# Patient Record
Sex: FEMALE | Race: BLACK OR AFRICAN AMERICAN | NOT HISPANIC OR LATINO | Employment: OTHER | URBAN - METROPOLITAN AREA
[De-identification: names, ages, dates, MRNs, and addresses within clinical notes are randomized per-mention and may not be internally consistent; named-entity substitution may affect disease eponyms.]

---

## 2017-01-03 ENCOUNTER — OFFICE VISIT (OUTPATIENT)
Dept: FAMILY MEDICINE | Facility: CLINIC | Age: 67
End: 2017-01-03
Payer: COMMERCIAL

## 2017-01-03 VITALS
BODY MASS INDEX: 46.1 KG/M2 | SYSTOLIC BLOOD PRESSURE: 108 MMHG | WEIGHT: 276.69 LBS | DIASTOLIC BLOOD PRESSURE: 79 MMHG | TEMPERATURE: 99 F | HEART RATE: 88 BPM | HEIGHT: 65 IN

## 2017-01-03 DIAGNOSIS — Z00.00 ANNUAL PHYSICAL EXAM: Primary | ICD-10-CM

## 2017-01-03 DIAGNOSIS — I10 ESSENTIAL HYPERTENSION: ICD-10-CM

## 2017-01-03 DIAGNOSIS — D32.0 MENINGIOMA, RECURRENT OF BRAIN: ICD-10-CM

## 2017-01-03 LAB
BILIRUB UR QL STRIP: NEGATIVE
CLARITY UR REFRACT.AUTO: CLEAR
COLOR UR AUTO: YELLOW
GLUCOSE UR QL STRIP: NEGATIVE
HGB UR QL STRIP: ABNORMAL
KETONES UR QL STRIP: NEGATIVE
LEUKOCYTE ESTERASE UR QL STRIP: NEGATIVE
MICROSCOPIC COMMENT: NORMAL
NITRITE UR QL STRIP: NEGATIVE
PH UR STRIP: 6 [PH] (ref 5–8)
PROT UR QL STRIP: NEGATIVE
RBC #/AREA URNS AUTO: 1 /HPF (ref 0–4)
SP GR UR STRIP: 1.01 (ref 1–1.03)
SQUAMOUS #/AREA URNS AUTO: 2 /HPF
URN SPEC COLLECT METH UR: ABNORMAL
UROBILINOGEN UR STRIP-ACNC: NEGATIVE EU/DL
WBC #/AREA URNS AUTO: 1 /HPF (ref 0–5)

## 2017-01-03 PROCEDURE — 99999 PR PBB SHADOW E&M-EST. PATIENT-LVL III: CPT | Mod: PBBFAC,,, | Performed by: FAMILY MEDICINE

## 2017-01-03 PROCEDURE — 99396 PREV VISIT EST AGE 40-64: CPT | Mod: S$GLB,,, | Performed by: FAMILY MEDICINE

## 2017-01-03 PROCEDURE — 81001 URINALYSIS AUTO W/SCOPE: CPT

## 2017-01-03 PROCEDURE — 3074F SYST BP LT 130 MM HG: CPT | Mod: S$GLB,,, | Performed by: FAMILY MEDICINE

## 2017-01-03 PROCEDURE — 3078F DIAST BP <80 MM HG: CPT | Mod: S$GLB,,, | Performed by: FAMILY MEDICINE

## 2017-01-03 RX ORDER — HYDROCHLOROTHIAZIDE 25 MG/1
25 TABLET ORAL DAILY
Qty: 90 TABLET | Refills: 3 | Status: SHIPPED | OUTPATIENT
Start: 2017-01-03 | End: 2017-03-28 | Stop reason: SDUPTHER

## 2017-01-03 RX ORDER — VALSARTAN 80 MG/1
80 TABLET ORAL DAILY
Qty: 90 TABLET | Refills: 3 | Status: SHIPPED | OUTPATIENT
Start: 2017-01-03 | End: 2018-01-14 | Stop reason: SDUPTHER

## 2017-01-03 NOTE — PROGRESS NOTES
Subjective:       Patient ID: Katelyn Huynh is a 66 y.o. female.    Chief Complaint: Annual Exam    HPI Comments: Disclaimer: This note has been generated using voice-recognition software. There may be typographical errors that have been missed during proof-reading    Here for routine exam, last exam one year ago  Last colonoscopy 2011, normal  Immunizations:  Will need Pneumovax at age 67  Prior history of anosmia secondary to meningioma.  Has f/u appt with MRI in July of this year  Normal recent mammogram  Recurrent pain, left knee, considering left knee replacement in the next 2 months    Review of Systems   Constitutional: Negative.  Negative for activity change, appetite change, chills, diaphoresis, fatigue, fever and unexpected weight change.   HENT: Negative.  Negative for congestion, ear pain, hearing loss, rhinorrhea, sinus pressure, sore throat, tinnitus, trouble swallowing and voice change.         History of anosmia     Eyes: Negative.  Negative for photophobia, pain and visual disturbance.   Respiratory: Negative.  Negative for cough, chest tightness and shortness of breath.    Cardiovascular: Negative.  Negative for chest pain, palpitations and leg swelling.   Gastrointestinal: Negative.  Negative for abdominal distention, abdominal pain, blood in stool, constipation, diarrhea, nausea and vomiting.   Genitourinary: Negative.  Negative for difficulty urinating, dysuria, frequency and hematuria.   Musculoskeletal: Positive for arthralgias. Negative for back pain, joint swelling, neck pain and neck stiffness.   Skin: Negative for color change, pallor and rash.   Neurological: Negative.  Negative for dizziness, tremors, speech difficulty, weakness, light-headedness, numbness and headaches.   Hematological: Negative for adenopathy. Does not bruise/bleed easily.   Psychiatric/Behavioral: Negative for agitation, confusion, dysphoric mood, hallucinations and sleep disturbance. The patient is not  nervous/anxious.        Objective:      Physical Exam   Constitutional: She is oriented to person, place, and time. She appears well-developed and well-nourished.   Obese, NAD   HENT:   Right Ear: Tympanic membrane and external ear normal.   Left Ear: Tympanic membrane and external ear normal.   Nose: Nose normal.   Mouth/Throat: Oropharynx is clear and moist.   Eyes: Conjunctivae and EOM are normal. Pupils are equal, round, and reactive to light.   Neck: Normal range of motion. Neck supple. No tracheal deviation present. No thyromegaly present.   Cardiovascular: Normal rate, regular rhythm, normal heart sounds and intact distal pulses.    Pulmonary/Chest: Effort normal. She has no wheezes. She has no rales. She exhibits no tenderness.   Abdominal: Soft. Bowel sounds are normal. She exhibits no distension and no mass. There is no rebound.   Musculoskeletal: Normal range of motion. She exhibits tenderness. She exhibits no edema.   Tender along medial, lateral joint lines left knee   Lymphadenopathy:     She has no cervical adenopathy.   Neurological: She is alert and oriented to person, place, and time. She has normal reflexes. No cranial nerve deficit. Coordination normal.   Skin: Skin is warm and dry. No rash noted. No erythema.   Psychiatric: She has a normal mood and affect. Her behavior is normal.       Assessment:       1.  Physical exam  2.  Essential hypertension  3.  DJD, left knee  4.  Meningioma  5.  obesity  Plan:       1.  Labs reviewed with pt  2.  Refill medications, UA, EKG  3.  Return in 2-3 months for Pneumovax

## 2017-01-03 NOTE — MR AVS SNAPSHOT
[facility]  [address] [facility], [address]  [facility] [address]  Phone: [phone]  Fax: [phone]                  Katelyn Huynh   1/3/2017 2:00 PM   Office Visit    Description:  Female : 1950   Provider:  Daniel Garcia MD   Department:  [facility]           Reason for Visit     Annual Exam           Diagnoses this Visit        Comments    Annual physical exam    -  Primary     Essential hypertension         Meningioma, recurrent of brain                To Do List           Goals (5 Years of Data)     None       These Medications        Disp Refills Start End    valsartan (DIOVAN) 80 MG tablet 90 tablet 3 1/3/2017     Take 1 tablet (80 mg total) by mouth once daily. - Oral    Pharmacy: [facility] [address] [place] [address] AT [facility] Ph #: [phone]       hydrochlorothiazide (HYDRODIURIL) 25 MG tablet 90 tablet 3 1/3/2017     Take 1 tablet (25 mg total) by mouth once daily. - Oral    Pharmacy: [facility] [address] [place] [address] AT [facility] Ph #: [phone]         [facility]     [facility] -  Assistance  Registered nurses in the [facility] provide clinical advisement, health education, appointment booking, and other advisory services.  Call for this free service at [phone].             Medications           Message regarding Medications     Verify the changes and/or additions to your medication regime listed below are the same as discussed with your clinician today.  If any of these changes or additions are incorrect, please notify your healthcare provider.        CHANGE how you are taking these medications     Start Taking Instead of    valsartan (DIOVAN) 80 MG tablet valsartan (DIOVAN) 80 MG tablet    Dosage:  Take 1 tablet (80 mg total) by mouth once daily. Dosage:  TAKE 1 TABLET BY  "MOUTH ONCE DAILY    Reason for Change:  Reorder     hydrochlorothiazide (HYDRODIURIL) 25 MG tablet hydrochlorothiazide (HYDRODIURIL) 25 MG tablet    Dosage:  Take 1 tablet (25 mg total) by mouth once daily. Dosage:  TAKE 1 TABLET BY MOUTH ONCE DAILY    Reason for Change:  Reorder            Verify that the below list of medications is an accurate representation of the medications you are currently taking.  If none reported, the list may be blank. If incorrect, please contact your healthcare provider. Carry this list with you in case of emergency.           Current Medications     ASCORBATE CALCIUM (VITAMIN C ORAL) Take by mouth.    hydrochlorothiazide (HYDRODIURIL) 25 MG tablet Take 1 tablet (25 mg total) by mouth once daily.    meloxicam (MOBIC) 15 MG tablet Take 1 tablet (15 mg total) by mouth once daily.    pimecrolimus (ELIDEL) 1 % cream Apply topically 2 (two) times daily.    tramadol (ULTRAM) 50 mg tablet TAKE 1 TABLET BY MOUTH EVERY 4 TO 6 HOURS AS NEEDED FOR PAIN    valsartan (DIOVAN) 80 MG tablet Take 1 tablet (80 mg total) by mouth once daily.           Clinical Reference Information           Vital Signs - Last Recorded  Most recent update: 1/3/2017  2:02 PM by Winifred Beckford MA    BP Pulse Temp Ht Wt BMI    108/79 (BP Location: Left arm, Patient Position: Sitting, BP Method: Automatic) 88 98.5 °F (36.9 °C) (Oral) 5' 5" (1.651 m) 125.5 kg (276 lb 10.8 oz) 46.04 kg/m2      Blood Pressure          Most Recent Value    BP  108/79      Allergies as of 1/3/2017     Cortisone      Immunizations Administered on Date of Encounter - 1/3/2017     None      Orders Placed During Today's Visit      Normal Orders This Visit    URINALYSIS     Future Labs/Procedures Expected by Expires    EKG 12-lead  As directed 1/3/2018      Instructions    Call after your next birthday for Pneumonia vaccine       "

## 2017-03-21 ENCOUNTER — OFFICE VISIT (OUTPATIENT)
Dept: FAMILY MEDICINE | Facility: CLINIC | Age: 67
End: 2017-03-21
Payer: COMMERCIAL

## 2017-03-21 VITALS
HEIGHT: 64 IN | WEIGHT: 272.94 LBS | TEMPERATURE: 98 F | HEART RATE: 68 BPM | SYSTOLIC BLOOD PRESSURE: 136 MMHG | DIASTOLIC BLOOD PRESSURE: 84 MMHG | BODY MASS INDEX: 46.6 KG/M2

## 2017-03-21 DIAGNOSIS — I87.2 VENOUS STASIS DERMATITIS OF BOTH LOWER EXTREMITIES: Primary | ICD-10-CM

## 2017-03-21 PROCEDURE — 3079F DIAST BP 80-89 MM HG: CPT | Mod: S$GLB,,, | Performed by: FAMILY MEDICINE

## 2017-03-21 PROCEDURE — 3075F SYST BP GE 130 - 139MM HG: CPT | Mod: S$GLB,,, | Performed by: FAMILY MEDICINE

## 2017-03-21 PROCEDURE — 99214 OFFICE O/P EST MOD 30 MIN: CPT | Mod: S$GLB,,, | Performed by: FAMILY MEDICINE

## 2017-03-21 PROCEDURE — 99999 PR PBB SHADOW E&M-EST. PATIENT-LVL III: CPT | Mod: PBBFAC,,, | Performed by: FAMILY MEDICINE

## 2017-03-21 PROCEDURE — 1160F RVW MEDS BY RX/DR IN RCRD: CPT | Mod: S$GLB,,, | Performed by: FAMILY MEDICINE

## 2017-03-21 RX ORDER — TRIAMCINOLONE ACETONIDE 1 MG/G
CREAM TOPICAL 3 TIMES DAILY
Qty: 80 G | Refills: 3 | Status: SHIPPED | OUTPATIENT
Start: 2017-03-21 | End: 2017-03-28 | Stop reason: SDUPTHER

## 2017-03-21 RX ORDER — CEPHALEXIN 500 MG/1
500 CAPSULE ORAL EVERY 8 HOURS
Qty: 30 CAPSULE | Refills: 0 | Status: SHIPPED | OUTPATIENT
Start: 2017-03-21 | End: 2017-03-31

## 2017-03-21 NOTE — PROGRESS NOTES
Disclaimer: This note has been generated using voice-recognition software. There may be typographical errors that have been missed during proof-reading      Patient is 66-year-old presents today for evaluation of a recurrent pruritic rash, involving the lower extremities, present for over 2 weeks.  She denies any changes in soaps, detergents or lotions.

## 2017-03-21 NOTE — PROGRESS NOTES
Subjective:       Patient ID: Katelyn Huynh is a 66 y.o. female.    Chief Complaint: Rash (Both legs)    HPI Comments: Disclaimer: This note has been generated using voice-recognition software. There may be typographical errors that have been missed during proof-reading      Patient is 66-year-old presents today for evaluation of a recurrent pruritic rash, involving the lower extremities, present for over 2 weeks.  She denies any changes in soaps, detergents or lotions.      Rash       Review of Systems   Skin: Positive for color change and rash. Negative for wound.       Objective:      Physical Exam   Musculoskeletal: She exhibits edema.   1+ edema bilaterally   Skin:   There is bilateral erythema and some induration involving the lower extremities, overlying areas of varicosities.  No ulcerations noted.       Assessment:       1. Venous stasis dermatitis of both lower extremities        Plan:       1.  TAC 0.1% topically; she will call if any allergic symptoms occur  2.  Keflex tid  3.  Increase HCTZ to 50mg daily  4.  F/u one week

## 2017-03-28 ENCOUNTER — OFFICE VISIT (OUTPATIENT)
Dept: FAMILY MEDICINE | Facility: CLINIC | Age: 67
End: 2017-03-28
Payer: COMMERCIAL

## 2017-03-28 VITALS
TEMPERATURE: 98 F | WEIGHT: 276.25 LBS | DIASTOLIC BLOOD PRESSURE: 70 MMHG | SYSTOLIC BLOOD PRESSURE: 128 MMHG | BODY MASS INDEX: 46.02 KG/M2 | HEART RATE: 100 BPM | HEIGHT: 65 IN

## 2017-03-28 DIAGNOSIS — I87.2 VENOUS STASIS DERMATITIS OF BOTH LOWER EXTREMITIES: ICD-10-CM

## 2017-03-28 PROCEDURE — 1160F RVW MEDS BY RX/DR IN RCRD: CPT | Mod: S$GLB,,, | Performed by: FAMILY MEDICINE

## 2017-03-28 PROCEDURE — 99999 PR PBB SHADOW E&M-EST. PATIENT-LVL III: CPT | Mod: PBBFAC,,, | Performed by: FAMILY MEDICINE

## 2017-03-28 PROCEDURE — 3078F DIAST BP <80 MM HG: CPT | Mod: S$GLB,,, | Performed by: FAMILY MEDICINE

## 2017-03-28 PROCEDURE — 3074F SYST BP LT 130 MM HG: CPT | Mod: S$GLB,,, | Performed by: FAMILY MEDICINE

## 2017-03-28 PROCEDURE — 99213 OFFICE O/P EST LOW 20 MIN: CPT | Mod: S$GLB,,, | Performed by: FAMILY MEDICINE

## 2017-03-28 RX ORDER — TRIAMCINOLONE ACETONIDE 1 MG/G
CREAM TOPICAL 3 TIMES DAILY
Qty: 80 G | Refills: 3 | Status: SHIPPED | OUTPATIENT
Start: 2017-03-28 | End: 2017-04-07

## 2017-03-28 RX ORDER — HYDROCHLOROTHIAZIDE 25 MG/1
50 TABLET ORAL DAILY
Qty: 180 TABLET | Refills: 3 | Status: ON HOLD | OUTPATIENT
Start: 2017-03-28 | End: 2018-03-13

## 2017-03-28 NOTE — PROGRESS NOTES
Subjective:       Patient ID: Katelyn Huynh is a 66 y.o. female.    Chief Complaint: Hypertension (follow up )    HPI Comments: Disclaimer: This note has been generated using voice-recognition software. There may be typographical errors that have been missed during proof-reading    Patient is 66-year-old presents today for follow-up of stasis dermatitis with superimposed infection.  Since last seen, she is tolerating her medications with no specific side effects, and has noted marked improvement of symptoms.  She denies any further swelling, pruritus, or discomfort.    Hypertension       Review of Systems   Cardiovascular: Negative for leg swelling.   Skin: Negative for color change, rash and wound.       Objective:      Physical Exam   Musculoskeletal: She exhibits no edema.   Skin:   Bilateral superficial varicosities over both lower extremities, mostly involving the medial aspect of the legs.  There is no erythema, or induration noted today.  No open sores or lesions.       Assessment:       1. Venous stasis dermatitis of both lower extremities        Plan:     1.  Continue and finish present medications 2.  F/u 2-3 months

## 2017-06-07 ENCOUNTER — OFFICE VISIT (OUTPATIENT)
Dept: OPHTHALMOLOGY | Facility: CLINIC | Age: 67
End: 2017-06-07
Payer: COMMERCIAL

## 2017-06-07 DIAGNOSIS — H52.7 REFRACTIVE ERROR: ICD-10-CM

## 2017-06-07 DIAGNOSIS — H26.492 PCO (POSTERIOR CAPSULAR OPACIFICATION), LEFT: Primary | ICD-10-CM

## 2017-06-07 DIAGNOSIS — I10 ESSENTIAL HYPERTENSION: ICD-10-CM

## 2017-06-07 DIAGNOSIS — H04.123 DRY EYE SYNDROME, BILATERAL: ICD-10-CM

## 2017-06-07 DIAGNOSIS — Z96.1 PSEUDOPHAKIA: ICD-10-CM

## 2017-06-07 PROCEDURE — 99999 PR PBB SHADOW E&M-EST. PATIENT-LVL II: CPT | Mod: PBBFAC,,, | Performed by: OPHTHALMOLOGY

## 2017-06-07 PROCEDURE — 92014 COMPRE OPH EXAM EST PT 1/>: CPT | Mod: S$GLB,,, | Performed by: OPHTHALMOLOGY

## 2017-06-07 NOTE — PROGRESS NOTES
Subjective:       Patient ID: Katelyn Huynh is a 66 y.o. female.    Chief Complaint: Dry Eye (KIRILL, bilateral)    HPI  Review of Systems    Objective:      Physical Exam    Assessment:       1. PCO (posterior capsular opacification), left    2. Dry eye syndrome, bilateral    3. Essential hypertension    4. Refractive error - Both Eyes    5. Pseudophakia - Both Eyes        Plan:       Early PCO OS- Not Visually Significant.     KIRILL-Needs more AT's.  HTN-No retinopathy OU.  RE-Pt wants MRx.        AT's.  Control HTN.  Give MRx.  RTC 1 yr.

## 2017-06-28 ENCOUNTER — OFFICE VISIT (OUTPATIENT)
Dept: RADIATION ONCOLOGY | Facility: CLINIC | Age: 67
End: 2017-06-28
Payer: COMMERCIAL

## 2017-06-28 ENCOUNTER — HOSPITAL ENCOUNTER (OUTPATIENT)
Dept: RADIOLOGY | Facility: HOSPITAL | Age: 67
Discharge: HOME | End: 2017-06-28
Attending: RADIOLOGY
Payer: COMMERCIAL

## 2017-06-28 VITALS
RESPIRATION RATE: 20 BRPM | TEMPERATURE: 98 F | BODY MASS INDEX: 47.86 KG/M2 | HEART RATE: 97 BPM | WEIGHT: 287.63 LBS | SYSTOLIC BLOOD PRESSURE: 135 MMHG | DIASTOLIC BLOOD PRESSURE: 80 MMHG

## 2017-06-28 DIAGNOSIS — Z86.018 HISTORY OF MENINGIOMA: Primary | Chronic | ICD-10-CM

## 2017-06-28 DIAGNOSIS — Z86.018 HISTORY OF MENINGIOMA: Chronic | ICD-10-CM

## 2017-06-28 PROCEDURE — A9585 GADOBUTROL INJECTION: HCPCS | Performed by: RADIOLOGY

## 2017-06-28 PROCEDURE — 99999 PR PBB SHADOW E&M-EST. PATIENT-LVL III: CPT | Mod: PBBFAC,,, | Performed by: RADIOLOGY

## 2017-06-28 PROCEDURE — 70553 MRI BRAIN STEM W/O & W/DYE: CPT | Mod: 26,,, | Performed by: RADIOLOGY

## 2017-06-28 PROCEDURE — 99212 OFFICE O/P EST SF 10 MIN: CPT | Mod: S$GLB,,, | Performed by: RADIOLOGY

## 2017-06-28 PROCEDURE — 70553 MRI BRAIN STEM W/O & W/DYE: CPT | Mod: TC

## 2017-06-28 PROCEDURE — 25500020 PHARM REV CODE 255: Performed by: RADIOLOGY

## 2017-06-28 RX ORDER — GADOBUTROL 604.72 MG/ML
10 INJECTION INTRAVENOUS
Status: COMPLETED | OUTPATIENT
Start: 2017-06-28 | End: 2017-06-28

## 2017-06-28 RX ADMIN — GADOBUTROL 10 ML: 604.72 INJECTION INTRAVENOUS at 08:06

## 2017-06-28 NOTE — PROGRESS NOTES
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     She comes today after MRI scan. This shows persistent meningioma on the floor the anterior cranial fossa  There is no significant change since prior MRI of 1/13/16.     Patient relates that she continues to have no sense of smell. She has no significant headaches and generally feels well.     She recently saw Dr Kaiser in opthalmology clinic for dry eye syndrome. She has no double vision. She notes occasional floaters and has had a change in the refraction of her spectacles.      PHYSICAL EXAMINATION: The EOM are full.  There are no evident neurologic deficits.     IMPRESSION: Stable residual meningioma of the planum sphenoidale after resection and radiation treatment.     PLAN: Patient to return in 18 months with MRI of the brain. She is followed at yearly intervals in opthalmology clinic.

## 2017-08-07 ENCOUNTER — TELEPHONE (OUTPATIENT)
Dept: DERMATOLOGY | Facility: CLINIC | Age: 67
End: 2017-08-07

## 2017-08-07 NOTE — TELEPHONE ENCOUNTER
----- Message from Raquel Hines sent at 8/7/2017  9:00 AM CDT -----  Contact: patient  Please call above patient said she has a rash on back now spreading to her face need to gat in ASAP waiting on a call from the nurse thanks

## 2017-08-09 ENCOUNTER — OFFICE VISIT (OUTPATIENT)
Dept: FAMILY MEDICINE | Facility: CLINIC | Age: 67
End: 2017-08-09
Payer: COMMERCIAL

## 2017-08-09 VITALS
SYSTOLIC BLOOD PRESSURE: 116 MMHG | HEART RATE: 98 BPM | DIASTOLIC BLOOD PRESSURE: 93 MMHG | WEIGHT: 287.94 LBS | TEMPERATURE: 99 F | HEIGHT: 64 IN | BODY MASS INDEX: 49.16 KG/M2

## 2017-08-09 DIAGNOSIS — Z29.9 PREVENTIVE MEASURE: ICD-10-CM

## 2017-08-09 DIAGNOSIS — L25.9 CONTACT DERMATITIS, UNSPECIFIED CONTACT DERMATITIS TYPE, UNSPECIFIED TRIGGER: Primary | ICD-10-CM

## 2017-08-09 PROCEDURE — 90471 IMMUNIZATION ADMIN: CPT | Mod: S$GLB,,, | Performed by: FAMILY MEDICINE

## 2017-08-09 PROCEDURE — 3008F BODY MASS INDEX DOCD: CPT | Mod: S$GLB,,, | Performed by: FAMILY MEDICINE

## 2017-08-09 PROCEDURE — 99213 OFFICE O/P EST LOW 20 MIN: CPT | Mod: 25,S$GLB,, | Performed by: FAMILY MEDICINE

## 2017-08-09 PROCEDURE — 99999 PR PBB SHADOW E&M-EST. PATIENT-LVL III: CPT | Mod: PBBFAC,,, | Performed by: FAMILY MEDICINE

## 2017-08-09 PROCEDURE — 90732 PPSV23 VACC 2 YRS+ SUBQ/IM: CPT | Mod: S$GLB,,, | Performed by: FAMILY MEDICINE

## 2017-08-09 PROCEDURE — 3074F SYST BP LT 130 MM HG: CPT | Mod: S$GLB,,, | Performed by: FAMILY MEDICINE

## 2017-08-09 PROCEDURE — 3080F DIAST BP >= 90 MM HG: CPT | Mod: S$GLB,,, | Performed by: FAMILY MEDICINE

## 2017-08-09 RX ORDER — BETAMETHASONE DIPROPIONATE 0.5 MG/G
CREAM TOPICAL 2 TIMES DAILY
Qty: 45 G | Refills: 1 | Status: ON HOLD | OUTPATIENT
Start: 2017-08-09 | End: 2018-02-28

## 2017-08-09 RX ORDER — HYDROXYZINE HYDROCHLORIDE 25 MG/1
25 TABLET, FILM COATED ORAL 3 TIMES DAILY PRN
Qty: 30 TABLET | Refills: 0 | Status: ON HOLD | OUTPATIENT
Start: 2017-08-09 | End: 2018-02-09 | Stop reason: HOSPADM

## 2017-08-09 NOTE — PROGRESS NOTES
Pneumovax administered to Left deltoid as per MD orders, patient tolerated well advise patient to wait 15min after shot is given for any adverse reaction.

## 2017-08-09 NOTE — PROGRESS NOTES
Subjective:       Patient ID: Katelyn Huynh is a 67 y.o. female.    Chief Complaint: Rash (mid-back and face)    Disclaimer: This note has been generated using voice-recognition software. There may be typographical errors that have been missed during proof-reading    68 yo presents for evaluation of pruritic rash, involving the mid upper back.  Rash started about 5 days ago.  Denies any changes in shampoo, soaps detergents or lotions      Review of Systems   Skin: Positive for color change and rash.       Objective:      Physical Exam   Skin: Rash noted.        Confluent erythematous rash, mary shaped, starting at nape of neck and involving mid upper back       Assessment:       1. Contact dermatitis, unspecified contact dermatitis type, unspecified trigger    2. Preventive measure        Plan:       1.  Diprolene cream topically, Atarax  2.  Stop all hair preparations for now

## 2017-12-11 ENCOUNTER — TELEPHONE (OUTPATIENT)
Dept: FAMILY MEDICINE | Facility: CLINIC | Age: 67
End: 2017-12-11

## 2017-12-11 RX ORDER — BENZONATATE 200 MG/1
200 CAPSULE ORAL 3 TIMES DAILY PRN
Qty: 30 CAPSULE | Refills: 0 | Status: SHIPPED | OUTPATIENT
Start: 2017-12-11 | End: 2017-12-18

## 2017-12-11 NOTE — TELEPHONE ENCOUNTER
----- Message from Tasia Liriano sent at 12/11/2017 11:01 AM CST -----  Contact: self   Patient would like to get medical advice.  Symptoms (please be specific):   A lot of Coughing, stuffy  How long has patient had these symptoms:  2 days   Pharmacy name and phone #:  [facility] [id number] [phone] (Phone)  [phone] (Fax)  Any drug allergies:  None   Comments:

## 2017-12-11 NOTE — TELEPHONE ENCOUNTER
I sent in a prescription for her cought.  She is allergic to steroids, so cannot send anything for the nasal congestion

## 2017-12-11 NOTE — TELEPHONE ENCOUNTER
Patient reports non-productive cough and stuffy nose x 2 days. Verbalizes that she is unable to clear passages when blowing nose. Patient is taking Robitussin DM. Patient requesting advise.

## 2018-01-08 ENCOUNTER — HOSPITAL ENCOUNTER (OUTPATIENT)
Dept: RADIOLOGY | Facility: HOSPITAL | Age: 68
Discharge: HOME | End: 2018-01-08
Attending: ORTHOPAEDIC SURGERY
Payer: COMMERCIAL

## 2018-01-08 ENCOUNTER — OFFICE VISIT (OUTPATIENT)
Dept: ORTHOPEDICS | Facility: CLINIC | Age: 68
End: 2018-01-08
Payer: COMMERCIAL

## 2018-01-08 VITALS — BODY MASS INDEX: 49.26 KG/M2 | WEIGHT: 288.56 LBS | HEIGHT: 64 IN

## 2018-01-08 DIAGNOSIS — M17.12 PRIMARY OSTEOARTHRITIS OF LEFT KNEE: ICD-10-CM

## 2018-01-08 DIAGNOSIS — M17.12 PRIMARY OSTEOARTHRITIS OF LEFT KNEE: Primary | ICD-10-CM

## 2018-01-08 PROCEDURE — 99999 PR PBB SHADOW E&M-EST. PATIENT-LVL III: CPT | Mod: PBBFAC,,, | Performed by: ORTHOPAEDIC SURGERY

## 2018-01-08 PROCEDURE — 73565 X-RAY EXAM OF KNEES: CPT | Mod: 26,,, | Performed by: RADIOLOGY

## 2018-01-08 PROCEDURE — 73565 X-RAY EXAM OF KNEES: CPT | Mod: TC

## 2018-01-08 PROCEDURE — 99214 OFFICE O/P EST MOD 30 MIN: CPT | Mod: S$GLB,,, | Performed by: ORTHOPAEDIC SURGERY

## 2018-01-08 RX ORDER — ACETAMINOPHEN 500 MG
1000 TABLET ORAL EVERY 6 HOURS
Status: CANCELLED | OUTPATIENT
Start: 2018-01-08 | End: 2018-01-10

## 2018-01-08 RX ORDER — MORPHINE SULFATE 10 MG/ML
2 INJECTION, SOLUTION INTRAMUSCULAR; INTRAVENOUS
Status: CANCELLED | OUTPATIENT
Start: 2018-01-08

## 2018-01-08 RX ORDER — SODIUM CHLORIDE 9 MG/ML
INJECTION, SOLUTION INTRAVENOUS CONTINUOUS
Status: CANCELLED | OUTPATIENT
Start: 2018-01-08 | End: 2018-01-09

## 2018-01-08 RX ORDER — BISACODYL 10 MG
10 SUPPOSITORY, RECTAL RECTAL EVERY 12 HOURS PRN
Status: CANCELLED | OUTPATIENT
Start: 2018-01-08

## 2018-01-08 RX ORDER — SODIUM CHLORIDE 9 MG/ML
INJECTION, SOLUTION INTRAVENOUS
Status: CANCELLED | OUTPATIENT
Start: 2018-01-08

## 2018-01-08 RX ORDER — CELECOXIB 100 MG/1
200 CAPSULE ORAL DAILY
Status: CANCELLED | OUTPATIENT
Start: 2018-01-08

## 2018-01-08 RX ORDER — LIDOCAINE HYDROCHLORIDE 10 MG/ML
1 INJECTION, SOLUTION EPIDURAL; INFILTRATION; INTRACAUDAL; PERINEURAL
Status: CANCELLED | OUTPATIENT
Start: 2018-01-08

## 2018-01-08 RX ORDER — OXYCODONE HYDROCHLORIDE 5 MG/1
15 TABLET ORAL
Status: CANCELLED | OUTPATIENT
Start: 2018-01-08

## 2018-01-08 RX ORDER — ACETAMINOPHEN 10 MG/ML
1000 INJECTION, SOLUTION INTRAVENOUS ONCE
Status: CANCELLED | OUTPATIENT
Start: 2018-01-08 | End: 2018-01-08

## 2018-01-08 RX ORDER — OXYCODONE HYDROCHLORIDE 5 MG/1
5 TABLET ORAL
Status: CANCELLED | OUTPATIENT
Start: 2018-01-08

## 2018-01-08 RX ORDER — MIDAZOLAM HYDROCHLORIDE 5 MG/ML
1 INJECTION INTRAMUSCULAR; INTRAVENOUS EVERY 5 MIN PRN
Status: CANCELLED | OUTPATIENT
Start: 2018-01-08

## 2018-01-08 RX ORDER — PREGABALIN 25 MG/1
150 CAPSULE ORAL NIGHTLY
Status: CANCELLED | OUTPATIENT
Start: 2018-01-08

## 2018-01-08 RX ORDER — MUPIROCIN 20 MG/G
1 OINTMENT TOPICAL
Status: CANCELLED | OUTPATIENT
Start: 2018-01-08

## 2018-01-08 RX ORDER — CELECOXIB 100 MG/1
400 CAPSULE ORAL
Status: CANCELLED | OUTPATIENT
Start: 2018-01-08

## 2018-01-08 RX ORDER — POLYETHYLENE GLYCOL 3350 17 G/17G
17 POWDER, FOR SOLUTION ORAL DAILY
Status: CANCELLED | OUTPATIENT
Start: 2018-01-08

## 2018-01-08 RX ORDER — AMOXICILLIN 250 MG
1 CAPSULE ORAL 2 TIMES DAILY
Status: CANCELLED | OUTPATIENT
Start: 2018-01-08

## 2018-01-08 RX ORDER — RAMELTEON 8 MG/1
8 TABLET ORAL NIGHTLY PRN
Status: CANCELLED | OUTPATIENT
Start: 2018-01-08

## 2018-01-08 RX ORDER — FENTANYL CITRATE 50 UG/ML
25 INJECTION, SOLUTION INTRAMUSCULAR; INTRAVENOUS EVERY 5 MIN PRN
Status: CANCELLED | OUTPATIENT
Start: 2018-01-08

## 2018-01-08 RX ORDER — ROPIVACAINE HYDROCHLORIDE 2 MG/ML
8 INJECTION, SOLUTION EPIDURAL; INFILTRATION; PERINEURAL CONTINUOUS
Status: CANCELLED | OUTPATIENT
Start: 2018-01-08

## 2018-01-08 RX ORDER — PREGABALIN 25 MG/1
150 CAPSULE ORAL
Status: CANCELLED | OUTPATIENT
Start: 2018-01-08

## 2018-01-08 RX ORDER — NALOXONE HCL 0.4 MG/ML
0.02 VIAL (ML) INJECTION
Status: CANCELLED | OUTPATIENT
Start: 2018-01-08

## 2018-01-08 RX ORDER — TRIAMCINOLONE ACETONIDE 1 MG/G
CREAM TOPICAL
Refills: 2 | Status: ON HOLD | COMMUNITY
Start: 2017-11-02 | End: 2018-03-13 | Stop reason: HOSPADM

## 2018-01-08 RX ORDER — SODIUM CHLORIDE 0.9 % (FLUSH) 0.9 %
3 SYRINGE (ML) INJECTION EVERY 8 HOURS PRN
Status: CANCELLED | OUTPATIENT
Start: 2018-01-08

## 2018-01-08 RX ORDER — FAMOTIDINE 20 MG/1
20 TABLET, FILM COATED ORAL 2 TIMES DAILY
Status: CANCELLED | OUTPATIENT
Start: 2018-01-08

## 2018-01-08 RX ORDER — OXYCODONE HYDROCHLORIDE 5 MG/1
10 TABLET ORAL
Status: CANCELLED | OUTPATIENT
Start: 2018-01-08

## 2018-01-08 RX ORDER — ASPIRIN 325 MG
325 TABLET, DELAYED RELEASE (ENTERIC COATED) ORAL 2 TIMES DAILY
Status: CANCELLED | OUTPATIENT
Start: 2018-01-08

## 2018-01-08 RX ORDER — ONDANSETRON 2 MG/ML
4 INJECTION INTRAMUSCULAR; INTRAVENOUS EVERY 12 HOURS PRN
Status: CANCELLED | OUTPATIENT
Start: 2018-01-08

## 2018-01-08 NOTE — PROGRESS NOTES
CHIEF COMPLAINT: Left knee pain.                                                          HISTORY OF PRESENT ILLNESS:  The patient is a 67 y.o. female known to this clinic who presents for evaluation of her left knee pain. She had a right TKA on 2/24/2015 which has done well. She is here today to discuss TKA for her left knee. She has had cortisone injections which were not helpful. The last one was about a year ago.     Pain Duration: 2 years  Pain Quality: achy  Pain Context:worsening  Pain Timing: constant  Pain Location:medial  Pain Severity: severe  Modifying Factors: improves with rest, ice, and elevation and ibuprofen, although she has stopped taking this due to stomach pain; the pain is worse with walking longer distances  Associated Signs and Symptoms: none    She  presents for further treatment recommendations.    She denies radicular pain or low back pain.  She denies distal paresthesias, lower extremity edema, or calf pain concerning for vascular claudication.  She has no history of discreet prior trauma.                                                                                                                       PAST MEDICAL HISTORY:    Past Medical History:   Diagnosis Date    Arthritis     Cataract     Glaucoma     History of iritis     OD    Hypertension     Meningioma     Uveitis                                                                                                             PAST SURGICAL HISTORY:    Past Surgical History:   Procedure Laterality Date    Brain tumor surgery      had a brain tumor removed    CATARACT EXTRACTION      OU    CATARACT EXTRACTION BILATERAL W/ ANTERIOR VITRECTOMY      CRANIOTOMY      KNEE SURGERY  2-24-15    right TKR    YAG Right 1/20/2016    Dr. Kaiser                                                                                                               SOCIAL HISTORY:  Reviewed per EPIC history for tobacco or alcohol use and  she   is an active  67 y.o.  female                                                                             FAMILY MEDICAL HISTORY:  family history includes Cataracts in her maternal aunt, mother, and sister; Diabetes in her father, maternal aunt, mother, and sister; Glaucoma in her brother, maternal aunt, maternal uncle, and mother; Hypertension in her father and mother; Stroke in her sister.                                                                                                                                                        PHYSICAL EXAMINATION:                                                        GENERAL:  A well-developed, well-nourished 67 y.o. female who is alert and oriented in no acute distress.      Gait: She  walks with a normal gait.                   EXTREMITIES:  Examination of lower extremities reveals there is no visible mass or deformity.    Left knee:  ROM 0-115    Ligamentously stable to varus/valgus stress.  Lateral crepitation    Anterior and posterior drawers negative.    No pain over pes bursa.    No warmth    No erythema    Effusion No    Positive medial and lateral joint line tenderness    Right knee:  ROM 0-120    Ligamentously stable to varus/valgus stress.    Anterior and posterior drawers negative.    No pain over pes bursa.    No warmth    No erythema    Effusion No    Well-healed surgical scar    The skin over both lower extremities is normal and unremarkable.  She has a  painless range of motion of the hips and ankles bilaterally.   Sensation is intact in both lower extremities.    There are no motor deficits in the lower extremities bilaterally.   Pedal pulses are palpable distally bilaterally.    She has no calf tenderness to palpation nor edema.    HEENT: normocephalic and atraumatic, EOMI, sclera white.  Oropharanx unremarkable  Heart: RRR without appreciable mumrur  Lungs: CTA  Abdomen: soft, non-tender                                    She has imaging which  was reviewed with the patient and shows severe osteoarthritis.  Radiographs show advanced DJD with joint space narrowing, sclerosis, and osteophytes.                                                                              IMPRESSION: Osteoarthritis left knee.                             The conservative options including NSAIDs, activity modification, physical therapy, corticosteroid injection, and viscosupplimentation were discussed.    The surgical process of knee replacement was discussed in detail with the patient including a detailed discussion of the procedure itself (including visual model, x-ray review, and literature review). The typical perioperative and post-operative course was discussed and perioperative risks were discussed to the patient's satisfaction.  Risks and complications discussed included but were not limited to the risks of anesthetic complications, infection, bleeding, wound healing complications, aseptic loosening, instability, limb length inequality, neurologic dysfunction including numbness,  DVT, pulmonary embolism, perioperative medical risks (cardiac, pulmonary, renal, neurologic), and death and the patient elects to proceed.   I have discussed anticoagulation with aspirin and coumadin and in low risk patients I have recommended aspirin twice a day. We will initiate pre-operative medical evaluation and clearance and set a provisional date for surgical intervention according to the patient's schedule on 2/9/18.     I have personally interviewed and evaluated the patient.  I have reviewed the resident physician's note and I concur with the findings.

## 2018-01-08 NOTE — H&P
CHIEF COMPLAINT: Left knee pain.                                                          HISTORY OF PRESENT ILLNESS:  The patient is a 67 y.o. female known to this clinic who presents for evaluation of her left knee pain. She had a right TKA on 2/24/2015 which has done well. She is here today to discuss TKA for her left knee. She has had cortisone injections which were not helpful. The last one was about a year ago.     Pain Duration: 2 years  Pain Quality: achy  Pain Context:worsening  Pain Timing: constant  Pain Location:medial  Pain Severity: severe  Modifying Factors: improves with rest, ice, and elevation and ibuprofen, although she has stopped taking this due to stomach pain; the pain is worse with walking longer distances  Associated Signs and Symptoms: none    She  presents for further treatment recommendations.    She denies radicular pain or low back pain.  She denies distal paresthesias, lower extremity edema, or calf pain concerning for vascular claudication.  She has no history of discreet prior trauma.                                                                                                                       PAST MEDICAL HISTORY:    Past Medical History:   Diagnosis Date    Arthritis     Cataract     Glaucoma     History of iritis     OD    Hypertension     Meningioma     Uveitis                                                                                                             PAST SURGICAL HISTORY:    Past Surgical History:   Procedure Laterality Date    Brain tumor surgery      had a brain tumor removed    CATARACT EXTRACTION      OU    CATARACT EXTRACTION BILATERAL W/ ANTERIOR VITRECTOMY      CRANIOTOMY      KNEE SURGERY  2-24-15    right TKR    YAG Right 1/20/2016    Dr. Kaiser                                                                                                               SOCIAL HISTORY:  Reviewed per EPIC history for tobacco or alcohol use and  she   is an active  67 y.o.  female                                                                             FAMILY MEDICAL HISTORY:  family history includes Cataracts in her maternal aunt, mother, and sister; Diabetes in her father, maternal aunt, mother, and sister; Glaucoma in her brother, maternal aunt, maternal uncle, and mother; Hypertension in her father and mother; Stroke in her sister.                                                                                                                                                        PHYSICAL EXAMINATION:                                                        GENERAL:  A well-developed, well-nourished 67 y.o. female who is alert and oriented in no acute distress.      Gait: She  walks with a normal gait.                   EXTREMITIES:  Examination of lower extremities reveals there is no visible mass or deformity.    Left knee:  ROM 0-115    Ligamentously stable to varus/valgus stress.  Lateral crepitation    Anterior and posterior drawers negative.    No pain over pes bursa.    No warmth    No erythema    Effusion No    Positive medial and lateral joint line tenderness    Right knee:  ROM 0-120    Ligamentously stable to varus/valgus stress.    Anterior and posterior drawers negative.    No pain over pes bursa.    No warmth    No erythema    Effusion No    Well-healed surgical scar    The skin over both lower extremities is normal and unremarkable.  She has a  painless range of motion of the hips and ankles bilaterally.   Sensation is intact in both lower extremities.    There are no motor deficits in the lower extremities bilaterally.   Pedal pulses are palpable distally bilaterally.    She has no calf tenderness to palpation nor edema.    HEENT: normocephalic and atraumatic, EOMI, sclera white.  Oropharanx unremarkable  Heart: RRR without appreciable mumrur  Lungs: CTA  Abdomen: soft, non-tender                                    She has imaging which  was reviewed with the patient and shows severe osteoarthritis.  Radiographs show advanced DJD with joint space narrowing, sclerosis, and osteophytes.                                                                              IMPRESSION: Osteoarthritis left knee.                             The conservative options including NSAIDs, activity modification, physical therapy, corticosteroid injection, and viscosupplimentation were discussed.    The surgical process of knee replacement was discussed in detail with the patient including a detailed discussion of the procedure itself (including visual model, x-ray review, and literature review). The typical perioperative and post-operative course was discussed and perioperative risks were discussed to the patient's satisfaction.  Risks and complications discussed included but were not limited to the risks of anesthetic complications, infection, bleeding, wound healing complications, aseptic loosening, instability, limb length inequality, neurologic dysfunction including numbness,  DVT, pulmonary embolism, perioperative medical risks (cardiac, pulmonary, renal, neurologic), and death and the patient elects to proceed.   I have discussed anticoagulation with aspirin and coumadin and in low risk patients I have recommended aspirin twice a day. We will initiate pre-operative medical evaluation and clearance and set a provisional date for surgical intervention according to the patient's schedule on 2/9/18.

## 2018-01-14 RX ORDER — VALSARTAN 80 MG/1
TABLET ORAL
Qty: 90 TABLET | Refills: 0 | Status: SHIPPED | OUTPATIENT
Start: 2018-01-14 | End: 2018-06-11 | Stop reason: SDUPTHER

## 2018-01-18 ENCOUNTER — ANESTHESIA EVENT (OUTPATIENT)
Dept: SURGERY | Facility: HOSPITAL | Age: 68
DRG: 470 | End: 2018-01-18
Payer: COMMERCIAL

## 2018-01-18 DIAGNOSIS — M79.606 PAIN OF LOWER EXTREMITY, UNSPECIFIED LATERALITY: Primary | ICD-10-CM

## 2018-01-18 NOTE — ANESTHESIA PREPROCEDURE EVALUATION
Anesthesia Assessment: Preoperative EQUATION    Planned Procedure: Procedure(s) (LRB):  REPLACEMENT-KNEE-TOTAL (Left)  Requested Anesthesia Type:Regional  Surgeon: Jasen Salinas MD  Service: Orthopedics  Known or anticipated Date of Surgery:2/9/2018    Plan:    Testing:  Hematology Profile, BMP and PT/INR   Pre-anesthesia  visit       Visit focus: possible regional anesthesia and/or nerve block      Consultation:IM Perioperative Hospitalist     Fara Hammonds RN 01/18/2018 01/18/2018  Katelyn Huynh is a 67 y.o., female.    Anesthesia Evaluation    I have reviewed the Patient Summary Reports.    I have reviewed the Nursing Notes.   I have reviewed the Medications.     Review of Systems  Anesthesia Hx:  Hx of Anesthetic complications   S/p R-TKR  2/2015 d/c pod #2    Additional Notes:  Difficult secondary to morbid obesity and poor landmarks.  Midline approach unsuccessfully attempted initially. R-paramedian successful History of prior surgery of interest to airway management or planning: Denies Family Hx of Anesthesia complications.   Denies Personal Hx of Anesthesia complications.   Social:  Former Smoker, Alcohol Use 1-2 times a month   Hematology/Oncology:  Hematology Normal   Oncology Normal     EENT/Dental:  EENT/Dental Normal Eyes: Eye Disease: Inflammatory Eye Disease: Iritis  resolved   Cardiovascular:   Hypertension ECG has been reviewed. Housework, cook, grocery shopping. Can climb a flight of stairs. Denies chest pain or sob   Pulmonary:  Possible Obstructive Sleep Apnea , (STOP/BANG) Symptoms B - BMI > 35, A - Age > 50, N - Neck circumference > 40 cms, P - Pressure being treated for high BP, S - Snoring (loud) and O - Observed interrupted breathing during sleep   HIGH RISK FOR EMY   Renal/:  Renal/ Normal     Hepatic/GI:   GERD, well controlled Denies Liver Disease.     Musculoskeletal:   Arthritis   Musculoskeletal General/Symptoms: Functional capacity is ambulatory with cane.  Joint Disease:  Arthritis, Osteoarthritis    Neurological:   Denies CVA. Denies Seizures.  Pain , onset is chronic , location of knee , quality of aching/dull, throbbing , severity is a 8 , precipitating factors are sitting too long or walks too long , alleviating factors are recline with legs elevated; heat;ibuprofen. Osteoarthritis  Brain Tumor, Meningioma (s/p craniotomy) Patient reports loss sense of smell and taste since brain surgery   Endocrine:  Endocrine Normal    Dermatological:  Skin Normal    Psych:  Psychiatric Normal           Physical Exam  General:  Morbid Obesity    Airway/Jaw/Neck:  Airway Findings: Mouth Opening: Normal Tongue: Normal  General Airway Assessment: Adult  Mallampati: III  Improves to II with phonation.  TM Distance: Normal, at least 6 cm  Jaw/Neck Findings:  Neck ROM: Normal ROM      Dental:  Dental Findings: (several missing teeth upper and lower. 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     Fara Hammonds, RN 1/25/18          Anesthesia Plan  Type of Anesthesia, risks & benefits discussed:  Anesthesia Type:  spinal, CSE  Patient's Preference:   Intra-op Monitoring Plan: standard ASA monitors  Intra-op Monitoring Plan Comments:   Post Op Pain Control Plan: multimodal analgesia, peripheral nerve block and IV/PO Opioids PRN  Post Op Pain Control Plan Comments:   Induction:   IV  Beta Blocker:  Patient is not currently on a Beta-Blocker (No further documentation required).       Informed Consent: Patient  understands risks and agrees with Anesthesia plan.  Questions answered. Anesthesia consent signed with patient.  ASA Score: 2     Day of Surgery Review of History & Physical:    H&P update referred to the surgeon.         Ready For Surgery From Anesthesia Perspective.

## 2018-01-18 NOTE — PRE ADMISSION SCREENING
Anesthesia Assessment: Preoperative EQUATION    Planned Procedure: Procedure(s) (LRB):  REPLACEMENT-KNEE-TOTAL (Left)  Requested Anesthesia Type:Regional  Surgeon: Jasen Salinas MD  Service: Orthopedics  Known or anticipated Date of Surgery:2/9/2018    Plan:    Testing:  Hematology Profile, BMP and PT/INR   Pre-anesthesia  visit       Visit focus: possible regional anesthesia and/or nerve block      Consultation: Perioperative Hospitalist     Fara Hammonds RN 01/18/2018

## 2018-01-24 ENCOUNTER — TELEPHONE (OUTPATIENT)
Dept: FAMILY MEDICINE | Facility: CLINIC | Age: 68
End: 2018-01-24

## 2018-01-24 DIAGNOSIS — Z12.31 ENCOUNTER FOR SCREENING MAMMOGRAM FOR MALIGNANT NEOPLASM OF BREAST: Primary | ICD-10-CM

## 2018-01-24 NOTE — TELEPHONE ENCOUNTER
Patient requesting a mammogram order to have done at [facility]. Mammogram ordered.

## 2018-01-24 NOTE — TELEPHONE ENCOUNTER
----- Message from Yohan Martin sent at 1/24/2018  9:17 AM CST -----  Contact: self/[phone]  Pt is calling to get a referral from the doctor to have a mammogram done [facility] . Please call and advise.    Thank You

## 2018-01-25 ENCOUNTER — INITIAL CONSULT (OUTPATIENT)
Dept: INTERNAL MEDICINE | Facility: CLINIC | Age: 68
End: 2018-01-25
Payer: COMMERCIAL

## 2018-01-25 ENCOUNTER — OFFICE VISIT (OUTPATIENT)
Dept: ORTHOPEDICS | Facility: CLINIC | Age: 68
End: 2018-01-25
Payer: MEDICARE

## 2018-01-25 ENCOUNTER — HOSPITAL ENCOUNTER (OUTPATIENT)
Dept: PREADMISSION TESTING | Facility: HOSPITAL | Age: 68
Discharge: HOME | End: 2018-01-25
Attending: ANESTHESIOLOGY
Payer: MEDICARE

## 2018-01-25 VITALS
DIASTOLIC BLOOD PRESSURE: 70 MMHG | HEIGHT: 65 IN | TEMPERATURE: 98 F | SYSTOLIC BLOOD PRESSURE: 156 MMHG | HEART RATE: 80 BPM | OXYGEN SATURATION: 97 % | WEIGHT: 293 LBS | BODY MASS INDEX: 48.82 KG/M2

## 2018-01-25 VITALS
BODY MASS INDEX: 50.02 KG/M2 | HEART RATE: 97 BPM | SYSTOLIC BLOOD PRESSURE: 156 MMHG | WEIGHT: 293 LBS | HEIGHT: 64 IN | DIASTOLIC BLOOD PRESSURE: 70 MMHG

## 2018-01-25 DIAGNOSIS — Z86.018 HISTORY OF MENINGIOMA: Chronic | ICD-10-CM

## 2018-01-25 DIAGNOSIS — Z96.659 STATUS POST KNEE REPLACEMENT, UNSPECIFIED LATERALITY: Primary | ICD-10-CM

## 2018-01-25 DIAGNOSIS — R60.9 EDEMA, UNSPECIFIED TYPE: ICD-10-CM

## 2018-01-25 DIAGNOSIS — I10 ESSENTIAL HYPERTENSION: ICD-10-CM

## 2018-01-25 DIAGNOSIS — Z87.898 HISTORY OF PREDIABETES: ICD-10-CM

## 2018-01-25 DIAGNOSIS — I83.93 VARICOSE VEINS OF BOTH LOWER EXTREMITIES: ICD-10-CM

## 2018-01-25 DIAGNOSIS — R73.03 PREDIABETES: ICD-10-CM

## 2018-01-25 DIAGNOSIS — E66.01 MORBID OBESITY: ICD-10-CM

## 2018-01-25 DIAGNOSIS — Z01.818 PREOP EXAMINATION: Primary | ICD-10-CM

## 2018-01-25 PROCEDURE — 99999 PR PBB SHADOW E&M-EST. PATIENT-LVL III: CPT | Mod: PBBFAC,,, | Performed by: HOSPITALIST

## 2018-01-25 PROCEDURE — 99244 OFF/OP CNSLTJ NEW/EST MOD 40: CPT | Mod: S$GLB,,, | Performed by: HOSPITALIST

## 2018-01-25 PROCEDURE — 99999 PR PBB SHADOW E&M-EST. PATIENT-LVL III: CPT | Mod: PBBFAC,,, | Performed by: NURSE PRACTITIONER

## 2018-01-25 PROCEDURE — 99499 UNLISTED E&M SERVICE: CPT | Mod: S$GLB,,, | Performed by: NURSE PRACTITIONER

## 2018-01-25 RX ORDER — IBUPROFEN 200 MG
200 TABLET ORAL
Status: ON HOLD | COMMUNITY
End: 2018-02-09 | Stop reason: HOSPADM

## 2018-01-25 NOTE — HPI
History of present illness- I had the pleasure of meeting this pleasant 67 y.o. lady in the pre op clinic prior to her elective Orthopedic surgery. The patient is new to me .     I have obtained the history by speaking to the patient and by reviewing the electronic health records.    Events leading up to surgery / History of presenting illness -    She has been troubled with severe  Left knee pain for 1 year . Pain increases with activity and decreases with Ibuprofen , elevating the leg on recliner helps and resting.    Relevant health conditions of significance for the perioperative period/ History of presenting illness -    Morbid obesity   Contributors- Soft drinks, Cookies, cakes, Off work for 1 year   Suggested weight loss    Hypertension ,On medication  Home  machine readings -  120-130/90- 80    Stable residual meningioma of the planum sphenoidale after resection and radiation treatment.     Under yearly MRI monitoring     History of prediabetes   Family history of Diabetes ( mother, father, sister)     Not known to have heart disease , Diabetes Mellitus, Lung disease , DVT,PE, sleep apnea, Fatty liver

## 2018-01-25 NOTE — PROGRESS NOTES
Katelyn Huynh is a 67 y.o. year old here today for a pre-operative visit in preparation for a Left total knee arthroplasty to be performed by  Dr. Salinas on 2/9/18.  she was last seen and treated in the clinic on 1/8/2018. she will be medically optimized by the pre op center. There has been no significant change in medical status since last visit. No fever, chills, malaise, or unexplained weight change.      Allergies, Medications, past medical and surgical history reviewed.    Focused examination performed.    Patient declined to see Dr. Salinas today in clinic. All questions answered. Patient encouraged to call with questions. Contact information given.     Pre, pj, and post operative procedures and expectations discussed. Questions were answered. Katelyn Huynh has been educated and is ready to proceed with surgery. Approximately 30 minutes was spent discussing surgical outcomes, plans, procedures pre, pj, and post operative expections and care.  Surgical consent signed.    Katelynrosalba Martines Lino will contact us if there are any questions, concerns, or changes in medical status prior to surgery.

## 2018-01-25 NOTE — PROGRESS NOTES
[facility] - Pre Op Consult  Progress Note    Patient Name: Katelyn Huynh  MRN: [id number]  Date of Evaluation- 01/25/2018  PCP- Daniel Garcia MD    Future cases for Katelyn Huynh [[id number]]     Case ID Status Date Time Madi Procedure Provider Location    [id number] [facility] 2/9/2018  8:50  REPLACEMENT-KNEE-TOTAL Jasen Salinas MD [[id number]] [facility] OR 2ND FLR      Left    HPI:  History of present illness- I had the pleasure of meeting this pleasant 67 y.o. lady in the pre op clinic prior to her elective Orthopedic surgery. The patient is new to me .     I have obtained the history by speaking to the patient and by reviewing the electronic health records.    Events leading up to surgery / History of presenting illness -    She has been troubled with severe  Left knee pain for 1 year . Pain increases with activity and decreases with Ibuprofen , elevating the leg on recliner helps and resting.    Relevant health conditions of significance for the perioperative period/ History of presenting illness -    Morbid obesity   Contributors- Soft drinks, Cookies, cakes, Off work for 1 year   Suggested weight loss    Hypertension ,On medication  Home  machine readings -  120-130/90- 80    Stable residual meningioma of the planum sphenoidale after resection and radiation treatment.     Under yearly MRI monitoring     History of prediabetes   Family history of Diabetes ( mother, father, sister)     Not known to have heart disease , Diabetes Mellitus, Lung disease , DVT,PE, sleep apnea, Fatty liver      Subjective/ Objective:          Chief complaint-Preoperative evaluation, Perioperative Medical management, complication reduction plan     Active cardiac conditions- none    Revised cardiac risk index predictors- none    Functional capacity -Examples of physical activity , takes care of her sister who has health problems , takes her to doctors, does grocery, can take a flight of stairs holding on to the railing-----  She can undertake all the above activities without  chest pain,chest tightness, Shortness of breath ,dizziness,lightheadedness making her exercise tolerance more  than 4 Mets.       Review of Systems   Constitutional: Negative for chills and fever.        Gained weight    HENT:        STOPBANG score 6/8  \Follow up suggested    Snoring - when very tired - 1-2 times a month   Witnessed stopping of breathing   Elevated BP  BMI> 35   Age over 50   Neck size over 40 CM     Eyes:        No new visual changes   Respiratory:        No cough , phlegm     Cardiovascular:        As noted   Gastrointestinal:        No overt GI/ blood losses  Bowel movements- Regular    Endocrine:        Prednisone use > 20 mg daily for 3 weeks- None   Genitourinary: Negative for dysuria.        No urinary hesitancy   Musculoskeletal:        As above      Neurological: Negative for syncope.        No unilateral weakness   Hematological:        Current use of Anticoagulants  Current use of Antiplatelet agents    None   Psychiatric/Behavioral:        Depression  Anxiety     None       No vascular stenting     Past Surgical History:   Procedure Laterality Date    Brain tumor surgery      had a brain tumor removed    CATARACT EXTRACTION      OU    CATARACT EXTRACTION BILATERAL W/ ANTERIOR VITRECTOMY      CRANIOTOMY      KNEE SURGERY  2-24-15    right TKR    YAG Right 1/20/2016    Dr. Kaiser       No anesthesia, bleeding, cardiac problems, PONV with previous surgeries/procedures.  FH- No anesthesia, thrombosis/ bleeding  , early onset heart disease in family   Medications and Allergies reviewed in epic.   Lives with  ,sister  Help available post op    Physical Exam  Physical Exam  Constitutional- Vitals - Body mass index is 49.58 kg/m²., There were no vitals filed for this visit.  General appearance-Conscious,Coherent  Eyes- No conjunctival icterus,pupils  round  and reactive to light   ENT-Oral cavity- moist  , Hearing grossly  "normal   Neck- No thyromegaly ,Trachea -central, No jugular venous distension,   No Carotid Bruit   Cardiovascular -Heart Sounds- Normal  and  no murmur   , No gallop rhythm   Respiratory - Normal Respiratory Effort, Normal breath sounds and  no wheeze    Peripheral pitting pedal edema-- mild,  varicose veins right lower extremity  and  varicose veins left lower extremity, no calf pain   Gastrointestinal -Soft abdomen, No palpable masses, Non Tender,Liver,Spleen not palpable. No-- free fluid and shifting dullness  Musculoskeletal- No finger Clubbing. Strength grossly normal   Lymphatic-No Palpable cervical, axillary,Inguinal lymphadenopathy   Psychiatric - normal effect,Orientation  Rt Dorsalis pedis pulses-palpable    Lt Dorsalis pedis pulses- palpable   Rt Posterior tibial pulses -palpable   Left posterior tibial pulses -palpable   Miscellaneous -  Surgical scarRt knee  and  no renal bruit  Height 5' 4.5" (1.638 m), weight 133.1 kg (293 lb 6.4 oz).      Investigations  Lab and Imaging have been reviewed in [facility].    Review of Medicine tests    EKG- I had independently reviewed the EKG from--1/26/2015   It was reported to be showing     Normal sinus rhythm  Normal ECG  When compared with ECG of 26-AUG-2014 08:50,  No significant change was found      Review of old records- Was done and information gathered regards to events leading to surgery and health conditions of significance in the perioperative period.        Preoperative cardiac risk assessment-  The patient does not have any active cardiac conditions . Revised cardiac risk index predictors-0 ---.Functional capacity is more than 4 Mets. She will be undergoing a Orthopedic procedure that carries a intermediate risk     The estimated risk of the rate of adverse cardiac outcomes  0.4%    No further cardiac work up is indicated prior to proceeding with the surgery     Orders Placed This Encounter    Hemoglobin A1c       American Society of Anesthesiologists " Physical status classification ( ASA ) class: 3     Postoperative pulmonary complication risk assessment:      ARISCAT ( [place]et) risk index- risk class -  Low, if duration of surgery is under 3 hours, intermediate, if duration of surgery is over 3 hours      American college of Surgeons, NSQUIP risk calculation     Risk of serous complication --5.3 % ( Average risk - 3.7 %), Risk of any Complication-6.1  % ( Average risk-- 4.3 %)    Assessment/Plan:     Essential hypertension  Hypertension-  Blood pressure is acceptable .I suggest holding HCTZ,Vlasartan on the morning of the surgery and can continue that  post operatively under blood pressure, electrolyte and renal function monitoring as long as they are acceptable.I suggest addressing pain control as uncontrolled pain can increased blood pressure     History of meningioma      Stable residual meningioma of the planum sphenoidale after resection and radiation treatment.     Under yearly MRI monitoring         Morbid obesity  Suggested weight loss  At this time , prefers loosing weight naturally versus bariatric surgery  Wants to join gym after recovering from knee replacement and plan on buying a treadmill    History of prediabetes  History of prediabetes   Family history of Diabetes ( mother, father, sister)  Will check A1c    Varicose veins of both lower extremities  Increased risk of thrombosis , compression stocking use discussed   Itching in the area from varicose veins    Edema  Edema- I suggested avoidance of added salt,avoidance of NSAID's, unless prescribed and suggested Limb elevation and lauryn hose use        Preventive perioperative care    Thromboembolic prophylaxis:  Her risk factors for thrombosis include morbid obesity, surgical procedure and age.I suggest  thromboembolic prophylaxis ( mechanical/pharmacological, weighing the risk benefits of pharmacological agent use considering pj procedural bleeding )  during the perioperative period.I suggested  "being active in the post operative period. The patient is a candidate for extended DVT prophylaxis     Postoperative pulmonary complication prophylaxis-Risk factors for post operative pulmonary complications include morbid obesity  age over 65 years and ASA class >2- I suggest incentive spirometry use, early ambulation and end tidal carbon dioxide monitoring  , oral care , head end of bed elevation     Renal complication prophylaxis-Risk factors for renal complications include hypertension . I suggest keeping her well hydrated .I suggested drinking 2 litre's of water a day      Surgical site Infection Prophylaxis-I  suggest appropriate antibiotic for Prophylaxis against Surgical site infections      In view of regional anesthesia, joint replacement  the patient  is at risk of postoperative urinary retention.  I suggest avoidance / minimizing the of  Benzodiazepines,Anticholinergic medication,antihistamines ( Benadryl) , if possible in the perioperative period. I suggest using the minimum possible use of opioids for the minimum period of time in the perioperative period. Benadryl avoidance suggested      This visit was focused on Preoperative evaluation, Perioperative Medical management, complication reduction plans. I suggest that the patient follows up with primary care or relevant sub specialists for ongoing health care.    I appreciate the opportunity to be involved in this patients care. Please feel free to contact me if there were any questions about this consultation.    Patient is optimized     Patient was instructed to call and update me about any changes to health, changes to medication, office visits , hospitalizations between now and surgery     Sheryl Albright MD  Perioperative Medicine  [facility]   Pager [phone]  ------    1/26-- 8 25     BP (!) 156/70 Comment: lt  Pulse 80   Temp 97.9 °F (36.6 °C) (Oral)   Ht 5' 4.5" (1.638 m)   Wt 133.1 kg (293 lb 6.4 oz)   SpO2 97%   " BMI 49.58 kg/m²   A1c - 5.7   Hb,HCT,PLT-,INR-N  BMP -BUN,Creat -N  Called to discuss labs-Improved a1c discussed   Call, if has any changes   -------    2/8- 8 28     Called to follow up   Unable to speak   Left a message to call, if needed

## 2018-01-25 NOTE — OUTPATIENT SUBJECTIVE & OBJECTIVE
Outpatient Subjective & Objective     Chief complaint-Preoperative evaluation, Perioperative Medical management, complication reduction plan     Active cardiac conditions- none    Revised cardiac risk index predictors- none    Functional capacity -Examples of physical activity , takes care of her sister who has health problems , takes her to doctors, does grocery, can take a flight of stairs holding on to the railing----- She can undertake all the above activities without  chest pain,chest tightness, Shortness of breath ,dizziness,lightheadedness making her exercise tolerance more  than 4 Mets.       Review of Systems   Constitutional: Negative for chills and fever.        Gained weight    HENT:        STOPBANG score 6/8  \Follow up suggested    Snoring - when very tired - 1-2 times a month   Witnessed stopping of breathing   Elevated BP  BMI> 35   Age over 50   Neck size over 40 CM     Eyes:        No new visual changes   Respiratory:        No cough , phlegm     Cardiovascular:        As noted   Gastrointestinal:        No overt GI/ blood losses  Bowel movements- Regular    Endocrine:        Prednisone use > 20 mg daily for 3 weeks- None   Genitourinary: Negative for dysuria.        No urinary hesitancy   Musculoskeletal:        As above      Neurological: Negative for syncope.        No unilateral weakness   Hematological:        Current use of Anticoagulants  Current use of Antiplatelet agents    None   Psychiatric/Behavioral:        Depression  Anxiety     None       No vascular stenting     Past Surgical History:   Procedure Laterality Date    Brain tumor surgery      had a brain tumor removed    CATARACT EXTRACTION      OU    CATARACT EXTRACTION BILATERAL W/ ANTERIOR VITRECTOMY      CRANIOTOMY      KNEE SURGERY  2-24-15    right TKR    YAG Right 1/20/2016    Dr. Kaiser       No anesthesia, bleeding, cardiac problems, PONV with previous surgeries/procedures.  FH- No anesthesia, thrombosis/ bleeding  " , early onset heart disease in family   Medications and Allergies reviewed in epic.   Lives with  ,sister  Help available post op    Physical Exam  Physical Exam  Constitutional- Vitals - Body mass index is 49.58 kg/m²., There were no vitals filed for this visit.  General appearance-Conscious,Coherent  Eyes- No conjunctival icterus,pupils  round  and reactive to light   ENT-Oral cavity- moist  , Hearing grossly normal   Neck- No thyromegaly ,Trachea -central, No jugular venous distension,   No Carotid Bruit   Cardiovascular -Heart Sounds- Normal  and  no murmur   , No gallop rhythm   Respiratory - Normal Respiratory Effort, Normal breath sounds and  no wheeze    Peripheral pitting pedal edema-- mild,  varicose veins right lower extremity  and  varicose veins left lower extremity, no calf pain   Gastrointestinal -Soft abdomen, No palpable masses, Non Tender,Liver,Spleen not palpable. No-- free fluid and shifting dullness  Musculoskeletal- No finger Clubbing. Strength grossly normal   Lymphatic-No Palpable cervical, axillary,Inguinal lymphadenopathy   Psychiatric - normal effect,Orientation  Rt Dorsalis pedis pulses-palpable    Lt Dorsalis pedis pulses- palpable   Rt Posterior tibial pulses -palpable   Left posterior tibial pulses -palpable   Miscellaneous -  Surgical scarRt knee  and  no renal bruit  Height 5' 4.5" (1.638 m), weight 133.1 kg (293 lb 6.4 oz).      Investigations  Lab and Imaging have been reviewed in [facility].    Review of Medicine tests    EKG- I had independently reviewed the EKG from--1/26/2015   It was reported to be showing     Normal sinus rhythm  Normal ECG  When compared with ECG of 26-AUG-2014 08:50,  No significant change was found      Review of old records- Was done and information gathered regards to events leading to surgery and health conditions of significance in the perioperative period.    Outpatient Subjective & Objective   "

## 2018-01-25 NOTE — H&P
CC: Left knee pain    Katelyn Huynh is a 67 y.o. female with a several history of Left knee pain. Pain is worse with activity and weight bearing.  Patient has experienced interference of activities of daily living due to decreased range of motion and an increase in joint pain and swelling.  Patient has failed non-operative treatment including NSAIDs, corticosteroid injections, viscosupplement injections, and activity modification.  Katelyn Huynh currently ambulates with a cane.     Past Medical History:   Diagnosis Date    Arthritis     Cataract     Glaucoma     History of iritis     OD    Hypertension     Meningioma     Uveitis        Past Surgical History:   Procedure Laterality Date    Brain tumor surgery      had a brain tumor removed    CATARACT EXTRACTION      OU    CATARACT EXTRACTION BILATERAL W/ ANTERIOR VITRECTOMY      CRANIOTOMY      KNEE SURGERY  2-24-15    right TKR    YAG Right 1/20/2016    Dr. Kaiser       Family History   Problem Relation Age of Onset    Glaucoma Mother     Hypertension Mother     Diabetes Mother     Cataracts Mother     Hypertension Father     Diabetes Father     Diabetes Sister     Cataracts Sister     Stroke Sister      minor stroke    Glaucoma Brother     Glaucoma Maternal Aunt     Diabetes Maternal Aunt     Cataracts Maternal Aunt     Glaucoma Maternal Uncle     Amblyopia Neg Hx     Blindness Neg Hx     Macular degeneration Neg Hx     Retinal detachment Neg Hx     Strabismus Neg Hx        Review of patient's allergies indicates:   Allergen Reactions    Cortisone Hives and Swelling     Swelling of the tongue         Current Outpatient Prescriptions:     ASCORBATE CALCIUM (VITAMIN C ORAL), Take by mouth once daily. , Disp: , Rfl:     hydrochlorothiazide (HYDRODIURIL) 25 MG tablet, Take 2 tablets (50 mg total) by mouth once daily. (Patient taking differently: Take 50 mg by mouth every morning. ), Disp: 180 tablet,  "Rfl: 3    hydrOXYzine HCl (ATARAX) 25 MG tablet, Take 1 tablet (25 mg total) by mouth 3 (three) times daily as needed for Itching., Disp: 30 tablet, Rfl: 0    ibuprofen (ADVIL,MOTRIN) 200 MG tablet, Take 200 mg by mouth as needed for Pain., Disp: , Rfl:     tramadol (ULTRAM) 50 mg tablet, TAKE 1 TABLET BY MOUTH EVERY 4 TO 6 HOURS AS NEEDED FOR PAIN, Disp: 60 tablet, Rfl: 0    triamcinolone acetonide 0.1% (KENALOG) 0.1 % cream, JOSE ELIAS EXT AA TID, Disp: , Rfl: 2    valsartan (DIOVAN) 80 MG tablet, TAKE 1 TABLET(80 MG) BY MOUTH EVERY DAY (Patient taking differently: TAKE 1 TABLET(80 MG) BY MOUTH EVERY DAY  am), Disp: 90 tablet, Rfl: 0    betamethasone dipropionate (DIPROLENE) 0.05 % cream, Apply topically 2 (two) times daily., Disp: 45 g, Rfl: 1    Review of Systems:  Constitutional: no fever or chills  Eyes: no visual changes  ENT: no nasal congestion or sore throat  Respiratory: no cough or shortness of breath  Cardiovascular: no chest pain or palpitations  Gastrointestinal: no nausea or vomiting, tolerating diet  Genitourinary: no hematuria or dysuria  Integument/Breast: no rash or pruritis  Hematologic/Lymphatic: no easy bruising or lymphadenopathy  Musculoskeletal: positive for c/o left knee pain worse with activity  Neurological: no seizures or tremors  Behavioral/Psych: no auditory or visual hallucinations  Endocrine: no heat or cold intolerance    PE:  BP (!) 156/70 (BP Location: Left arm, Patient Position: Sitting, BP Method: Large (Automatic))   Pulse 97   Ht 5' 4" (1.626 m)   Wt 133.1 kg (293 lb 6.2 oz)   BMI 50.36 kg/m²   General: Pleasant, cooperative, NAD   Gait: antalgic  HEENT: NCAT, sclera nonicteric   Lungs: Respirations clear bilaterally; equal and unlabored.   CV: S1S2; 2+ bilateral upper and lower extremity pulses.   Skin: Intact throughout with no rashes, erythema, or lesions  Extremities: No LE edema,  no erythema or warmth of the skin in either lower extremity.    Left knee exam:  Knee " Range of Motion:limited by pain, pain with passive range of motion  Effusion:none  Condition of skin:intact  Location of tenderness:Medial joint line, Lateral joint line and Patella   Strength:limited by pain  Stability: stable to testing    Hip Examination:normal    Radiographs: Radiographs reveal Severe degenerative change of the lateral compartment of the left knee     Knee Alignment: normal    Diagnosis: osteoarthritis Left knee    Plan: Left total knee arthroplasty    Due to the serious nature of total joint infection and the high prevalence of community acquired MRSA, vancomycin will be used perioperatively.

## 2018-01-25 NOTE — ASSESSMENT & PLAN NOTE
Suggested weight loss  At this time , prefers loosing weight naturally versus bariatric surgery  Wants to join gym after recovering from knee replacement and plan on buying a treadmill

## 2018-01-25 NOTE — DISCHARGE INSTRUCTIONS
Your surgery has been scheduled for:__________________________________________    You should report to:  ____[facility], located on the [place] side of the first floor of the           [facility] ([phone])  ____[facility], located on the [facility] side of the            Second floor of the [facility] ([phone])  ____3rd Floor [facility] located on the [facility] side of the [facility] [phone]  Please Note   - Tell your doctor if you take Aspirin, products containing Aspirin, herbal medications  or blood thinners, such as Coumadin, Ticlid, or Plavix.  (Consult your provider regarding holding or stopping before surgery).  - Arrange for someone to drive you home following surgery.  You will not be allowed to leave the surgical facility alone or drive yourself home following sedation and anesthesia.  Before Surgery  - Stop taking all herbal medications 14days prior to surgery  - No Motrin/Advil (Ibuprofen) 7 days before surgery  - No Aleve (Naproxen) 7 days before surgery  - Stop Taking Asprin, products containing Asprin _____days before surgery  - Stop taking blood thinners_______days before surgery  - Refrain from drinking alcoholic beverages for 24hours before and after surgery  - Stop or limit smoking _________days before surgery  Night before Surgery  - DO NOT EAT OR DRINK ANYTHING AFTER MIDNIGHT, INCLUDING GUM, HARD CANDY, MINTS, OR CHEWING TOBACCO.  - Take a shower or bath (shower is recommended).  Bathe with Hibiclens soap or an antibacterial soap from the neck down.  If not supplied by your surgeon, hibiclens soap will need to be purchased over the counter in pharmacy.  Rinse soap off thoroughly.  - Shampoo your hair with your regular shampoo  The Day of Surgery  - Take another bath or shower with hibiclens or any antibacterial soap, to reduce the chance of infection.  - Take heart and blood  pressure medications with a small sip of water, as advised by the perioperative team.  - Do not take fluid pills  - You may brush your teeth and rinse your mouth, but do not swall any additional water.   - Do not apply perfumes, powder, body lotions or deodorant on the day of surgery.  - Nail polish should be removed.  - Do not wear makeup or moisturizer  - Wear comfortable clothes, such as a button front shirt and loose fitting pants.  - Leave all jewelry, including body piercings, and valuables at home.    - Bring any devices you will neeed after surgery such as crutches or canes.  - If you have sleep apnea, please bring your CPAP machine  In the event that your physical condition changes including the onset of a cold or respiratory illness, or if you have to delay or cancel your surgery, please notify your surgeon.Anesthesia: Regional Anesthesia  Youre scheduled for surgery. During surgery, youll receive medication called anesthesia to keep you comfortable and pain-free. Your surgeon has decided that youll receive regional anesthesia. This sheet tells you what to expect with this type of anesthesia.  What Is Regional Anesthesia?  Regional anesthesia numbs one region of your body. The anesthesia may be given around nerves or into veins in your arms, neck, or legs (nerve block or Cate block). Or it may be sent into the spinal fluid (spinal anesthesia) or into the space just outside the spinal fluid (epidural anesthesia). Sedatives may also be given to relax you.  Nerve Block or Cate Block  A small area of the body, such as an arm or leg, can be numbed using a nerve block or [place] block.  · Nerve block: During a nerve block, your skin is numbed. A needle is then inserted near nerves that serve the area to be numbed. Anesthetic is sent through the needle.  · IV regional or Cate block: For this type of block, an IV line is put into a vein. The blood flow to the area to be numbed is blocked for a short time.  Anesthetic is sent through the IV.  Spinal Anesthesia  Spinal anesthesia numbs your body from about the waist down.  · Anesthetic is injected into the spinal fluid. This is a substance that surrounds the spinal cord in your spinal column. The anesthetic blocks pain traveling from the body to the brain.  · To receive the anesthetic, your skin is numbed at the injection site.  · A needle is then inserted into the spinal fluid. Anesthetic is sent through the needle.  Anesthesia Tools and Medications  · Local anesthetic given through a needle numbs one region of your body.  · Electrocardiography leads (electrodes) record your heart rate and rhythm.  · A blood pressure cuff monitors your blood pressure.  · A pulse oximeter on the end of the finger measures your blood oxygen level.  · Sedatives may be given through an IV to relax you and keep you comfortable. You may stay awake or sleep slightly.  · Oxygen may be given to you through a facemask.  Risks and Possible Complications  Regional anesthesia carries some risks. These include:  · Nausea and vomiting  · Headache  · Backache  · Decreased blood pressure  · Allergic reaction to the anesthetic  · Ongoing numbness (rare)  · Irregular heartbeat (rare)  · Cardiac arrest (rare)   © 3937-4960 [facility], [address] [place] [address], [place] [address]. All rights reserved. This information is not intended as a substitute for professional medical care. Always follow your healthcare professional's instructions.

## 2018-01-25 NOTE — LETTER
January 25, 2018      Odessa York MD  [address] [facility] [address]           [facility] - Pre Op Consult  [address] [facility] [address]  Phone: [phone]          Patient: Katelyn Huynh   MR Number: [id number]   YOB: 1950   Date of Visit: 1/25/2018       Dear Dr. Odessa York:    Thank you for referring Katelyn Huynh to me for evaluation. Attached you will find relevant portions of my assessment and plan of care.    If you have questions, please do not hesitate to call me. I look forward to following Katelyn Huynh along with you.    Sincerely,    Sheryl Albright MD    Enclosure  CC:  MD Jasen Nolasco MD    If you would like to receive this communication electronically, please contact [email or web address] or [phone] to request more information on [facility] Link access.    For providers and/or their staff who would like to refer a patient to [facility], please contact us through our one-stop-shop provider referral line, [facility], at [phone].    If you feel you have received this communication in error or would no longer like to receive these types of communications, please e-mail [email or web address]

## 2018-01-25 NOTE — ASSESSMENT & PLAN NOTE
Edema- I suggested avoidance of added salt,avoidance of NSAID's, unless prescribed and suggested Limb elevation and lauryn hose use

## 2018-01-25 NOTE — ASSESSMENT & PLAN NOTE
Stable residual meningioma of the planum sphenoidale after resection and radiation treatment.     Under yearly MRI monitoring

## 2018-01-25 NOTE — ASSESSMENT & PLAN NOTE
Hypertension-  Blood pressure is acceptable .I suggest holding HCTZ,Vlasartan on the morning of the surgery and can continue that  post operatively under blood pressure, electrolyte and renal function monitoring as long as they are acceptable.I suggest addressing pain control as uncontrolled pain can increased blood pressure

## 2018-01-25 NOTE — ASSESSMENT & PLAN NOTE
Increased risk of thrombosis , compression stocking use discussed   Itching in the area from varicose veins

## 2018-01-26 PROBLEM — R73.03 PREDIABETES: Status: ACTIVE | Noted: 2018-01-25

## 2018-02-08 ENCOUNTER — HOSPITAL ENCOUNTER (OUTPATIENT)
Dept: RADIOLOGY | Facility: HOSPITAL | Age: 68
Discharge: HOME | DRG: 470 | End: 2018-02-08
Attending: FAMILY MEDICINE
Payer: COMMERCIAL

## 2018-02-08 DIAGNOSIS — Z12.31 ENCOUNTER FOR SCREENING MAMMOGRAM FOR MALIGNANT NEOPLASM OF BREAST: ICD-10-CM

## 2018-02-08 PROCEDURE — 77063 BREAST TOMOSYNTHESIS BI: CPT | Mod: TC

## 2018-02-08 PROCEDURE — 77067 SCR MAMMO BI INCL CAD: CPT | Mod: 26,,, | Performed by: RADIOLOGY

## 2018-02-08 PROCEDURE — 77063 BREAST TOMOSYNTHESIS BI: CPT | Mod: 26,,, | Performed by: RADIOLOGY

## 2018-02-08 PROCEDURE — 77067 SCR MAMMO BI INCL CAD: CPT | Mod: TC

## 2018-02-09 ENCOUNTER — ANESTHESIA (OUTPATIENT)
Dept: SURGERY | Facility: HOSPITAL | Age: 68
DRG: 470 | End: 2018-02-09
Payer: COMMERCIAL

## 2018-02-09 ENCOUNTER — HOSPITAL ENCOUNTER (INPATIENT)
Facility: HOSPITAL | Age: 68
LOS: 1 days | Discharge: HOME | DRG: 470 | End: 2018-02-10
Attending: ORTHOPAEDIC SURGERY | Admitting: ORTHOPAEDIC SURGERY
Payer: COMMERCIAL

## 2018-02-09 DIAGNOSIS — M17.12 PRIMARY OSTEOARTHRITIS OF LEFT KNEE: ICD-10-CM

## 2018-02-09 DIAGNOSIS — H26.493 BILATERAL POSTERIOR CAPSULAR OPACIFICATION: ICD-10-CM

## 2018-02-09 DIAGNOSIS — I10 ESSENTIAL HYPERTENSION: Primary | ICD-10-CM

## 2018-02-09 LAB
ANION GAP SERPL CALC-SCNC: 7 MMOL/L
BUN SERPL-MCNC: 10 MG/DL
CALCIUM SERPL-MCNC: 8.2 MG/DL
CHLORIDE SERPL-SCNC: 106 MMOL/L
CO2 SERPL-SCNC: 24 MMOL/L
CREAT SERPL-MCNC: 0.8 MG/DL
EST. GFR  (AFRICAN AMERICAN): >60 ML/MIN/1.73 M^2
EST. GFR  (NON AFRICAN AMERICAN): >60 ML/MIN/1.73 M^2
GLUCOSE SERPL-MCNC: 114 MG/DL
POTASSIUM SERPL-SCNC: 4.3 MMOL/L
SODIUM SERPL-SCNC: 137 MMOL/L

## 2018-02-09 PROCEDURE — 37000009 HC ANESTHESIA EA ADD 15 MINS: Performed by: ORTHOPAEDIC SURGERY

## 2018-02-09 PROCEDURE — 97530 THERAPEUTIC ACTIVITIES: CPT

## 2018-02-09 PROCEDURE — 63600175 PHARM REV CODE 636 W HCPCS: Performed by: ORTHOPAEDIC SURGERY

## 2018-02-09 PROCEDURE — 25000003 PHARM REV CODE 250: Performed by: ANESTHESIOLOGY

## 2018-02-09 PROCEDURE — 97535 SELF CARE MNGMENT TRAINING: CPT

## 2018-02-09 PROCEDURE — 0SRD0J9 REPLACEMENT OF LEFT KNEE JOINT WITH SYNTHETIC SUBSTITUTE, CEMENTED, OPEN APPROACH: ICD-10-PCS | Performed by: ORTHOPAEDIC SURGERY

## 2018-02-09 PROCEDURE — 36000711: Performed by: ORTHOPAEDIC SURGERY

## 2018-02-09 PROCEDURE — 97161 PT EVAL LOW COMPLEX 20 MIN: CPT

## 2018-02-09 PROCEDURE — 94799 UNLISTED PULMONARY SVC/PX: CPT

## 2018-02-09 PROCEDURE — 88311 DECALCIFY TISSUE: CPT | Mod: 26,,, | Performed by: PATHOLOGY

## 2018-02-09 PROCEDURE — 63600175 PHARM REV CODE 636 W HCPCS: Performed by: ANESTHESIOLOGY

## 2018-02-09 PROCEDURE — 64450 NJX AA&/STRD OTHER PN/BRANCH: CPT | Mod: 59,LT,, | Performed by: ANESTHESIOLOGY

## 2018-02-09 PROCEDURE — 37000008 HC ANESTHESIA 1ST 15 MINUTES: Performed by: ORTHOPAEDIC SURGERY

## 2018-02-09 PROCEDURE — C1776 JOINT DEVICE (IMPLANTABLE): HCPCS | Performed by: ORTHOPAEDIC SURGERY

## 2018-02-09 PROCEDURE — 80048 BASIC METABOLIC PNL TOTAL CA: CPT

## 2018-02-09 PROCEDURE — 97165 OT EVAL LOW COMPLEX 30 MIN: CPT

## 2018-02-09 PROCEDURE — 76942 ECHO GUIDE FOR BIOPSY: CPT | Performed by: ANESTHESIOLOGY

## 2018-02-09 PROCEDURE — 27447 TOTAL KNEE ARTHROPLASTY: CPT | Mod: LT,,, | Performed by: ORTHOPAEDIC SURGERY

## 2018-02-09 PROCEDURE — 94761 N-INVAS EAR/PLS OXIMETRY MLT: CPT

## 2018-02-09 PROCEDURE — 36000710: Performed by: ORTHOPAEDIC SURGERY

## 2018-02-09 PROCEDURE — 36415 COLL VENOUS BLD VENIPUNCTURE: CPT

## 2018-02-09 PROCEDURE — 88305 TISSUE EXAM BY PATHOLOGIST: CPT | Mod: 26,,, | Performed by: PATHOLOGY

## 2018-02-09 PROCEDURE — 25000003 PHARM REV CODE 250: Performed by: ORTHOPAEDIC SURGERY

## 2018-02-09 PROCEDURE — 27800517 HC TRAY,EPIDURAL-CONTINUOUS: Performed by: ANESTHESIOLOGY

## 2018-02-09 PROCEDURE — D9220A PRA ANESTHESIA: Mod: ,,, | Performed by: ANESTHESIOLOGY

## 2018-02-09 PROCEDURE — 71000039 HC RECOVERY, EACH ADD'L HOUR: Performed by: ORTHOPAEDIC SURGERY

## 2018-02-09 PROCEDURE — 76942 ECHO GUIDE FOR BIOPSY: CPT | Mod: 26,,, | Performed by: ANESTHESIOLOGY

## 2018-02-09 PROCEDURE — 11000001 HC ACUTE MED/SURG PRIVATE ROOM

## 2018-02-09 PROCEDURE — 71000033 HC RECOVERY, INTIAL HOUR: Performed by: ORTHOPAEDIC SURGERY

## 2018-02-09 PROCEDURE — 63600175 PHARM REV CODE 636 W HCPCS

## 2018-02-09 PROCEDURE — 88305 TISSUE EXAM BY PATHOLOGIST: CPT | Performed by: PATHOLOGY

## 2018-02-09 PROCEDURE — 64448 NJX AA&/STRD FEM NRV NFS IMG: CPT | Mod: 59,LT,, | Performed by: ANESTHESIOLOGY

## 2018-02-09 PROCEDURE — 27201423 OPTIME MED/SURG SUP & DEVICES STERILE SUPPLY: Performed by: ORTHOPAEDIC SURGERY

## 2018-02-09 PROCEDURE — C1713 ANCHOR/SCREW BN/BN,TIS/BN: HCPCS | Performed by: ORTHOPAEDIC SURGERY

## 2018-02-09 DEVICE — BASEPLATE TIB CEM PRIM SZ 4: Type: IMPLANTABLE DEVICE | Site: TIBIA | Status: FUNCTIONAL

## 2018-02-09 DEVICE — CEMENT BONE W/GENT SIMPLEX HV: Type: IMPLANTABLE DEVICE | Site: KNEE | Status: FUNCTIONAL

## 2018-02-09 DEVICE — COMP FEM POST STAB CEM SZ 4 LT: Type: IMPLANTABLE DEVICE | Site: FEMUR | Status: FUNCTIONAL

## 2018-02-09 DEVICE — PATELLA TRIATHLON 33X9 SYMTRC: Type: IMPLANTABLE DEVICE | Site: PATELLA | Status: FUNCTIONAL

## 2018-02-09 RX ORDER — DOCUSATE SODIUM 100 MG/1
100 CAPSULE, LIQUID FILLED ORAL 2 TIMES DAILY
Qty: 60 CAPSULE | Refills: 0 | Status: ON HOLD | OUTPATIENT
Start: 2018-02-09 | End: 2018-02-24 | Stop reason: HOSPADM

## 2018-02-09 RX ORDER — AMOXICILLIN 250 MG
1 CAPSULE ORAL 2 TIMES DAILY
Status: DISCONTINUED | OUTPATIENT
Start: 2018-02-09 | End: 2018-02-10 | Stop reason: HOSPADM

## 2018-02-09 RX ORDER — PREGABALIN 50 MG/1
150 CAPSULE ORAL NIGHTLY
Status: DISCONTINUED | OUTPATIENT
Start: 2018-02-09 | End: 2018-02-10 | Stop reason: HOSPADM

## 2018-02-09 RX ORDER — RAMELTEON 8 MG/1
8 TABLET ORAL NIGHTLY PRN
Status: DISCONTINUED | OUTPATIENT
Start: 2018-02-09 | End: 2018-02-10 | Stop reason: HOSPADM

## 2018-02-09 RX ORDER — PREGABALIN 150 MG/1
150 CAPSULE ORAL
Status: COMPLETED | OUTPATIENT
Start: 2018-02-09 | End: 2018-02-09

## 2018-02-09 RX ORDER — ROPIVACAINE HYDROCHLORIDE 2 MG/ML
8 INJECTION, SOLUTION EPIDURAL; INFILTRATION; PERINEURAL CONTINUOUS
Status: DISCONTINUED | OUTPATIENT
Start: 2018-02-09 | End: 2018-02-10 | Stop reason: HOSPADM

## 2018-02-09 RX ORDER — ONDANSETRON 4 MG/1
4 TABLET, ORALLY DISINTEGRATING ORAL EVERY 6 HOURS PRN
Qty: 30 TABLET | Refills: 0 | Status: ON HOLD | OUTPATIENT
Start: 2018-02-09 | End: 2018-02-24 | Stop reason: HOSPADM

## 2018-02-09 RX ORDER — OXYCODONE AND ACETAMINOPHEN 10; 325 MG/1; MG/1
1 TABLET ORAL EVERY 4 HOURS PRN
Qty: 90 TABLET | Refills: 0 | Status: ON HOLD | OUTPATIENT
Start: 2018-02-09 | End: 2018-02-24 | Stop reason: HOSPADM

## 2018-02-09 RX ORDER — FENTANYL CITRATE 50 UG/ML
25 INJECTION, SOLUTION INTRAMUSCULAR; INTRAVENOUS EVERY 5 MIN PRN
Status: DISCONTINUED | OUTPATIENT
Start: 2018-02-09 | End: 2018-02-09 | Stop reason: HOSPADM

## 2018-02-09 RX ORDER — CELECOXIB 200 MG/1
400 CAPSULE ORAL
Status: COMPLETED | OUTPATIENT
Start: 2018-02-09 | End: 2018-02-09

## 2018-02-09 RX ORDER — SODIUM CHLORIDE 0.9 % (FLUSH) 0.9 %
3 SYRINGE (ML) INJECTION EVERY 8 HOURS PRN
Status: DISCONTINUED | OUTPATIENT
Start: 2018-02-09 | End: 2018-02-10 | Stop reason: HOSPADM

## 2018-02-09 RX ORDER — ACETAMINOPHEN 500 MG
1000 TABLET ORAL EVERY 6 HOURS
Status: DISCONTINUED | OUTPATIENT
Start: 2018-02-09 | End: 2018-02-10 | Stop reason: HOSPADM

## 2018-02-09 RX ORDER — DEXAMETHASONE SODIUM PHOSPHATE 4 MG/ML
INJECTION, SOLUTION INTRA-ARTICULAR; INTRALESIONAL; INTRAMUSCULAR; INTRAVENOUS; SOFT TISSUE
Status: DISCONTINUED | OUTPATIENT
Start: 2018-02-09 | End: 2018-02-09

## 2018-02-09 RX ORDER — OXYCODONE HYDROCHLORIDE 5 MG/1
5 TABLET ORAL
Status: DISCONTINUED | OUTPATIENT
Start: 2018-02-09 | End: 2018-02-10 | Stop reason: HOSPADM

## 2018-02-09 RX ORDER — SODIUM CHLORIDE 9 MG/ML
INJECTION, SOLUTION INTRAVENOUS CONTINUOUS PRN
Status: DISCONTINUED | OUTPATIENT
Start: 2018-02-09 | End: 2018-02-09

## 2018-02-09 RX ORDER — ONDANSETRON 2 MG/ML
4 INJECTION INTRAMUSCULAR; INTRAVENOUS EVERY 12 HOURS PRN
Status: DISCONTINUED | OUTPATIENT
Start: 2018-02-09 | End: 2018-02-09

## 2018-02-09 RX ORDER — SODIUM CHLORIDE 9 MG/ML
INJECTION, SOLUTION INTRAVENOUS CONTINUOUS
Status: ACTIVE | OUTPATIENT
Start: 2018-02-09 | End: 2018-02-10

## 2018-02-09 RX ORDER — CEFAZOLIN SODIUM 1 G/3ML
2 INJECTION, POWDER, FOR SOLUTION INTRAMUSCULAR; INTRAVENOUS
Status: COMPLETED | OUTPATIENT
Start: 2018-02-09 | End: 2018-02-10

## 2018-02-09 RX ORDER — ACETAMINOPHEN 10 MG/ML
1000 INJECTION, SOLUTION INTRAVENOUS ONCE
Status: COMPLETED | OUTPATIENT
Start: 2018-02-09 | End: 2018-02-09

## 2018-02-09 RX ORDER — NALOXONE HCL 0.4 MG/ML
0.02 VIAL (ML) INJECTION
Status: DISCONTINUED | OUTPATIENT
Start: 2018-02-09 | End: 2018-02-10 | Stop reason: HOSPADM

## 2018-02-09 RX ORDER — ASPIRIN 325 MG
325 TABLET, DELAYED RELEASE (ENTERIC COATED) ORAL 2 TIMES DAILY
Status: DISCONTINUED | OUTPATIENT
Start: 2018-02-09 | End: 2018-02-10 | Stop reason: HOSPADM

## 2018-02-09 RX ORDER — ASPIRIN 325 MG
325 TABLET ORAL 2 TIMES DAILY
Qty: 60 TABLET | Refills: 0 | Status: ON HOLD | OUTPATIENT
Start: 2018-02-09 | End: 2018-02-24 | Stop reason: HOSPADM

## 2018-02-09 RX ORDER — MUPIROCIN 20 MG/G
1 OINTMENT TOPICAL
Status: COMPLETED | OUTPATIENT
Start: 2018-02-09 | End: 2018-02-09

## 2018-02-09 RX ORDER — PROPOFOL 10 MG/ML
VIAL (ML) INTRAVENOUS CONTINUOUS PRN
Status: DISCONTINUED | OUTPATIENT
Start: 2018-02-09 | End: 2018-02-09

## 2018-02-09 RX ORDER — LIDOCAINE HYDROCHLORIDE 10 MG/ML
1 INJECTION, SOLUTION EPIDURAL; INFILTRATION; INTRACAUDAL; PERINEURAL
Status: COMPLETED | OUTPATIENT
Start: 2018-02-09 | End: 2018-02-09

## 2018-02-09 RX ORDER — HYDROCHLOROTHIAZIDE 25 MG/1
50 TABLET ORAL EVERY MORNING
Status: DISCONTINUED | OUTPATIENT
Start: 2018-02-09 | End: 2018-02-10 | Stop reason: HOSPADM

## 2018-02-09 RX ORDER — BISACODYL 10 MG
10 SUPPOSITORY, RECTAL RECTAL EVERY 12 HOURS PRN
Status: DISCONTINUED | OUTPATIENT
Start: 2018-02-09 | End: 2018-02-10 | Stop reason: HOSPADM

## 2018-02-09 RX ORDER — ACETAMINOPHEN 10 MG/ML
INJECTION, SOLUTION INTRAVENOUS
Status: COMPLETED
Start: 2018-02-09 | End: 2018-02-09

## 2018-02-09 RX ORDER — MORPHINE SULFATE 2 MG/ML
2 INJECTION, SOLUTION INTRAMUSCULAR; INTRAVENOUS
Status: DISCONTINUED | OUTPATIENT
Start: 2018-02-09 | End: 2018-02-10 | Stop reason: HOSPADM

## 2018-02-09 RX ORDER — FAMOTIDINE 20 MG/1
20 TABLET, FILM COATED ORAL 2 TIMES DAILY
Status: DISCONTINUED | OUTPATIENT
Start: 2018-02-09 | End: 2018-02-10 | Stop reason: HOSPADM

## 2018-02-09 RX ORDER — ROPIVACAINE HYDROCHLORIDE 2 MG/ML
INJECTION, SOLUTION EPIDURAL; INFILTRATION; PERINEURAL
Status: COMPLETED
Start: 2018-02-09 | End: 2018-02-09

## 2018-02-09 RX ORDER — POLYETHYLENE GLYCOL 3350 17 G/17G
17 POWDER, FOR SOLUTION ORAL DAILY
Status: DISCONTINUED | OUTPATIENT
Start: 2018-02-09 | End: 2018-02-10 | Stop reason: HOSPADM

## 2018-02-09 RX ORDER — OXYCODONE HYDROCHLORIDE 5 MG/1
15 TABLET ORAL
Status: DISCONTINUED | OUTPATIENT
Start: 2018-02-09 | End: 2018-02-10 | Stop reason: HOSPADM

## 2018-02-09 RX ORDER — SODIUM CHLORIDE 0.9 % (FLUSH) 0.9 %
3 SYRINGE (ML) INJECTION
Status: DISCONTINUED | OUTPATIENT
Start: 2018-02-09 | End: 2018-02-10 | Stop reason: HOSPADM

## 2018-02-09 RX ORDER — CEFAZOLIN SODIUM 1 G/3ML
INJECTION, POWDER, FOR SOLUTION INTRAMUSCULAR; INTRAVENOUS
Status: DISCONTINUED | OUTPATIENT
Start: 2018-02-09 | End: 2018-02-09

## 2018-02-09 RX ORDER — OXYCODONE HYDROCHLORIDE 5 MG/1
10 TABLET ORAL
Status: DISCONTINUED | OUTPATIENT
Start: 2018-02-09 | End: 2018-02-10 | Stop reason: HOSPADM

## 2018-02-09 RX ORDER — KETAMINE HCL IN 0.9 % NACL 50 MG/5 ML
SYRINGE (ML) INTRAVENOUS
Status: DISCONTINUED | OUTPATIENT
Start: 2018-02-09 | End: 2018-02-09

## 2018-02-09 RX ORDER — VALSARTAN 80 MG/1
80 TABLET ORAL DAILY
Status: DISCONTINUED | OUTPATIENT
Start: 2018-02-09 | End: 2018-02-10 | Stop reason: HOSPADM

## 2018-02-09 RX ORDER — CEFAZOLIN SODIUM 1 G/3ML
2 INJECTION, POWDER, FOR SOLUTION INTRAMUSCULAR; INTRAVENOUS
Status: DISCONTINUED | OUTPATIENT
Start: 2018-02-09 | End: 2018-02-09

## 2018-02-09 RX ORDER — SODIUM CHLORIDE 9 MG/ML
INJECTION, SOLUTION INTRAVENOUS
Status: COMPLETED | OUTPATIENT
Start: 2018-02-09 | End: 2018-02-09

## 2018-02-09 RX ORDER — MIDAZOLAM HYDROCHLORIDE 1 MG/ML
1 INJECTION INTRAMUSCULAR; INTRAVENOUS EVERY 5 MIN PRN
Status: DISCONTINUED | OUTPATIENT
Start: 2018-02-09 | End: 2018-02-09 | Stop reason: HOSPADM

## 2018-02-09 RX ORDER — FENTANYL CITRATE 50 UG/ML
25 INJECTION, SOLUTION INTRAMUSCULAR; INTRAVENOUS EVERY 5 MIN PRN
Status: COMPLETED | OUTPATIENT
Start: 2018-02-09 | End: 2018-02-09

## 2018-02-09 RX ORDER — ONDANSETRON 2 MG/ML
4 INJECTION INTRAMUSCULAR; INTRAVENOUS EVERY 8 HOURS PRN
Status: DISCONTINUED | OUTPATIENT
Start: 2018-02-09 | End: 2018-02-10 | Stop reason: HOSPADM

## 2018-02-09 RX ADMIN — CEFAZOLIN 2 G: 1 INJECTION, POWDER, FOR SOLUTION INTRAMUSCULAR; INTRAVENOUS at 03:02

## 2018-02-09 RX ADMIN — VANCOMYCIN 1000 MG: 1 INJECTION, SOLUTION INTRAVENOUS at 06:02

## 2018-02-09 RX ADMIN — SODIUM CHLORIDE: 0.9 INJECTION, SOLUTION INTRAVENOUS at 05:02

## 2018-02-09 RX ADMIN — FAMOTIDINE 20 MG: 20 TABLET, FILM COATED ORAL at 09:02

## 2018-02-09 RX ADMIN — OXYCODONE HYDROCHLORIDE 10 MG: 5 TABLET ORAL at 09:02

## 2018-02-09 RX ADMIN — MIDAZOLAM HYDROCHLORIDE 2 MG: 1 INJECTION, SOLUTION INTRAMUSCULAR; INTRAVENOUS at 06:02

## 2018-02-09 RX ADMIN — FENTANYL CITRATE 25 MCG: 50 INJECTION, SOLUTION INTRAMUSCULAR; INTRAVENOUS at 10:02

## 2018-02-09 RX ADMIN — HYDROCHLOROTHIAZIDE 50 MG: 25 TABLET ORAL at 03:02

## 2018-02-09 RX ADMIN — ACETAMINOPHEN 1000 MG: 500 TABLET ORAL at 11:02

## 2018-02-09 RX ADMIN — ACETAMINOPHEN 1000 MG: 500 TABLET ORAL at 03:02

## 2018-02-09 RX ADMIN — SODIUM CHLORIDE, SODIUM GLUCONATE, SODIUM ACETATE, POTASSIUM CHLORIDE, MAGNESIUM CHLORIDE, SODIUM PHOSPHATE, DIBASIC, AND POTASSIUM PHOSPHATE: .53; .5; .37; .037; .03; .012; .00082 INJECTION, SOLUTION INTRAVENOUS at 08:02

## 2018-02-09 RX ADMIN — MUPIROCIN 1 G: 20 OINTMENT TOPICAL at 06:02

## 2018-02-09 RX ADMIN — SODIUM CHLORIDE: 0.9 INJECTION, SOLUTION INTRAVENOUS at 04:02

## 2018-02-09 RX ADMIN — CELECOXIB 400 MG: 200 CAPSULE ORAL at 06:02

## 2018-02-09 RX ADMIN — ASPIRIN 325 MG: 325 TABLET, DELAYED RELEASE ORAL at 09:02

## 2018-02-09 RX ADMIN — Medication 20 MG: at 07:02

## 2018-02-09 RX ADMIN — OXYCODONE HYDROCHLORIDE 15 MG: 5 TABLET ORAL at 01:02

## 2018-02-09 RX ADMIN — ACETAMINOPHEN 1000 MG: 10 INJECTION, SOLUTION INTRAVENOUS at 09:02

## 2018-02-09 RX ADMIN — SODIUM CHLORIDE, SODIUM GLUCONATE, SODIUM ACETATE, POTASSIUM CHLORIDE, MAGNESIUM CHLORIDE, SODIUM PHOSPHATE, DIBASIC, AND POTASSIUM PHOSPHATE: .53; .5; .37; .037; .03; .012; .00082 INJECTION, SOLUTION INTRAVENOUS at 07:02

## 2018-02-09 RX ADMIN — Medication 10 MG: at 07:02

## 2018-02-09 RX ADMIN — STANDARDIZED SENNA CONCENTRATE AND DOCUSATE SODIUM 1 TABLET: 8.6; 5 TABLET, FILM COATED ORAL at 11:02

## 2018-02-09 RX ADMIN — ROPIVACAINE HYDROCHLORIDE 8 ML/HR: 2 INJECTION, SOLUTION EPIDURAL; INFILTRATION at 09:02

## 2018-02-09 RX ADMIN — VALSARTAN 80 MG: 80 TABLET ORAL at 02:02

## 2018-02-09 RX ADMIN — FAMOTIDINE 20 MG: 20 TABLET, FILM COATED ORAL at 11:02

## 2018-02-09 RX ADMIN — SODIUM CHLORIDE: 0.9 INJECTION, SOLUTION INTRAVENOUS at 06:02

## 2018-02-09 RX ADMIN — CEFAZOLIN 3 G: 330 INJECTION, POWDER, FOR SOLUTION INTRAMUSCULAR; INTRAVENOUS at 07:02

## 2018-02-09 RX ADMIN — FENTANYL CITRATE 25 MCG: 50 INJECTION, SOLUTION INTRAMUSCULAR; INTRAVENOUS at 06:02

## 2018-02-09 RX ADMIN — SODIUM CHLORIDE: 0.9 INJECTION, SOLUTION INTRAVENOUS at 09:02

## 2018-02-09 RX ADMIN — LIDOCAINE HYDROCHLORIDE: 10 INJECTION, SOLUTION EPIDURAL; INFILTRATION; INTRACAUDAL; PERINEURAL at 05:02

## 2018-02-09 RX ADMIN — STANDARDIZED SENNA CONCENTRATE AND DOCUSATE SODIUM 1 TABLET: 8.6; 5 TABLET, FILM COATED ORAL at 09:02

## 2018-02-09 RX ADMIN — PROPOFOL 75 MCG/KG/MIN: 10 INJECTION, EMULSION INTRAVENOUS at 07:02

## 2018-02-09 RX ADMIN — ASPIRIN 325 MG: 325 TABLET, DELAYED RELEASE ORAL at 11:02

## 2018-02-09 RX ADMIN — FENTANYL CITRATE 25 MCG: 50 INJECTION, SOLUTION INTRAMUSCULAR; INTRAVENOUS at 11:02

## 2018-02-09 RX ADMIN — PREGABALIN 150 MG: 50 CAPSULE ORAL at 11:02

## 2018-02-09 RX ADMIN — PREGABALIN 150 MG: 150 CAPSULE ORAL at 06:02

## 2018-02-09 RX ADMIN — POLYETHYLENE GLYCOL 3350 17 G: 17 POWDER, FOR SOLUTION ORAL at 09:02

## 2018-02-09 RX ADMIN — Medication 1000 MG: at 11:02

## 2018-02-09 RX ADMIN — DEXAMETHASONE SODIUM PHOSPHATE 8 MG: 4 INJECTION, SOLUTION INTRAMUSCULAR; INTRAVENOUS at 07:02

## 2018-02-09 RX ADMIN — OXYCODONE HYDROCHLORIDE 5 MG: 5 TABLET ORAL at 07:02

## 2018-02-09 RX ADMIN — CEFAZOLIN 2 G: 1 INJECTION, POWDER, FOR SOLUTION INTRAMUSCULAR; INTRAVENOUS at 11:02

## 2018-02-09 NOTE — PT/OT/SLP EVAL
Physical Therapy Evaluation    Patient Name:  Katelyn Huynh   MRN:  [id number]    Recommendations:     Discharge recommendations: Home Health PT  Barriers to discharge: None  Equipment recommendations: none    Assessment:     Katelyn Huynh is a 67 y.o. female admitted with a medical diagnosis of L TKA.  She presents with the below impairments/functional limitations.  Pt tolerated evaluation well today and is motivated to do well with recent joint replacement.  Pt is a good candidate for skilled PT services to address the below deficits and to increase functional independence.      Rehab Prognosis: good; patient would benefit from acute skilled PT services to address these deficits and reach maximum level of function.      Rehab Identified impairments/functional limitations:   weakness, impaired endurance, impaired sensation, impaired self care skills, impaired functional mobilty, gait instability, impaired balance, decreased coordination, decreased lower extremity function, decreased safety awareness, pain, decreased ROM, impaired skin, edema, orthopedic precautions    Recent Surgery: Procedure(s) (LRB):  REPLACEMENT-KNEE-TOTAL (Left) Day of Surgery    Plan:     During this hospitalization, patient to be seen BID to address the above listed problems via gait training, therapeutic activity, therapeutic exercise, and neuromuscular re-education   Plan of Care Reviewed with: patient    Subjective     Communicated with RN prior to session.  Patient found supine upon PT entry, agreeable to evaluation.      Chief Complaint: none  Patient comments/goals: to return home safely    Pain/Comfort:  Pain level prior: 4/10 in L knee  Pain level post: 4/10 in L knee    Patients cultural, spiritual, Mosque conflicts given the current situation: none stated    Living Environment:  Pt lives with her  and sister in [facility] with no NOÉ and tub/shower combo.   Previous level of function: PTA, pt reports  being independent for all adls and mod I for functional mobility   Equipment Owned: rolling walker, bedside commode, transfer tub bench and straight cane   Assistance Upon Discharge: Pt reports she will have assistance from both her  and sister upon d/c from hospital.   Objective:     Patient found supine in bed with blood pressure cuff, telemetry, perineural catheter, polar ice, peripheral IV, pulse ox, and FCD.     General Precautions: Standard, fall  Orthopedic Precautions:  LLE weight bearing as tolerated  Braces: none     Physical Exam:  Postural examination/scapula alignment: WFL    Skin integrity: Visible skin intact  Edema: Mild LLE    Sensation:   Intact    Lower Extremity Range of Motion:  Right Lower Extremity: WFL  Left Lower Extremity: WFL except knee    Lower Extremity Strength:  Right Lower Extremity: WFL  Left Lower Extremity: WFL except knee    Functional Mobility:    Bed Mobility:    Supine to sit: Min A  Sit to supine: Min A    Transfers:    Sit<>Stand: Min A with SW    Ambulation:    Pt ambulated 3 steps forward and backward and 1-2 sidesteps with SW and CGA.  Pt educated on 3 point gait pattern.  Pt able to verbalize and demonstrate understanding.       AM-PAC 6 CLICK MOBILITY  Total Score: 18     Therapeutic Activities and Exercises:  PT evaluation performed.  Pt educated on role of PT, safety with functional mobility.     Patient left supine in bed with all lines intact and RN notified.    GOALS:    Physical Therapy Goals        Problem: Physical Therapy Goal    Goal Priority Disciplines Outcome Goal Variances Interventions   Physical Therapy Goal     PT/OT, PT Ongoing (interventions implemented as appropriate)     Description:  Goals to be met by: 18     Patient will increase functional independence with mobility by performin. Supine to sit with Supervision  2. Sit to supine with Supervision  3. Sit to stand transfer with Stand-by Assistance  4. Gait  x 150 feet with  Stand-by Assistance using Rolling Walker.   5. Lower extremity exercise program x 30 reps per handout, with independence                      History:     Past Medical History:   Diagnosis Date    Arthritis     Cataract     Glaucoma     History of iritis     OD    Hypertension     Meningioma     Uveitis        Past Surgical History:   Procedure Laterality Date    Brain tumor surgery      had a brain tumor removed    CATARACT EXTRACTION      OU    CATARACT EXTRACTION BILATERAL W/ ANTERIOR VITRECTOMY      CHOLECYSTECTOMY      CRANIOTOMY      KNEE SURGERY  2-24-15    right TKR    YAG Right 1/20/2016    Dr. Kaiser       Time Tracking:     PT Received On: 02/09/18  PT Start Time: 1305     PT Stop Time: 1325  PT Total Time (min): 20 min     Billable Minutes: Evaluation 12; Therapeutic Activity 8      Prashant Stinson, PT, DPT  2/9/2018   [phone]

## 2018-02-09 NOTE — ANESTHESIA PROCEDURE NOTES
IPACK Single Injection Block    Patient location during procedure: pre-op   Block not for primary anesthetic.  Reason for block: at surgeon's request and post-op pain management   Post-op Pain Location: L knee pain  Start time: 2/9/2018 6:35 AM  Timeout: 2/9/2018 6:31 AM   End time: 2/9/2018 6:51 AM  Staffing  Anesthesiologist: NIKIA BUTT  Resident/CRNA: TRAN SWIFT  Performed: resident/CRNA   Preanesthetic Checklist  Completed: patient identified, site marked, surgical consent, pre-op evaluation, timeout performed, IV checked, risks and benefits discussed and monitors and equipment checked  Peripheral Block  Patient position: supine  Prep: ChloraPrep  Patient monitoring: heart rate, cardiac monitor, continuous pulse ox, continuous capnometry and frequent blood pressure checks  Block type: I PACK  Laterality: left  Injection technique: single shot  Needle  Needle type: Stimuplex   Needle gauge: 21 G  Needle length: 4 in  Needle localization: anatomical landmarks and ultrasound guidance   -ultrasound image captured on disc.  Assessment  Injection assessment: negative aspiration, negative parasthesia and local visualized surrounding nerve  Paresthesia pain: none  Heart rate change: no  Slow fractionated injection: yes  Medications:  Bolus administered: 20 mL of 0.25 ropivacaine  Epinephrine added: 3.75 mcg/mL (1/300,000)  Additional Notes  VSS.  [facility] RN monitoring vitals throughout procedure.  Patient tolerated procedure well.

## 2018-02-09 NOTE — PLAN OF CARE
[facility]    HOME HEALTH ORDERS  FACE TO FACE ENCOUNTER    Patient Name: Katelyn Huynh  YOB: 1950    PCP: Daniel Garcia MD   PCP Address: [address] [facility] [address] / [facility] [address]  PCP Phone Number: [phone]  PCP Fax: [phone]    Encounter Date: 02/09/2018    Admit to Home Health    Diagnoses:  Active Hospital Problems    Diagnosis  POA    *Primary osteoarthritis of left knee [M17.12]  Yes      Resolved Hospital Problems    Diagnosis Date Resolved POA   No resolved problems to display.       Future Appointments  Date Time Provider Department Center   2/23/2018 10:15 AM Shelley Up NP [facility]hp[facility]           I have seen and examined this patient face to face today. My clinical findings that support the need for the home health skilled services and home bound status are the following:  Weakness/numbness causing balance and gait disturbance due to Joint Replacement making it taxing to leave home.    Allergies:  Review of patient's allergies indicates:   Allergen Reactions    Cortisone Hives and Swelling     Swelling of the tongue       Diet: regular diet    Activities: activity as tolerated    Nursing:   SN to complete comprehensive assessment including routine vital signs. Instruct on disease process and s/s of complications to report to MD. Follow specific home health arthoplasty protocol. Review/verify medication list sent home with the patient at time of discharge  and instruct patient/caregiver as needed. If coumadin ordered, coumadin clinic to manage INR with INR draws 2x per week with a goal to maintain INR between 1.8 and 2.2. Frequency may be adjusted depending on start of care date.    Notify MD if SBP > 160 or < 90; DBP > 90 or < 50; HR > 120 or < 50; Temp > 101    Home Medical Equipment:  Walker, 3-1 bedside commode, transfer tub bench    CONSULTS:    Physical Therapy to evaluate and treat. Evaluate for home safety  and equipment needs; Establish/upgrade home exercise program. Perform / instruct on therapeutic exercises, gait training, transfer training, and Range of Motion.    OTHER:  Occupational Therapy to evaluate and treat. Evaluate home environment for safety and equipment needs. Perform/Instruct on transfers, ADL training, ROM, and therapeutic exercises.    MISCELLANEOUS CARE:  Routine Skin for Bedridden Patients: Instruct patient/caregiver to apply moisture barrier cream to all skin folds and wet areas in perineal area daily and after baths and all bowel movements.    WOUND CARE ORDERS  Follow Home Health specific protocol.      Medications: Review discharge medications with patient and family and provide education.      Current Discharge Medication List      CONTINUE these medications which have NOT CHANGED    Details   ASCORBATE CALCIUM (VITAMIN C ORAL) Take by mouth once daily.       hydrochlorothiazide (HYDRODIURIL) 25 MG tablet Take 2 tablets (50 mg total) by mouth once daily.  Qty: 180 tablet, Refills: 3      ibuprofen (ADVIL,MOTRIN) 200 MG tablet Take 200 mg by mouth as needed for Pain.      valsartan (DIOVAN) 80 MG tablet TAKE 1 TABLET(80 MG) BY MOUTH EVERY DAY  Qty: 90 tablet, Refills: 0      betamethasone dipropionate (DIPROLENE) 0.05 % cream Apply topically 2 (two) times daily.  Qty: 45 g, Refills: 1    Associated Diagnoses: Contact dermatitis, unspecified contact dermatitis type, unspecified trigger      hydrOXYzine HCl (ATARAX) 25 MG tablet Take 1 tablet (25 mg total) by mouth 3 (three) times daily as needed for Itching.  Qty: 30 tablet, Refills: 0    Associated Diagnoses: Contact dermatitis, unspecified contact dermatitis type, unspecified trigger      tramadol (ULTRAM) 50 mg tablet TAKE 1 TABLET BY MOUTH EVERY 4 TO 6 HOURS AS NEEDED FOR PAIN  Qty: 60 tablet, Refills: 0    Associated Diagnoses: Primary osteoarthritis of one knee      triamcinolone acetonide 0.1% (KENALOG) 0.1 % cream JOSE ELIAS EXT AA  TID  Refills: 2             I certify that this patient is confined to her home and needs intermittent skilled nursing care, physical therapy and occupational therapy.

## 2018-02-09 NOTE — OP NOTE
02/09/2018    PREOPERATIVE DIAGNOSIS:  Osteoarthritis left knee.    POSTOPERATIVE DIAGNOSIS:  Osteoarthritis left knee.    PROCEDURE:  Left total knee replacement. (CPT# 07329)    SURGEON:  Jasen Salinas M.D.    ASSISTANT:   Emigdio.    ANESTHESIA:  spinal.    ESTIMATED BLOOD LOSS:  200 mL    Complications: None    Specimen: Bone    INDICATIONS:  Katelyn Huynh is a 67 y.o. year-old with a longstanding history of left knee pain.  She has failed extensive conservative care to this point.  Preoperative imaging revealed degenerative joint disease and treatment options were discussed.  She elected to proceed with knee replacement.    Risks and complications were discussed including, but not limited to risks of anesthetic complications, infections, wound healing complications, aseptic loosening, instability, DVT, pulmonary embolism and death among others and she elected to proceed.    COMPONENTS USED:  The Chronogolf triathlon system with size femur 4, tibia 4, 11  mm polyethylene, 33 mm patella.    DESCRIPTION OF PROCEDURE:  The patient was taken to the Operating Room where anesthesia was administered by the Anesthesia Department. She was then placed in the supine position and all superficial neurovascular structures were well padded.  The left lower extremity was then sterilely prepped and draped in the normal fashion.    Under tourniquet control, a 20 cm longitudinal incision was made over the anterior aspect of the knee.  Subcutaneous tissue was sharply dissected down to the deep fascia, which was incised along the line of the incision.  A standard medial parapatellar arthrotomy was made.  The proximal medial tibial plateau was then subperiosteally exposed protecting the medial collateral ligament.  A portion of the infrapatellar fat pad was excised.  The patella was everted and the knee was flexed to 90 degrees.    The extramedullary tibial alignment guide was then placed and the proximal tibial osteotomy  was made approximately 2 mm distal to the most shallow surface of the tibial plateau.  This was done perpendicular to the axis of the tibia with approximately 3 degrees posterior slope.    A drill was then used to gain access into the intramedullary canal of the distal femur. The distal femoral cutting guide was placed and the 10 mm distal femoral cut was made in 5 degrees of valgus.  Femur was sized to a size 4.  The size 4 cutting guide was applied and the posterior femoral condyle cuts were made.  At this time, the cutting guide was removed and the cruciate ligaments, osteophytes, menisci and any debris was removed from the joint space.  Flexion and extension gaps were checked and found to be equal and stable at 11 mm. The femoral cutting guide was reapplied and the posterior chamfer, anterior, and anterior chamfer cuts were made. The notch cutting guide for the posterior stabilize housing was placed and the notch cuts were made.    We then turned our attention back towards the proximal tibia.  This was sized to a size 4, placed in neutral rotation, and the keel was punched in the standard fashion.  Trial sizes of the 4 femur, 4 tibia, and 11 mm polyethylene liner were then placed.    The patellar cutting guide was then used to remove the dorsal 8 mm of the patellar surface to a final thickness of 12 mm.  This was sized to a size 33. Drill holes were placed and patellar trial was placed.    At this time, the knee was placed through range of motion.  Excellent patellofemoral tracking and stability were noted throughout.  Full extension was easily obtained and intraoperative range of motion was from approximately 0 to 120 degrees.    Trial components were removed and the bony surfaces were thoroughly irrigated in a pulse lavage fashion and dried.  Two batches of cement were mixed and the tibial, femoral and patellar components were cemented into position, impacted and held in place as the cement polymerized.  Any  excess cement was removed using Doucette elevators.  The 11 mm polyethylene was then locked into position.    The wound was once again thoroughly irrigated.  The tourniquet was deflated and any significant bleeding was stopped using electrocautery.  Tranexamic acid was placed in the wound as well to aid in hemostasis.    The quadriceps tendon and parapatellar retinaculum were then closed with interrupted figure-of-eight sutures of #1 Vicryl.  Subcutaneous tissue was closed with interrupted inverted stitches #3-0 Vicryl.  Skin was approximated using staples.  Sterile dressing was applied and she was returned to the Postanesthesia Care Unit in stable condition.    As the attending surgeon I was physically present for the key/critical portions of the procedure.

## 2018-02-09 NOTE — NURSING TRANSFER
Nursing Transfer Note      2/9/2018     Transfer To: 510    Transfer via stretcher    Transfer with two personal belongings bags, purse, perineural catheter w/ infusion    Transported by MAGDA Gonzalez    Medicines sent: NS infusing; Ropivacaine infusing    Chart send with patient: Yes    Notified: spouse, Mr. Riddle; Pts nurse Carlene    Patient reassessed at: 1700 2/9/18

## 2018-02-09 NOTE — TRANSFER OF CARE
"Anesthesia Transfer of Care Note    Patient: Katelyn Huynh    Procedure(s) Performed: Procedure(s) (LRB):  REPLACEMENT-KNEE-TOTAL (Left)    Patient location: PACU    Anesthesia Type: spinal and MAC    Transport from OR: Transported from OR on 100% O2 by closed face mask with adequate spontaneous ventilation    Post pain: adequate analgesia    Post assessment: no apparent anesthetic complications    Post vital signs: stable    Level of consciousness: awake and alert    Nausea/Vomiting: no nausea/vomiting    Complications: none    Transfer of care protocol was followed      Last vitals:   Visit Vitals  BP (!) 147/84 (BP Location: Left arm, Patient Position: Lying)   Pulse 81   Temp 36.8 °C (98.3 °F) (Oral)   Resp 12   Ht 5' 4.5" (1.638 m)   Wt 128.4 kg (283 lb)   SpO2 100%   Breastfeeding? No   BMI 47.83 kg/m²     "

## 2018-02-09 NOTE — PLAN OF CARE
VSS. Patient states pain is tolerable. No N&V. Bilateral foot pumps  in place throughout duration in PACU. PT/OT worked with pt. Pt offered bedpan/purewick multiple times, pt refused stating nu would wait until she was in her room. Pt has perineaural catheter w/ infusion. NS infusing. Pts dressing clean/dry/intact w/ polar ice in place. Polar ice refilled with ice before transfer. Pt transferred to room 510. Pts  notified.Transferred to room, Carlene SELF received report.

## 2018-02-09 NOTE — ANESTHESIA PROCEDURE NOTES
Spinal    Diagnosis: arthritis  Patient location during procedure: OR  Start time: 2/9/2018 7:10 AM  Timeout: 2/9/2018 7:08 AM  End time: 2/9/2018 7:21 AM  Staffing  Anesthesiologist: NIKIA BUTT  Resident/CRNA: TRAN RODRIGUEZ JR.  Performed: resident/CRNA   Preanesthetic Checklist  Completed: patient identified, site marked, surgical consent, pre-op evaluation, timeout performed, IV checked, risks and benefits discussed and monitors and equipment checked  Spinal Block  Patient position: sitting  Prep: ChloraPrep  Patient monitoring: heart rate, cardiac monitor and continuous pulse ox  Approach: midline  Location: L3-4  Injection technique: single shot  CSF Fluid: clear free-flowing CSF  Needle  Needle type: pencil-tip   Needle gauge: 25 G  Needle length: 5 in  Needle localization: anatomical landmarks  Assessment  Ease of block: difficult  Patient's tolerance of the procedure: comfortable throughout block and no complaints  Medications:  Bolus administered: 2.4 mL of 2.0 mepivacaine  Additional Notes  Tuohy used as introducer to facilitate spinal placement.  Attempt initially at L4-5 unsuccessful

## 2018-02-09 NOTE — ANESTHESIA PROCEDURE NOTES
Adductor Canal Catheter    Patient location during procedure: pre-op   Block not for primary anesthetic.  Reason for block: at surgeon's request and post-op pain management   Post-op Pain Location: L knee pain  Start time: 2/9/2018 6:35 AM  Timeout: 2/9/2018 6:31 AM   End time: 2/9/2018 6:51 AM  Staffing  Anesthesiologist: NIKIA BUTT  Resident/CRNA: TRAN SWIFT  Performed: resident/CRNA   Preanesthetic Checklist  Completed: patient identified, site marked, surgical consent, pre-op evaluation, timeout performed, IV checked, risks and benefits discussed and monitors and equipment checked  Peripheral Block  Patient position: supine  Prep: ChloraPrep and site prepped and draped  Patient monitoring: heart rate, cardiac monitor, continuous pulse ox, continuous capnometry and frequent blood pressure checks  Block type: adductor canal  Laterality: left  Injection technique: continuous  Needle  Needle type: Tuohy   Needle gauge: 17 G  Needle length: 3.5 in  Needle localization: anatomical landmarks and ultrasound guidance  Catheter type: spring wound  Catheter size: 19 G  Test dose: lidocaine 1.5% with Epi 1-to-200,000 and negative   -ultrasound image captured on disc.  Assessment  Injection assessment: negative aspiration, negative parasthesia and local visualized surrounding nerve  Paresthesia pain: none  Heart rate change: no  Slow fractionated injection: yes  Medications:  Bolus administered: 20 mL of 0.25 ropivacaine  Epinephrine added: 3.75 mcg/mL (1/300,000)  Additional Notes  VSS.  [facility] RN monitoring vitals throughout procedure.  Patient tolerated procedure well.

## 2018-02-09 NOTE — PT/OT/SLP EVAL
Occupational Therapy   Evaluation    Name: Katelyn Huynh  MRN: [id number]  Admitting Diagnosis:  Primary osteoarthritis of left knee Day of Surgery    Recommendations:     Discharge recommendations: home with home health     Barriers to discharge: None    Equipment recommendations: none - pt has all necessary equipment     History:     Occupational Profile:  Living Environment: Pt lives with her  and sister in [facility] with no NOÉ and tub/shower combo.   Previous level of function: PTA, pt reports being independent for all adls and mod I for functional mobility   Equipment Owned: rolling walker, bedside commode, transfer tub bench and straight cane   Assistance Upon Discharge: Pt reports she will have assistance from both her  and sister upon d/c from hospital.     Past Medical History:   Diagnosis Date    Arthritis     Cataract     Glaucoma     History of iritis     OD    Hypertension     Meningioma     Uveitis        Past Surgical History:   Procedure Laterality Date    Brain tumor surgery      had a brain tumor removed    CATARACT EXTRACTION      OU    CATARACT EXTRACTION BILATERAL W/ ANTERIOR VITRECTOMY      CHOLECYSTECTOMY      CRANIOTOMY      KNEE SURGERY  2-24-15    right TKR    YAG Right 1/20/2016    Dr. Kaiser       Subjective     Communicated with: RN prior to session.    Pt agreeable to Evaluation.    Pain/Comfort:  Pain level: 3/10 in L knee    Objective:     Pt found supine in bed with blood pressure cuff, quick catheter, telemetry, PNC, PCEA, pulse ox, Peripheral IV and FCD.    Orthopedic Precautions: LLE weight bearing as tolerated    Occupational Performance:    Bed Mobility:    Supine to sit: CGA  Sit to supine: Min A    Transfers:    Sit<>Stand: Min A, SW    Ambulation:    Pt ambulated 3 steps forward, backward, and side stepped to HOB with CGA and SW - no signs of LOB or SOB noted     Activities of Daily Living:  UE dressing: Maximum Assistance to meño gown  around back  LE dressing: Total Assistance to meño socks  Pt educated on LE dressing techniques and need for AD with LE dressing       Patient left supine in bed with all lines intact and RN notified.    [facility] 6 Click: Self-care  [facility] Total Score: 16    Education:    Assessment:     Katelyn Huynh is a 67 y.o. female with a medical diagnosis of Primary osteoarthritis of left knee.  She presents with decreased function of L LE impeding her ability to perform ADLs and functional mobility and would benefit from OT services to maximize functional (I) and safety.    Performance deficits affecting function are weakness, impaired endurance, impaired self care skills, impaired functional mobilty, gait instability, impaired balance, decreased lower extremity function, decreased safety awareness, pain, impaired skin, decreased ROM, edema, orthopedic precautions.    Rehab Prognosis:  Good    Plan:     Patient to be seen QD to address the above listed problems via self-care/home management, therapeutic activities, therapeutic exercises    Plan of Care Reviewed with: patient    This Plan of care has been discussed with the patient who was involved in its development and understands and is in agreement with the identified goals and treatment plan    GOALS:    Occupational Therapy Goals        Problem: Occupational Therapy Goal    Goal Priority Disciplines Outcome Interventions   Occupational Therapy Goal     OT, PT/OT Ongoing (interventions implemented as appropriate)    Description:  Goals to be met by: 7 days    Patient will increase functional independence with ADLs by performing:    UE Dressing with Supervision.  LE Dressing with Supervision with AD as needed.  Grooming while standing with Supervision.  Toileting from bedside commode with Supervision for hygiene and clothing management.   Stand pivot transfers with Supervision.  Toilet transfer to bedside commode with Supervision.                         Time  Tracking:     OT Received On: 2/9/2018  OT Start Time: 1305  OT Stop Time: 1325   OT Total Time: 20 minutes     Billable Minutes:  Evaluation 12 minutes  Self Care/Home Management 8 minutes    Hailey Stephen OT  2/9/2018

## 2018-02-10 VITALS
TEMPERATURE: 98 F | OXYGEN SATURATION: 96 % | BODY MASS INDEX: 47.8 KG/M2 | HEART RATE: 85 BPM | DIASTOLIC BLOOD PRESSURE: 60 MMHG | RESPIRATION RATE: 16 BRPM | WEIGHT: 280 LBS | HEIGHT: 64 IN | SYSTOLIC BLOOD PRESSURE: 138 MMHG

## 2018-02-10 PROCEDURE — 97530 THERAPEUTIC ACTIVITIES: CPT

## 2018-02-10 PROCEDURE — 97116 GAIT TRAINING THERAPY: CPT

## 2018-02-10 PROCEDURE — 63600175 PHARM REV CODE 636 W HCPCS: Performed by: ORTHOPAEDIC SURGERY

## 2018-02-10 PROCEDURE — 25000003 PHARM REV CODE 250: Performed by: ORTHOPAEDIC SURGERY

## 2018-02-10 PROCEDURE — 97110 THERAPEUTIC EXERCISES: CPT

## 2018-02-10 PROCEDURE — 97535 SELF CARE MNGMENT TRAINING: CPT

## 2018-02-10 PROCEDURE — 25000003 PHARM REV CODE 250: Performed by: STUDENT IN AN ORGANIZED HEALTH CARE EDUCATION/TRAINING PROGRAM

## 2018-02-10 RX ORDER — CALCIUM CARBONATE 500(1250)
1000 TABLET ORAL
Status: DISCONTINUED | OUTPATIENT
Start: 2018-02-10 | End: 2018-02-10 | Stop reason: HOSPADM

## 2018-02-10 RX ORDER — CELECOXIB 200 MG/1
200 CAPSULE ORAL DAILY
Status: DISCONTINUED | OUTPATIENT
Start: 2018-02-10 | End: 2018-02-10 | Stop reason: HOSPADM

## 2018-02-10 RX ADMIN — STANDARDIZED SENNA CONCENTRATE AND DOCUSATE SODIUM 1 TABLET: 8.6; 5 TABLET, FILM COATED ORAL at 08:02

## 2018-02-10 RX ADMIN — FAMOTIDINE 20 MG: 20 TABLET, FILM COATED ORAL at 08:02

## 2018-02-10 RX ADMIN — CELECOXIB 200 MG: 200 CAPSULE ORAL at 09:02

## 2018-02-10 RX ADMIN — ASPIRIN 325 MG: 325 TABLET, DELAYED RELEASE ORAL at 08:02

## 2018-02-10 RX ADMIN — ROPIVACAINE HYDROCHLORIDE 8 ML/HR: 2 INJECTION, SOLUTION EPIDURAL; INFILTRATION at 07:02

## 2018-02-10 RX ADMIN — HYDROCHLOROTHIAZIDE 50 MG: 25 TABLET ORAL at 06:02

## 2018-02-10 RX ADMIN — ACETAMINOPHEN 1000 MG: 500 TABLET ORAL at 12:02

## 2018-02-10 RX ADMIN — OXYCODONE HYDROCHLORIDE 15 MG: 5 TABLET ORAL at 12:02

## 2018-02-10 RX ADMIN — CEFAZOLIN 2 G: 1 INJECTION, POWDER, FOR SOLUTION INTRAMUSCULAR; INTRAVENOUS at 07:02

## 2018-02-10 RX ADMIN — POLYETHYLENE GLYCOL 3350 17 G: 17 POWDER, FOR SOLUTION ORAL at 08:02

## 2018-02-10 RX ADMIN — CALCIUM 1000 MG: 500 TABLET ORAL at 12:02

## 2018-02-10 RX ADMIN — OXYCODONE HYDROCHLORIDE 10 MG: 5 TABLET ORAL at 06:02

## 2018-02-10 RX ADMIN — VALSARTAN 80 MG: 80 TABLET ORAL at 08:02

## 2018-02-10 RX ADMIN — CALCIUM 1000 MG: 500 TABLET ORAL at 08:02

## 2018-02-10 RX ADMIN — ACETAMINOPHEN 1000 MG: 500 TABLET ORAL at 06:02

## 2018-02-10 NOTE — PT/OT/SLP PROGRESS
Occupational Therapy   Treatment    Name: Katelyn Huynh  MRN: [id number]  Admitting Diagnosis:  Primary osteoarthritis of left knee  1 Day Post-Op    Recommendations:     Discharge Recommendations: home with home health  Discharge Equipment Recommendations:  walker, rolling, bedside commode  Barriers to discharge:  None    Subjective     Communicated with: RN prior to session.  Pain/Comfort:  · Pain Rating 1: 3/10  · Location - Side 1: Left  · Location 1:  (knee)  · Pain Addressed 1: Pre-medicate for activity, Cessation of Activity  · Pain Rating Post-Intervention 1: 3/10    Patients cultural, spiritual, Temple conflicts given the current situation: none stated    Objective:     Patient found with:  (polar ice; epidural; FCD)    General Precautions: Standard, fall   Orthopedic Precautions:LLE weight bearing as tolerated   Braces: N/A     Occupational Performance:    Bed Mobility:    · NT, found and left UIC    Functional Mobility/Transfers:  · Patient completed Sit <> Stand Transfer with stand by assistance  with  standard walker   · Functional Mobility: SBA with SW, 3 steps forward, 3 backward    Activities of Daily Living:  · LB Dressing: able to doff/don L sock with no AE; able to don pants with good technique and good safety awareness; able to stand and pull over hips with SBA and SW    Patient left up in chair with all lines intact and call button in reach    [facility] 6 Click:  [facility] Total Score: 20    Treatment & Education:  Pt ed re OT role and POC. Pt performed LBD with SBA (see above). Pt performed multiple sit to stand t/f's with SBA and SW, and was able to ambulate 3 feet forward and backward with SBA and SW.  Education:    Assessment:     Katelyn Huynh is a 67 y.o. female with a medical diagnosis of Primary osteoarthritis of left knee.  She presents with the following performance deficits affecting function are weakness, impaired endurance, gait instability, decreased lower  extremity function, decreased ROM, pain, impaired self care skills.  Pt participates well and is motivated to regain functional independence. Pt progressing well with therapy and is able to perform LBD with SBA and no AE. Pt would benefit from HH therapy to continue progression toward improved independence in mobility and self care.    Rehab Prognosis:  Good; patient would benefit from acute skilled OT services to address these deficits and reach maximum level of function.       Plan:     Patient to be seen   to address the above listed problems via self-care/home management, therapeutic activities, therapeutic exercises  · Plan of Care Expires:    · Plan of Care Reviewed with: patient    This Plan of care has been discussed with the patient who was involved in its development and understands and is in agreement with the identified goals and treatment plan    GOALS:    Occupational Therapy Goals        Problem: Occupational Therapy Goal    Goal Priority Disciplines Outcome Interventions   Occupational Therapy Goal     OT, PT/OT Ongoing (interventions implemented as appropriate)    Description:  Goals to be met by: 7 days    Patient will increase functional independence with ADLs by performing:    UE Dressing with Supervision.  LE Dressing with Supervision with AD as needed.  Grooming while standing with Supervision.  Toileting from bedside commode with Supervision for hygiene and clothing management.   Stand pivot transfers with Supervision.  Toilet transfer to bedside commode with Supervision.                         Time Tracking:     OT Date of Treatment: 02/10/18  OT Start Time: 0956  OT Stop Time: 1019  OT Total Time (min): 23 min    Billable Minutes:Self Care/Home Management 23 minutes    KAROLINE Miller  2/10/2018  Pager: [phone]

## 2018-02-10 NOTE — SUBJECTIVE & OBJECTIVE
"Principal Problem:Primary osteoarthritis of left knee    Principal Orthopedic Problem: same    Interval History: Patient seen and examined at bedside.  No acute events overnight.  Pain controlled.  Walked with PT yesterday.      Review of patient's allergies indicates:   Allergen Reactions    Cortisone Hives and Swelling     Swelling of the tongue       Current Facility-Administered Medications   Medication    0.9%  NaCl infusion    acetaminophen tablet 1,000 mg    aspirin EC tablet 325 mg    bisacodyl suppository 10 mg    ceFAZolin injection 2 g    famotidine tablet 20 mg    hydroCHLOROthiazide tablet 50 mg    morphine injection 2 mg    morphine injection 2 mg    morphine injection 2 mg    naloxone 0.4 mg/mL injection 0.02 mg    ondansetron injection 4 mg    oxyCODONE immediate release tablet 10 mg    oxyCODONE immediate release tablet 15 mg    oxyCODONE immediate release tablet 5 mg    polyethylene glycol packet 17 g    pregabalin capsule 150 mg    ramelteon tablet 8 mg    ropivacaine (PF) 2 mg/ml (0.2%) infusion    senna-docusate 8.6-50 mg per tablet 1 tablet    sodium chloride 0.9% flush 3 mL    sodium chloride 0.9% flush 3 mL    valsartan tablet 80 mg     Objective:     Vital Signs (Most Recent):  Temp: 97.9 °F (36.6 °C) (02/10/18 0600)  Pulse: 85 (02/10/18 0600)  Resp: 18 (02/10/18 0600)  BP: 134/65 (02/10/18 0600)  SpO2: 95 % (02/10/18 0600) Vital Signs (24h Range):  Temp:  [94.1 °F (34.5 °C)-98.8 °F (37.1 °C)] 97.9 °F (36.6 °C)  Pulse:  [64-98] 85  Resp:  [12-20] 18  SpO2:  [91 %-100 %] 95 %  BP: (118-164)/(60-86) 134/65     Weight: 128.4 kg (283 lb)  Height: 5' 4.5" (163.8 cm)  Body mass index is 47.83 kg/m².      Intake/Output Summary (Last 24 hours) at 02/10/18 0654  Last data filed at 02/09/18 1500   Gross per 24 hour   Intake           3689.3 ml   Output                0 ml   Net           3689.3 ml       Ortho/SPM Exam  AAOx4  NAD  RRR  No increased WOB  Aquacel with small " amount of blood  Polar ice in place  SILT and motor intact T/SP/DP  WWP extremities  FCDs in place and functioning    Significant Labs: All pertinent labs within the past 24 hours have been reviewed.    Significant Imaging: I have reviewed all pertinent imaging results/findings.

## 2018-02-10 NOTE — PROGRESS NOTES
Pt d/c home with walker per MD order. Pt verbalized understanding d/c instructions.Written prescriptions given to pt .IV removed and catheter tip intact.VSS.Pt left via wheelchair escorted by staff and family.

## 2018-02-10 NOTE — PROGRESS NOTES
[facility]  Orthopedics  Progress Note    Patient Name: Katelyn Huynh  MRN: [id number]  Admission Date: 2/9/2018  Hospital Length of Stay: 1 days  Attending Provider: Jasen Salinas MD  Primary Care Provider: Daniel Garcia MD  Follow-up For: Procedure(s) (LRB):  REPLACEMENT-KNEE-TOTAL (Left)    Post-Operative Day: 1 Day Post-Op  Subjective:     Principal Problem:Primary osteoarthritis of left knee    Principal Orthopedic Problem: same    Interval History: Patient seen and examined at bedside.  No acute events overnight.  Pain controlled.  Walked with PT yesterday.      Review of patient's allergies indicates:   Allergen Reactions    Cortisone Hives and Swelling     Swelling of the tongue       Current Facility-Administered Medications   Medication    0.9%  NaCl infusion    acetaminophen tablet 1,000 mg    aspirin EC tablet 325 mg    bisacodyl suppository 10 mg    ceFAZolin injection 2 g    famotidine tablet 20 mg    hydroCHLOROthiazide tablet 50 mg    morphine injection 2 mg    morphine injection 2 mg    morphine injection 2 mg    naloxone 0.4 mg/mL injection 0.02 mg    ondansetron injection 4 mg    oxyCODONE immediate release tablet 10 mg    oxyCODONE immediate release tablet 15 mg    oxyCODONE immediate release tablet 5 mg    polyethylene glycol packet 17 g    pregabalin capsule 150 mg    ramelteon tablet 8 mg    ropivacaine (PF) 2 mg/ml (0.2%) infusion    senna-docusate 8.6-50 mg per tablet 1 tablet    sodium chloride 0.9% flush 3 mL    sodium chloride 0.9% flush 3 mL    valsartan tablet 80 mg     Objective:     Vital Signs (Most Recent):  Temp: 97.9 °F (36.6 °C) (02/10/18 0600)  Pulse: 85 (02/10/18 0600)  Resp: 18 (02/10/18 0600)  BP: 134/65 (02/10/18 0600)  SpO2: 95 % (02/10/18 0600) Vital Signs (24h Range):  Temp:  [94.1 °F (34.5 °C)-98.8 °F (37.1 °C)] 97.9 °F (36.6 °C)  Pulse:  [64-98] 85  Resp:  [12-20] 18  SpO2:  [91 %-100 %] 95 %  BP:  "(118-164)/(60-86) 134/65     Weight: 128.4 kg (283 lb)  Height: 5' 4.5" (163.8 cm)  Body mass index is 47.83 kg/m².      Intake/Output Summary (Last 24 hours) at 02/10/18 0654  Last data filed at 02/09/18 1500   Gross per 24 hour   Intake           3689.3 ml   Output                0 ml   Net           3689.3 ml       Ortho/SPM Exam  AAOx4  NAD  RRR  No increased WOB  Aquacel with small amount of blood  Polar ice in place  SILT and motor intact T/SP/DP  WWP extremities  FCDs in place and functioning    Significant Labs: All pertinent labs within the past 24 hours have been reviewed.    Significant Imaging: I have reviewed all pertinent imaging results/findings.    Assessment/Plan:     * Primary osteoarthritis of left knee    67 y.o. female POD1 s/p L TKA    Pain control  PT/OT: WBAT LLE  DVT PPx:  BID, FCDs at all times when not ambulating  Abx: postop Ancef  Labs: reviewed  Drain: none  Lozano: none    Dispo: f/u PT recs        Idiopathic hypertension    Home meds              Isidro Harper MD  Orthopedics  [facility]  "

## 2018-02-10 NOTE — PT/OT/SLP PROGRESS
Physical Therapy Treatment    Patient Name:  Katelyn Huynh   MRN:  [id number]    Recommendations:     Discharge Recommendations:  home with home health   Discharge Equipment Recommendations: walker, rolling, bedside commode   Barriers to discharge: None    Assessment:     Katelyn Huynh is a 67 y.o. female admitted with a medical diagnosis of Primary osteoarthritis of left knee.  She presents with the following impairments/functional limitations:  weakness, impaired endurance, impaired self care skills, impaired balance, gait instability, decreased lower extremity function, decreased ROM, edema, orthopedic precautions, pain, impaired joint extensibility.    Rehab Prognosis:  Good; patient would benefit from acute skilled PT services to address these deficits and reach maximum level of function.      -Pt tolerated PM session well with improved functional mobility. Pt with no LOB during ambulation and t/f's using RW. Pt with good tolerance to supine exercises and demonstrated good knee flexion ROM. Pt safe to d/c home with HHPT at this time.    Recent Surgery: Procedure(s) (LRB):  REPLACEMENT-KNEE-TOTAL (Left) 1 Day Post-Op    Plan:     During this hospitalization, patient to be seen BID to address the above listed problems via gait training, therapeutic activities, therapeutic exercises, neuromuscular re-education  · Plan of Care Expires:  03/10/18   Plan of Care Reviewed with: patient    Subjective     Communicated with nsg prior to session.  Patient found sitting upon PT entry to room, agreeable to treatment.      Chief Complaint: impaired mobility  Patient comments/goals: return home to [facility]  Pain/Comfort:  · Pain Rating 1: 6/10  · Location - Side 1: Left  · Location - Orientation 1: anterior  · Location 1: knee  · Pain Addressed 1: Pre-medicate for activity, Cessation of Activity, Reposition  · Pain Rating Post-Intervention 1: 4/10    Patients cultural, spiritual, Christianity conflicts  given the current situation: none stated    Objective:     Patient found with: FCD, perineural catheter, cryotherapy     General Precautions: Standard, fall   Orthopedic Precautions:LLE weight bearing as tolerated   Braces: N/A     Functional Mobility:  · Bed Mobility:     · Scooting: stand by assistance  · Supine to Sit: stand by assistance  · Sit to Supine: stand by assistance  · Transfers:     · Sit to Stand:  contact guard assistance with rolling walker  · Gait: 75' x2 trials using RW; cueing for upright posture.  · Balance: Pt with good sitting and fair standing balance.      AM-PAC 6 CLICK MOBILITY  Turning over in bed (including adjusting bedclothes, sheets and blankets)?: 4  Sitting down on and standing up from a chair with arms (e.g., wheelchair, bedside commode, etc.): 3  Moving from lying on back to sitting on the side of the bed?: 4  Moving to and from a bed to a chair (including a wheelchair)?: 4  Need to walk in hospital room?: 4  Climbing 3-5 steps with a railing?: 4  Total Score: 23       Therapeutic Activities and Exercises:   -Pt performed TKR protocol exercises x15 reps of:  A. AP  B. GS  C. QS  D. SAQ-AAROM  E. Heel slides-AAROM  F. Hip abd/add-AAROM  G. SLR-AAROM  H. LAQ-AAROM    -Pt educated on:  A. PT POC and role of PT  B. Importance of OOB activity to improve functional outcomes after surgery  C. S/s associated with TKR procedure  D. Importance of re-gaining ROM early in acute phase  E. DME mgmt and gait/transfer sequencing  F. Performing HEP to reduce risk of developing blood clots  G. Car t/f    -Pt's knee ROM measured at 0 deg ext to 68 deg flexion      Patient left up in chair with all lines intact, call button in reach and nsg notified..    GOALS:    Physical Therapy Goals     Not on file          Multidisciplinary Problems (Resolved)        Problem: Physical Therapy Goal    Goal Priority Disciplines Outcome Goal Variances Interventions   Physical Therapy Goal   (Resolved)     PT/OT, PT  Outcome(s) achieved     Description:  Goals to be met by: 18     Patient will increase functional independence with mobility by performin. Supine to sit with Supervision  2. Sit to supine with Supervision  3. Sit to stand transfer with Stand-by Assistance  4. Gait  x 150 feet with Stand-by Assistance using Rolling Walker.   5. Lower extremity exercise program x 30 reps per handout, with independence                      Time Tracking:     PT Received On: 02/10/18  PT Start Time: 1306     PT Stop Time: 1351  PT Total Time (min): 45 min     Billable Minutes: Gait Training 15, Therapeutic Activity 15 and Therapeutic Exercise 15    Treatment Type: Treatment  PT/PTA: PT           Ian Rodriguez, PT  02/10/2018

## 2018-02-10 NOTE — PLAN OF CARE
Problem: Occupational Therapy Goal  Goal: Occupational Therapy Goal  Goals to be met by: 7 days    Patient will increase functional independence with ADLs by performing:    UE Dressing with Supervision.  LE Dressing with Supervision with AD as needed.  Grooming while standing with Supervision.  Toileting from bedside commode with Supervision for hygiene and clothing management.   Stand pivot transfers with Supervision.  Toilet transfer to bedside commode with Supervision.        Outcome: Ongoing (interventions implemented as appropriate)  Goals remain appropriate. Cont POC.    KAROLINE Miller  2/10/2018  Pager: [phone]

## 2018-02-10 NOTE — PROGRESS NOTES
Acute Pain Service and Perioperative Surgical Home Progress Note    HPI  Katelyn Huynh is a 67 y.o., female,     Interval history    Surgery:  Procedure(s) (LRB):  REPLACEMENT-KNEE-TOTAL (Left)    Post Op Day #: 1    Catheter type: Perineural Adductor Canal    Infusion type: Ropivacaine 0.2%  8 mL/hr basal    Problem List:    Active Hospital Problems    Diagnosis  POA    *Primary osteoarthritis of left knee [M17.12]  Yes    Idiopathic hypertension [I10]  Yes      Resolved Hospital Problems    Diagnosis Date Resolved POA   No resolved problems to display.       Subjective:     General appearance of alert, oriented, no complaints   Pain with rest: 3    Numbers   Pain with movement: 5    Numbers   Side Effects    1. Pruritis No    2. Nausea No    3. Motor Blockade No, 0=Ability to raise lower extremities off bed    4. Sedation No, 1=awake and alert    Schedule Medications:    acetaminophen  1,000 mg Oral Q6H    aspirin  325 mg Oral BID    calcium carbonate  1,000 mg Oral TID WM    famotidine  20 mg Oral BID    hydroCHLOROthiazide  50 mg Oral QAM    polyethylene glycol  17 g Oral Daily    pregabalin  150 mg Oral QHS    senna-docusate 8.6-50 mg  1 tablet Oral BID    valsartan  80 mg Oral Daily        Continuous Infusions:   sodium chloride 0.9% 150 mL/hr at 02/09/18 1652    ropivacaine (PF) 2 mg/ml (0.2%) 8 mL/hr (02/10/18 0711)        PRN Medications:  bisacodyl, morphine, morphine, morphine, naloxone, ondansetron, oxyCODONE, oxyCODONE, oxyCODONE, ramelteon, sodium chloride 0.9%, sodium chloride 0.9%       Antibiotics:  Antibiotics     None           Objective:     Catheter site clean, dry, intact    Vital Signs (Most Recent):  Temp: 36.6 °C (97.9 °F) (02/10/18 0732)  Pulse: 80 (02/10/18 0732)  Resp: 18 (02/10/18 0732)  BP: 122/60 (02/10/18 0732)  SpO2: 96 % (02/10/18 0732) Vital Signs Range (Last 24H):  Temp:  [34.5 °C (94.1 °F)-37.1 °C (98.8 °F)]   Pulse:  [64-98]   Resp:  [12-20]   BP:  (118-164)/(60-86)   SpO2:  [91 %-100 %]      I & O (Last 24H):  Intake/Output Summary (Last 24 hours) at 02/10/18 0813  Last data filed at 02/10/18 0500   Gross per 24 hour   Intake           3485.3 ml   Output                4 ml   Net           3481.3 ml       Physical Exam:    GA: Alert, comfortable, no acute distress.   Pulmonary: Clear to auscultation A/P/L. No wheezing, crackles, or rhonchi.  Cardiac: RRR S1 & S2 w/o rubs/murmurs/gallops.   Abdominal:Bowel sounds present. No tenderness to palpation or distension. No appreciable hepatosplenomegaly.   Skin: No jaundice, rashes, or visible lesions.    Laboratory:  CBC: No results for input(s): WBC, RBC, HGB, HCT, PLT, MCV, MCH, MCHC in the last 72 hours.    BMP: Recent Labs      02/09/18   0938   NA  137   K  4.3   CO2  24   CL  106   BUN  10   CREATININE  0.8   GLU  114*   CALCIUM  8.2*       No results for input(s): PT, INR, PROTIME, APTT in the last 72 hours.    Assessment:    Pain control adequate    Plan:    1. Pain:  Adductor canal perineural catheter in place and infusing. Good level.  Multimodal pain regimen with intraoperative ketamine and dexamethasone, postoperative acetaminophen, celecoxib, pregabalin, and PRN oxycodone as indicated unless contraindicated.  Will continue to monitor. Plan to continue perineural catheter and discharge home with On-Q today if working well with PT.  2. HTN: BPs in good range.  Continue home HCTZ, valsartan.  3. FEN/GI:  Tolerating liquids, advance diet as tolerated.  4. DVT prophylaxis: SCD,  BID  5. Dispo:  Pt working well with PT/OT. Case management and SW for placement. Plan for discharge tomorrow or possibly today if patient works well with PT and meets goals.    Ozzie Chavira MD PGY2/CA1  Anesthesiology  [facility]  Pager: [phone]

## 2018-02-10 NOTE — PROGRESS NOTES
Pt and family present, consent to converting adductor PNC to On Q.  Site CDI.  Educated regarding continued pain management, fall risk, signs of complications, continued monitoring, as well as discontinuing catheter on 2/12/2018.  Two numbers obtained for APS to follow up.  Understanding verbalized.

## 2018-02-10 NOTE — ASSESSMENT & PLAN NOTE
67 y.o. female POD1 s/p L TKA    Pain control  PT/OT: WBAT LLE  DVT PPx:  BID, FCDs at all times when not ambulating  Abx: postop Ancef  Labs: reviewed  Drain: none  Lozano: none    Dispo: f/u PT recs

## 2018-02-10 NOTE — PLAN OF CARE
Problem: Physical Therapy Goal  Goal: Physical Therapy Goal  Goals to be met by: 18     Patient will increase functional independence with mobility by performin. Supine to sit with Supervision  2. Sit to supine with Supervision  3. Sit to stand transfer with Stand-by Assistance  4. Gait  x 150 feet with Stand-by Assistance using Rolling Walker.   5. Lower extremity exercise program x 30 reps per handout, with independence     Outcome: Outcome(s) achieved Date Met: 02/10/18  Pt tolerated PM session well with improved functional mobility. Pt with no LOB during ambulation and t/f's using RW. Pt with good tolerance to supine exercises and demonstrated good knee flexion ROM. Pt safe to d/c home with HHPT at this time.

## 2018-02-10 NOTE — PLAN OF CARE
Pt accepted to [facility] and will be able to admit tomorrow.    Nunu Tamayo LMSW  On call

## 2018-02-10 NOTE — PROGRESS NOTES
Pt arrive to room via stretcher escorted by staff. Pt alert/oriented /awake. Ace wrap to LLE clean dry intact. VSS. No cardiac monitoring. Call light within reach. Fall precautions maintained.

## 2018-02-10 NOTE — DISCHARGE SUMMARY
"[facility]  Orthopedics  Discharge Summary      Patient Name: Katelyn Huynh  MRN: [id number]  Admission Date: 2/9/2018  Hospital Length of Stay: 1 days  Discharge Date and Time: 2/10/2018  4:09 PM  Attending Physician: Jasen Salinas MD  Discharging Provider: Isidro Harper MD  Primary Care Provider: Daniel Garcia MD    HPI: Katelyn Huynh is a 67 y.o. female with a several history of Left knee pain. Pain is worse with activity and weight bearing.  Patient has experienced interference of activities of daily living due to decreased range of motion and an increase in joint pain and swelling.  Patient has failed non-operative treatment including NSAIDs, corticosteroid injections, viscosupplement injections, and activity modification.  Katelyn Huynh currently ambulates with a cane.     Procedure(s) (LRB):  REPLACEMENT-KNEE-TOTAL (Left)      Hospital Course: Patient presented for above procedure.  Tolerated it well and was discharged home POD1 after voiding, tolerating diet, ambulating, pain controlled.  Discharge instructions, follow-up appointment, and med rec are below.            Pending Diagnostic Studies:     None        Final Active Diagnoses:    Diagnosis Date Noted POA    PRINCIPAL PROBLEM:  Primary osteoarthritis of left knee [M17.12] 02/09/2018 Yes    Idiopathic hypertension [I10] 05/20/2013 Yes      Problems Resolved During this Admission:    Diagnosis Date Noted Date Resolved POA      Discharged Condition: stable    Disposition: Home or Self Care    Follow Up:  Follow-up Information     Shelley Up NP On 2/23/2018.    Specialty:  Orthopedic Surgery  Why:  For wound re-check, staple removal  Contact information:  [address] [place] [id number]  [phone]                 Patient Instructions:     COMMODE FOR HOME USE   Order Specific Question Answer Comments   Type: Standard    Height: 5' 4.5" (1.638 m)    Weight: 128.4 kg (283 " "lb)    Does patient have medical equipment at home? none    Length of need (1-99 months): 1      BATH/SHOWER CHAIR FOR HOME USE   Order Specific Question Answer Comments   Height: 5' 4.5" (1.638 m)    Weight: 128.4 kg (283 lb)    Does patient have medical equipment at home? none    Length of need (1-99 months): 99    Type: Without back      TRANSFER TUB BENCH FOR HOME USE   Order Specific Question Answer Comments   Type of Transfer Tub Bench: Unpadded    Height: 5' 4.5" (1.638 m)    Weight: 128.4 kg (283 lb)    Does patient have medical equipment at home? none    Length of need (1-99 months): 1      WALKER FOR HOME USE   Order Specific Question Answer Comments   Type of Walker: Adult (5'4"-6'6")    With wheels? Yes    Height: 5' 4.5" (1.638 m)    Weight: 128.4 kg (283 lb)    Length of need (1-99 months): 1    Does patient have medical equipment at home? none    Please check all that apply: Walker will be used for gait training.      Call MD for:  temperature >100.4     Call MD for:  persistent nausea and vomiting or diarrhea     Call MD for:  severe uncontrolled pain     Call MD for:  redness, tenderness, or signs of infection (pain, swelling, redness, odor or green/yellow discharge around incision site)     Call MD for:  difficulty breathing or increased cough     Call MD for:  severe persistent headache     Call MD for:  worsening rash     Call MD for:  persistent dizziness, light-headedness, or visual disturbances     Call MD for:  increased confusion or weakness       Medications:  Reconciled Home Medications:   Discharge Medication List as of 2/10/2018  8:41 AM      START taking these medications    Details   aspirin 325 MG tablet Take 1 tablet (325 mg total) by mouth 2 (two) times daily., Starting Fri 2/9/2018, Until Sat 2/9/2019, Print      docusate sodium (COLACE) 100 MG capsule Take 1 capsule (100 mg total) by mouth 2 (two) times daily., Starting Fri 2/9/2018, Print      ondansetron (ZOFRAN-ODT) 4 MG TbDL " Take 1 tablet (4 mg total) by mouth every 6 (six) hours as needed., Starting Fri 2/9/2018, Print      oxyCODONE-acetaminophen (PERCOCET)  mg per tablet Take 1 tablet by mouth every 4 (four) hours as needed for Pain., Starting Fri 2/9/2018, Print         CONTINUE these medications which have NOT CHANGED    Details   ASCORBATE CALCIUM (VITAMIN C ORAL) Take by mouth once daily. , Historical Med      betamethasone dipropionate (DIPROLENE) 0.05 % cream Apply topically 2 (two) times daily., Starting Wed 8/9/2017, Until Wed 1/24/2018, Normal      hydrochlorothiazide (HYDRODIURIL) 25 MG tablet Take 2 tablets (50 mg total) by mouth once daily., Starting Tue 3/28/2017, Normal      triamcinolone acetonide 0.1% (KENALOG) 0.1 % cream JOSE ELIAS EXT AA TID, Historical Med      valsartan (DIOVAN) 80 MG tablet TAKE 1 TABLET(80 MG) BY MOUTH EVERY DAY, Normal         STOP taking these medications       hydrOXYzine HCl (ATARAX) 25 MG tablet Comments:   Reason for Stopping:         ibuprofen (ADVIL,MOTRIN) 200 MG tablet Comments:   Reason for Stopping:         tramadol (ULTRAM) 50 mg tablet Comments:   Reason for Stopping:               Isidro Harper MD  Orthopedics  [facility]

## 2018-02-11 NOTE — ANESTHESIA POST-OP PAIN MANAGEMENT
Called to discuss ON-Q catheter with patient, pain adequately controlled, denied signs/symptoms of allergic reaction.    Will call again tomorrow to discuss removal.    Wilfred Armenta, CA-2

## 2018-02-12 NOTE — ANESTHESIA POSTPROCEDURE EVALUATION
"Anesthesia Post Evaluation    Patient: Katelyn Huynh    Procedure(s) Performed: Procedure(s) (LRB):  REPLACEMENT-KNEE-TOTAL (Left)    Final Anesthesia Type: general  Patient location during evaluation: PACU  Patient participation: Yes- Able to Participate  Level of consciousness: awake and alert and oriented  Post-procedure vital signs: reviewed and stable  Pain management: adequate  Airway patency: patent  PONV status at discharge: No PONV  Anesthetic complications: no      Cardiovascular status: blood pressure returned to baseline  Respiratory status: unassisted, spontaneous ventilation and room air  Hydration status: euvolemic  Follow-up not needed.        Visit Vitals  /60 (Patient Position: Sitting)   Pulse 85   Temp 36.6 °C (97.9 °F) (Oral)   Resp 16   Ht 5' 4" (1.626 m)   Wt 127 kg (280 lb)   SpO2 96%   Breastfeeding? No   BMI 48.06 kg/m²       Pain/Cheli Score: No Data Recorded      "

## 2018-02-12 NOTE — ADDENDUM NOTE
Addendum  created 02/12/18 1457 by Sage Altamirano MD    Anesthesia Event edited, Sign clinical note

## 2018-02-12 NOTE — ANESTHESIA POST-OP PAIN MANAGEMENT
On-Q catheter removed, tip intact. No issues.  All questions answered.    Enrike Altamirano, CA-1

## 2018-02-12 NOTE — ANESTHESIA POST-OP PAIN MANAGEMENT
Called patient twice to discuss On-Q catheter, pain adequately controlled, denied signs/symptoms of allergic reaction.. Patient without any issues. She is waiting for her son to arrive prior to pulling the On-Q catheter. Patient to call Anesthesia team once catheter is pulled.    Enrike Altamirano, CA-1

## 2018-02-13 ENCOUNTER — NURSE TRIAGE (OUTPATIENT)
Dept: ADMINISTRATIVE | Facility: CLINIC | Age: 68
End: 2018-02-13

## 2018-02-13 NOTE — TELEPHONE ENCOUNTER
Pt had a knee replacement of Friday. Had a fall and has been taken to hospital per ambulance to [facility]. Would like to speak to surgeon. Advised to call back when clinic opens. Caller concerned about pt having a wrist injury and not being able to do anything.    Reason for Disposition   [1] Caller requesting NON-URGENT health information AND [2] PCP's office is the best resource    Protocols used: ST INFORMATION ONLY CALL-A-AH

## 2018-02-14 ENCOUNTER — TELEPHONE (OUTPATIENT)
Dept: ORTHOPEDICS | Facility: CLINIC | Age: 68
End: 2018-02-14

## 2018-02-14 NOTE — TELEPHONE ENCOUNTER
Ortho Telephone Triage Call  [phone]  Caller: Nadja Coats,PT reporting that pt fell yesterday fracturing one of her wrists and is, presently, in Recovery at  [facility] s/p wrist surgery. Nadja states that pt did not injure L TKA during fall. She requests that pt be considered for SNF care, as pt will not be able to use upper extremity to balance when ambulating.   HX: L TKA 2/9/18  Triage Advice: Advised that Dr. Salinas is out this week and that MICHAEL Up NP will be notified.   Resolution: States understanding.

## 2018-02-16 ENCOUNTER — HOSPITAL ENCOUNTER (INPATIENT)
Facility: HOSPITAL | Age: 68
LOS: 7 days | Discharge: ANOTHER HOSPITAL | DRG: 560 | End: 2018-02-23
Attending: INTERNAL MEDICINE | Admitting: INTERNAL MEDICINE
Payer: COMMERCIAL

## 2018-02-16 DIAGNOSIS — E66.01 MORBID OBESITY WITH BMI OF 50.0-59.9, ADULT: ICD-10-CM

## 2018-02-16 DIAGNOSIS — Z96.652 S/P TKR (TOTAL KNEE REPLACEMENT), LEFT: ICD-10-CM

## 2018-02-16 DIAGNOSIS — S62.102A LEFT WRIST FRACTURE: ICD-10-CM

## 2018-02-16 DIAGNOSIS — M17.12 PRIMARY OSTEOARTHRITIS OF LEFT KNEE: ICD-10-CM

## 2018-02-16 DIAGNOSIS — R00.0 TACHYCARDIA: ICD-10-CM

## 2018-02-16 PROCEDURE — 25000003 PHARM REV CODE 250: Performed by: INTERNAL MEDICINE

## 2018-02-16 PROCEDURE — 12000000 HC SNF SEMI-PRIVATE ROOM

## 2018-02-16 RX ORDER — ACETAMINOPHEN 325 MG/1
650 TABLET ORAL EVERY 6 HOURS PRN
Status: DISCONTINUED | OUTPATIENT
Start: 2018-02-16 | End: 2018-02-24 | Stop reason: HOSPADM

## 2018-02-16 RX ORDER — PREGABALIN 75 MG/1
75 CAPSULE ORAL NIGHTLY
Status: DISCONTINUED | OUTPATIENT
Start: 2018-02-16 | End: 2018-02-24 | Stop reason: HOSPADM

## 2018-02-16 RX ORDER — ONDANSETRON 4 MG/1
4 TABLET, ORALLY DISINTEGRATING ORAL EVERY 6 HOURS PRN
Status: DISCONTINUED | OUTPATIENT
Start: 2018-02-16 | End: 2018-02-24 | Stop reason: HOSPADM

## 2018-02-16 RX ORDER — CALCIUM CARBONATE 200(500)MG
500 TABLET,CHEWABLE ORAL 2 TIMES DAILY PRN
Status: DISCONTINUED | OUTPATIENT
Start: 2018-02-16 | End: 2018-02-24 | Stop reason: HOSPADM

## 2018-02-16 RX ORDER — HYDROCHLOROTHIAZIDE 25 MG/1
50 TABLET ORAL EVERY MORNING
Status: DISCONTINUED | OUTPATIENT
Start: 2018-02-17 | End: 2018-02-24 | Stop reason: HOSPADM

## 2018-02-16 RX ORDER — OXYCODONE HYDROCHLORIDE 5 MG/1
5 TABLET ORAL
Status: DISCONTINUED | OUTPATIENT
Start: 2018-02-16 | End: 2018-02-24 | Stop reason: HOSPADM

## 2018-02-16 RX ORDER — RAMELTEON 8 MG/1
8 TABLET ORAL NIGHTLY PRN
Status: DISCONTINUED | OUTPATIENT
Start: 2018-02-16 | End: 2018-02-24 | Stop reason: HOSPADM

## 2018-02-16 RX ORDER — AMOXICILLIN 250 MG
1 CAPSULE ORAL 2 TIMES DAILY
Status: DISCONTINUED | OUTPATIENT
Start: 2018-02-16 | End: 2018-02-24 | Stop reason: HOSPADM

## 2018-02-16 RX ORDER — POLYETHYLENE GLYCOL 3350 17 G/17G
17 POWDER, FOR SOLUTION ORAL DAILY
Status: DISCONTINUED | OUTPATIENT
Start: 2018-02-17 | End: 2018-02-24 | Stop reason: HOSPADM

## 2018-02-16 RX ORDER — OXYCODONE HYDROCHLORIDE 5 MG/1
10 TABLET ORAL
Status: DISCONTINUED | OUTPATIENT
Start: 2018-02-16 | End: 2018-02-24 | Stop reason: HOSPADM

## 2018-02-16 RX ORDER — FAMOTIDINE 20 MG/1
20 TABLET, FILM COATED ORAL 2 TIMES DAILY
Status: DISCONTINUED | OUTPATIENT
Start: 2018-02-16 | End: 2018-02-24 | Stop reason: HOSPADM

## 2018-02-16 RX ORDER — ASPIRIN 325 MG
325 TABLET, DELAYED RELEASE (ENTERIC COATED) ORAL 2 TIMES DAILY
Status: DISCONTINUED | OUTPATIENT
Start: 2018-02-16 | End: 2018-02-24 | Stop reason: HOSPADM

## 2018-02-16 RX ORDER — VALSARTAN 80 MG/1
80 TABLET ORAL DAILY
Status: DISCONTINUED | OUTPATIENT
Start: 2018-02-17 | End: 2018-02-24 | Stop reason: HOSPADM

## 2018-02-16 RX ADMIN — FAMOTIDINE 20 MG: 20 TABLET, FILM COATED ORAL at 09:02

## 2018-02-16 RX ADMIN — PREGABALIN 75 MG: 75 CAPSULE ORAL at 09:02

## 2018-02-16 RX ADMIN — OXYCODONE HYDROCHLORIDE 10 MG: 5 TABLET ORAL at 09:02

## 2018-02-16 RX ADMIN — ASPIRIN 325 MG: 325 TABLET, DELAYED RELEASE ORAL at 09:02

## 2018-02-16 RX ADMIN — STANDARDIZED SENNA CONCENTRATE AND DOCUSATE SODIUM 1 TABLET: 8.6; 5 TABLET, FILM COATED ORAL at 09:02

## 2018-02-17 PROBLEM — Z96.652 S/P TKR (TOTAL KNEE REPLACEMENT), LEFT: Status: ACTIVE | Noted: 2018-02-17

## 2018-02-17 PROBLEM — K59.04 CHRONIC IDIOPATHIC CONSTIPATION: Status: ACTIVE | Noted: 2018-02-17

## 2018-02-17 PROBLEM — E66.01 MORBID OBESITY WITH BMI OF 50.0-59.9, ADULT: Status: ACTIVE | Noted: 2018-02-17

## 2018-02-17 PROCEDURE — 97116 GAIT TRAINING THERAPY: CPT

## 2018-02-17 PROCEDURE — 97161 PT EVAL LOW COMPLEX 20 MIN: CPT

## 2018-02-17 PROCEDURE — 97530 THERAPEUTIC ACTIVITIES: CPT

## 2018-02-17 PROCEDURE — 99305 1ST NF CARE MODERATE MDM 35: CPT | Mod: ,,, | Performed by: INTERNAL MEDICINE

## 2018-02-17 PROCEDURE — 25000003 PHARM REV CODE 250: Performed by: INTERNAL MEDICINE

## 2018-02-17 PROCEDURE — 97165 OT EVAL LOW COMPLEX 30 MIN: CPT

## 2018-02-17 PROCEDURE — 97110 THERAPEUTIC EXERCISES: CPT

## 2018-02-17 PROCEDURE — 97535 SELF CARE MNGMENT TRAINING: CPT

## 2018-02-17 PROCEDURE — 12000000 HC SNF SEMI-PRIVATE ROOM

## 2018-02-17 RX ORDER — ADHESIVE BANDAGE
30 BANDAGE TOPICAL DAILY PRN
Status: DISCONTINUED | OUTPATIENT
Start: 2018-02-17 | End: 2018-02-24 | Stop reason: HOSPADM

## 2018-02-17 RX ORDER — DOCUSATE SODIUM 283 MG/5ML
1 LIQUID RECTAL DAILY PRN
Status: DISCONTINUED | OUTPATIENT
Start: 2018-02-17 | End: 2018-02-24 | Stop reason: HOSPADM

## 2018-02-17 RX ADMIN — FAMOTIDINE 20 MG: 20 TABLET, FILM COATED ORAL at 09:02

## 2018-02-17 RX ADMIN — VALSARTAN 80 MG: 80 TABLET ORAL at 08:02

## 2018-02-17 RX ADMIN — HYDROCHLOROTHIAZIDE 50 MG: 25 TABLET ORAL at 06:02

## 2018-02-17 RX ADMIN — STANDARDIZED SENNA CONCENTRATE AND DOCUSATE SODIUM 1 TABLET: 8.6; 5 TABLET, FILM COATED ORAL at 09:02

## 2018-02-17 RX ADMIN — FAMOTIDINE 20 MG: 20 TABLET, FILM COATED ORAL at 08:02

## 2018-02-17 RX ADMIN — POLYETHYLENE GLYCOL 3350 17 G: 17 POWDER, FOR SOLUTION ORAL at 08:02

## 2018-02-17 RX ADMIN — OXYCODONE HYDROCHLORIDE 10 MG: 5 TABLET ORAL at 09:02

## 2018-02-17 RX ADMIN — ASPIRIN 325 MG: 325 TABLET, DELAYED RELEASE ORAL at 08:02

## 2018-02-17 RX ADMIN — STANDARDIZED SENNA CONCENTRATE AND DOCUSATE SODIUM 1 TABLET: 8.6; 5 TABLET, FILM COATED ORAL at 08:02

## 2018-02-17 RX ADMIN — ASPIRIN 325 MG: 325 TABLET, DELAYED RELEASE ORAL at 09:02

## 2018-02-17 RX ADMIN — OXYCODONE HYDROCHLORIDE 10 MG: 5 TABLET ORAL at 08:02

## 2018-02-17 RX ADMIN — PREGABALIN 75 MG: 75 CAPSULE ORAL at 09:02

## 2018-02-17 RX ADMIN — OXYCODONE HYDROCHLORIDE 10 MG: 5 TABLET ORAL at 01:02

## 2018-02-17 NOTE — ASSESSMENT & PLAN NOTE
S/p ORIF  NWB to LINDAE but may use platform walker  F/U with Dr. Cl Wong on discharge from SNF ([facility])

## 2018-02-17 NOTE — PLAN OF CARE
Problem: Patient Care Overview  Goal: Plan of Care Review  Outcome: Ongoing (interventions implemented as appropriate)  Mrs Huynh required a moderate amt of assistance with getting from sit-to-stand position using a platform rolling walker to transfer from w/c to toilet to attempt a BM. She was assisted with all 3 phases of toileting. Pt had a medium, brown, and formed BM. Safety measures maintained. Pt was assisted into bed. Ms Huynh also called her family and instructed them to bring the polar ice therapy machine to SNF unit. Pt was given verbal teaching on how the polar ice therapy aides in the reduction of pain and swelling. She verbalized understanding of information. Call light placed within reach, and bed is in it's lowest poisiton with the wheels locked.

## 2018-02-17 NOTE — PT/OT/SLP EVAL
PhysicalTherapy   Evaluation    Katelyn Huynh   MRN: [id number]     PT Received On: 02/17/18  PT Start Time: 1104     PT Stop Time: 1204    PT Total Time (min): 60 min       Billable Minutes:  Evaluation 15, Gait Training 15, Therapeutic Activity 15 and Therapeutic Exercise 15=e cisco +45    Diagnosis: <principal problem not specified>  Past Medical History:   Diagnosis Date    Arthritis     Cataract     Glaucoma     History of iritis     OD    Hypertension     Meningioma     Uveitis       Past Surgical History:   Procedure Laterality Date    Brain tumor surgery      had a brain tumor removed    CATARACT EXTRACTION      OU    CATARACT EXTRACTION BILATERAL W/ ANTERIOR VITRECTOMY      CHOLECYSTECTOMY      CRANIOTOMY      KNEE SURGERY  2-24-15    right TKR    YAG Right 1/20/2016    Dr. Kaiser         General Precautions: Standard, fall  Orthopedic Precautions: LUE non weight bearing, LLE weight bearing as tolerated   Braces:  (left UE wrapped/splint)         Patient History:  Lives With: spouse  Living Arrangements: house  Home Accessibility: stairs to enter home  Number of Stairs to Enter Home: 1 (small threshold step)  Stair Railings at Home: none  Living Environment Comment: Patient lives w/ spouse who is blind. spouse works daily and pt usually drives him to work. He is on FMLA presently. [employer]  Equipment Currently Used at Home: bedside commode, walker, rolling  DME owned (not currently used): none    Previous Level of Function:  Ambulation Skills: needs device  Transfer Skills: needs device  ADL Skills: needs device    Subjective:  Communicated with patient prior to session.  Patient agreeable to session  Chief Complaint: can't walk  Patient goals: return home to PLOF    Pain/Comfort  Pain Rating 1: 6/10  Location - Side 1: Left  Location 1: wrist  Pain Addressed 1: Pre-medicate for activity, Distraction  Pain Rating Post-Intervention 1: 6/10  Pain Rating 2: 8/10  Location - Side  2: Left  Location 2: knee  Pain Addressed 2: Pre-medicate for activity, Distraction, Cessation of Activity  Pain Rating Post-Intervention 2: 7/10    Objective:  Patient found sitting in w/c   with      Cognitive Exam:  Oriented to: Person, Place, Time and Situation  Follows Commands/attention: Follows multistep  commands  Communication: clear/fluent  Safety awareness/insight to disability: intact    Physical Exam:  Postural examination/scapula alignment:    -       morbid obesity    Skin integrity: Visible skin intact, LUE splint and wrap; LLE w/ island dressing intact w/ minimal drainage noted  Edema: Mild LLE; Mod LUE    Sensation:      -       Intact light touch    Upper Extremity Range of Motion:  Right Upper Extremity: WFL  Left Upper Extremity: shoulder flexion decreased, elbow, wrist NT     Upper Extremity Strength:  Right Upper Extremity: Deficits: Shoulder flex 3/5; elbow 3+/5  Left Upper Extremity: Deficits: NT s/p SX and NWB wrist    Lower Extremity Range of Motion:  Right Lower Extremity: WNL (mild hyperextension noted knee, knee flexion ~100*)  Left Lower Extremity: WFL except knee     KNEE AAROM extension -5* and flexion 87*  Lower Extremity Strength:  Right Lower Extremity: WFL  Left Lower Extremity: Deficits: HF 3+/5, knee w/ marked extension lag noted LAQ, ankle WFL     Fine motor coordination:      Gross motor coordination: WFL    Functional Status:  MDS G  Bed Mobility Functional Status: mod(A)-Max(A)  Transfer Functional Status: total(A)  Transfer Level of (A): 3: Two+ persons physical (A)  Walk in Room Functional Status: CGA-Min (A)  Walk In Room Level of (A): 2: One person physical (A)  Walk in Corridor Functional Status: CGA-Min (A)  Walk In Corridor Level of (A): 2: One person physical (A)  Locomotion on Unit Functional Status: total(A)  Locomotion Off Unit Functional Status: total(A)  Eval Only: Number of L/E limb <4/5 MMT: 1  Moving from seated to standing position: Not steady, only able to  stabilize with staff assistance  Walking (with assistive device if used): Not steady, only able to stabilize with staff assistance  Turning around and facing the opposite direction while walking: Not steady, only able to stabilize with staff assistance  Surface-to-surface transfer (transfer between bed and chair or wheelchair): Not steady, only able to stabilize with staff assistance    MDS GG  Sit to lying Performance: Partial/moderate assistance.  Lying to sitting on side of bed Performance: Partial/moderate assistance.  Sit to Stand Performance: Substantial/maximal assistance.  Chair/bed-to-chair transfer Performance: Substantial/maximal assistance.  Does the resident walk?: Yes --> Continue to Walk 50 feet with two turns assessment  Walk 50 feet with two turns Performance: Not attempted due to medical condition or safety concerns (38 feet RW min)    AM-PAC 6 CLICK MOBILITY  Total Score:11    Bed Mobility:  Sit>Supine:on mat w/ mod assist for BLE assistance  Supine>Sit: on mat w/ min assist for LLE    Transfers:  Sit<>Stand: from w/c w/ max assist of 2 and left PRW w/ multiple attempts before success. NWB LUE. Cues for technique. Patient stands w/ forward trunk flexion and then takes backward steps, assist for balance before transitioning RUE to RW>    Patient required max cues, instruction for technique. From mat w/ CGA and Left PRW and cues for tech w/ mat slightly elevated.  Stand Pivot Transfer: mat>w/c w/ CGA. Cues for technique  NWB LUE. Cues for technique.   Gait:  Amb 38 feet w/ Left PRW and min assist, NWB left wrist. FFP, decreased left knee extension in stance phase. Mild valgus noted BLEs in standing. Fatigue limiting distance.  Second trial w/ readjusted left PRW and use of bariatric RW, amb 30 feet w/ CGA and improved technique. W/c follow for both trials.      Wheelchair Mobility:  Patient propels w/c w/ RU/RLE and min assist 12 feet.      Therex:  Pt performed left LE TKA protocol exercises x20  reps of:  Ankle pumps  Glute sets  Quad sets  Short arc quads  Heel slides  Hip abd/add  SLR  Seated exercises:    LAQ    Hip flexion    Ankle pumps seated    Knee flexion  AAROM ext -5* and flexion 87*  Balance:  Stands w/ left PRW and forward flexed posture w/ CGA        Patient left up in chair with call button in reach and nurse notified.    Assessment:  Katelyn Huynh is a 67 y.o. female with a medical diagnosis of <principal problem not specified>. She is s/p left TKA on 2/9 w/ Dr. Salinas. She d/c home in a few days where she fell getting up from the bed on 2/13. She sustained a left wrist fracture and was inpatient at [facility]. D/c to SNF on 2/16/18. Patient is NWB LUE but okay to use platform RW. She is continuing knee rehab from left TKA. Patient is NWB LUE and decreased strength RUE, and w/ decreased strength and ROM LLE. Therefore, she required increased assist )max of 2) from w/c height, although she trasnferred w/ CG/min assist from bed height. She did improve as the session progressed and used Left PRW well. She amb 30 feet w/ L PRW and CG/min assist. Her  is blind, but he works and is independent. Patient is stable and therefore a LOW complexity evalutation. Patient will benefit from 14 days skilled therapy to progress in strength, ROM and function to enable patient to transition safely home    Rehab identified problem list/impairments: weakness, impaired functional mobilty, gait instability, impaired balance, decreased upper extremity function, decreased lower extremity function, pain, orthopedic precautions, decreased ROM, edema    Rehab potential is good.    Activity tolerance: Good    Discharge recommendations: home with home health     Barriers to discharge: Decreased caregiver support, Inaccessible home environment    Equipment recommendations:  (bariatric RW with left platform)     GOALS:    Physical Therapy Goals        Problem: Physical Therapy Goal    Goal  Priority Disciplines Outcome Goal Variances Interventions   Physical Therapy Goal     PT/OT, PT      Description:  Goals to be met by: 14 days     Patient will increase functional independence with mobility by performin. Supine to sit with Stand-by Assistance  2. Sit to supine with Stand-by Assistance  3. Sit to stand transfer with Stand-by Assistance using left platform rolling walker  4. Bed to chair transfer with Stand-by Assistance using Left platform Rolling Walker  5. Gait  x 100 feet with Stand-by Assistance using left platform Rolling Walker and observing weight bearing precautions LUE.   6. Wheelchair propulsion x25 feet with Stand-by Assistance using right lower extremity and right lower extremity  7. Ascend/Descend 4 inch curb step with Contact Guard Assistance using Left platform Rolling Walker.  8. Lower extremity exercise program x20 reps per  TKA protocol and handout, with assistance as needed shavon gym therex  9. Left knee AAROM 0* to 90* or better                      PLAN:    Patient to be seen 5 x/week  to address the above listed problems via gait training, therapeutic activities, therapeutic exercises, wheelchair management/training  Plan of Care Expires: 18    Jennifer Oliveira, PT 2018

## 2018-02-17 NOTE — HOSPITAL COURSE
The patient was admitted at [facility] from 2/13 to 2/16/2018.  2/16: Patient admitted to SNF for ongoing Pt/Ot following a hospitalization for left distal radius fracture s/p ORIF and recent left TKR.  2/19: Patient seen at bedside doing well, no complaints, labs reviewed.  2/21/18 Pt seen at bedside this morning. Staples removed without difficulty. Incision is well healed. Cast intact to left wrist. Pt will continue to f/u with Dr. Pablo for her wrist. (orthopedic NP)  2/21: Patient seen at bedside doing well, K+ improved  2/23: Patient with continued oozing to left knee incision, dressing removed, incision well approximated with pin point oozing to mid incision from old staple site. Instructed to place pressure dressing. Notified MICHAEL Anderson NP orthopedics, place pressure dressing keep knee immobilized. At approx 1615 called to room to assess increase drainage to left knee incision after therapy, once dressing was removed mid incisional opening was noted, ABD pads and ace wraps were applied, instructed to send patient to ED. Patient and family at bedside was notified. Report given to Dr. Dowell at [facility] ED.

## 2018-02-17 NOTE — ASSESSMENT & PLAN NOTE
Occurs at home and worsened with narcotic usage and immobility  Continue senokot-s and miralax   Milk of magnesia is effective for the patient at home so ordered prn if scheduled laxatives ineffective

## 2018-02-17 NOTE — SUBJECTIVE & OBJECTIVE
Past Medical History:   Diagnosis Date    Arthritis     Cataract     Glaucoma     History of iritis     OD    Hypertension     Meningioma     Uveitis        Past Surgical History:   Procedure Laterality Date    Brain tumor surgery      had a brain tumor removed    CATARACT EXTRACTION      OU    CATARACT EXTRACTION BILATERAL W/ ANTERIOR VITRECTOMY      CHOLECYSTECTOMY      CRANIOTOMY      KNEE SURGERY  2-24-15    right TKR    YAG Right 1/20/2016    Dr. Kaiser       Review of patient's allergies indicates:   Allergen Reactions    Cortisone Hives and Swelling     Swelling of the tongue       No current facility-administered medications on file prior to encounter.      Current Outpatient Prescriptions on File Prior to Encounter   Medication Sig    ASCORBATE CALCIUM (VITAMIN C ORAL) Take by mouth once daily.     aspirin 325 MG tablet Take 1 tablet (325 mg total) by mouth 2 (two) times daily.    betamethasone dipropionate (DIPROLENE) 0.05 % cream Apply topically 2 (two) times daily.    docusate sodium (COLACE) 100 MG capsule Take 1 capsule (100 mg total) by mouth 2 (two) times daily.    hydrochlorothiazide (HYDRODIURIL) 25 MG tablet Take 2 tablets (50 mg total) by mouth once daily. (Patient taking differently: Take 50 mg by mouth every morning. )    ondansetron (ZOFRAN-ODT) 4 MG TbDL Take 1 tablet (4 mg total) by mouth every 6 (six) hours as needed.    oxyCODONE-acetaminophen (PERCOCET)  mg per tablet Take 1 tablet by mouth every 4 (four) hours as needed for Pain.    triamcinolone acetonide 0.1% (KENALOG) 0.1 % cream JOSE ELIAS EXT AA TID    valsartan (DIOVAN) 80 MG tablet TAKE 1 TABLET(80 MG) BY MOUTH EVERY DAY (Patient taking differently: TAKE 1 TABLET(80 MG) BY MOUTH EVERY DAY  am)     Family History     Problem Relation (Age of Onset)    Cataracts Mother, Sister, Maternal Aunt    Diabetes Mother, Father, Sister, Maternal Aunt    Glaucoma Mother, Brother, Maternal Aunt, Maternal Uncle     Hypertension Mother, Father    Stroke Sister        Social History Main Topics    Smoking status: Former Smoker     Packs/day: 0.10     Years: 3.00     Types: Cigarettes     Quit date: 4/26/1973    Smokeless tobacco: Never Used    Alcohol use 1.2 oz/week     2 Glasses of wine per week      Comment: social- glass on wine on occasions    Drug use: No    Sexual activity: Not on file     Review of Systems   Constitutional: no fever or chills  Eyes: no visual changes  ENT: no nasal congestion or sore throat  Respiratory: no cough or shortness of breath; she does get winded with activity chronically  Cardiovascular: no chest pain or palpitations  Gastrointestinal: no nausea or vomiting, no abdominal pain; last BM: 2/9  Genitourinary: no hematuria or dysuria  Integument/Breast: no rash or pruritis  Hematologic/Lymphatic: no easy bruising or lymphadenopathy  Allergy/Immunology: no postnasal drip  Musculoskeletal: no arthralgias or myalgias  Neurological: no seizures or tremors  Behavioral/Psych: no auditory or visual hallucinations  Endocrine: no heat or cold intolerance      Objective:     Vital Signs (Most Recent):  Temp: 97.5 °F (36.4 °C) (02/17/18 0808)  Pulse: 97 (02/17/18 0808)  Resp: 18 (02/17/18 0808)  BP: (!) 149/59 (02/17/18 0808)  SpO2: 97 % (02/17/18 0808) Vital Signs (24h Range):  Temp:  [97.5 °F (36.4 °C)-98.3 °F (36.8 °C)] 97.5 °F (36.4 °C)  Pulse:  [] 97  Resp:  [17-20] 18  SpO2:  [97 %-98 %] 97 %  BP: (134-149)/(59-66) 149/59     Weight: (!) 137.1 kg (302 lb 4 oz)  Body mass index is 51.08 kg/m².    Physical Exam  General: well developed, morbid obesity, no distress, seated in wheelchair  HENT: Head:normocephalic, atraumatic. Ears:bilateral external ear canals normal. Nose: Nares normal. Septum midline. Mucosa normal. Throat: lips, mucosa, and tongue normal and no throat erythema.  Eyes: conjunctivae/corneas clear.  EOM normal  Neck: supple, symmetrical, trachea midline  Lungs:  clear to  auscultation bilaterally and normal respiratory effort  Cardiovascular: Heart: regular rate and rhythm, S1, S2 normal, no murmur, click, rub or gallop. Chest Wall: no tenderness. Extremities: no cyanosis, 1+ BLE edema, no clubbing. Pulses: 2+ and symmetric.  Abdomen/Rectal: Abdomen: soft, non-tender non-distended; bowel sounds normal; no masses,  no organomegaly.   Skin: Skin color, texture, turgor normal.   Musculoskeletal:no clubbing, cyanosis; left arm with splint and ACE wrap; left knee with serosanguineous drainage to island dressing; no erythema to staple line or skin  Lymph Nodes: No cervical or supraclavicular adenopathy  Neurologic: Normal strength and tone on right; left side not tested. No focal numbness or weakness  Psych/Behavioral:  Alert and oriented, appropriate affect.    Significant Labs:     From outside hospital:  2/13: Cr. 0.8, Na 141, K+ 3.7, WBC 9.2, Hgb 14.3, plt 175

## 2018-02-17 NOTE — NURSING
Pt arrived on unit via strecher by [facility]. AAOx's 4.  Vital signs and weight taken (see flowsheet). Aquacel  dressing noted to left knee area saturated with dark brown drainage. Dressing changed by Charge nurse Marquita SELF. Incision noted with staples intact. Charge nurse replaced dressing with [place] dressing. Will reported patient arrival and status to oncoming nurse.

## 2018-02-17 NOTE — PLAN OF CARE
Problem: Physical Therapy Goal  Goal: Physical Therapy Goal  Goals to be met by: 14 days     Patient will increase functional independence with mobility by performin. Supine to sit with Stand-by Assistance  2. Sit to supine with Stand-by Assistance  3. Sit to stand transfer with Stand-by Assistance using left platform rolling walker  4. Bed to chair transfer with Stand-by Assistance using Left platform Rolling Walker  5. Gait  x 100 feet with Stand-by Assistance using left platform Rolling Walker and observing weight bearing precautions LUE.   6. Wheelchair propulsion x25 feet with Stand-by Assistance using right lower extremity and right lower extremity  7. Ascend/Descend 4 inch curb step with Contact Guard Assistance using Left platform Rolling Walker.  8. Lower extremity exercise program x20 reps per  TKA protocol and handout, with assistance as needed shavon gym therex  9. Left knee AAROM 0* to 90* or better    PT eval completed. Jennifer Oliveira, PT 2018

## 2018-02-17 NOTE — PT/OT/SLP EVAL
Occupational Therapy  Evaluation/treatment    Katelyn Huynh   MRN: [id number]   Admitting Diagnosis: s/P left knee  And left wrist fx     OT Date of Treatment: 02/17/18   OT Start Time: 0850  OT Stop Time: 1005  OT Total Time (min): 75 min    Billable Minutes:  Evaluation 15  Self Care/Home Management 30  Therapeutic Activity 30      Past Medical History:   Diagnosis Date    Arthritis     Cataract     Glaucoma     History of iritis     OD    Hypertension     Meningioma     Uveitis       Past Surgical History:   Procedure Laterality Date    Brain tumor surgery      had a brain tumor removed    CATARACT EXTRACTION      OU    CATARACT EXTRACTION BILATERAL W/ ANTERIOR VITRECTOMY      CHOLECYSTECTOMY      CRANIOTOMY      KNEE SURGERY  2-24-15    right TKR    YAG Right 1/20/2016    Dr. Kaiser         General Precautions: Standard, fall  Orthopedic Precautions: LUE non weight bearing  Braces: UE Sling    Do you have any cultural, spiritual, Nondenominational conflicts, given your current situation?: no     Patient History:  Lives With: spouse, sibling(s)  Living Arrangements: house  Home Accessibility: stairs to enter home  Home Layout: Able to live on 1st floor  Number of Stairs to Enter Home: 1  Stair Railings at Home: none  Transportation Available: family or friend will provide  Living Environment Comment: Pt lives with spouse who is blind;however, sister lives with pt.  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    Chief Complaint: pain and decreased indep  Patient/Family stated goals: go home    Pain/Comfort  Pain Rating 1: 5/10  Location - Side 1: Left  Location 1: knee  Pain Addressed 1: Pre-medicate for activity  Pain Rating Post-Intervention 1: 7/10  Pain Rating 2: 7/10  Location - Side 2: Left  Location 2: wrist  Pain Addressed 2: Pre-medicate for activity, Reposition  Pain Rating Post-Intervention 2: 7/10    Objective:   Patient found with:  (supine in bed)    Cognitive Exam:  Oriented to: Person, Place, Time and Situation  Follows Commands/attention: Follows two-step commands  Communication: clear/fluent  Memory:  No Deficits noted  Safety awareness/insight to disability: intact  Coping skills/emotional control: Appropriate to situation    Visual/perceptual:  Intact    Physical Exam:  Postural examination/scapula alignment:    -       Rounded shoulders  -       Forward head  Skin integrity: incisions intact and covered  Edema: Moderate in left knee and left ankle    Sensation:      -       Intact    Upper Extremity Range of Motion:  Right Upper Extremity: WFL  Left Upper Extremity: WFL    Upper Extremity Strength:  Right Upper Extremity: WFL  Left Upper Extremity: WFL   Strength: WFL    Fine motor coordination:      -       Intact    Gross motor coordination: WFL    Functional Status:  MDS G  Bed Mobility Functional Status: S-SBA (supine to sit)  Transfer Functional Status: CGA-Min (A) stand from bed and BSC with no AD  Dressing Functional Status: 2:CGA-Min (A)-pt donned shirt with setup; socks sup ; pants and underwear min A  Eating Functional Status: mod(I) -   Toilet Use Functional Status: mod(A)-on BSC  Personal Hygiene Functional Status: mod(I) - (I)  Bathing Functional Status: CGA-Min (A)--A with feet  Eval Only: Number of U/E limb <4/5 MMT: 2  Moving from seated to standing position: Not steady, only able to stabilize with staff assistance  Moving on and off the toilet: Not steady, only  able to stabilize with staff assistance  Surface-to-surface transfer (transfer between bed and chair or wheelchair): Not steady, only able to stabilize with staff assistance    MDS GG  Eating Performance: Set-up or clean-up assistance.  Oral Hygiene Performance: Setup or clean-up assistance  Toileting Hygiene Performance: Substantial/maximal assistance.  Lying to sitting on side of bed Performance: Dependent.  Sit to Stand Performance: Substantial/maximal assistance.  Chair/bed-to-chair transfer Performance: Substantial/maximal assistance.  Toilet transfer Performance: Substantial/maximal assistance.          Additional Treatment:  Pt educated on OT role and POC; DME use; positioning of left UE/LE. Dressing techniques and t/f techniques    Patient left up in chair with bed alarm on    Assessment:  Katelyn Huynh is a 67 y.o. female with a medical diagnosis of and presents with  Decreased indep with self care and functional mobility. Pt will beneift from OT to address limitations and increase functional indep and sfadety to return to PLOF.    Rehab identified problem list/impairments: weakness, impaired endurance, gait instability, impaired functional mobilty, impaired self care skills, impaired balance, decreased lower extremity function, decreased upper extremity function, decreased coordination, pain, impaired fine motor, impaired coordination, decreased ROM, impaired skin, edema, orthopedic precautions    Rehab potential is good    Activity tolerance: Good    Discharge recommendations: home with home health     Barriers to discharge: None     Equipment recommendations:  (PFRW)     GOALS:             Problem: Occupational Therapy Goal  Goal: Occupational Therapy Goal  Goals to be met by: 14 days      Patient will increase functional independence with ADLs by performing:     UE Dressing with [place].  LE Dressing with [place].  Grooming while standing at sink with Modified  [place].  Toileting from bedside commode with Modified [place] for hygiene and clothing management.   Bathing from  sitting at sink with Supervision.  Supine to sit with Modified [place].  Stand pivot transfers with Modified [place].  Toilet transfer to bedside commode with Supervision.  Upper extremity exercise program x 25 min to increase functional endurance for ADLs   Pt will perform a functional standing task with sup       PLAN: Patient to be seen 5 x/week to address the above listed problems via self-care/home management, therapeutic activities, therapeutic exercises  Plan of Care expires: 03/17/18  Plan of Care reviewed with: patient    KAROLINE Gurrola  02/17/2018

## 2018-02-17 NOTE — PLAN OF CARE
Problem: Occupational Therapy Goal  Goal: Occupational Therapy Goal  Goals to be met by: 14 days     Patient will increase functional independence with ADLs by performing:    UE Dressing with Modified [place].  LE Dressing with Modified [place].  Grooming while standing at sink with Modified [place].  Toileting from bedside commode with Modified [place] for hygiene and clothing management.   Bathing from  sitting at sink with Supervision.  Supine to sit with Modified [place].  Stand pivot transfers with Modified [place].  Toilet transfer to bedside commode with Supervision.  Upper extremity exercise program x 25 min to increase functional endurance for ADLs   Pt will perform a functional standing task with sup

## 2018-02-17 NOTE — H&P
[facility]  Department of Hospital Medicine  History & Physical    Patient Name: Katelyn Huynh  MRN: [id number]  Code Status: Full Code  Admission Date: 2/16/2018  Attending Physician: Jackelin Miller MD   Primary Care Provider: Daniel Garcia MD    Subjective:     Principal Problem:S/P TKR (total knee replacement), left     HPI: Chief Complaint/Reason for Admission: S/P TKR (total knee replacement), left and left radius fracture    History of Present Illness:  Patient is a 67 y.o. female with HTN, morbid obesity who presents to SNF after a fall with left distal radius fracture requiring ORIF with bone allograft on 2/14/2018 by Dr. Wong at [facility].  The patient recently had a left TKR to treat osteoarthritis on 2/9 by Dr. Salinas and was discharged to home with home health on 2/10.  She states she was doing well at home after TKR until the fall on 2/13.  She landed on her buttocks and denies trauma to her surgical knee.  X-ray of left knee on 2/16 at [facility] showed TKA with components well seated and no acute fractures of foreign bodies.  Patient complains of 7-8/10 pain to her left knee and left wrist.  The pain is controlled with oxycodone.  The patient's Aquacel dressing was saturated and had begun to leak beneath the hydrocolloid on arrival to SNF.  The patient states this occurred in the hospital.  She has not had a BM in > 1 week.     The patient has been admitted to SNF for ongoing PT/OT due to insufficient progress to go home safely from the hospital.    Records from last hospital stay reviewed and summarized above.     Past Medical History:   Diagnosis Date    Arthritis     Cataract     Glaucoma     History of iritis     OD    Hypertension     Meningioma     Uveitis        Past Surgical History:   Procedure Laterality Date    Brain tumor surgery      had a brain tumor removed    CATARACT EXTRACTION      OU    CATARACT EXTRACTION BILATERAL W/  ANTERIOR VITRECTOMY      CHOLECYSTECTOMY      CRANIOTOMY      KNEE SURGERY  2-24-15    right TKR    YAG Right 1/20/2016    Dr. Kaiser       Review of patient's allergies indicates:   Allergen Reactions    Cortisone Hives and Swelling     Swelling of the tongue       No current facility-administered medications on file prior to encounter.      Current Outpatient Prescriptions on File Prior to Encounter   Medication Sig    ASCORBATE CALCIUM (VITAMIN C ORAL) Take by mouth once daily.     aspirin 325 MG tablet Take 1 tablet (325 mg total) by mouth 2 (two) times daily.    betamethasone dipropionate (DIPROLENE) 0.05 % cream Apply topically 2 (two) times daily.    docusate sodium (COLACE) 100 MG capsule Take 1 capsule (100 mg total) by mouth 2 (two) times daily.    hydrochlorothiazide (HYDRODIURIL) 25 MG tablet Take 2 tablets (50 mg total) by mouth once daily. (Patient taking differently: Take 50 mg by mouth every morning. )    ondansetron (ZOFRAN-ODT) 4 MG TbDL Take 1 tablet (4 mg total) by mouth every 6 (six) hours as needed.    oxyCODONE-acetaminophen (PERCOCET)  mg per tablet Take 1 tablet by mouth every 4 (four) hours as needed for Pain.    triamcinolone acetonide 0.1% (KENALOG) 0.1 % cream JOSE ELIAS EXT AA TID    valsartan (DIOVAN) 80 MG tablet TAKE 1 TABLET(80 MG) BY MOUTH EVERY DAY (Patient taking differently: TAKE 1 TABLET(80 MG) BY MOUTH EVERY DAY  am)     Family History     Problem Relation (Age of Onset)    Cataracts Mother, Sister, Maternal Aunt    Diabetes Mother, Father, Sister, Maternal Aunt    Glaucoma Mother, Brother, Maternal Aunt, Maternal Uncle    Hypertension Mother, Father    Stroke Sister        Social History Main Topics    Smoking status: Former Smoker     Packs/day: 0.10     Years: 3.00     Types: Cigarettes     Quit date: 4/26/1973    Smokeless tobacco: Never Used    Alcohol use 1.2 oz/week     2 Glasses of wine per week      Comment: social- glass on wine on occasions     Drug use: No    Sexual activity: Not on file     Review of Systems   Constitutional: no fever or chills  Eyes: no visual changes  ENT: no nasal congestion or sore throat  Respiratory: no cough or shortness of breath; she does get winded with activity chronically  Cardiovascular: no chest pain or palpitations  Gastrointestinal: no nausea or vomiting, no abdominal pain; last BM: 2/9  Genitourinary: no hematuria or dysuria  Integument/Breast: no rash or pruritis  Hematologic/Lymphatic: no easy bruising or lymphadenopathy  Allergy/Immunology: no postnasal drip  Musculoskeletal: no arthralgias or myalgias  Neurological: no seizures or tremors  Behavioral/Psych: no auditory or visual hallucinations  Endocrine: no heat or cold intolerance      Objective:     Vital Signs (Most Recent):  Temp: 97.5 °F (36.4 °C) (02/17/18 0808)  Pulse: 97 (02/17/18 0808)  Resp: 18 (02/17/18 0808)  BP: (!) 149/59 (02/17/18 0808)  SpO2: 97 % (02/17/18 0808) Vital Signs (24h Range):  Temp:  [97.5 °F (36.4 °C)-98.3 °F (36.8 °C)] 97.5 °F (36.4 °C)  Pulse:  [] 97  Resp:  [17-20] 18  SpO2:  [97 %-98 %] 97 %  BP: (134-149)/(59-66) 149/59     Weight: (!) 137.1 kg (302 lb 4 oz)  Body mass index is 51.08 kg/m².    Physical Exam  General: well developed, morbid obesity, no distress, seated in wheelchair  HENT: Head:normocephalic, atraumatic. Ears:bilateral external ear canals normal. Nose: Nares normal. Septum midline. Mucosa normal. Throat: lips, mucosa, and tongue normal and no throat erythema.  Eyes: conjunctivae/corneas clear.  EOM normal  Neck: supple, symmetrical, trachea midline  Lungs:  clear to auscultation bilaterally and normal respiratory effort  Cardiovascular: Heart: regular rate and rhythm, S1, S2 normal, no murmur, click, rub or gallop. Chest Wall: no tenderness. Extremities: no cyanosis, 1+ BLE edema, no clubbing. Pulses: 2+ and symmetric.  Abdomen/Rectal: Abdomen: soft, non-tender non-distended; bowel sounds normal; no masses,   no organomegaly.   Skin: Skin color, texture, turgor normal.   Musculoskeletal:no clubbing, cyanosis; left arm with splint and ACE wrap; left knee with serosanguineous drainage to island dressing; no erythema to staple line or skin  Lymph Nodes: No cervical or supraclavicular adenopathy  Neurologic: Normal strength and tone on right; left side not tested. No focal numbness or weakness  Psych/Behavioral:  Alert and oriented, appropriate affect.    Significant Labs:     From outside hospital:  2/13: Cr. 0.8, Na 141, K+ 3.7, WBC 9.2, Hgb 14.3, plt 175          Assessment/Plan:      S/P TKR (total knee replacement), left    -continue PT/OT to increase ambulation, ADL performance and endurance  -WBAT to LLE  -continue oxycodone for pain control prn  -keep polar ice in place when not in therapy (patient to have brought from home)  -continue ASA for DVT prophylaxis; continue famotidine while taking high dose ASA  -Ortho NP to assess surgical wound; Aquacel removed when admitted; island dressing change daily with monitoring of wound  -Ortho NP to assess patient while on SNF weekly  -continue lyrica after knee replacement for pain relief and ROM; discontinue on discharge from SNF or earlier for any adverse effects            *Primary osteoarthritis of left knee    S/p TKR on 2/9  See plan below.        Chronic idiopathic constipation    Occurs at home and worsened with narcotic usage and immobility  Continue senokot-s and miralax   Milk of magnesia is effective for the patient at home so ordered prn if scheduled laxatives ineffective        Morbid obesity with BMI of 50.0-59.9, adult    Portion control while at SNF with modeling at home  Referral to weight loss clinic if patient interested        Left wrist fracture    S/p ORIF  NWB to LUE but may use platform walker  F/U with Dr. Cl Wong on discharge from SNF ([place])        Essential (primary) hypertension    Chronic and mildly elevated  Continue therapy with  valsartan, HCTZ  Low Na diet  -will continue to monitor and adjust regimen as necessary          Future Appointments  Date Time Provider Department Center   2/19/2018 9:00 AM Shelley [place], NP [facility] [address]   2/23/2018 10:15 AM Shelley [place], NP [facility] [address]   3/29/2018 10:20 AM Cl Wong Jr., MD [facility]     Patient's care plan and discharge planning will be discussed by the SNF team in IDT meeting weekly. Medications to be reviewed and discussed with the SNF unit clinical pharmacist.    Jackelin Miller MD  Department of Hospital Medicine  [facility]

## 2018-02-17 NOTE — ASSESSMENT & PLAN NOTE
-continue PT/OT to increase ambulation, ADL performance and endurance  -WBAT to LLE  -continue oxycodone for pain control prn  -keep polar ice in place when not in therapy (patient to have brought from home)  -continue ASA for DVT prophylaxis; continue famotidine while taking high dose ASA  -Ortho NP to assess surgical wound; Aquacel removed when admitted; island dressing change daily with monitoring of wound  -Ortho NP to assess patient while on SNF weekly  -continue lyrica after knee replacement for pain relief and ROM; discontinue on discharge from SNF or earlier for any adverse effects

## 2018-02-17 NOTE — ASSESSMENT & PLAN NOTE
Portion control while at SNF with modeling at home  Referral to weight loss clinic if patient interested

## 2018-02-17 NOTE — HPI
Chief Complaint/Reason for Admission: S/P TKR (total knee replacement), left and left radius fracture    History of Present Illness:  Patient is a 67 y.o. female with HTN, morbid obesity who presents to SNF after a fall with left distal radius fracture requiring ORIF with bone allograft on 2/14/2018 by Dr. Wong at [facility].  The patient recently had a left TKR to treat osteoarthritis on 2/9 by Dr. Salinas and was discharged to home with home health on 2/10.  She states she was doing well at home after TKR until the fall on 2/13.  She landed on her buttocks and denies trauma to her surgical knee.  X-ray of left knee on 2/16 at [facility] showed TKA with components well seated and no acute fractures of foreign bodies.  Patient complains of 7-8/10 pain to her left knee and left wrist.  The pain is controlled with oxycodone.  The patient's Aquacel dressing was saturated and had begun to leak beneath the hydrocolloid on arrival to SNF.  The patient states this occurred in the hospital.  She has not had a BM in > 1 week. The patient has been admitted to SNF for ongoing PT/OT due to insufficient progress to go home safely from the hospital.

## 2018-02-17 NOTE — ASSESSMENT & PLAN NOTE
Chronic and mildly elevated  Continue therapy with valsartan, HCTZ  Low Na diet  -will continue to monitor and adjust regimen as necessary

## 2018-02-18 PROCEDURE — 97530 THERAPEUTIC ACTIVITIES: CPT

## 2018-02-18 PROCEDURE — 97535 SELF CARE MNGMENT TRAINING: CPT

## 2018-02-18 PROCEDURE — 97802 MEDICAL NUTRITION INDIV IN: CPT

## 2018-02-18 PROCEDURE — 97116 GAIT TRAINING THERAPY: CPT

## 2018-02-18 PROCEDURE — 97110 THERAPEUTIC EXERCISES: CPT

## 2018-02-18 PROCEDURE — 25000003 PHARM REV CODE 250: Performed by: INTERNAL MEDICINE

## 2018-02-18 PROCEDURE — 12000000 HC SNF SEMI-PRIVATE ROOM

## 2018-02-18 RX ADMIN — ASPIRIN 325 MG: 325 TABLET, DELAYED RELEASE ORAL at 09:02

## 2018-02-18 RX ADMIN — ONDANSETRON 4 MG: 4 TABLET, ORALLY DISINTEGRATING ORAL at 09:02

## 2018-02-18 RX ADMIN — POLYETHYLENE GLYCOL 3350 17 G: 17 POWDER, FOR SOLUTION ORAL at 09:02

## 2018-02-18 RX ADMIN — STANDARDIZED SENNA CONCENTRATE AND DOCUSATE SODIUM 1 TABLET: 8.6; 5 TABLET, FILM COATED ORAL at 09:02

## 2018-02-18 RX ADMIN — FAMOTIDINE 20 MG: 20 TABLET, FILM COATED ORAL at 09:02

## 2018-02-18 RX ADMIN — VALSARTAN 80 MG: 80 TABLET ORAL at 09:02

## 2018-02-18 RX ADMIN — PREGABALIN 75 MG: 75 CAPSULE ORAL at 08:02

## 2018-02-18 RX ADMIN — FAMOTIDINE 20 MG: 20 TABLET, FILM COATED ORAL at 08:02

## 2018-02-18 RX ADMIN — OXYCODONE HYDROCHLORIDE 10 MG: 5 TABLET ORAL at 04:02

## 2018-02-18 RX ADMIN — STANDARDIZED SENNA CONCENTRATE AND DOCUSATE SODIUM 1 TABLET: 8.6; 5 TABLET, FILM COATED ORAL at 08:02

## 2018-02-18 RX ADMIN — OXYCODONE HYDROCHLORIDE 5 MG: 5 TABLET ORAL at 05:02

## 2018-02-18 RX ADMIN — OXYCODONE HYDROCHLORIDE 10 MG: 5 TABLET ORAL at 09:02

## 2018-02-18 RX ADMIN — ASPIRIN 325 MG: 325 TABLET, DELAYED RELEASE ORAL at 08:02

## 2018-02-18 NOTE — PT/OT/SLP PROGRESS
Occupational Therapy  Treatment    Katelyn Huynh   MRN: [id number]   Admitting Diagnosis: S/P TKR (total knee replacement), left    OT Date of Treatment: 02/18/18  Total Time (min): 53 min    Billable Minutes:  Self Care/Home Management 53    General Precautions: Standard, fall  Orthopedic Precautions: LUE non weight bearing  Braces: UE Sling    Do you have any cultural, spiritual, Shinto conflicts, given your current situation?: no    Subjective:  Communicated with nsg prior to session.  I am doing well today    Pain/Comfort  Pain Rating 1: 7/10  Location - Side 1: Left  Location 1: knee  Pain Addressed 1: Pre-medicate for activity  Pain Rating Post-Intervention 1: 7/10    Objective:   Pt. EOB on arrival    Functional Status:  MDS G  Transfer Functional Status: CGA-Min (A) from EOB to w/c with PFRW  Walk in Room Functional Status: CGA-Min (A) from EOB to w/c with PFRW and CGA for bal and (A)  Dressing Functional Status: 2:CGA-Min (A) for EOB level (A) with management over hips in stance  Eating Functional Status: mod(I) - (I)   Personal Hygiene Functional Status: mod(I) - (I)  Bathing Functional Status: CGA-Min (A) from EOB with BLE feet and post pj for thoroughness      MDS GG  Eating Performance: Set-up or clean-up assistance.  Oral Hygiene Performance: Setup or clean-up assistance  Toileting Hygiene Performance: Substantial/maximal assistance.  Lying to sitting on side of bed Performance: Dependent.  Sit to Stand Performance: Substantial/maximal assistance.  Chair/bed-to-chair transfer Performance: Substantial/maximal assistance.  Toilet transfer Performance: Substantial/maximal assistance.     [facility] 6 Click:  AMPA Total Score: 19    Patient left up in chair with in gym with PT present    ASSESSMENT:  Katelyn Huynh is a 67 y.o. female with a medical diagnosis of S/P TKR (total knee replacement), left Pt. participated well with session on this day.Pt demos physical deficits with  balance  functional mobility, UB strength, endurance  level of functional indep with daily tasks and activities and selfcare skills .Pt. Will continue to benefit from continued OT to progress towards goals  .    Rehab identified problem list/impairments: weakness, impaired endurance, gait instability, impaired functional mobilty, impaired self care skills, impaired balance, decreased lower extremity function, decreased upper extremity function, decreased coordination, pain, impaired fine motor, impaired coordination, decreased ROM, impaired skin, edema, orthopedic precautions    Rehab potential is fair    Activity tolerance: Fair    Discharge recommendations: home with home health     Barriers to discharge: None     Equipment recommendations:  (PFRW)     GOALS: Problem: Occupational Therapy Goal  Goal: Occupational Therapy Goal  Goals to be met by: 14 days      Patient will increase functional independence with ADLs by performing:     UE Dressing with Modified [place].  LE Dressing with Modified [place].  Grooming while standing at sink with Modified [place].  Toileting from bedside commode with Modified [place] for hygiene and clothing management.   Bathing from  sitting at sink with Supervision.  Supine to sit with Modified [place].  Stand pivot transfers with Modified [place].  Toilet transfer to bedside commode with Supervision.  Upper extremity exercise program x 25 min to increase functional endurance for ADLs   Pt will perform a functional standing task with sup    Plan:  Patient to be seen 5 x/week to address the above listed problems via self-care/home management, therapeutic activities, therapeutic exercises  Plan of Care expires: 03/17/18  Plan of Care reviewed with: patient    A client care conference was performed between the KAROLINE and JASON, prior to treatment by JASON, to discuss the patient's status, treatment plan and established goals.   TEDDY Wilson 2/18/2018

## 2018-02-18 NOTE — PLAN OF CARE
Problem: Patient Care Overview  Goal: Plan of Care Review  Outcome: Ongoing (interventions implemented as appropriate)   02/17/18 6776   Coping/Psychosocial   Plan Of Care Reviewed With patient       Problem: Fall Risk (Adult)  Goal: Identify Related Risk Factors and Signs and Symptoms  Related risk factors and signs and symptoms are identified upon initiation of Human Response Clinical Practice Guideline (CPG)   Outcome: Ongoing (interventions implemented as appropriate)   02/17/18 4256   Fall Risk   Related Risk Factors (Fall Risk) fatigue/slow reaction;fear of falling;gait/mobility problems;history of falls

## 2018-02-18 NOTE — PLAN OF CARE
Problem: Physical Therapy Goal  Goal: Physical Therapy Goal  Goals to be met by: 14 days     Patient will increase functional independence with mobility by performin. Supine to sit with Stand-by Assistance = not met  2. Sit to supine with Stand-by Assistance= not met  3. Sit to stand transfer with Stand-by Assistance using left platform rolling walker= not met  4. Bed to chair transfer with Stand-by Assistance using Left platform Rolling Walker= not met  5. Gait  x 100 feet with Stand-by Assistance using left platform Rolling Walker and observing weight bearing precautions LUE. = not met  6. Wheelchair propulsion x25 feet with Stand-by Assistance using right lower extremity and right lower extremity= not met  7. Ascend/Descend 4 inch curb step with Contact Guard Assistance using Left platform Rolling Walker.= not met  8. Lower extremity exercise program x20 reps per  TKA protocol and handout, with assistance as needed shavon gym therex= not met  9. Left knee AAROM 0* to 90* or better= not met     Goals remain appropriate. Continue with Physical therapy Plan of Care. Jennifer Oliveira, PT 2018

## 2018-02-18 NOTE — PT/OT/SLP PROGRESS
Physical Therapy  Treatment    Katelyn Huynh   MRN: [id number]   Admitting Diagnosis: S/P TKR (total knee replacement), left    PT Received On: 02/18/18          Billable Minutes:  Gait Training 15, Therapeutic Activity 15 and Therapeutic Exercise 23=53    Treatment Type: Treatment  PT/PTA: PT     PTA Visit Number: 0       General Precautions: Standard, fall  Orthopedic Precautions: LUE non weight bearing, LLE weight bearing as tolerated   Braces:  (left UE wrapped/splint)         Subjective:  Communicated with patient prior to session.  Patient agreeable to session     Pain/Comfort  Pain Rating 1: 7/10  Location - Side 1: Left  Location 1: knee  Pain Addressed 1: Pre-medicate for activity, Distraction  Pain Rating Post-Intervention 1: 7/10  Pain Rating 2: 4/10  Location - Side 2: Left  Location 2: wrist  Pain Addressed 2: Pre-medicate for activity, Distraction  Pain Rating Post-Intervention 2: 4/10    Objective:  Patient found sitting in w/c. Completed OT.           Functional Status:  MDS G  Bed Mobility Functional Status: CGA-Min (A)  Transfer Functional Status: CGA-Min (A)  Walk in Room Functional Status: CGA-Min (A)  Walk in Corridor Functional Status: CGA-Min (A)  Locomotion on Unit Functional Status: CGA-Min (A)  Locomotion Off Unit Functional Status: mod(A)-Max(A)    MDS GG  Sit to lying Performance: Partial/moderate assistance.  Lying to sitting on side of bed Performance: Partial/moderate assistance.  Sit to Stand Performance: Partial/moderate assistance.  Chair/bed-to-chair transfer Performance: Partial/moderate assistance.  Walk 50 feet with two turns Performance: Supervision or touching assistance.  Walk 150 feet Performance: Not attempted due to medical condition or safety concerns  Wheel 50 feet with two turns Performance: Dependent.     AM-PAC 6 CLICK MOBILITY  Total Score:16    Bed Mobility:  Sit>Supine:min assist for LLE onto mat  Supine>Sit: SBA on mat    Transfers:  Sit<>Stand: w/  left PRW and min assist to/from w/c x2 trials; to/from mat w/ Left PRW and min assist x1 trial.   Stand Pivot Transfer: w/c<>mat w/ L PRW and min assist to rise, then CGA to turn, taking steps in transfer to mat  Maintains NWB Left wrist    Gait:  Amb 5 feet w/ L PRW and min assist to mat, unsteady and required assist to manage PRW in turning to mat  Amb 98 feet w/ L PRW and CGA w/ w/c follow, including managing PRW in turns. Slow pace, steady balance, mild FFP, step to gait pattern.   Therex:  Pt performed TKA protocol exercises x20 reps of:  Ankle pumps  Glute sets  Quad sets  Short arc quads  Heel slides  Hip abd/add  SLR  Seated exercises:    LAQ    Hip flexion    Ankle pumps seated    Knee flexion  Left knee AAROM extension -5* and flexion 90*    Balance:  Stands w/ Left PRW and SBA w/o LOB  Sits EOM w/o assist    Additional Treatment:  Patient pedaled LBE (lower body ergometer) x6 minutes to increase strength and endurance.      Patient left up in chair with call button in reach.    Assessment:  Katelyn Huynh is a 67 y.o. female with a medical diagnosis of S/P TKR (total knee replacement), left.  And she is NWB Left wrist. She demonstrated improvement w/ all aspects of functional mobility. Bed mobility w/ min assist; trfs w/ L PRW w/ min assist; and increased amb distance to 98 feet w/ Left PRW and CGA to min assist. Patient will benefit from continued physical therapy to address deficits and improve safety and functional mobility. Continue with physical therapy plan of care. .    Rehab identified problem list/impairments: weakness, impaired functional mobilty, gait instability, impaired balance, decreased upper extremity function, decreased lower extremity function, pain, orthopedic precautions, decreased ROM, edema    Rehab potential is good.    Activity tolerance: Good    Discharge recommendations: home with home health     Barriers to discharge: Decreased caregiver support, Inaccessible home  environment    Equipment recommendations:  (bariatric RW with left platform)     GOALS:    Physical Therapy Goals        Problem: Physical Therapy Goal    Goal Priority Disciplines Outcome Goal Variances Interventions   Physical Therapy Goal     PT/OT, PT      Description:  Goals to be met by: 14 days     Patient will increase functional independence with mobility by performin. Supine to sit with Stand-by Assistance = not met  2. Sit to supine with Stand-by Assistance= not met  3. Sit to stand transfer with Stand-by Assistance using left platform rolling walker= not met  4. Bed to chair transfer with Stand-by Assistance using Left platform Rolling Walker= not met  5. Gait  x 100 feet with Stand-by Assistance using left platform Rolling Walker and observing weight bearing precautions LUE. = not met  6. Wheelchair propulsion x25 feet with Stand-by Assistance using right lower extremity and right lower extremity= not met  7. Ascend/Descend 4 inch curb step with Contact Guard Assistance using Left platform Rolling Walker.= not met  8. Lower extremity exercise program x20 reps per  TKA protocol and handout, with assistance as needed shavon gym therex= not met  9. Left knee AAROM 0* to 90* or better= not met                       PLAN:    Patient to be seen 5 x/week  to address the above listed problems via gait training, therapeutic activities, therapeutic exercises, wheelchair management/training  Plan of Care expires: 18  Plan of Care reviewed with: patient    Jennifer Oliveira, PT  2018

## 2018-02-18 NOTE — PLAN OF CARE
Problem: Patient Care Overview  Goal: Plan of Care Review  Outcome: Ongoing (interventions implemented as appropriate)  Mrs Huynh requested assistance with transferring from bed to toilet to attempt void. Pt was assisted with using a rolling walker with a platform to ambulate to the bathroom. Safety measures maintained.   Pt had a small, brown, and formed BM. Will continue with plan of care.

## 2018-02-19 PROBLEM — E87.5 HYPERKALEMIA: Status: ACTIVE | Noted: 2018-02-19

## 2018-02-19 PROBLEM — R00.0 TACHYCARDIA: Status: ACTIVE | Noted: 2018-02-19

## 2018-02-19 LAB
ANION GAP SERPL CALC-SCNC: 8 MMOL/L
BASOPHILS # BLD AUTO: 0.05 K/UL
BASOPHILS NFR BLD: 0.6 %
BUN SERPL-MCNC: 12 MG/DL
CALCIUM SERPL-MCNC: 9 MG/DL
CHLORIDE SERPL-SCNC: 101 MMOL/L
CO2 SERPL-SCNC: 31 MMOL/L
CREAT SERPL-MCNC: 0.8 MG/DL
DIFFERENTIAL METHOD: ABNORMAL
EOSINOPHIL # BLD AUTO: 0.3 K/UL
EOSINOPHIL NFR BLD: 3 %
ERYTHROCYTE [DISTWIDTH] IN BLOOD BY AUTOMATED COUNT: 14.3 %
EST. GFR  (AFRICAN AMERICAN): >60 ML/MIN/1.73 M^2
EST. GFR  (NON AFRICAN AMERICAN): >60 ML/MIN/1.73 M^2
GLUCOSE SERPL-MCNC: 98 MG/DL
HCT VFR BLD AUTO: 33.8 %
HGB BLD-MCNC: 11 G/DL
LYMPHOCYTES # BLD AUTO: 1.3 K/UL
LYMPHOCYTES NFR BLD: 15.5 %
MAGNESIUM SERPL-MCNC: 1.7 MG/DL
MCH RBC QN AUTO: 29.3 PG
MCHC RBC AUTO-ENTMCNC: 32.5 G/DL
MCV RBC AUTO: 90 FL
MONOCYTES # BLD AUTO: 0.8 K/UL
MONOCYTES NFR BLD: 9.1 %
NEUTROPHILS # BLD AUTO: 6 K/UL
NEUTROPHILS NFR BLD: 71.3 %
NRBC BLD-RTO: 0 /100 WBC
PHOSPHATE SERPL-MCNC: 3.3 MG/DL
PLATELET # BLD AUTO: 298 K/UL
PMV BLD AUTO: 9.6 FL
POTASSIUM SERPL-SCNC: 5.5 MMOL/L
RBC # BLD AUTO: 3.76 M/UL
SODIUM SERPL-SCNC: 140 MMOL/L
WBC # BLD AUTO: 8.43 K/UL

## 2018-02-19 PROCEDURE — 97110 THERAPEUTIC EXERCISES: CPT

## 2018-02-19 PROCEDURE — 25000003 PHARM REV CODE 250: Performed by: INTERNAL MEDICINE

## 2018-02-19 PROCEDURE — 84100 ASSAY OF PHOSPHORUS: CPT

## 2018-02-19 PROCEDURE — 25000003 PHARM REV CODE 250: Performed by: NURSE PRACTITIONER

## 2018-02-19 PROCEDURE — 12000000 HC SNF SEMI-PRIVATE ROOM

## 2018-02-19 PROCEDURE — 80048 BASIC METABOLIC PNL TOTAL CA: CPT

## 2018-02-19 PROCEDURE — 93010 ELECTROCARDIOGRAM REPORT: CPT | Mod: ,,, | Performed by: INTERNAL MEDICINE

## 2018-02-19 PROCEDURE — 83735 ASSAY OF MAGNESIUM: CPT

## 2018-02-19 PROCEDURE — 85025 COMPLETE CBC W/AUTO DIFF WBC: CPT

## 2018-02-19 PROCEDURE — 93005 ELECTROCARDIOGRAM TRACING: CPT

## 2018-02-19 PROCEDURE — 97116 GAIT TRAINING THERAPY: CPT

## 2018-02-19 PROCEDURE — 97530 THERAPEUTIC ACTIVITIES: CPT

## 2018-02-19 PROCEDURE — 36415 COLL VENOUS BLD VENIPUNCTURE: CPT

## 2018-02-19 PROCEDURE — 99309 SBSQ NF CARE MODERATE MDM 30: CPT | Mod: ,,, | Performed by: NURSE PRACTITIONER

## 2018-02-19 RX ORDER — METOPROLOL TARTRATE 25 MG/1
12.5 TABLET ORAL 2 TIMES DAILY
Status: DISCONTINUED | OUTPATIENT
Start: 2018-02-19 | End: 2018-02-24 | Stop reason: HOSPADM

## 2018-02-19 RX ADMIN — ASPIRIN 325 MG: 325 TABLET, DELAYED RELEASE ORAL at 08:02

## 2018-02-19 RX ADMIN — OXYCODONE HYDROCHLORIDE 5 MG: 5 TABLET ORAL at 12:02

## 2018-02-19 RX ADMIN — OXYCODONE HYDROCHLORIDE 10 MG: 5 TABLET ORAL at 01:02

## 2018-02-19 RX ADMIN — VALSARTAN 80 MG: 80 TABLET ORAL at 08:02

## 2018-02-19 RX ADMIN — PREGABALIN 75 MG: 75 CAPSULE ORAL at 09:02

## 2018-02-19 RX ADMIN — OXYCODONE HYDROCHLORIDE 10 MG: 5 TABLET ORAL at 09:02

## 2018-02-19 RX ADMIN — ASPIRIN 325 MG: 325 TABLET, DELAYED RELEASE ORAL at 09:02

## 2018-02-19 RX ADMIN — FAMOTIDINE 20 MG: 20 TABLET, FILM COATED ORAL at 08:02

## 2018-02-19 RX ADMIN — METOPROLOL TARTRATE 12.5 MG: 25 TABLET, FILM COATED ORAL at 09:02

## 2018-02-19 RX ADMIN — METOPROLOL TARTRATE 12.5 MG: 25 TABLET, FILM COATED ORAL at 02:02

## 2018-02-19 RX ADMIN — FAMOTIDINE 20 MG: 20 TABLET, FILM COATED ORAL at 09:02

## 2018-02-19 RX ADMIN — STANDARDIZED SENNA CONCENTRATE AND DOCUSATE SODIUM 1 TABLET: 8.6; 5 TABLET, FILM COATED ORAL at 08:02

## 2018-02-19 RX ADMIN — POLYETHYLENE GLYCOL 3350 17 G: 17 POWDER, FOR SOLUTION ORAL at 08:02

## 2018-02-19 RX ADMIN — OXYCODONE HYDROCHLORIDE 10 MG: 5 TABLET ORAL at 08:02

## 2018-02-19 RX ADMIN — SODIUM POLYSTYRENE SULFONATE 15 G: 15 SUSPENSION ORAL; RECTAL at 02:02

## 2018-02-19 RX ADMIN — OXYCODONE HYDROCHLORIDE 10 MG: 5 TABLET ORAL at 06:02

## 2018-02-19 NOTE — PROGRESS NOTES
02/19/2018  10:53 AM  Discharge Planning Assessment    Payor: MEDICARE / Plan: MEDICARE A ONLY / Product Type: Government /     Expected length of stay:  [] 7 days   [] 10 days  [x] 14 days   [] 21 days   [] > 30 days    Communicated expected length of stay with patient/caregiver:  [x] Yes   [] No    Anticipated discharge date:  3/1/2018    Assessment information obtained from:  [x] Patient   [] Caregiver     Arrived from:   [x] Home   [] Assisted Living    [] Nursing Home   [] SNF   [] Rehab  [] LTACH   [] Group Home   [] Foster Care   [] Psych   [] Shelter   [] Homeless   [] Transfer  [] Correctional Facility  [] Name of Facility:      Patient currently lives with:    [] Alone   [x] Spouse   [] Daughter   [] Son   [] Grandparents   []  Parents   [] Siblings   [] Friends   [] Domestic Partner   [] Facility Resident      [] Foster Home    [] Other:       Extended Emergency Contact Information  Primary Emergency Contact: Venkatesh Huynh  Address: [address] [place] [address]  Home Phone: [phone]  Mobile Phone: [phone]  Relation: Spouse     Prior to hospitalization cognitive status:   [x] Alert/Oriented  [] No Deficits [] Risk of Harm to Self/Others   [] Not Oriented to Person   [] Not Oriented to Place   [] Not Oriented to Time   [] Coma/Sedated/Intubated  [] Judgement Impaired    []  Unable to Assess   [] Inappropriate Behavior  [] Infant/Toddler    Prior to hospitalization functional status:   [x] Independent   [x] Assistive Equipment   [] Assistive Person    [] Completely Dependent  [] Infant/Toddler/Child Appropriate   [] Infant/Toddler/Child Delayed    []  Adolescent     Current cognitive status:   [x] Alert/Oriented  [] No Deficits [] Risk of Harm to Self/Others   [] Not Oriented to Person   [] Not Oriented to Place   [] Not Oriented to Time   [] Coma/Sedated/Intubated  [] Judgement Impaired    []  Unable to Assess   [] Inappropriate Behavior  []  Infant/Toddler    Current functional status:     [] Independent   [x] Assistive Equipment   [x] Assistive Person     [] Completely Dependent   [] Infant/Toddler/Child Appropriate   [] Infant/Toddler/Child Delayed     [] Adolescent     Capacity to Care for Self:   Is patient able to return to prior living arrangements after discharge: [x] Yes  [] No     Is patient able to care for self after discharge?   [] Yes   [x] No     [] Pediatric     Does the patient have family/friends to assist after discharge?:  [x] Yes   [] No    [] N/A   Comments:  sister      Patient/caregiver perception of discharge disposition:   [] Home   [x] Home with Family  [x] Home Health   [] SNF   [] Rehab   [] LTAC    []  New Nursing Home Placement  [] Return to Nursing Home    [] Shelter     [] Assisted Living  [] Foster Home   [] Other:      Readmit:   Has patient viky in the hospital in the last 30 days? [x] Yes   [] No     If YES, was patient admitted for the same reason?  [] Yes   [x] No       Home Health:   Patient currently receives home health services?:   [x] Yes   [] No     Patient previously received home health services and would like to resume services if necessary   [x] Yes   [] No      If YES, name of home health provider: [facility]    DME:   Patient currently uses DME:   [x] Yes   [] No        List of equipment currently used:     [] Wheelchair   [] Standard Walker  [x] Rolling Walker  []  Rollator    [] Oxygen    [] Portable oxygen   [] Nebulizer    [] Apnea Monitor    [] Crutches  [] Hospital Bed   []  Lift Device   [] Scooter [x] Cane     [] Prosthesis   [x]  BSC   [x] Tub Bench   [] Catheter Supplies    [] Ostomy Supplies   [] Trach Supplies     [] Suction Machine        [] Home Vent    [] Bipap   [] Other:         Medications:    Can the patient afford all prescribed medications?  [x] Yes   [] No     If NO, what medication:       Is the patient taking medications as prescribed?    [x] Yes   [] No    Financial Concerns:   Does the  patient have any financial concerns?   [] Yes   [x] No      If YES, what are the concerns:      Transportation:   Does the patient have transportation to healthcare appointments?   [x] Yes   [] No     If YES, what means of transportation does the patient have?   [x] Car   [x] Family/Friend  [] Bicycle   [] Motorcycle   [] Public Transportation [] Ambulance[] Wheelchair van   [] Name of Provider    Dialysis:   Does the patient currently receive dialysis?   [] Yes   [x] No     Daniel Garcia MD   [address] [facility]  [address] [place] [address]  [phone]         APS/CPS involved in the case:  [] Yes   [x] No   If YES, name of :     If YES, phone number of :        Discharge Plan A:  [] Home   [x] Home with Family  [x] Home Health   [] SNF   [] Rehab   [] LTAC   []  New Nursing Home Placement  [] Return to Nursing Home    [] Assisted Living    [] Shelter     [] Private Duty Nursing   [] Foster Home    [] Psych    [] Early Steps  [] WIC       [] Home Hospice   [] Inpatient Hospice   [] Other    Discharge Plan B:  [] Home   [] Home with Family  [] Home Health   [] SNF   [] Rehab   [] LTAC  []  New Nursing Home Placement   [] Return to  Nursing Home    [] Assisted Living   [] Shelter  [] Private Duty Nursing   [] Foster Home     [] Psych     [] Early Steps  [] WIC    [] Home Hospice     [] Inpatient Hospice   [] Other     [x] Patient and family in agreement with discharge plan.    Met with patient in room to discuss discharge plan and date. Patient AAO. Discharge plan is to return home with very limited assist of spouse. Patient stated assist of sister. Informed patient therapy recommends a 2 week SNF stay and that we (MD, nurse, therapy,MATT,CLARE) meet today to review her case and come up with a discharge date. Patient stated understanding. Patient stated using [facility] in the past and would like to use them again upon discharge from SNF. CLARE and MATT will continue to follow for any  additional needs.  Brianna Herman RN, CM Skilled  [id number]

## 2018-02-19 NOTE — PLAN OF CARE
Problem: Patient Care Overview  Goal: Plan of Care Review  Outcome: Ongoing (interventions implemented as appropriate)     Morbid obesity with BMI of 50.0-59.9, adult     Related to (etiology):   Excessive caloric intake      Signs and Symptoms (as evidenced by):   Good po intake with BMI 51     Interventions/Recommendations (treatment strategy):  Low sodium diet, boost glucose bid     Nutrition Diagnosis Status:   New       Continue low sodium diet , recommend boost glucose bid to meet protein requirements, RD to follow  Goals: PO to meet 85% of EEN in one week  Nutrition Goal Status: new  Communication of RD Recs: other (comment)

## 2018-02-19 NOTE — PROGRESS NOTES
02/19/2018  3:05 PM    Discharge Planning-Met with patient in room to inform of discharge date of 3/1/2018. Discharge date written on dry erase board in room. Patient stated understanding to discharge date.  Brianna Herman RN, CM Skilled  [id number]

## 2018-02-19 NOTE — ASSESSMENT & PLAN NOTE
Evaluated on 2/19/18  -Chronic and mildly elevated  -Continue therapy with valsartan, HCTZ  -Low Na diet  -will continue to monitor and adjust regimen as necessary

## 2018-02-19 NOTE — CONSULTS
"  [facility]  Adult Nutrition  Consult Note    SUMMARY     Recommendations    Continue low sodium diet , recommend boost glucose bid to meet protein requirements, RD to follow  Goals: PO to meet 85% of EEN in one week  Nutrition Goal Status: new  Communication of RD Recs: other (comment)    Continuum of Care Plan    Referral to Outpatient Services: home care pre-diabetes counseling    Reason for Assessment  Reason for Assessment: physician consult  Diagnosis:  (L wrist fx, sp TKA 1/8/18), pre-diabetes, morbid obesity    Interdisciplinary Rounds: did not attend        Nutrition Discharge Planning: DC on low sodium diet    Nutrition Prescription Ordered    Current Diet Order: 2gm Na          Oral Nutrition Supplement: none     Evaluation of Received Nutrients/Fluid Intake        Energy Calories Required: meeting needs          Protein Required: not meeting needs           Fluid Required: meeting needs     Tolerance: tolerating  % Intake of Estimated Energy Needs: 75 - 100 %  % Meal Intake: 75%     Nutrition Risk Screen     Nutrition Risk Screen: no indicators present    Nutrition/Diet History  Patient Reported Diet/Restrictions/Preferences: general   Food Preferences: No cultural or Sikh food preferences         Labs     Pertinent Labs Reviewed: reviewed, pertinent   Glucose 114, HgA1c 5.7  Pertinent Medications Reviewed: reviewed, pertinent  Pertinent Medications Comments: HCTZ, ASA, famotidine, senna-docusate, polyethylglyol,     Physical Findings    Overall Physical Appearance: edematous, nourished, obese  Tubes:  (-)  Oral/Mouth Cavity: WDL  Skin: incision    Anthropometrics     Height: 5' 4.5" (163.8 cm)  Weight Method: Bed Scale  Weight: 120.6 kg (265 lb 14 oz)     Ideal Body Weight (IBW), Female: 122.5 lb     % Ideal Body Weight, Female (lb): 217.04 lb  BMI (Calculated): 45        Estimated/Assessed Needs    Weight Used For Calorie Calculations: 120.6 kg (265 lb 14 oz)    Energy Calorie " Requirements (kcal): 1743  Energy Need Method: [place] (no activity factor 2/2 obesity)      Weight Used For Protein Calculations: 56 kg (123 lb 7.3 oz) (IBW x 1.5)  Protein Requirements: 84gm    Fluid Requirements (mL): or per MD  Fluid Need Method: RDA Method    RDA Method (mL): 1743               Assessment and Plan    Morbid obesity with BMI of 50.0-59.9, adult    Related to (etiology):   Excessive caloric intake     Signs and Symptoms (as evidenced by):   Good po intake with BMI 51    Interventions/Recommendations (treatment strategy):  Low sodium diet, boost glucose bid    Nutrition Diagnosis Status:   New              Monitor and Evaluation    Food and Nutrient Intake: energy intake  Food and Nutrient Adminstration: diet order     Physical Activity and Function: nutrition-related ADLs and IADLs  Anthropometric Measurements: weight change  Biochemical Data, Medical Tests and Procedures: electrolyte and renal panel, glucose/endocrine profile       Nutrition Risk    Level of Risk:  (follow one time per week)    Nutrition Follow-Up    RD Follow-up?: Yes

## 2018-02-19 NOTE — ASSESSMENT & PLAN NOTE
Evaluated on 2/19/18  -Portion control while at SNF with modeling at home  -Referral to weight loss clinic if patient interested

## 2018-02-19 NOTE — ASSESSMENT & PLAN NOTE
Evaluated on 2/19/18  -Occurs at home and worsened with narcotic usage and immobility  -Continue senokot-s and miralax   -Milk of magnesia is effective for the patient at home so ordered prn if scheduled laxatives ineffective

## 2018-02-19 NOTE — PROGRESS NOTES
[facility]  Department of Hospital Medicine  Progress Note    Patient Name: Katelyn Huynh  MRN: [id number]  Code Status: Full Code  Admission Date: 2/16/2018  Length of Stay: 3 days  Attending Physician: Jackelin Miller MD  Primary Care Provider: Daniel Garcia MD    Subjective:     Principal Problem:Primary osteoarthritis of left knee    Chief Complaint/Reason for Admission: S/P TKR (total knee replacement), left and left radius fracture    History of Present Illness:  Patient is a 67 y.o. female with HTN, morbid obesity who presents to SNF after a fall with left distal radius fracture requiring ORIF with bone allograft on 2/14/2018 by Dr. Wong at [facility].  The patient recently had a left TKR to treat osteoarthritis on 2/9 by Dr. Salinas and was discharged to home with home health on 2/10.  She states she was doing well at home after TKR until the fall on 2/13.  She landed on her buttocks and denies trauma to her surgical knee.  X-ray of left knee on 2/16 at [facility] showed TKA with components well seated and no acute fractures of foreign bodies.  Patient complains of 7-8/10 pain to her left knee and left wrist.  The pain is controlled with oxycodone.  The patient's Aquacel dressing was saturated and had begun to leak beneath the hydrocolloid on arrival to SNF.  The patient states this occurred in the hospital.  She has not had a BM in > 1 week. The patient has been admitted to SNF for ongoing PT/OT due to insufficient progress to go home safely from the hospital.    Hospital Course:  The patient was admitted at [facility] from 2/13 to 2/16/2018.  2/16: Patient admitted to SNF for ongoing Pt/Ot following a hospitalization for left distal radius fracture s/p ORIF and recent left TKR.  2/19: Patient seen at bedside doing well, no complaints, labs reviewed.    Interval History: Patient seen at bedside, doing well. No acute events overnight.    Review of Systems    Constitutional: Negative for appetite change, chills, fatigue and fever.   HENT: Negative for trouble swallowing.    Respiratory: Negative for cough, chest tightness, shortness of breath and wheezing.    Cardiovascular: Negative for chest pain, palpitations and leg swelling.   Gastrointestinal: Negative for abdominal pain, constipation, diarrhea and nausea.   Genitourinary: Negative for difficulty urinating, frequency and urgency.   Musculoskeletal: Negative for arthralgias and myalgias.   Skin: Negative for rash.   Neurological: Negative for dizziness, weakness, light-headedness and headaches.   Psychiatric/Behavioral: Negative for sleep disturbance.     Scheduled Meds:   aspirin  325 mg Oral BID    famotidine  20 mg Oral BID    hydroCHLOROthiazide  50 mg Oral QAM    metoprolol tartrate  12.5 mg Oral BID    polyethylene glycol  17 g Oral Daily    pregabalin  75 mg Oral QHS    senna-docusate 8.6-50 mg  1 tablet Oral BID    valsartan  80 mg Oral Daily     Continuous Infusions:  PRN Meds:.acetaminophen, calcium carbonate, docusate sodium, magnesium hydroxide 400 mg/5 ml, ondansetron, oxyCODONE, oxyCODONE, ramelteon    Objective:     Vital Signs (Most Recent):  Temp: 97.7 °F (36.5 °C) (02/19/18 0807)  Pulse: (!) 111 (02/19/18 0807)  Resp: 18 (02/19/18 0807)  BP: (!) 120/58 (02/19/18 0807)  SpO2: 98 % (02/19/18 0807) Vital Signs (24h Range):  Temp:  [97.7 °F (36.5 °C)-97.9 °F (36.6 °C)] 97.7 °F (36.5 °C)  Pulse:  [103-111] 111  Resp:  [18] 18  SpO2:  [94 %-98 %] 98 %  BP: (100-120)/(58-63) 120/58     Weight: 120.6 kg (265 lb 14 oz)  Body mass index is 44.93 kg/m².  No intake or output data in the 24 hours ending 02/19/18 1559   Physical Exam   Constitutional: She is oriented to person, place, and time. She appears well-developed and well-nourished. No distress.   Cardiovascular: Normal rate, regular rhythm and normal heart sounds.  Exam reveals no gallop and no friction rub.    No murmur  heard.  Pulmonary/Chest: Effort normal and breath sounds normal. No respiratory distress. She has no wheezes. She has no rales.   Abdominal: Soft. Bowel sounds are normal. She exhibits no distension. There is no tenderness.   Musculoskeletal: She exhibits edema. She exhibits no tenderness.   1+ edema to BLE, left arm with splint and ACE wrap; left knee with small amount of serosanguineous drainage on island dressing   Neurological: She is alert and oriented to person, place, and time.   Skin: Skin is warm and dry. No rash noted. She is not diaphoretic. No cyanosis. Nails show no clubbing.   Psychiatric: She has a normal mood and affect. Her behavior is normal.       Significant Labs:    Recent Labs  Lab 02/19/18  0416   WBC 8.43   HGB 11.0*   HCT 33.8*          Recent Labs  Lab 02/19/18 0416      K 5.5*      CO2 31*   BUN 12   CREATININE 0.8   CALCIUM 9.0     Lab Results   Component Value Date    LABPROT 10.4 01/25/2018    ALBUMIN 3.3 (L) 12/27/2016     Lab Results   Component Value Date    CALCIUM 9.0 02/19/2018    PHOS 3.3 02/19/2018       Significant Imaging: n/a    Assessment/Plan:      * Primary osteoarthritis of left knee    Evaluated on 2/19/18  -S/p TKR on 2/9  -See plan below.        S/P TKR (total knee replacement), left    Evaluated on 2/19/18  -continue PT/OT to increase ambulation, ADL performance and endurance  -WBAT to LLE  -continue oxycodone for pain control prn  -keep polar ice in place when not in therapy (patient to have brought from home)  -continue ASA for DVT prophylaxis; continue famotidine while taking high dose ASA  -Ortho NP to assess surgical wound; Aquacel removed when admitted; island dressing change daily with monitoring of wound  -Ortho NP to assess patient while on SNF weekly  -continue lyrica after knee replacement for pain relief and ROM; discontinue on discharge from SNF or earlier for any adverse effects        Hyperkalemia    New  -K+ 5.5 today  -EKG  -given  kayexalate  -repeat BMP in AM        Tachycardia    New  - today, been tachy  -EKG  -adequate renal function on todays labs  -started on low dose metoprolol 12.5mg bid  -continue to monitor a adjust as needed        Chronic idiopathic constipation    Evaluated on 2/19/18  -Occurs at home and worsened with narcotic usage and immobility  -Continue senokot-s and miralax   -Milk of magnesia is effective for the patient at home so ordered prn if scheduled laxatives ineffective        Morbid obesity with BMI of 50.0-59.9, adult    Evaluated on 2/19/18  -Portion control while at [facility] with modeling at home  -Referral to weight loss clinic if patient interested        Left wrist fracture s/p ORIF    Evaluated on 2/19/18  -S/p ORIF  -NWB to MAYA but may use platform walker  -F/U with Dr. Cl Wong on discharge from SNF ([place])        Essential (primary) hypertension    Evaluated on 2/19/18  -Chronic and mildly elevated  -Continue therapy with valsartan, HCTZ  -Low Na diet  -will continue to monitor and adjust regimen as necessary          Future Appointments  Date Time Provider Department Center   2/23/2018 10:15 AM Shelley Up NP [facility]   3/29/2018 10:20 AM MD JAYJAY Alvares Jr. [facility]       Vandana Santoyo NP  Department of Hospital Medicine  [facility]

## 2018-02-19 NOTE — ASSESSMENT & PLAN NOTE
New  - today, been tachy  -EKG  -adequate renal function on todays labs  -started on low dose metoprolol 12.5mg bid  -continue to monitor a adjust as needed

## 2018-02-19 NOTE — ASSESSMENT & PLAN NOTE
Evaluated on 2/19/18  -S/p ORIF  -NWB to LUE but may use platform walker  -F/U with Dr. Cl Wong on discharge from SNF ([place])

## 2018-02-19 NOTE — CLINICAL REVIEW
Clinical Pharmacy Chart Review Note      Admit Date: 2/16/2018   LOS: 3 days       Katelyn Huynh is a 67 y.o. female admitted to SNF for PT/OT after hospitalization for S/P TKR, left and left radius fracture.    Active Hospital Problems    Diagnosis  POA    *S/P TKR (total knee replacement), left [Z96.652]  Not Applicable    Morbid obesity with BMI of 50.0-59.9, adult [E66.01, Z68.43]  Not Applicable    Chronic idiopathic constipation [K59.04]  Yes    Left wrist fracture [S62.102A]  Yes    Primary osteoarthritis of left knee [M17.12]  Yes    Essential (primary) hypertension [I10]  Yes      Resolved Hospital Problems    Diagnosis Date Resolved POA   No resolved problems to display.     Review of patient's allergies indicates:   Allergen Reactions    Cortisone Hives and Swelling     Swelling of the tongue     Patient Active Problem List    Diagnosis Date Noted    S/P TKR (total knee replacement), left 02/17/2018    Morbid obesity with BMI of 50.0-59.9, adult 02/17/2018    Chronic idiopathic constipation 02/17/2018    Left wrist fracture 02/16/2018    Primary osteoarthritis of left knee 02/09/2018    Morbid obesity 01/25/2018    Prediabetes 01/25/2018    Varicose veins of both lower extremities 01/25/2018    Edema 01/25/2018    Insufficiency of tear film of both eyes 06/01/2016    Left posterior capsular opacification 02/03/2016    Bilateral posterior capsular opacification 01/20/2016    Essential hypertension 01/20/2016    Left knee pain 11/06/2015    Abnormality of gait 11/06/2015    Knee joint replacement by other means 02/26/2015    Osteoarthritis of right knee 02/24/2015    Right knee DJD 01/13/2015    After-cataract, unspecified 08/25/2014    Steroid responders - Both Eyes 02/17/2014    Refractive error - Both Eyes 12/30/2013    Post-operative state - Both Eyes 11/04/2013    Posterior tibial tendon dysfunction 07/19/2013    Foot pain 07/19/2013    CME (cystoid  macular edema) - Right Eye 07/08/2013    Ocular hypertension - Both Eyes 06/10/2013    Iritis - Both Eyes 05/20/2013    Essential (primary) hypertension 05/20/2013    Pseudophakia - Both Eyes 05/20/2013    After-cataract, obscuring vision - Both Eyes 05/20/2013    History of meningioma 07/19/2012       Scheduled Meds:    aspirin  325 mg Oral BID    famotidine  20 mg Oral BID    hydroCHLOROthiazide  50 mg Oral QAM    polyethylene glycol  17 g Oral Daily    pregabalin  75 mg Oral QHS    senna-docusate 8.6-50 mg  1 tablet Oral BID    valsartan  80 mg Oral Daily     Continuous Infusions:   PRN Meds: acetaminophen, calcium carbonate, docusate sodium, magnesium hydroxide 400 mg/5 ml, ondansetron, oxyCODONE, oxyCODONE, ramelteon    OBJECTIVE:     Vital Signs (Last 24H)  Temp:  [97.7 °F (36.5 °C)-97.9 °F (36.6 °C)]   Pulse:  [103-111]   Resp:  [18]   BP: (100-120)/(58-63)   SpO2:  [94 %-98 %]     Laboratory:  See pertinent labs below.    ASSESSMENT/PLAN:     Active Hospital Problems    Diagnosis    *S/P TKR (total knee replacement), left and left radius fracture  --PT/OT: Lyrica 75mg nightly; Oxycodone 5mg q3hrs prn moderate pain; 10 mg q3hrs prn severe pain  --bowel regimen for constipation; hold for loose or frequent stools: Miralax daily; Senna-docusate twice daily; MOM daily prn constipation; Docusate enema daily prn constipation  --DVT prophylaxis:  mg twice daily  --Famotidine 20 mg twice daily for GI prophylaxis  Lab Results   Component Value Date    WBC 8.43 02/19/2018    HGB 11.0 (L) 02/19/2018    HCT 33.8 (L) 02/19/2018    MCV 90 02/19/2018     02/19/2018           Morbid obesity with BMI of 50.0-59.9, adult   --Portion control while at [facility]      Chronic idiopathic constipation   --Cont bowel regimen as above      Left wrist fracture   --S/P ORIF      Primary osteoarthritis of left knee   --S/P TKR on 2/9  --Cont plan as above      Essential (primary) hypertension   **Monitor BP and  pulse  --Valsartan 80 mg daily (monitor BMP)  --HCTZ 50 mg daily (monitor BMP)  BP Readings from Last 3 Encounters:   02/19/18 (!) 120/58   02/10/18 138/60   01/25/18 (!) 156/70     Pulse Readings from Last 3 Encounters:   02/19/18 (!) 111   02/10/18 85   01/25/18 97     BMP  Lab Results   Component Value Date     02/19/2018    K 5.5 (H) 02/19/2018     02/19/2018    CO2 31 (H) 02/19/2018    BUN 12 02/19/2018    CREATININE 0.8 02/19/2018    CALCIUM 9.0 02/19/2018    ANIONGAP 8 02/19/2018    ESTGFRAFRICA >60.0 02/19/2018    EGFRNONAA >60.0 02/19/2018              I have reviewed the medications in compliance with CMS Regulation F329 of the [place] Appendix PP.      Reba Soliz, Pharm. D.  Clinical Pharmacist  [facility]

## 2018-02-19 NOTE — SUBJECTIVE & OBJECTIVE
Hospital Course:  The patient was admitted at [facility] from 2/13 to 2/16/2018.  2/16: Patient admitted to SNF for ongoing Pt/Ot following a hospitalization for left distal radius fracture s/p ORIF and recent left TKR.  2/19: Patient seen at bedside doing well, no complaints, labs reviewed.    Interval History: Patient seen at bedside, doing well. No acute events overnight.    Review of Systems   Constitutional: Negative for appetite change, chills, fatigue and fever.   HENT: Negative for trouble swallowing.    Respiratory: Negative for cough, chest tightness, shortness of breath and wheezing.    Cardiovascular: Negative for chest pain, palpitations and leg swelling.   Gastrointestinal: Negative for abdominal pain, constipation, diarrhea and nausea.   Genitourinary: Negative for difficulty urinating, frequency and urgency.   Musculoskeletal: Negative for arthralgias and myalgias.   Skin: Negative for rash.   Neurological: Negative for dizziness, weakness, light-headedness and headaches.   Psychiatric/Behavioral: Negative for sleep disturbance.     Scheduled Meds:   aspirin  325 mg Oral BID    famotidine  20 mg Oral BID    hydroCHLOROthiazide  50 mg Oral QAM    metoprolol tartrate  12.5 mg Oral BID    polyethylene glycol  17 g Oral Daily    pregabalin  75 mg Oral QHS    senna-docusate 8.6-50 mg  1 tablet Oral BID    valsartan  80 mg Oral Daily     Continuous Infusions:  PRN Meds:.acetaminophen, calcium carbonate, docusate sodium, magnesium hydroxide 400 mg/5 ml, ondansetron, oxyCODONE, oxyCODONE, ramelteon    Objective:     Vital Signs (Most Recent):  Temp: 97.7 °F (36.5 °C) (02/19/18 0807)  Pulse: (!) 111 (02/19/18 0807)  Resp: 18 (02/19/18 0807)  BP: (!) 120/58 (02/19/18 0807)  SpO2: 98 % (02/19/18 0807) Vital Signs (24h Range):  Temp:  [97.7 °F (36.5 °C)-97.9 °F (36.6 °C)] 97.7 °F (36.5 °C)  Pulse:  [103-111] 111  Resp:  [18] 18  SpO2:  [94 %-98 %] 98 %  BP: (100-120)/(58-63) 120/58     Weight: 120.6 kg (265 lb  14 oz)  Body mass index is 44.93 kg/m².  No intake or output data in the 24 hours ending 02/19/18 1559   Physical Exam   Constitutional: She is oriented to person, place, and time. She appears well-developed and well-nourished. No distress.   Cardiovascular: Normal rate, regular rhythm and normal heart sounds.  Exam reveals no gallop and no friction rub.    No murmur heard.  Pulmonary/Chest: Effort normal and breath sounds normal. No respiratory distress. She has no wheezes. She has no rales.   Abdominal: Soft. Bowel sounds are normal. She exhibits no distension. There is no tenderness.   Musculoskeletal: She exhibits edema. She exhibits no tenderness.   1+ edema to BLE, left arm with splint and ACE wrap; left knee with small amount of serosanguineous drainage on island dressing   Neurological: She is alert and oriented to person, place, and time.   Skin: Skin is warm and dry. No rash noted. She is not diaphoretic. No cyanosis. Nails show no clubbing.   Psychiatric: She has a normal mood and affect. Her behavior is normal.       Significant Labs:    Recent Labs  Lab 02/19/18  0416   WBC 8.43   HGB 11.0*   HCT 33.8*          Recent Labs  Lab 02/19/18  0416      K 5.5*      CO2 31*   BUN 12   CREATININE 0.8   CALCIUM 9.0     Lab Results   Component Value Date    LABPROT 10.4 01/25/2018    ALBUMIN 3.3 (L) 12/27/2016     Lab Results   Component Value Date    CALCIUM 9.0 02/19/2018    PHOS 3.3 02/19/2018       Significant Imaging: n/a

## 2018-02-19 NOTE — PT/OT/SLP PROGRESS
"Physical Therapy  Treatment    Katelyn Huynh   MRN: [id number]   Admitting Diagnosis: Primary osteoarthritis of left knee    PT Received On: 02/19/18  Total Time (min): 45       Billable Minutes:  Gait Training 15, Therapeutic Activity 15 and Therapeutic Exercise 15    Treatment Type: Treatment  PT/PTA: PTA     PTA Visit Number: 1       General Precautions: Standard, fall  Orthopedic Precautions: LUE non weight bearing, LLE weight bearing as tolerated   Braces:  (left UE wrapped/splint)         Subjective:  Pt agreeable to therapy. " I was waiting for you, I'm ready to get going and get this done today, I have company coming visit."    Pain/Comfort  Pain Rating 1: 7/10  Location - Side 1: Left  Location 1: knee  Pain Addressed 1: Pre-medicate for activity, Reposition, Distraction  Pain Rating Post-Intervention 1: 7/10    Objective:  Patient found sitting up in w/c in room.          Functional Status:  MDS G  Bed Mobility Functional Status: CGA-Min (A)  Transfer Functional Status: CGA-Min (A)  Walk in Room Functional Status: CGA-Min (A)  Walk in Corridor Functional Status: CGA-Min (A)  Moving from seated to standing position: Not steady, only able to stabilize with staff assistance  Walking (with assistive device if used): Not steady, only able to stabilize with staff assistance  Turning around and facing the opposite direction while walking: Not steady, only able to stabilize with staff assistance  Surface-to-surface transfer (transfer between bed and chair or wheelchair): Not steady, only able to stabilize with staff assistance    MDS GG  Does the resident walk?: Yes --> Continue to Walk 50 feet with two turns assessment  Walk 50 feet with two turns Performance: Supervision or touching assistance.  Walk 150 feet Performance: Not attempted due to medical condition or safety concerns     AM-PAC 6 CLICK MOBILITY  Total Score:16    Bed Mobility:  Sit>Supine:Min A for LLE management on mat.   Supine>Sit: SBA " on mat    Transfers:  Sit<>Stand: Min A with L PRW from w/c and mat. Assistance to elevate from seat.   Stand Pivot Transfer: CGA with L PRW once upright, w/c<> mat, w/c>bed.     Gait:  Amb: Pt ambulated 130 ft with L PRW and CGA. Pt maintains NWB on LUE. Slow [place] and slight L knee flexion during stance phase. Pt given VC's to improve extension of L knee and for AD management. 2 standing rest breaks throughout trial, each after about 50 ft.      Therex:  Seated and supine LLE Therex per TKR protocol x 20 reps.     Balance:  Pt with no LOB with all mobility this tx.     Additional Treatment:  L knee AROM: -3* extension - 91* flexion    Patient left sitting EOB with call button in reach and family present.    Assessment:  Katelyn Huynh is a 67 y.o. female with a medical diagnosis of Primary osteoarthritis of left knee.  Pt tolerated treatment well with focus on gait training and transfers. Pt progressing well but required increased assistance with standing this session. Pt will continue to benefit from skilled therapy to improve strength, ROM, endurance, and functional mobility.     Rehab identified problem list/impairments: weakness, impaired functional mobilty, gait instability, impaired balance, decreased upper extremity function, decreased lower extremity function, pain, orthopedic precautions, decreased ROM, edema    Rehab potential is good.    Activity tolerance: Good    Discharge recommendations: home with home health     Barriers to discharge: Decreased caregiver support, Inaccessible home environment    Equipment recommendations:  (bariatric RW with left platform)     GOALS:    Physical Therapy Goals        Problem: Physical Therapy Goal    Goal Priority Disciplines Outcome Goal Variances Interventions   Physical Therapy Goal     PT/OT, PT Ongoing (interventions implemented as appropriate)     Description:  Goals to be met by: 14 days     Patient will increase functional independence with  mobility by performin. Supine to sit with Stand-by Assistance = not met  2. Sit to supine with Stand-by Assistance= not met  3. Sit to stand transfer with Stand-by Assistance using left platform rolling walker= not met  4. Bed to chair transfer with Stand-by Assistance using Left platform Rolling Walker= not met  5. Gait  x 100 feet with Stand-by Assistance using left platform Rolling Walker and observing weight bearing precautions LUE. = not met  6. Wheelchair propulsion x25 feet with Stand-by Assistance using right lower extremity and right lower extremity= not met  7. Ascend/Descend 4 inch curb step with Contact Guard Assistance using Left platform Rolling Walker.= not met  8. Lower extremity exercise program x20 reps per  TKA protocol and handout, with assistance as needed [employer] therex= not met  9. Left knee AAROM 0* to 90* or better= not met                       PLAN:    Patient to be seen 5 x/week  to address the above listed problems via gait training, therapeutic activities, therapeutic exercises, wheelchair management/training  Plan of Care expires: 18  Plan of Care reviewed with: patient    Thor Willian, PTA  2018

## 2018-02-19 NOTE — PLAN OF CARE
Problem: Patient Care Overview  Goal: Plan of Care Review  Outcome: Ongoing (interventions implemented as appropriate)   02/18/18 0354   Coping/Psychosocial   Plan Of Care Reviewed With patient

## 2018-02-19 NOTE — PLAN OF CARE
Problem: Physical Therapy Goal  Goal: Physical Therapy Goal  Goals to be met by: 14 days     Patient will increase functional independence with mobility by performin. Supine to sit with Stand-by Assistance = not met  2. Sit to supine with Stand-by Assistance= not met  3. Sit to stand transfer with Stand-by Assistance using left platform rolling walker= not met  4. Bed to chair transfer with Stand-by Assistance using Left platform Rolling Walker= not met  5. Gait  x 100 feet with Stand-by Assistance using left platform Rolling Walker and observing weight bearing precautions LUE. = not met  6. Wheelchair propulsion x25 feet with Stand-by Assistance using right lower extremity and right lower extremity= not met  7. Ascend/Descend 4 inch curb step with Contact Guard Assistance using Left platform Rolling Walker.= not met  8. Lower extremity exercise program x20 reps per  TKA protocol and handout, with assistance as needed sh[employer] therex= not met  9. Left knee AAROM 0* to 90* or better= not met      Outcome: Ongoing (interventions implemented as appropriate)  Goals remain appropriate.

## 2018-02-19 NOTE — PT/OT/SLP PROGRESS
"Occupational Therapy  Treatment    Katelyn Huynh   MRN: [id number]   Admitting Diagnosis: S/P TKR (total knee replacement), left    OT Date of Treatment: 02/19/18  Total Time (min): 45 min    Billable Minutes:  Therapeutic Activity 20 and Therapeutic Exercise 25    General Precautions: Standard, fall  Orthopedic Precautions: LUE non weight bearing  Braces: UE Sling    Do you have any cultural, spiritual, Caodaism conflicts, given your current situation?: no    Subjective:  Communicated with pt prior to session.  "I need to change my clothes for PT"    Pain/Comfort  Pain Rating 1: 7/10  Location - Side 1: Left  Location 1: knee  Pain Addressed 1: Nurse notified  Pain Rating Post-Intervention 1: 7/10    Objective:  Patient found with:  (seated in w/c)    Functional Status:  MDS G  Transfer Functional Status:-Min (A) to stand from w/c with use of right grab bar  Locomotion Off Unit Functional Status: total(A) w/c mobility  Dressing Functional Status: -Min (A)--sup to doff button up shirt; min A to doff dress and slip; sup to meño shirt; min A to meño pants           AMPA 6 Click:  AMPA Total Score: 19    OT Exercises: UE Ergometer 15 min with RUE to increase functional endurance for ADLs    Additional Treatment:  Pt performed 15 reps x 2 sets LUE AROM shoulder flexion; abduction; int/ext rotation;  Pt performed 15 reps RUE AROM with 3# dowel with shoulder flexion; abduction; int/ext rotation; chest press and bicep curls    Pt required rest breaks between there ex tasks and during self care tasks    Patient left up in chair with call button in reach    ASSESSMENT:  Pt tolerated tx and demonstrated increased indep with t/f from w/c ; LBD and UBD. Pt cont to progress toward goals and benefit from OT to address limitations and increase functional indep and safety to return home with assist as needed    Rehab identified problem list/impairments: weakness, impaired endurance, gait instability, impaired " functional mobilty, impaired self care skills, impaired balance, decreased lower extremity function, decreased upper extremity function, decreased coordination, pain, impaired fine motor, impaired coordination, decreased ROM, impaired skin, edema, orthopedic precautions    Rehab potential is good    Activity tolerance: Good    Discharge recommendations: home with home health     Barriers to discharge: None     Equipment recommendations:  (PFRW)     GOALS:    Occupational Therapy Goals        Problem: Occupational Therapy Goal    Goal Priority Disciplines Outcome Interventions   Occupational Therapy Goal     OT, PT/OT     Description:  Problem: Occupational Therapy Goal  Goal: Occupational Therapy Goal  Goals to be met by: 14 days     Patient will increase functional independence with ADLs by performing:    UE Dressing with Modified [place].  LE Dressing with Modified [place].  Grooming while standing at sink with Modified [place].  Toileting from bedside commode with Modified [place] for hygiene and clothing management.   Bathing from  sitting at sink with Supervision.  Supine to sit with Modified [place].  Stand pivot transfers with Modified [place].  Toilet transfer to bedside commode with Supervision.  Upper extremity exercise program x 25 min to increase functional endurance for ADLs   Pt will perform a functional standing task with sup                      Plan:  Patient to be seen 5 x/week to address the above listed problems via self-care/home management, therapeutic activities, therapeutic exercises  Plan of Care expires: 03/17/18  Plan of Care reviewed with: patient    KAROLINE Gurrola  02/19/2018

## 2018-02-19 NOTE — ASSESSMENT & PLAN NOTE
Related to (etiology):   Excessive caloric intake     Signs and Symptoms (as evidenced by):   Good po intake with BMI 51    Interventions/Recommendations (treatment strategy):  Low sodium diet, boost glucose bid    Nutrition Diagnosis Status:   New

## 2018-02-19 NOTE — PLAN OF CARE
Problem: Occupational Therapy Goal  Goal: Occupational Therapy Goal  Problem: Occupational Therapy Goal  Goal: Occupational Therapy Goal  Goals to be met by: 14 days     Patient will increase functional independence with ADLs by performing:    UE Dressing with Modified [place].  LE Dressing with Modified [place].  Grooming while standing at sink with Modified [place].  Toileting from bedside commode with Modified [place] for hygiene and clothing management.   Bathing from  sitting at sink with Supervision.  Supine to sit with Modified [place].  Stand pivot transfers with Modified [place].  Toilet transfer to bedside commode with Supervision.  Upper extremity exercise program x 25 min to increase functional endurance for ADLs   Pt will perform a functional standing task with sup     Outcome: Ongoing (interventions implemented as appropriate)  Progressing. KAROLINE Gurrola  2/19/2018

## 2018-02-19 NOTE — ASSESSMENT & PLAN NOTE
Evaluated on 2/19/18  -continue PT/OT to increase ambulation, ADL performance and endurance  -WBAT to LLE  -continue oxycodone for pain control prn  -keep polar ice in place when not in therapy (patient to have brought from home)  -continue ASA for DVT prophylaxis; continue famotidine while taking high dose ASA  -Ortho NP to assess surgical wound; Aquacel removed when admitted; island dressing change daily with monitoring of wound  -Ortho NP to assess patient while on SNF weekly  -continue lyrica after knee replacement for pain relief and ROM; discontinue on discharge from SNF or earlier for any adverse effects

## 2018-02-20 LAB — POTASSIUM SERPL-SCNC: 4.5 MMOL/L

## 2018-02-20 PROCEDURE — 97110 THERAPEUTIC EXERCISES: CPT

## 2018-02-20 PROCEDURE — 84132 ASSAY OF SERUM POTASSIUM: CPT

## 2018-02-20 PROCEDURE — 36415 COLL VENOUS BLD VENIPUNCTURE: CPT

## 2018-02-20 PROCEDURE — 25000003 PHARM REV CODE 250: Performed by: NURSE PRACTITIONER

## 2018-02-20 PROCEDURE — 25000003 PHARM REV CODE 250: Performed by: INTERNAL MEDICINE

## 2018-02-20 PROCEDURE — 97116 GAIT TRAINING THERAPY: CPT

## 2018-02-20 PROCEDURE — 97530 THERAPEUTIC ACTIVITIES: CPT

## 2018-02-20 PROCEDURE — 12000000 HC SNF SEMI-PRIVATE ROOM

## 2018-02-20 RX ADMIN — OXYCODONE HYDROCHLORIDE 10 MG: 5 TABLET ORAL at 12:02

## 2018-02-20 RX ADMIN — METOPROLOL TARTRATE 12.5 MG: 25 TABLET, FILM COATED ORAL at 08:02

## 2018-02-20 RX ADMIN — VALSARTAN 80 MG: 80 TABLET ORAL at 09:02

## 2018-02-20 RX ADMIN — FAMOTIDINE 20 MG: 20 TABLET, FILM COATED ORAL at 09:02

## 2018-02-20 RX ADMIN — RAMELTEON 8 MG: 8 TABLET, FILM COATED ORAL at 12:02

## 2018-02-20 RX ADMIN — OXYCODONE HYDROCHLORIDE 10 MG: 5 TABLET ORAL at 08:02

## 2018-02-20 RX ADMIN — OXYCODONE HYDROCHLORIDE 10 MG: 5 TABLET ORAL at 06:02

## 2018-02-20 RX ADMIN — PREGABALIN 75 MG: 75 CAPSULE ORAL at 08:02

## 2018-02-20 RX ADMIN — ASPIRIN 325 MG: 325 TABLET, DELAYED RELEASE ORAL at 08:02

## 2018-02-20 RX ADMIN — METOPROLOL TARTRATE 12.5 MG: 25 TABLET, FILM COATED ORAL at 09:02

## 2018-02-20 RX ADMIN — STANDARDIZED SENNA CONCENTRATE AND DOCUSATE SODIUM 1 TABLET: 8.6; 5 TABLET, FILM COATED ORAL at 09:02

## 2018-02-20 RX ADMIN — ASPIRIN 325 MG: 325 TABLET, DELAYED RELEASE ORAL at 09:02

## 2018-02-20 RX ADMIN — FAMOTIDINE 20 MG: 20 TABLET, FILM COATED ORAL at 08:02

## 2018-02-20 NOTE — PT/OT/SLP PROGRESS
Physical Therapy  Treatment    Katelyn Huynh   MRN: [id number]   Admitting Diagnosis: Primary osteoarthritis of left knee    PT Received On: 02/20/18  Total Time (min): 45       Billable Minutes:  Gait Training 20, Therapeutic Activity 10 and Therapeutic Exercise 15    Treatment Type: Treatment  PT/PTA: PTA     PTA Visit Number: 2       General Precautions: Standard, fall  Orthopedic Precautions: LUE non weight bearing, LLE weight bearing as tolerated   Braces:  (left UE wrapped/splint)         Subjective:  Pt agreeable to therapy.     Pain/Comfort  Pain Rating 1: 7/10  Location - Side 1: Left  Location 1: knee  Pain Addressed 1: Pre-medicate for activity, Reposition, Distraction  Pain Rating Post-Intervention 1: 7/10    Objective:  Patient found up in w/c in lunch group       LUE NWB maintained throughout tx.        Functional Status:  MDS G  Bed Mobility Functional Status: CGA-Min (A)  Transfer Functional Status: CGA-Min (A)  Walk in Room Functional Status: S-SBA  Walk in Corridor Functional Status: S-SBA  Moving from seated to standing position: Not steady, only able to stabilize with staff assistance  Walking (with assistive device if used): Not steady, but able to stabilize without staff assistance  Turning around and facing the opposite direction while walking: Not steady, but able to stabilize without staff assistance  Surface-to-surface transfer (transfer between bed and chair or wheelchair): Not steady, only able to stabilize with staff assistance          AM-PAC 6 CLICK MOBILITY  Total Score:16    Bed Mobility:  Sit>Supine: Min A for LLE management.   Supine>Sit: SBA on mat.     Transfers:  Sit<>Stand: Min A/CGA with L PRW for steadying and tactile cueing to improve weight shift forward as pt transitions RUE support to AD.   Stand Pivot Transfer: SBA with L PRW, w/c <> mat, w/c > bed.     Gait:  Amb: Pt ambulated 140 ft with L PRW with SBA. Pt given VC's to improve posture, L LE extension, and  AD management. Slow noe and slight L knee flexion in stance.      Therex:  Pt performed seated and supine therex per TKR protocol x 20.     Balance:  No LOB with all mobility this tx.     Additional Treatment:  L knee AROM: 0* - 92*    Patient left sitting EOB in room with call button in reach.    Assessment:  Katelyn Huynh is a 67 y.o. female with a medical diagnosis of Primary osteoarthritis of left knee.  Pt tolerated tx well with focus on gait training, transfers, and therex. Pt progressing well with less assistance needed for ambulation. Pt will continue to benefit from skilled therapy to improve strength, endurance, and functional mobility.     Rehab identified problem list/impairments: weakness, impaired functional mobilty, gait instability, impaired balance, decreased upper extremity function, decreased lower extremity function, pain, orthopedic precautions, decreased ROM, edema    Rehab potential is good.    Activity tolerance: Good    Discharge recommendations: home with home health     Barriers to discharge: Decreased caregiver support, Inaccessible home environment    Equipment recommendations:  (bariatric RW with left platform)     GOALS:    Physical Therapy Goals        Problem: Physical Therapy Goal    Goal Priority Disciplines Outcome Goal Variances Interventions   Physical Therapy Goal     PT/OT, PT Ongoing (interventions implemented as appropriate)     Description:  Goals to be met by: 14 days     Patient will increase functional independence with mobility by performin. Supine to sit with Stand-by Assistance = not met  2. Sit to supine with Stand-by Assistance= not met  3. Sit to stand transfer with Stand-by Assistance using left platform rolling walker= not met  4. Bed to chair transfer with Stand-by Assistance using Left platform Rolling Walker= not met  5. Gait  x 100 feet with Stand-by Assistance using left platform Rolling Walker and observing weight bearing  precautions LUE. = not met  6. Wheelchair propulsion x25 feet with Stand-by Assistance using right lower extremity and right lower extremity= not met  7. Ascend/Descend 4 inch curb step with Contact Guard Assistance using Left platform Rolling Walker.= not met  8. Lower extremity exercise program x20 reps per  TKA protocol and handout, with assistance as needed shavon gym therex= not met  9. Left knee AAROM 0* to 90* or better= not met                       PLAN:    Patient to be seen 5 x/week  to address the above listed problems via gait training, therapeutic activities, therapeutic exercises, wheelchair management/training  Plan of Care expires: 03/19/18  Plan of Care reviewed with: patient    Thor Dorsey, PTA  02/20/2018

## 2018-02-20 NOTE — PT/OT/SLP PROGRESS
"Occupational Therapy  Treatment    Katelyn Huynh   MRN: [id number]   Admitting Diagnosis: Primary osteoarthritis of left knee    OT Date of Treatment: 02/20/18  Total Time (min): 48 min    Billable Minutes:  Therapeutic Activity 18 and Therapeutic Exercise 20    General Precautions: Standard, fall  Orthopedic Precautions: LUE non weight bearing  Braces: UE Sling    Do you have any cultural, spiritual, Christian conflicts, given your current situation?: no    Subjective:  Communicated with pt prior to session.  "I took a sleeping pill at midnight and I am still out of it"    Pain/Comfort  Pain Rating 1: 8/10  Location - Side 1: Left  Location 1: knee  Pain Addressed 1: Pre-medicate for activity, Cessation of Activity, Reposition  Pain Rating Post-Intervention 1: 8/10    Objective:  Patient found with:  (supine in bed)    Functional Status:  MDS G  Bed Mobility Functional Status: Sup with supine to sit  Transfer Functional Status: CGA-Min (A)--pt stood from elevated  bed with PFRW and sup ; pt attempted to stand from w/c 3x with no success and required min A x 2 persons with PFRW to  front of table  Walk in Room Functional Status: S-SBA--Pt ambulated from bed in room and out in hutton with SBA and PFRW approx 40 ft prior to fatigue. Pt with difficulty advancing RLE due to decreased tolerance of weight through LLE--pt educated  Walk in Corridor Functional Status: S-SBA  Locomotion Off Unit Functional Status: total(A)--w/c mobility around unit            AMPA 6 Click:  [facility] Total Score: 20    OT Exercises: UE Ergometer 15 min with RUE to increase functional endurance for ADLS    Additional Treatment:  Pt stood at table with PFRW x 10 min to perform and UE task with RUE and SBA to increase standing tolerance for ADL tasks    Patient left up in chair with supervision in lunch room    ASSESSMENT:  Pt tolerated tx;however, with increased feelings of fatigue due to sleeping pill.  Pt required increased " assistance with sit to stand from lower surface and cont to progress toward goals.  Pt cont to be limited by pain;however, is very motivated to increase indep. Pt cont to benefit from OT to increase functional indep, endurance and safety to return to PLOF    Rehab identified problem list/impairments: weakness, impaired endurance, gait instability, impaired functional mobilty, impaired self care skills, impaired balance, decreased lower extremity function, decreased upper extremity function, decreased coordination, pain, impaired fine motor, impaired coordination, decreased ROM, impaired skin, edema, orthopedic precautions    Rehab potential is good    Activity tolerance: Good    Discharge recommendations: home with home health     Barriers to discharge: None     Equipment recommendations:  (PFRW)     GOALS:    Occupational Therapy Goals        Problem: Occupational Therapy Goal    Goal Priority Disciplines Outcome Interventions   Occupational Therapy Goal     OT, PT/OT Ongoing (interventions implemented as appropriate)    Description:  Problem: Occupational Therapy Goal  Goal: Occupational Therapy Goal  Goals to be met by: 14 days     Patient will increase functional independence with ADLs by performing:    UE Dressing with Modified [place].  LE Dressing with Modified [place].  Grooming while standing at sink with Modified [place].  Toileting from bedside commode with Modified [place] for hygiene and clothing management.   Bathing from  sitting at sink with Supervision.  Supine to sit with Modified [place].  Stand pivot transfers with Modified [place].  Toilet transfer to bedside commode with Supervision.  Upper extremity exercise program x 25 min to increase functional endurance for ADLs   Pt will perform a functional standing task with sup-met 10 min                       Plan:  Patient to be seen 5 x/week to address the above listed problems via self-care/home management, therapeutic  activities, therapeutic exercises  Plan of Care expires: 03/17/18  Plan of Care reviewed with: patient    KAROLINE Gurrola  02/20/2018

## 2018-02-20 NOTE — PLAN OF CARE
Problem: Patient Care Overview  Goal: Plan of Care Review  Outcome: Ongoing (interventions implemented as appropriate)   02/20/18 0559   Coping/Psychosocial   Plan Of Care Reviewed With patient       Problem: Pressure Ulcer Risk (Balta Scale) (Adult,Obstetrics,Pediatric)  Goal: Identify Related Risk Factors and Signs and Symptoms  Related risk factors and signs and symptoms are identified upon initiation of Human Response Clinical Practice Guideline (CPG)   Outcome: Ongoing (interventions implemented as appropriate)   02/20/18 0559   Pressure Ulcer Risk (Balta Scale)   Related Risk Factors (Pressure Ulcer Risk (Balta Scale)) hospitalization prolonged;medical devices;medication;mobility impaired       Problem: Fall Risk (Adult)  Goal: Identify Related Risk Factors and Signs and Symptoms  Related risk factors and signs and symptoms are identified upon initiation of Human Response Clinical Practice Guideline (CPG)   Outcome: Ongoing (interventions implemented as appropriate)   02/20/18 0559   Fall Risk   Related Risk Factors (Fall Risk) culprit medication(s);gait/mobility problems;history of falls       Comments: Patient remains free of falls and injury this shift. Fall precautions maintained. Prn pain medication given as needed. Soft cast to left arm and polar ice to left knee.

## 2018-02-20 NOTE — PLAN OF CARE
Problem: Occupational Therapy Goal  Goal: Occupational Therapy Goal  Problem: Occupational Therapy Goal  Goal: Occupational Therapy Goal  Goals to be met by: 14 days     Patient will increase functional independence with ADLs by performing:    UE Dressing with Modified [place].  LE Dressing with Modified [place].  Grooming while standing at sink with Modified [place].  Toileting from bedside commode with Modified [place] for hygiene and clothing management.   Bathing from  sitting at sink with Supervision.  Supine to sit with Modified [place].  Stand pivot transfers with Modified [place].  Toilet transfer to bedside commode with Supervision.  Upper extremity exercise program x 25 min to increase functional endurance for ADLs   Pt will perform a functional standing task with sup-met 10 min     Outcome: Ongoing (interventions implemented as appropriate)  Progressing. KAROLINE Gurrola  2/20/2018

## 2018-02-21 PROCEDURE — 97110 THERAPEUTIC EXERCISES: CPT

## 2018-02-21 PROCEDURE — 97116 GAIT TRAINING THERAPY: CPT

## 2018-02-21 PROCEDURE — 97530 THERAPEUTIC ACTIVITIES: CPT

## 2018-02-21 PROCEDURE — 25000003 PHARM REV CODE 250: Performed by: INTERNAL MEDICINE

## 2018-02-21 PROCEDURE — 12000000 HC SNF SEMI-PRIVATE ROOM

## 2018-02-21 PROCEDURE — 25000003 PHARM REV CODE 250: Performed by: NURSE PRACTITIONER

## 2018-02-21 RX ADMIN — ASPIRIN 325 MG: 325 TABLET, DELAYED RELEASE ORAL at 08:02

## 2018-02-21 RX ADMIN — FAMOTIDINE 20 MG: 20 TABLET, FILM COATED ORAL at 08:02

## 2018-02-21 RX ADMIN — STANDARDIZED SENNA CONCENTRATE AND DOCUSATE SODIUM 1 TABLET: 8.6; 5 TABLET, FILM COATED ORAL at 08:02

## 2018-02-21 RX ADMIN — FAMOTIDINE 20 MG: 20 TABLET, FILM COATED ORAL at 09:02

## 2018-02-21 RX ADMIN — HYDROCHLOROTHIAZIDE 50 MG: 25 TABLET ORAL at 06:02

## 2018-02-21 RX ADMIN — OXYCODONE HYDROCHLORIDE 10 MG: 5 TABLET ORAL at 08:02

## 2018-02-21 RX ADMIN — OXYCODONE HYDROCHLORIDE 10 MG: 5 TABLET ORAL at 01:02

## 2018-02-21 RX ADMIN — OXYCODONE HYDROCHLORIDE 10 MG: 5 TABLET ORAL at 09:02

## 2018-02-21 RX ADMIN — PREGABALIN 75 MG: 75 CAPSULE ORAL at 08:02

## 2018-02-21 RX ADMIN — METOPROLOL TARTRATE 12.5 MG: 25 TABLET, FILM COATED ORAL at 08:02

## 2018-02-21 RX ADMIN — ASPIRIN 325 MG: 325 TABLET, DELAYED RELEASE ORAL at 09:02

## 2018-02-21 RX ADMIN — METOPROLOL TARTRATE 12.5 MG: 25 TABLET, FILM COATED ORAL at 09:02

## 2018-02-21 RX ADMIN — STANDARDIZED SENNA CONCENTRATE AND DOCUSATE SODIUM 1 TABLET: 8.6; 5 TABLET, FILM COATED ORAL at 09:02

## 2018-02-21 RX ADMIN — VALSARTAN 80 MG: 80 TABLET ORAL at 09:02

## 2018-02-21 NOTE — PLAN OF CARE
Problem: Pressure Ulcer Risk (Balta Scale) (Adult,Obstetrics,Pediatric)  Goal: Identify Related Risk Factors and Signs and Symptoms  Related risk factors and signs and symptoms are identified upon initiation of Human Response Clinical Practice Guideline (CPG)    02/21/18 0037   Pressure Ulcer Risk (Balta Scale)   Related Risk Factors (Pressure Ulcer Risk (Balta Scale)) body weight extremes;length of surgery;medication;mobility impaired

## 2018-02-21 NOTE — PT/OT/SLP PROGRESS
Physical Therapy  Treatment    Katelyn Huynh   MRN: [id number]   Admitting Diagnosis: Primary osteoarthritis of left knee    PT Received On: 02/21/18  Total Time (min): 45       Billable Minutes:  Gait Training 20, Therapeutic Activity 15 and Therapeutic Exercise 10    Treatment Type: Treatment  PT/PTA: PTA     PTA Visit Number: 3       General Precautions: Standard, fall  Orthopedic Precautions: LUE non weight bearing, LLE weight bearing as tolerated   Braces:  (left UE wrapped/splint)         Subjective:  Communicated with NSG prior to session.  Pt agreeable to therapy.     Pain/Comfort  Pain Rating 1: 5/10  Location - Side 1: Left  Location 1: knee  Pain Addressed 1: Reposition, Distraction  Pain Rating Post-Intervention 1: 5/10    Objective:  Patient found sitting up in w/c with OT completing session.   Pt maintains LUE NWB throughout session       Functional Status:  MDS G  Transfer Functional Status: CGA-Min (A)  Walk in Room Functional Status: S-SBA  Walk in Corridor Functional Status: S-SBA  Moving from seated to standing position: Not steady, only able to stabilize with staff assistance  Walking (with assistive device if used): Not steady, but able to stabilize without staff assistance  Turning around and facing the opposite direction while walking: Not steady, but able to stabilize without staff assistance  Surface-to-surface transfer (transfer between bed and chair or wheelchair): Not steady, only able to stabilize with staff assistance          AM-PAC 6 CLICK MOBILITY  Total Score:16    Transfers:  Sit<>Stand: Min A/CGA with L PRW from w/c.   Stand Pivot Transfer: SBA with L PRW from w/c > bed.     Gait:  Amb: Pt ambulated 140 ft with L PRW and SBA. Pt given VC's for improved posture and AD management. Pt ambulates with step-to pattern and slow noe. One standing break after 60 ft. Pt demonstrates improved stability in L knee and full extension in stance phase.     Therex:  Pt performed  therex per TKR protocol x 20 reps. With AAROM as needed.   L knee AAROM: 0* - 92*     Balance:  No LOB with all mobility this tx.     Patient left sitting EOB with call button in reach.    Assessment:  Katelyn Huynh is a 67 y.o. female with a medical diagnosis of Primary osteoarthritis of left knee.  Pt tolerated tx well and continues to progress well with all mobility. Pts L knee ROM is good and pt demonstrates improved stability of same knee during gait. Pt will continue to benefit from skilled therapy to improve ROM, strength, endurance, and functional mobility.     Rehab identified problem list/impairments: weakness, impaired functional mobilty, gait instability, impaired balance, decreased upper extremity function, decreased lower extremity function, pain, orthopedic precautions, decreased ROM, edema    Rehab potential is good.    Activity tolerance: Good    Discharge recommendations: home with home health     Barriers to discharge: Decreased caregiver support, Inaccessible home environment    Equipment recommendations:  (bariatric RW with left platform)     GOALS:    Physical Therapy Goals        Problem: Physical Therapy Goal    Goal Priority Disciplines Outcome Goal Variances Interventions   Physical Therapy Goal     PT/OT, PT Ongoing (interventions implemented as appropriate)     Description:  Goals to be met by: 14 days     Patient will increase functional independence with mobility by performin. Supine to sit with Stand-by Assistance = not met  2. Sit to supine with Stand-by Assistance= not met  3. Sit to stand transfer with Stand-by Assistance using left platform rolling walker= not met  4. Bed to chair transfer with Stand-by Assistance using Left platform Rolling Walker= not met  5. Gait  x 100 feet with Stand-by Assistance using left platform Rolling Walker and observing weight bearing precautions LUE. = not met  6. Wheelchair propulsion x25 feet with Stand-by Assistance using  right lower extremity and right lower extremity= not met  7. Ascend/Descend 4 inch curb step with Contact Guard Assistance using Left platform Rolling Walker.= not met  8. Lower extremity exercise program x20 reps per  TKA protocol and handout, with assistance as needed [employer] therex= not met  9. Left knee AAROM 0* to 90* or better= not met                       PLAN:    Patient to be seen 5 x/week  to address the above listed problems via gait training, therapeutic activities, therapeutic exercises, wheelchair management/training  Plan of Care expires: 03/19/18  Plan of Care reviewed with: patient    Thor Dorsey, PTA  02/21/2018

## 2018-02-21 NOTE — PT/OT/SLP PROGRESS
Occupational Therapy  Treatment    Katelyn Huynh   MRN: [id number]   Admitting Diagnosis: Primary osteoarthritis of left knee    OT Date of Treatment: 02/21/18  Total Time (min): 45 min    Billable Minutes:  Therapeutic Activity 45    General Precautions: Standard, fall  Orthopedic Precautions: LUE non weight bearing  Braces: UE Sling    Do you have any cultural, spiritual, Taoist conflicts, given your current situation?: no    Subjective:  Communicated with nsg prior to session.  I am doing well today    Pain/Comfort  Pain Rating 1: 5/10  Location - Side 1: Left  Location 1: knee  Pain Addressed 1: Reposition, Distraction, Pre-medicate for activity    Objective:   Pt. seated in activity room     Functional Status:  MDS G  Transfer Functional Status: CGA-Min (A) from w/c with PFRW  Moving from seated to standing position: Not steady, only able to stabilize with staff assistance           [facility] 6 Click:  [facility] Total Score: 20    Additional Treatment:  Pt. With fM activity with digit flex/ext  Add/abd with L hand fingers x 15 reps.   Pt. With ROM of RUE with x 10 reps with shd flx and forward press   Pt. With standing act on this day. Pt. Able to tolerate standing approx 9 -10 min to complete task on this day. Pt. With CGA/SBA with PFRW. Pt. With standing and therex performed to increase ROM, endurance selfcare task and fxl mobility for independence and home management task.    Patient left up in chair with PT present    ASSESSMENT:  Katelyn Huynh is a 67 y.o. female with a medical diagnosis of Primary osteoarthritis of left knee Pt. participated well with session on this day.Pt demos physical deficits with balance  functional mobility, UB strength, endurance  level of functional indep with daily tasks and activities and selfcare skills .Pt. Will continue to benefit from continued OT to progress towards goals      Rehab identified problem list/impairments: weakness, impaired endurance,  gait instability, impaired functional mobilty, impaired self care skills, impaired balance, decreased lower extremity function, decreased upper extremity function, decreased coordination, pain, impaired fine motor, impaired coordination, decreased ROM, impaired skin, edema, orthopedic precautions    Rehab potential is fair    Activity tolerance: Fair    Discharge recommendations: home with home health     Barriers to discharge: None     Equipment recommendations:  (PFRW)     GOALS:    Occupational Therapy Goals        Problem: Occupational Therapy Goal    Goal Priority Disciplines Outcome Interventions   Occupational Therapy Goal     OT, PT/OT Ongoing (interventions implemented as appropriate)    Description:  Problem: Occupational Therapy Goal  Goal: Occupational Therapy Goal  Goals to be met by: 14 days     Patient will increase functional independence with ADLs by performing:    UE Dressing with Modified [place].  LE Dressing with Modified [place].  Grooming while standing at sink with Modified [place].  Toileting from bedside commode with Modified [place] for hygiene and clothing management.   Bathing from  sitting at sink with Supervision.  Supine to sit with Modified [place].  Stand pivot transfers with Modified [place].  Toilet transfer to bedside commode with Supervision.  Upper extremity exercise program x 25 min to increase functional endurance for ADLs   Pt will perform a functional standing task with sup-met 10 min                   Plan:  Patient to be seen 5 x/week to address the above listed problems via self-care/home management, therapeutic activities, therapeutic exercises  Plan of Care expires: 03/17/18  Plan of Care reviewed with: patient    TEDDY Wilson  02/21/2018

## 2018-02-21 NOTE — SUBJECTIVE & OBJECTIVE
Interval History:  Staples removed without difficulty. Incision is healing well with no drainage or erythema.    Review of Systems   Constitutional: Negative.    Respiratory: Negative.    Cardiovascular: Negative.    Musculoskeletal: Positive for arthralgias and joint swelling.        C/o left wrist pain and left knee pain r/t surgery. There is some mild swelling to the left knee as well.    Psychiatric/Behavioral: Negative.      Objective:     Vital Signs (Most Recent):  Temp: 98.3 °F (36.8 °C) (02/20/18 1936)  Pulse: 104 (02/20/18 1936)  Resp: 18 (02/20/18 1936)  BP: 131/63 (02/20/18 1936)  SpO2: 98 % (02/20/18 1936) Vital Signs (24h Range):  Temp:  [98.3 °F (36.8 °C)] 98.3 °F (36.8 °C)  Pulse:  [104] 104  Resp:  [18] 18  SpO2:  [98 %] 98 %  BP: (131)/(63) 131/63     Weight: 120.6 kg (265 lb 14 oz)  Body mass index is 44.93 kg/m².  No intake or output data in the 24 hours ending 02/21/18 0932   Physical Exam   Constitutional: She is oriented to person, place, and time. She appears well-developed and well-nourished.   Pulmonary/Chest: Effort normal.   Musculoskeletal:   Range of motion improving with PT on the left knee. Cast in place to left wrist   Neurological: She is alert and oriented to person, place, and time.   Skin: Skin is warm and dry.   Staples removed from left knee. Incision is healing well with no erythema or drainage. Cast in place to left wrist   Psychiatric: She has a normal mood and affect. Her behavior is normal.       Assessment:   2 weeks s/p left  total knee arthroplasty and s/p left wrist fracture with repair at an outside hospital    Plan:  PT/OT- normal PT for left knee and as directed by hand surgeon at outside facility for her wrist  Pain Control  DVT Prophylaxis  Follow up in 4 weeks with Dr. Brad Up  NP Orthopedic Surgery

## 2018-02-21 NOTE — PLAN OF CARE
Problem: Occupational Therapy Goal  Goal: Occupational Therapy Goal  Problem: Occupational Therapy Goal  Goal: Occupational Therapy Goal  Goals to be met by: 14 days     Patient will increase functional independence with ADLs by performing:    UE Dressing with Modified [place].  LE Dressing with Modified [place].  Grooming while standing at sink with Modified [place].  Toileting from bedside commode with Modified [place] for hygiene and clothing management.   Bathing from  sitting at sink with Supervision.  Supine to sit with Modified [place].  Stand pivot transfers with Modified [place].  Toilet transfer to bedside commode with Supervision.  Upper extremity exercise program x 25 min to increase functional endurance for ADLs   Pt will perform a functional standing task with sup-met 10 min      Outcome: Ongoing (interventions implemented as appropriate)  .    Comments: .

## 2018-02-22 LAB
ANION GAP SERPL CALC-SCNC: 8 MMOL/L
BASOPHILS # BLD AUTO: 0.07 K/UL
BASOPHILS NFR BLD: 0.8 %
BUN SERPL-MCNC: 12 MG/DL
CALCIUM SERPL-MCNC: 9.2 MG/DL
CHLORIDE SERPL-SCNC: 101 MMOL/L
CO2 SERPL-SCNC: 33 MMOL/L
CREAT SERPL-MCNC: 0.8 MG/DL
DIFFERENTIAL METHOD: ABNORMAL
EOSINOPHIL # BLD AUTO: 0.4 K/UL
EOSINOPHIL NFR BLD: 5.1 %
ERYTHROCYTE [DISTWIDTH] IN BLOOD BY AUTOMATED COUNT: 14 %
EST. GFR  (AFRICAN AMERICAN): >60 ML/MIN/1.73 M^2
EST. GFR  (NON AFRICAN AMERICAN): >60 ML/MIN/1.73 M^2
GLUCOSE SERPL-MCNC: 77 MG/DL
HCT VFR BLD AUTO: 34.2 %
HGB BLD-MCNC: 11 G/DL
LYMPHOCYTES # BLD AUTO: 1.8 K/UL
LYMPHOCYTES NFR BLD: 20.8 %
MAGNESIUM SERPL-MCNC: 1.8 MG/DL
MCH RBC QN AUTO: 29.3 PG
MCHC RBC AUTO-ENTMCNC: 32.2 G/DL
MCV RBC AUTO: 91 FL
MONOCYTES # BLD AUTO: 0.7 K/UL
MONOCYTES NFR BLD: 8.1 %
NEUTROPHILS # BLD AUTO: 5.5 K/UL
NEUTROPHILS NFR BLD: 64.8 %
NRBC BLD-RTO: 0 /100 WBC
PHOSPHATE SERPL-MCNC: 3.1 MG/DL
PLATELET # BLD AUTO: 350 K/UL
PMV BLD AUTO: 9.6 FL
POTASSIUM SERPL-SCNC: 4.7 MMOL/L
RBC # BLD AUTO: 3.75 M/UL
SODIUM SERPL-SCNC: 142 MMOL/L
WBC # BLD AUTO: 8.47 K/UL

## 2018-02-22 PROCEDURE — 12000000 HC SNF SEMI-PRIVATE ROOM

## 2018-02-22 PROCEDURE — 84100 ASSAY OF PHOSPHORUS: CPT

## 2018-02-22 PROCEDURE — 97530 THERAPEUTIC ACTIVITIES: CPT

## 2018-02-22 PROCEDURE — 83735 ASSAY OF MAGNESIUM: CPT

## 2018-02-22 PROCEDURE — 80048 BASIC METABOLIC PNL TOTAL CA: CPT

## 2018-02-22 PROCEDURE — 97110 THERAPEUTIC EXERCISES: CPT

## 2018-02-22 PROCEDURE — 36415 COLL VENOUS BLD VENIPUNCTURE: CPT

## 2018-02-22 PROCEDURE — 85025 COMPLETE CBC W/AUTO DIFF WBC: CPT

## 2018-02-22 PROCEDURE — 97116 GAIT TRAINING THERAPY: CPT

## 2018-02-22 PROCEDURE — 25000003 PHARM REV CODE 250: Performed by: INTERNAL MEDICINE

## 2018-02-22 PROCEDURE — 25000003 PHARM REV CODE 250: Performed by: NURSE PRACTITIONER

## 2018-02-22 RX ADMIN — OXYCODONE HYDROCHLORIDE 10 MG: 5 TABLET ORAL at 08:02

## 2018-02-22 RX ADMIN — POLYETHYLENE GLYCOL 3350 17 G: 17 POWDER, FOR SOLUTION ORAL at 09:02

## 2018-02-22 RX ADMIN — FAMOTIDINE 20 MG: 20 TABLET, FILM COATED ORAL at 08:02

## 2018-02-22 RX ADMIN — ASPIRIN 325 MG: 325 TABLET, DELAYED RELEASE ORAL at 08:02

## 2018-02-22 RX ADMIN — METOPROLOL TARTRATE 12.5 MG: 25 TABLET, FILM COATED ORAL at 08:02

## 2018-02-22 RX ADMIN — OXYCODONE HYDROCHLORIDE 10 MG: 5 TABLET ORAL at 11:02

## 2018-02-22 RX ADMIN — ASPIRIN 325 MG: 325 TABLET, DELAYED RELEASE ORAL at 09:02

## 2018-02-22 RX ADMIN — METOPROLOL TARTRATE 12.5 MG: 25 TABLET, FILM COATED ORAL at 09:02

## 2018-02-22 RX ADMIN — OXYCODONE HYDROCHLORIDE 10 MG: 5 TABLET ORAL at 02:02

## 2018-02-22 RX ADMIN — PREGABALIN 75 MG: 75 CAPSULE ORAL at 08:02

## 2018-02-22 RX ADMIN — STANDARDIZED SENNA CONCENTRATE AND DOCUSATE SODIUM 1 TABLET: 8.6; 5 TABLET, FILM COATED ORAL at 09:02

## 2018-02-22 RX ADMIN — FAMOTIDINE 20 MG: 20 TABLET, FILM COATED ORAL at 09:02

## 2018-02-22 RX ADMIN — STANDARDIZED SENNA CONCENTRATE AND DOCUSATE SODIUM 1 TABLET: 8.6; 5 TABLET, FILM COATED ORAL at 08:02

## 2018-02-22 RX ADMIN — VALSARTAN 80 MG: 80 TABLET ORAL at 09:02

## 2018-02-22 NOTE — PT/OT/SLP PROGRESS
Physical Therapy  Treatment    Katelyn Huynh   MRN: [id number]   Admitting Diagnosis: Primary osteoarthritis of left knee    PT Received On: 02/22/18          Billable Minutes:  Gait Training 20, Therapeutic Activity 15 and Therapeutic Exercise 25=60    Treatment Type: Treatment  PT/PTA: PT     PTA Visit Number: 0       General Precautions: Standard, fall  Orthopedic Precautions: LUE non weight bearing, LLE weight bearing as tolerated   Braces:  (left UE wrapped/splint)         Subjective:  Communicated with patient prior to session.  Patient agreeable to session.    Pain/Comfort  Pain Rating 1: 7/10  Location - Side 1: Left  Location 1: knee  Pain Addressed 1: Pre-medicate for activity, Reposition, Distraction  Pain Rating Post-Intervention 1: 7/10    Objective:  Patient found seated on edge of bed   with         Functional Status:  MDS G  Bed Mobility Functional Status: CGA-Min (A)  Transfer Functional Status: S-SBA  Walk in Room Functional Status: S-SBA  Walk in Corridor Functional Status: S-SBA  Locomotion on Unit Functional Status: S-SBA  Locomotion Off Unit Functional Status: S-SBA  Moving from seated to standing position: Not steady, but able to stabilize without staff assistance  Walking (with assistive device if used): Not steady, but able to stabilize without staff assistance  Turning around and facing the opposite direction while walking: Not steady, but able to stabilize without staff assistance  Surface-to-surface transfer (transfer between bed and chair or wheelchair): Not steady, but able to stabilize without staff assistance          AM-PAC 6 CLICK MOBILITY  Total Score:17    Bed Mobility:  Sit>Supine:on mat w/ min assist   Supine>Sit: on mat w/ SBA    Transfers:  Sit<>Stand: from bed, mat, elevated w/ Left PRW and SBA     W/c<>stand w/ L PRW and min assist for hip elevation (cushion+pillow on w/c)  Stand Pivot Transfer: w/ LPRW and SBA to/from mat  Observes LUE WB precaution w/ use of  platform    Gait:  Amb 159 feet w/ Left PRW and SBA / supervision and w/c in tow. Slow pace. MIld forward flexed posture.  Left platform raised and second trial, patient amb 35 feet w/ LPRW and supervision w/ improved upright posture.  Patient instructed in maintaining height of platform w/ checking and tightening.   Therex:  Pt performed TKA protocol exercises LLE w/ assist as needed and use of written copy for HEP/TKA protocol x20 reps of:  Ankle pumps  Glute sets  Quad sets  Short arc quads  Heel slides  Hip abd/add  SLR  Seated exercises:    LAQ    Hip flexion    Ankle pumps seated    Knee flexion  Addtion of closed chair knee extension w/ green theraband in supine x20     Knee adduction/squeeze w/ ball x20, in sitting (prior to LAQ      Additional Treatment:  Patient pedaled LBE (lower body ergometer) x10 minutes to increase strength and endurance.  Left knee extension mm strength = 2minus/5  Left knee AAROM 0* to 97*  Patient left sitting EOBwith call button in reach.    Assessment:  Katelyn Huynh is a 67 y.o. female with a medical diagnosis of Primary osteoarthritis of left knee.  Patient is NWB L wrist and using a platform attachment to RW for forearm weight bearing. She is progressing well w/ improving amb distance and transfers. Progressing w/ Left knee ROM but continues w/ weak left quads ~2-/5 strength. Patient will benefit from continued physical therapy to address deficits and improve safety and functional mobility. Continue with physical therapy plan of care. .    Rehab identified problem list/impairments: weakness, impaired functional mobilty, gait instability, impaired balance, decreased upper extremity function, decreased lower extremity function, pain, orthopedic precautions, decreased ROM, edema    Rehab potential is good.    Activity tolerance: Good    Discharge recommendations: home with home health     Barriers to discharge: Decreased caregiver support, Inaccessible home  environment    Equipment recommendations:  (bariatric RW with left platform)     GOALS:    Physical Therapy Goals        Problem: Physical Therapy Goal    Goal Priority Disciplines Outcome Goal Variances Interventions   Physical Therapy Goal     PT/OT, PT Ongoing (interventions implemented as appropriate)     Description:  Goals to be met by: 14 days     Patient will increase functional independence with mobility by performin. Supine to sit with Stand-by Assistance =  Met 2018  2. Sit to supine with Stand-by Assistance= not met  3. Sit to stand transfer with Stand-by Assistance using left platform rolling walker=  Met 2018  4. Bed to chair transfer with Stand-by Assistance using Left platform Rolling Walker= met 2018  5. Gait  x 100 feet with Stand-by Assistance using left platform Rolling Walker and observing weight bearing precautions LUE. = met 2018       NEW GOAL; 2018 Gait x 150 feet w/ Supervision using left platform rolling walker and observing weight bearing precautions LUE = NOT MET  6. Wheelchair propulsion x25 feet with Stand-by Assistance using right lower extremity and right lower extremity= not met  7. Ascend/Descend 4 inch curb step with Contact Guard Assistance using Left platform Rolling Walker.= not met  8. Lower extremity exercise program x20 reps per  TKA protocol and handout, with assistance as needed [employer] therex= not met  9. Left knee AAROM 0* to 90* or better= met 2018      NEW GOAL: left knee AAROM 0* - 100* or better = NOT MET  NEW GOAL 2018 10. Patient will demonstrate improved quad strength LLE to 3-/5 = not met                        PLAN:    Patient to be seen 5 x/week  to address the above listed problems via gait training, therapeutic activities, therapeutic exercises, wheelchair management/training  Plan of Care expires: 18  Plan of Care reviewed with: patient    Jennifer Oliveira, PT  2018

## 2018-02-22 NOTE — PLAN OF CARE
Problem: Fall Risk (Adult)  Goal: Identify Related Risk Factors and Signs and Symptoms  Related risk factors and signs and symptoms are identified upon initiation of Human Response Clinical Practice Guideline (CPG)    02/20/18 0559   Fall Risk   Related Risk Factors (Fall Risk) culprit medication(s);gait/mobility problems;history of falls

## 2018-02-22 NOTE — PLAN OF CARE
Problem: Occupational Therapy Goal  Goal: Occupational Therapy Goal  Problem: Occupational Therapy Goal  Goal: Occupational Therapy Goal  Goals to be met by: 14 days     Patient will increase functional independence with ADLs by performing:    UE Dressing with Modified [place].  LE Dressing with Modified [place].  Grooming while standing at sink with Modified [place].  Toileting from bedside commode with Modified [place] for hygiene and clothing management.   Bathing from  sitting at sink with Supervision.  Supine to sit with Modified [place].  Stand pivot transfers with Modified [place].  Toilet transfer to bedside commode with Supervision.  Upper extremity exercise program x 25 min to increase functional endurance for ADLs   Pt will perform a functional standing task with sup-met 10 min      Outcome: Ongoing (interventions implemented as appropriate)  Progressing. KAROLINE Gurrola  2/22/2018

## 2018-02-22 NOTE — PLAN OF CARE
Problem: Physical Therapy Goal  Goal: Physical Therapy Goal  Goals to be met by: 14 days     Patient will increase functional independence with mobility by performin. Supine to sit with Stand-by Assistance =  Met 2018  2. Sit to supine with Stand-by Assistance= not met  3. Sit to stand transfer with Stand-by Assistance using left platform rolling walker=  Met 2018  4. Bed to chair transfer with Stand-by Assistance using Left platform Rolling Walker= met 2018  5. Gait  x 100 feet with Stand-by Assistance using left platform Rolling Walker and observing weight bearing precautions LUE. = met 2018       NEW GOAL; 2018 Gait x 150 feet w/ Supervision using left platform rolling walker and observing weight bearing precautions LUE = NOT MET  6. Wheelchair propulsion x25 feet with Stand-by Assistance using right lower extremity and right lower extremity= not met  7. Ascend/Descend 4 inch curb step with Contact Guard Assistance using Left platform Rolling Walker.= not met  8. Lower extremity exercise program x20 reps per  TKA protocol and handout, with assistance as needed shavon gym therex= not met  9. Left knee AAROM 0* to 90* or better= met 2018      NEW GOAL: left knee AAROM 0* - 100* or better = NOT MET  NEW GOAL 2018 10. Patient will demonstrate improved quad strength LLE to 3-/5 = not met      Goals remain appropriate. New goals established. Continue with Physical therapy Plan of Care. Jennifer Oliveira, PT 2018

## 2018-02-22 NOTE — PT/OT/SLP PROGRESS
Occupational Therapy  Treatment    Katelyn Huynh   MRN: [id number]   Admitting Diagnosis: Primary osteoarthritis of left knee    OT Date of Treatment: 02/22/18  Total Time (min): 39 min    Billable Minutes:  Therapeutic Activity 24 and Therapeutic Exercise 15    General Precautions: Standard, fall  Orthopedic Precautions: LUE non weight bearing  Braces: UE Sling    Do you have any cultural, spiritual, Confucianist conflicts, given your current situation?: no    Subjective:  Communicated with pt prior to session.      Pain/Comfort  Pain Rating 1: 7/10  Location - Side 1: Left  Location 1: knee  Pain Addressed 1: Pre-medicate for activity, Cessation of Activity  Pain Rating Post-Intervention 1: 7/10    Objective:  Patient found with:  (seated in w/c)    Functional Status:  MDS G  Transfer Functional Status: CGA-Min (A)--pt stood from w/c with PFRW 2x with min A 1st trial and 2nd SBA  Walk in Room Functional Status: S-SBA--pt ambulated with PFRW approx 8 ft with sup           AMPAC 6 Click:  [facility] Total Score: 21    OT Exercises: UE Ergometer 15 min to increase functional endurance for ADLs    Additional Treatment:  Pt stood at table with PFRW x 12 min while performing a dynamic reaching task with RUE and sup to increase standing tolerance for ADLs    Patient left EOB with call button in reach    ASSESSMENT:  Pt tolerated tx and demonstrated increased indep with t/f's and standing tolerance.  Pt with decreased c/o pain and cont to progress toward goals.  Pt cont to benefit from OT to address limitations and increase functional indep and safety    Rehab identified problem list/impairments: weakness, impaired endurance, gait instability, impaired functional mobilty, impaired self care skills, impaired balance, decreased lower extremity function, decreased upper extremity function, decreased coordination, pain, impaired fine motor, impaired coordination, decreased ROM, impaired skin, edema, orthopedic  precautions    Rehab potential is good    Activity tolerance: Good    Discharge recommendations: home with home health     Barriers to discharge: None     Equipment recommendations:  (PFRW)     GOALS:    Occupational Therapy Goals        Problem: Occupational Therapy Goal    Goal Priority Disciplines Outcome Interventions   Occupational Therapy Goal     OT, PT/OT Ongoing (interventions implemented as appropriate)    Description:  Problem: Occupational Therapy Goal  Goal: Occupational Therapy Goal  Goals to be met by: 14 days     Patient will increase functional independence with ADLs by performing:    UE Dressing with Modified [place].  LE Dressing with Modified [place].  Grooming while standing at sink with Modified [place].  Toileting from bedside commode with Modified [place] for hygiene and clothing management.   Bathing from  sitting at sink with Supervision.  Supine to sit with Modified [place].  Stand pivot transfers with Modified [place].  Toilet transfer to bedside commode with Supervision.  Upper extremity exercise program x 25 min to increase functional endurance for ADLs   Pt will perform a functional standing task with sup-met 10 min                       Plan:  Patient to be seen 5 x/week to address the above listed problems via self-care/home management, therapeutic activities, therapeutic exercises  Plan of Care expires: 03/17/18  Plan of Care reviewed with: patient    KAROLINE Gurrola  02/22/2018

## 2018-02-23 ENCOUNTER — ANESTHESIA EVENT (OUTPATIENT)
Dept: SURGERY | Facility: HOSPITAL | Age: 68
DRG: 902 | End: 2018-02-23
Payer: COMMERCIAL

## 2018-02-23 ENCOUNTER — HOSPITAL ENCOUNTER (INPATIENT)
Facility: HOSPITAL | Age: 68
LOS: 2 days | Discharge: NURSING, REHAB OR LONG-TERM CARE | DRG: 902 | End: 2018-02-28
Attending: EMERGENCY MEDICINE | Admitting: ORTHOPAEDIC SURGERY
Payer: COMMERCIAL

## 2018-02-23 VITALS
OXYGEN SATURATION: 98 % | WEIGHT: 265.88 LBS | BODY MASS INDEX: 44.3 KG/M2 | HEART RATE: 86 BPM | RESPIRATION RATE: 18 BRPM | TEMPERATURE: 98 F | SYSTOLIC BLOOD PRESSURE: 135 MMHG | DIASTOLIC BLOOD PRESSURE: 61 MMHG | HEIGHT: 65 IN

## 2018-02-23 DIAGNOSIS — Z96.652 S/P TKR (TOTAL KNEE REPLACEMENT), LEFT: ICD-10-CM

## 2018-02-23 DIAGNOSIS — T81.30XA WOUND DEHISCENCE: Primary | ICD-10-CM

## 2018-02-23 DIAGNOSIS — Z01.810 PREOP CARDIOVASCULAR EXAM: ICD-10-CM

## 2018-02-23 LAB
ABO + RH BLD: NORMAL
ALBUMIN SERPL BCP-MCNC: 3.1 G/DL
ALP SERPL-CCNC: 97 U/L
ALT SERPL W/O P-5'-P-CCNC: 16 U/L
ANION GAP SERPL CALC-SCNC: 10 MMOL/L
AST SERPL-CCNC: 30 U/L
BASOPHILS # BLD AUTO: 0.06 K/UL
BASOPHILS NFR BLD: 0.6 %
BILIRUB SERPL-MCNC: 0.7 MG/DL
BILIRUB UR QL STRIP: NEGATIVE
BLD GP AB SCN CELLS X3 SERPL QL: NORMAL
BUN SERPL-MCNC: 13 MG/DL
CALCIUM SERPL-MCNC: 9.6 MG/DL
CHLORIDE SERPL-SCNC: 99 MMOL/L
CLARITY UR REFRACT.AUTO: CLEAR
CO2 SERPL-SCNC: 27 MMOL/L
COLOR UR AUTO: YELLOW
CREAT SERPL-MCNC: 0.8 MG/DL
DIFFERENTIAL METHOD: ABNORMAL
EOSINOPHIL # BLD AUTO: 0.3 K/UL
EOSINOPHIL NFR BLD: 3 %
ERYTHROCYTE [DISTWIDTH] IN BLOOD BY AUTOMATED COUNT: 14 %
EST. GFR  (AFRICAN AMERICAN): >60 ML/MIN/1.73 M^2
EST. GFR  (NON AFRICAN AMERICAN): >60 ML/MIN/1.73 M^2
GLUCOSE SERPL-MCNC: 100 MG/DL
GLUCOSE UR QL STRIP: NEGATIVE
HCT VFR BLD AUTO: 39.5 %
HGB BLD-MCNC: 12.9 G/DL
HGB UR QL STRIP: NEGATIVE
IMM GRANULOCYTES # BLD AUTO: 0.05 K/UL
IMM GRANULOCYTES NFR BLD AUTO: 0.5 %
INR PPP: 1
KETONES UR QL STRIP: NEGATIVE
LEUKOCYTE ESTERASE UR QL STRIP: NEGATIVE
LYMPHOCYTES # BLD AUTO: 1.7 K/UL
LYMPHOCYTES NFR BLD: 18.3 %
MAGNESIUM SERPL-MCNC: 2.6 MG/DL
MCH RBC QN AUTO: 29.4 PG
MCHC RBC AUTO-ENTMCNC: 32.7 G/DL
MCV RBC AUTO: 90 FL
MONOCYTES # BLD AUTO: 0.7 K/UL
MONOCYTES NFR BLD: 7.1 %
NEUTROPHILS # BLD AUTO: 6.5 K/UL
NEUTROPHILS NFR BLD: 70.5 %
NITRITE UR QL STRIP: NEGATIVE
NRBC BLD-RTO: 0 /100 WBC
PH UR STRIP: 7 [PH] (ref 5–8)
PHOSPHATE SERPL-MCNC: 3.4 MG/DL
PLATELET # BLD AUTO: 412 K/UL
PMV BLD AUTO: 9.1 FL
POTASSIUM SERPL-SCNC: 5.7 MMOL/L
PROT SERPL-MCNC: 8.9 G/DL
PROT UR QL STRIP: NEGATIVE
PROTHROMBIN TIME: 10.5 SEC
RBC # BLD AUTO: 4.39 M/UL
SODIUM SERPL-SCNC: 136 MMOL/L
SP GR UR STRIP: 1.01 (ref 1–1.03)
URN SPEC COLLECT METH UR: NORMAL
UROBILINOGEN UR STRIP-ACNC: 2 EU/DL
WBC # BLD AUTO: 9.28 K/UL

## 2018-02-23 PROCEDURE — 99284 EMERGENCY DEPT VISIT MOD MDM: CPT | Mod: 57,,, | Performed by: ORTHOPAEDIC SURGERY

## 2018-02-23 PROCEDURE — 81003 URINALYSIS AUTO W/O SCOPE: CPT

## 2018-02-23 PROCEDURE — 80053 COMPREHEN METABOLIC PANEL: CPT

## 2018-02-23 PROCEDURE — 18500000 HC LEAVE OF ABSENCE HOSPITAL SERVICES

## 2018-02-23 PROCEDURE — 86850 RBC ANTIBODY SCREEN: CPT

## 2018-02-23 PROCEDURE — 85025 COMPLETE CBC W/AUTO DIFF WBC: CPT

## 2018-02-23 PROCEDURE — 97110 THERAPEUTIC EXERCISES: CPT

## 2018-02-23 PROCEDURE — 97530 THERAPEUTIC ACTIVITIES: CPT

## 2018-02-23 PROCEDURE — 93005 ELECTROCARDIOGRAM TRACING: CPT

## 2018-02-23 PROCEDURE — 97535 SELF CARE MNGMENT TRAINING: CPT

## 2018-02-23 PROCEDURE — 97605 NEG PRS WND THER DME<=50SQCM: CPT | Mod: 59,,, | Performed by: ORTHOPAEDIC SURGERY

## 2018-02-23 PROCEDURE — 84100 ASSAY OF PHOSPHORUS: CPT

## 2018-02-23 PROCEDURE — 83735 ASSAY OF MAGNESIUM: CPT

## 2018-02-23 PROCEDURE — 93010 ELECTROCARDIOGRAM REPORT: CPT | Mod: ,,, | Performed by: INTERNAL MEDICINE

## 2018-02-23 PROCEDURE — 63600175 PHARM REV CODE 636 W HCPCS: Performed by: STUDENT IN AN ORGANIZED HEALTH CARE EDUCATION/TRAINING PROGRAM

## 2018-02-23 PROCEDURE — 25000003 PHARM REV CODE 250: Performed by: INTERNAL MEDICINE

## 2018-02-23 PROCEDURE — 97116 GAIT TRAINING THERAPY: CPT

## 2018-02-23 PROCEDURE — 25000003 PHARM REV CODE 250: Performed by: NURSE PRACTITIONER

## 2018-02-23 PROCEDURE — 99284 EMERGENCY DEPT VISIT MOD MDM: CPT | Mod: ,,, | Performed by: EMERGENCY MEDICINE

## 2018-02-23 PROCEDURE — 99316 NF DSCHRG MGMT 30 MIN+: CPT | Mod: ,,, | Performed by: HOSPITALIST

## 2018-02-23 PROCEDURE — 25000003 PHARM REV CODE 250: Performed by: STUDENT IN AN ORGANIZED HEALTH CARE EDUCATION/TRAINING PROGRAM

## 2018-02-23 PROCEDURE — 99900058 HC 022 PAID UNDER SNF PPS

## 2018-02-23 PROCEDURE — 99285 EMERGENCY DEPT VISIT HI MDM: CPT | Mod: 25

## 2018-02-23 PROCEDURE — 85610 PROTHROMBIN TIME: CPT

## 2018-02-23 RX ORDER — CEFAZOLIN SODIUM 1 G/3ML
2 INJECTION, POWDER, FOR SOLUTION INTRAMUSCULAR; INTRAVENOUS
Status: DISPENSED | OUTPATIENT
Start: 2018-02-23 | End: 2018-02-24

## 2018-02-23 RX ORDER — HYDROCHLOROTHIAZIDE 25 MG/1
50 TABLET ORAL DAILY
Status: DISCONTINUED | OUTPATIENT
Start: 2018-02-25 | End: 2018-02-28 | Stop reason: HOSPADM

## 2018-02-23 RX ORDER — VALSARTAN 40 MG/1
80 TABLET ORAL DAILY
Status: DISCONTINUED | OUTPATIENT
Start: 2018-02-25 | End: 2018-02-28 | Stop reason: HOSPADM

## 2018-02-23 RX ORDER — ASPIRIN 325 MG
325 TABLET ORAL 2 TIMES DAILY
Status: DISCONTINUED | OUTPATIENT
Start: 2018-02-24 | End: 2018-02-24

## 2018-02-23 RX ORDER — DOCUSATE SODIUM 100 MG/1
100 CAPSULE, LIQUID FILLED ORAL 2 TIMES DAILY
Status: DISCONTINUED | OUTPATIENT
Start: 2018-02-23 | End: 2018-02-28 | Stop reason: HOSPADM

## 2018-02-23 RX ORDER — ONDANSETRON 4 MG/1
4 TABLET, ORALLY DISINTEGRATING ORAL EVERY 6 HOURS PRN
Status: DISCONTINUED | OUTPATIENT
Start: 2018-02-23 | End: 2018-02-28 | Stop reason: HOSPADM

## 2018-02-23 RX ORDER — SODIUM CHLORIDE 9 MG/ML
INJECTION, SOLUTION INTRAVENOUS CONTINUOUS
Status: ACTIVE | OUTPATIENT
Start: 2018-02-23 | End: 2018-02-24

## 2018-02-23 RX ADMIN — CEFAZOLIN 2 G: 330 INJECTION, POWDER, FOR SOLUTION INTRAMUSCULAR; INTRAVENOUS at 11:02

## 2018-02-23 RX ADMIN — VALSARTAN 80 MG: 80 TABLET ORAL at 09:02

## 2018-02-23 RX ADMIN — METOPROLOL TARTRATE 12.5 MG: 25 TABLET, FILM COATED ORAL at 09:02

## 2018-02-23 RX ADMIN — SODIUM CHLORIDE: 0.9 INJECTION, SOLUTION INTRAVENOUS at 11:02

## 2018-02-23 RX ADMIN — HYDROCHLOROTHIAZIDE 50 MG: 25 TABLET ORAL at 06:02

## 2018-02-23 RX ADMIN — FAMOTIDINE 20 MG: 20 TABLET, FILM COATED ORAL at 09:02

## 2018-02-23 RX ADMIN — OXYCODONE HYDROCHLORIDE 10 MG: 5 TABLET ORAL at 06:02

## 2018-02-23 RX ADMIN — OXYCODONE HYDROCHLORIDE 10 MG: 5 TABLET ORAL at 02:02

## 2018-02-23 RX ADMIN — DOCUSATE SODIUM 100 MG: 100 CAPSULE, LIQUID FILLED ORAL at 11:02

## 2018-02-23 RX ADMIN — POLYETHYLENE GLYCOL 3350 17 G: 17 POWDER, FOR SOLUTION ORAL at 09:02

## 2018-02-23 RX ADMIN — ASPIRIN 325 MG: 325 TABLET, DELAYED RELEASE ORAL at 09:02

## 2018-02-23 RX ADMIN — STANDARDIZED SENNA CONCENTRATE AND DOCUSATE SODIUM 1 TABLET: 8.6; 5 TABLET, FILM COATED ORAL at 09:02

## 2018-02-23 NOTE — PT/OT/SLP PROGRESS
"Physical Therapy  Treatment    Katelyn Huynh   MRN: [id number]   Admitting Diagnosis: Primary osteoarthritis of left knee    PT Received On: 02/23/18  Total Time (min): 40       Billable Minutes:  Gait Training 15, Therapeutic Activity 10 and Therapeutic Exercise 15    Treatment Type: Treatment  PT/PTA: PTA     PTA Visit Number: 1       General Precautions: Standard, fall  Orthopedic Precautions: LUE non weight bearing, LLE weight bearing as tolerated   Braces:  (left UE wrapped/splint)         Subjective:  Pt agreeable to therapy. "My family is coming in a little while again today. Let's get his going. Are we walking?"    Pain/Comfort  Pain Rating 1: 6/10  Location - Side 1: Left  Location 1: knee  Pain Addressed 1: Pre-medicate for activity, Reposition, Distraction  Pain Rating Post-Intervention 1: 6/10    Objective:  Patient found seated in w/c in room with PCT present after assisting pt with toileting.          Functional Status:  MDS G  Bed Mobility Functional Status: CGA-Min (A)  Transfer Functional Status: CGA-Min (A)  Walk in Room Functional Status: S-SBA  Walk in Corridor Functional Status: S-SBA  Moving from seated to standing position: Not steady, only able to stabilize with staff assistance  Walking (with assistive device if used): Not steady, but able to stabilize without staff assistance  Turning around and facing the opposite direction while walking: Not steady, but able to stabilize without staff assistance  Surface-to-surface transfer (transfer between bed and chair or wheelchair): Not steady, only able to stabilize with staff assistance          AM-PAC 6 CLICK MOBILITY  Total Score:16    Bed Mobility:  Sit>Supine: Min A on mat for LLE management.   Supine>Sit: SBA on mat    Transfers:  Sit<>Stand: Min A with L PRW and assistance needed to steady pt as she shifts weight forward and transitions RUE support onto AD.   Stand Pivot Transfer: SBA with L PRW, mat > w/c and w/c > " bed.    Gait:  Amb: Pt ambulated 165 ft with L PRW and SBA. Pt with noted good stability in LLE at knee. Pt ambulates with slow noe and occasional flexed posture that is corrected with VCs.     Therex:  Pt performed LLE therex in supine and sitting per TKR protocol x 30 reps.   Pt AROM  L knee:  0 - 95*     Balance:  Pt with no LOB with all mobility this tx.       Patient left sitting EOB with call button in reach and NSG present.    Assessment:  Katelyn Huynh is a 67 y.o. female with a medical diagnosis of Primary osteoarthritis of left knee.  Pt tolerated tx well and continues to progress well with all mobility. Pt limited with transfers from lower surfaces and with bed mobility d/t assistance needed for LLE management. Pt will continue to benefit from skilled therapy to improve impairments listed below.     Rehab identified problem list/impairments: weakness, impaired functional mobilty, gait instability, impaired balance, decreased upper extremity function, decreased lower extremity function, pain, orthopedic precautions, decreased ROM, edema    Rehab potential is good.    Activity tolerance: Good    Discharge recommendations: home with home health     Barriers to discharge: Decreased caregiver support, Inaccessible home environment    Equipment recommendations:  (bariatric RW with left platform)     GOALS:    Physical Therapy Goals        Problem: Physical Therapy Goal    Goal Priority Disciplines Outcome Goal Variances Interventions   Physical Therapy Goal     PT/OT, PT Ongoing (interventions implemented as appropriate)     Description:  Goals to be met by: 14 days     Patient will increase functional independence with mobility by performin. Supine to sit with Stand-by Assistance =  Met 2018  2. Sit to supine with Stand-by Assistance= not met  3. Sit to stand transfer with Stand-by Assistance using left platform rolling walker=  Met 2018  4. Bed to chair transfer with  Stand-by Assistance using Left platform Rolling Walker= met 2/22/2018  5. Gait  x 100 feet with Stand-by Assistance using left platform Rolling Walker and observing weight bearing precautions LUE. = met 2/22/2018       NEW GOAL; 2/22/2018 Gait x 150 feet w/ Supervision using left platform rolling walker and observing weight bearing precautions LUE = NOT MET  6. Wheelchair propulsion x25 feet with Stand-by Assistance using right lower extremity and right lower extremity= not met  7. Ascend/Descend 4 inch curb step with Contact Guard Assistance using Left platform Rolling Walker.= not met  8. Lower extremity exercise program x20 reps per  TKA protocol and handout, with assistance as needed sh[employer] gym therex= not met  9. Left knee AAROM 0* to 90* or better= met 2/22/2018      NEW GOAL: left knee AAROM 0* - 100* or better = NOT MET  NEW GOAL 2/22/2018 10. Patient will demonstrate improved quad strength LLE to 3-/5 = not met                        PLAN:    Patient to be seen 5 x/week  to address the above listed problems via gait training, therapeutic activities, therapeutic exercises, wheelchair management/training  Plan of Care expires: 03/19/18  Plan of Care reviewed with: patient    Thor Waldronnandez, PTA  02/23/2018

## 2018-02-23 NOTE — PLAN OF CARE
Problem: Occupational Therapy Goal  Goal: Occupational Therapy Goal  Problem: Occupational Therapy Goal  Goal: Occupational Therapy Goal  Goals to be met by: 14 days     Patient will increase functional independence with ADLs by performing:    UE Dressing with Modified [place].  LE Dressing with Modified [place].  Grooming while standing at sink with Modified [place].  Toileting from bedside commode with Modified [place] for hygiene and clothing management.   Bathing from  sitting at sink with Supervision.  Supine to sit with Modified [place].  Stand pivot transfers with Modified [place].  Toilet transfer to bedside commode with Supervision.  Upper extremity exercise program x 25 min to increase functional endurance for ADLs   Pt will perform a functional standing task with sup-met 10 min      Outcome: Ongoing (interventions implemented as appropriate)  .    Comments: .

## 2018-02-23 NOTE — ED NOTES
Patient identifiers verified and correct for Katelyn Huynh .   LOC: The patient is awake, alert and aware of environment with an appropriate affect, the patient is oriented x 3 and speaking appropriately.   APPEARANCE: Patient appears comfortable and in no acute distress, patient is clean and well groomed.  SKIN: The skin is warm and dry, color consistent with ethnicity, patient has normal skin turgor and moist mucus membranes, skin intact, no breakdown or bruising noted.   MUSCULOSKELETAL: Patient moving all extremities spontaneously, Pt has wound dehiscence to left knee s/p surgical staple removal. Pt has fractured left radius which is splinted.   RESPIRATORY: Airway is open and patent, respirations are spontaneous, patient has a normal effort and rate, no accessory muscle use noted, pt placed on continuous pulse ox with O2 sats noted at 97% on room air.  CARDIAC: Pt placed on cardiac monitor. Patient has a normal rate and regular rhythm, no edema noted, capillary refill < 3 seconds.   GASTRO: Soft and non tender to palpation, no distention noted, normoactive bowel sounds present in all four quadrants. Pt states bowel movements have been regular.  : Pt denies any pain or frequency with urination.  NEURO: Pt opens eyes spontaneously, behavior appropriate to situation, follows commands, facial expression symmetrical, bilateral hand grasp equal and even, purposeful motor response noted, normal sensation in all extremities when touched with a finger.

## 2018-02-23 NOTE — PLAN OF CARE
Problem: Patient Care Overview  Goal: Plan of Care Review  Outcome: Ongoing (interventions implemented as appropriate)  POC reviewed with patient and interventions implemented and documented in flow sheet.

## 2018-02-23 NOTE — ED TRIAGE NOTES
Pt arrived via EMS from [facility] for evaluation of left knee dehiscence. Pt had left total knee replacement 2/9/18 and had the staples removed 2 days ago. Since then the surgical site has opened with drainage. Pt denies any fever/chills/n/v. Pt denies pain. Pt also recently had a left arm fracture which is splinted.

## 2018-02-23 NOTE — PLAN OF CARE
Problem: Physical Therapy Goal  Goal: Physical Therapy Goal  Goals to be met by: 14 days     Patient will increase functional independence with mobility by performin. Supine to sit with Stand-by Assistance =  Met 2018  2. Sit to supine with Stand-by Assistance= not met  3. Sit to stand transfer with Stand-by Assistance using left platform rolling walker=  Met 2018  4. Bed to chair transfer with Stand-by Assistance using Left platform Rolling Walker= met 2018  5. Gait  x 100 feet with Stand-by Assistance using left platform Rolling Walker and observing weight bearing precautions LUE. = met 2018       NEW GOAL; 2018 Gait x 150 feet w/ Supervision using left platform rolling walker and observing weight bearing precautions LUE = NOT MET  6. Wheelchair propulsion x25 feet with Stand-by Assistance using right lower extremity and right lower extremity= not met  7. Ascend/Descend 4 inch curb step with Contact Guard Assistance using Left platform Rolling Walker.= not met  8. Lower extremity exercise program x20 reps per  TKA protocol and handout, with assistance as needed shavon gym therex= not met  9. Left knee AAROM 0* to 90* or better= met 2018      NEW GOAL: left knee AAROM 0* - 100* or better = NOT MET  NEW GOAL 2018 10. Patient will demonstrate improved quad strength LLE to 3-/5 = not met       Outcome: Ongoing (interventions implemented as appropriate)  Goals remain appropriate.

## 2018-02-23 NOTE — PLAN OF CARE
Problem: Patient Care Overview  Goal: Plan of Care Review  Outcome: Ongoing (interventions implemented as appropriate)  Patient remains free of falls and injury this shift. Fall precautions maintained. Prn pain medication given for pain, polar ice to left knee.

## 2018-02-23 NOTE — NURSING
[facility].

## 2018-02-23 NOTE — PT/OT/SLP PROGRESS
Occupational Therapy  Treatment    Katelyn Huynh   MRN: [id number]   Admitting Diagnosis: Primary osteoarthritis of left knee    OT Date of Treatment: 02/23/18  Total Time (min): 45 min    Billable Minutes:  Self Care/Home Management 35 and Therapeutic Activity 10    General Precautions: Standard, fall  Orthopedic Precautions: LUE non weight bearing  Braces: UE Sling    Do you have any cultural, spiritual, Church conflicts, given your current situation?: no    Subjective:  Communicated with charge nurse  prior to session.  My would is leaking today (charge nsg notified and reinforced bandage    Pain/Comfort  Pain Rating 1: 7/10  Location - Side 1: Left  Location 1: knee  Pain Addressed 1: Distraction, Pre-medicate for activity    Objective:   Pt. participated well with session on this day    Functional Status:  MDS G  Bed Mobility Functional Status: S-SBA from supine to sit  Transfer Functional Status: CGA-Min (A) from EOB to 3n1 with cues and PFRW to 3n1 and then to w/c with cues for safety  Toilet Use Functional Status: CGA-Min (A) for clothing management   Personal Hygiene Functional Status: mod(I) - (I)           AMPA 6 Click:  [facility] Total Score: 21    Additional Treatment:  Pt. With 2# weight on RUE with shd flex abd/add and forward press to increase UE ROM x 10 reps    Patient left up in chair with   present    ASSESSMENT:  Katelyn Huynh is a 67 y.o. female with a medical diagnosis of Primary osteoarthritis of left knee Pt. participated well with session on this day.Pt demos physical deficits with balance  functional mobility, UB strength, endurance  level of functional indep with daily tasks and activities and selfcare skills .Pt. Will continue to benefit from continued OT to progress towards goals      Rehab identified problem list/impairments: weakness, impaired endurance, gait instability, impaired functional mobilty, impaired self care skills, impaired  balance, decreased lower extremity function, decreased upper extremity function, decreased coordination, pain, impaired fine motor, impaired coordination, decreased ROM, impaired skin, edema, orthopedic precautions    Rehab potential is fair    Activity tolerance: Fair    Discharge recommendations: home with home health     Barriers to discharge: None     Equipment recommendations:  (PFRW)     GOALS:    Occupational Therapy Goals        Problem: Occupational Therapy Goal    Goal Priority Disciplines Outcome Interventions   Occupational Therapy Goal     OT, PT/OT Ongoing (interventions implemented as appropriate)    Description:  Problem: Occupational Therapy Goal  Goal: Occupational Therapy Goal  Goals to be met by: 14 days     Patient will increase functional independence with ADLs by performing:    UE Dressing with Modified [place].  LE Dressing with Modified [place].  Grooming while standing at sink with Modified [place].  Toileting from bedside commode with Modified [place] for hygiene and clothing management.   Bathing from  sitting at sink with Supervision.  Supine to sit with Modified [place].  Stand pivot transfers with Modified [place].  Toilet transfer to bedside commode with Supervision.  Upper extremity exercise program x 25 min to increase functional endurance for ADLs   Pt will perform a functional standing task with sup-met 10 min                   Plan:  Patient to be seen 5 x/week to address the above listed problems via self-care/home management, therapeutic activities, therapeutic exercises  Plan of Care expires: 03/17/18  Plan of Care reviewed with: patient    TEDDY Wilson  02/23/2018

## 2018-02-23 NOTE — NURSING
Patient returned from pt, to find that the incision to her L TKA had dehisced.  Marisa Ramirez placed orders to send pt to [facility]

## 2018-02-24 ENCOUNTER — SURGERY (OUTPATIENT)
Age: 68
End: 2018-02-24

## 2018-02-24 ENCOUNTER — ANESTHESIA (OUTPATIENT)
Dept: SURGERY | Facility: HOSPITAL | Age: 68
DRG: 902 | End: 2018-02-24
Payer: COMMERCIAL

## 2018-02-24 LAB
ALBUMIN SERPL BCP-MCNC: 2.5 G/DL
ALP SERPL-CCNC: 95 U/L
ALT SERPL W/O P-5'-P-CCNC: 12 U/L
ANION GAP SERPL CALC-SCNC: 10 MMOL/L
AST SERPL-CCNC: 18 U/L
BASOPHILS # BLD AUTO: 0.03 K/UL
BASOPHILS NFR BLD: 0.3 %
BILIRUB SERPL-MCNC: 0.6 MG/DL
BUN SERPL-MCNC: 8 MG/DL
CALCIUM SERPL-MCNC: 8.4 MG/DL
CHLORIDE SERPL-SCNC: 106 MMOL/L
CO2 SERPL-SCNC: 24 MMOL/L
CREAT SERPL-MCNC: 0.8 MG/DL
CRP SERPL-MCNC: 23.2 MG/L
DIFFERENTIAL METHOD: ABNORMAL
EOSINOPHIL # BLD AUTO: 0 K/UL
EOSINOPHIL NFR BLD: 0.2 %
ERYTHROCYTE [DISTWIDTH] IN BLOOD BY AUTOMATED COUNT: 14.1 %
ERYTHROCYTE [SEDIMENTATION RATE] IN BLOOD BY WESTERGREN METHOD: 54 MM/HR
EST. GFR  (AFRICAN AMERICAN): >60 ML/MIN/1.73 M^2
EST. GFR  (NON AFRICAN AMERICAN): >60 ML/MIN/1.73 M^2
GLUCOSE SERPL-MCNC: 129 MG/DL
GRAM STN SPEC: NORMAL
HCT VFR BLD AUTO: 34.6 %
HGB BLD-MCNC: 11.4 G/DL
IMM GRANULOCYTES # BLD AUTO: 0.05 K/UL
IMM GRANULOCYTES NFR BLD AUTO: 0.5 %
LYMPHOCYTES # BLD AUTO: 1.2 K/UL
LYMPHOCYTES NFR BLD: 12.7 %
MCH RBC QN AUTO: 29.5 PG
MCHC RBC AUTO-ENTMCNC: 32.9 G/DL
MCV RBC AUTO: 89 FL
MONOCYTES # BLD AUTO: 0.5 K/UL
MONOCYTES NFR BLD: 5.7 %
NEUTROPHILS # BLD AUTO: 7.3 K/UL
NEUTROPHILS NFR BLD: 80.6 %
NRBC BLD-RTO: 0 /100 WBC
PLATELET # BLD AUTO: 294 K/UL
PLATELET BLD QL SMEAR: ABNORMAL
PMV BLD AUTO: 9.4 FL
POTASSIUM SERPL-SCNC: 4.4 MMOL/L
PROCALCITONIN SERPL IA-MCNC: <0.02 NG/ML
PROT SERPL-MCNC: 7 G/DL
RBC # BLD AUTO: 3.87 M/UL
SODIUM SERPL-SCNC: 140 MMOL/L
WBC # BLD AUTO: 9.12 K/UL

## 2018-02-24 PROCEDURE — 27385 REPAIR OF THIGH MUSCLE: CPT | Mod: 51,22,LT, | Performed by: ORTHOPAEDIC SURGERY

## 2018-02-24 PROCEDURE — 0SBC0ZZ EXCISION OF RIGHT KNEE JOINT, OPEN APPROACH: ICD-10-PCS | Performed by: ORTHOPAEDIC SURGERY

## 2018-02-24 PROCEDURE — 3E1U38Z IRRIGATION OF JOINTS USING IRRIGATING SUBSTANCE, PERCUTANEOUS APPROACH: ICD-10-PCS | Performed by: ORTHOPAEDIC SURGERY

## 2018-02-24 PROCEDURE — 87070 CULTURE OTHR SPECIMN AEROBIC: CPT | Mod: 59

## 2018-02-24 PROCEDURE — 85025 COMPLETE CBC W/AUTO DIFF WBC: CPT

## 2018-02-24 PROCEDURE — 71000033 HC RECOVERY, INTIAL HOUR: Performed by: ORTHOPAEDIC SURGERY

## 2018-02-24 PROCEDURE — 86140 C-REACTIVE PROTEIN: CPT

## 2018-02-24 PROCEDURE — 25000003 PHARM REV CODE 250: Performed by: ORTHOPAEDIC SURGERY

## 2018-02-24 PROCEDURE — C1776 JOINT DEVICE (IMPLANTABLE): HCPCS | Performed by: ORTHOPAEDIC SURGERY

## 2018-02-24 PROCEDURE — 87015 SPECIMEN INFECT AGNT CONCNTJ: CPT | Mod: 59

## 2018-02-24 PROCEDURE — 0SPV0JZ REMOVAL OF SYNTHETIC SUBSTITUTE FROM RIGHT KNEE JOINT, TIBIAL SURFACE, OPEN APPROACH: ICD-10-PCS | Performed by: ORTHOPAEDIC SURGERY

## 2018-02-24 PROCEDURE — C1729 CATH, DRAINAGE: HCPCS | Performed by: ORTHOPAEDIC SURGERY

## 2018-02-24 PROCEDURE — 63600175 PHARM REV CODE 636 W HCPCS: Performed by: ORTHOPAEDIC SURGERY

## 2018-02-24 PROCEDURE — 0LQQ0ZZ REPAIR RIGHT KNEE TENDON, OPEN APPROACH: ICD-10-PCS | Performed by: ORTHOPAEDIC SURGERY

## 2018-02-24 PROCEDURE — 87206 SMEAR FLUORESCENT/ACID STAI: CPT

## 2018-02-24 PROCEDURE — 84145 PROCALCITONIN (PCT): CPT

## 2018-02-24 PROCEDURE — 25000003 PHARM REV CODE 250: Performed by: NURSE ANESTHETIST, CERTIFIED REGISTERED

## 2018-02-24 PROCEDURE — 85651 RBC SED RATE NONAUTOMATED: CPT

## 2018-02-24 PROCEDURE — 25000003 PHARM REV CODE 250: Performed by: STUDENT IN AN ORGANIZED HEALTH CARE EDUCATION/TRAINING PROGRAM

## 2018-02-24 PROCEDURE — 37000008 HC ANESTHESIA 1ST 15 MINUTES: Performed by: ORTHOPAEDIC SURGERY

## 2018-02-24 PROCEDURE — 63600175 PHARM REV CODE 636 W HCPCS: Performed by: ANESTHESIOLOGY

## 2018-02-24 PROCEDURE — 27486 REVISE/REPLACE KNEE JOINT: CPT | Mod: 52,LT,, | Performed by: ORTHOPAEDIC SURGERY

## 2018-02-24 PROCEDURE — 87116 MYCOBACTERIA CULTURE: CPT

## 2018-02-24 PROCEDURE — 87176 TISSUE HOMOGENIZATION CULTR: CPT

## 2018-02-24 PROCEDURE — 36000710: Performed by: ORTHOPAEDIC SURGERY

## 2018-02-24 PROCEDURE — 37000009 HC ANESTHESIA EA ADD 15 MINS: Performed by: ORTHOPAEDIC SURGERY

## 2018-02-24 PROCEDURE — 94799 UNLISTED PULMONARY SVC/PX: CPT

## 2018-02-24 PROCEDURE — 27201423 OPTIME MED/SURG SUP & DEVICES STERILE SUPPLY: Performed by: ORTHOPAEDIC SURGERY

## 2018-02-24 PROCEDURE — 63600175 PHARM REV CODE 636 W HCPCS: Performed by: STUDENT IN AN ORGANIZED HEALTH CARE EDUCATION/TRAINING PROGRAM

## 2018-02-24 PROCEDURE — D9220A PRA ANESTHESIA: Mod: ANES,,, | Performed by: ANESTHESIOLOGY

## 2018-02-24 PROCEDURE — 87205 SMEAR GRAM STAIN: CPT | Mod: 59

## 2018-02-24 PROCEDURE — 87102 FUNGUS ISOLATION CULTURE: CPT | Mod: 59

## 2018-02-24 PROCEDURE — 36415 COLL VENOUS BLD VENIPUNCTURE: CPT

## 2018-02-24 PROCEDURE — 0SRV0JZ REPLACEMENT OF RIGHT KNEE JOINT, TIBIAL SURFACE WITH SYNTHETIC SUBSTITUTE, OPEN APPROACH: ICD-10-PCS | Performed by: ORTHOPAEDIC SURGERY

## 2018-02-24 PROCEDURE — 13160 SEC CLSR SURG WND/DEHSN XTN: CPT | Mod: 51,,, | Performed by: ORTHOPAEDIC SURGERY

## 2018-02-24 PROCEDURE — 71000039 HC RECOVERY, EACH ADD'L HOUR: Performed by: ORTHOPAEDIC SURGERY

## 2018-02-24 PROCEDURE — 0JBN0ZZ EXCISION OF RIGHT LOWER LEG SUBCUTANEOUS TISSUE AND FASCIA, OPEN APPROACH: ICD-10-PCS | Performed by: ORTHOPAEDIC SURGERY

## 2018-02-24 PROCEDURE — 27000221 HC OXYGEN, UP TO 24 HOURS

## 2018-02-24 PROCEDURE — 63600175 PHARM REV CODE 636 W HCPCS: Performed by: NURSE ANESTHETIST, CERTIFIED REGISTERED

## 2018-02-24 PROCEDURE — 87075 CULTR BACTERIA EXCEPT BLOOD: CPT

## 2018-02-24 PROCEDURE — 36000711: Performed by: ORTHOPAEDIC SURGERY

## 2018-02-24 PROCEDURE — D9220A PRA ANESTHESIA: Mod: CRNA,,, | Performed by: NURSE ANESTHETIST, CERTIFIED REGISTERED

## 2018-02-24 PROCEDURE — 80053 COMPREHEN METABOLIC PANEL: CPT

## 2018-02-24 DEVICE — TIBIAL POST STAB SZ 4 13X3 POL: Type: IMPLANTABLE DEVICE | Site: KNEE | Status: FUNCTIONAL

## 2018-02-24 RX ORDER — FENTANYL CITRATE 50 UG/ML
25 INJECTION, SOLUTION INTRAMUSCULAR; INTRAVENOUS EVERY 5 MIN PRN
Status: COMPLETED | OUTPATIENT
Start: 2018-02-24 | End: 2018-02-24

## 2018-02-24 RX ORDER — METOPROLOL TARTRATE 1 MG/ML
INJECTION, SOLUTION INTRAVENOUS
Status: DISCONTINUED | OUTPATIENT
Start: 2018-02-24 | End: 2018-02-24

## 2018-02-24 RX ORDER — DOCUSATE SODIUM 100 MG/1
100 CAPSULE, LIQUID FILLED ORAL 2 TIMES DAILY
Qty: 60 CAPSULE | Refills: 0 | Status: SHIPPED | OUTPATIENT
Start: 2018-02-24 | End: 2018-10-17

## 2018-02-24 RX ORDER — MORPHINE SULFATE 2 MG/ML
2 INJECTION, SOLUTION INTRAMUSCULAR; INTRAVENOUS
Status: DISCONTINUED | OUTPATIENT
Start: 2018-02-24 | End: 2018-02-28 | Stop reason: HOSPADM

## 2018-02-24 RX ORDER — MIDAZOLAM HYDROCHLORIDE 1 MG/ML
INJECTION INTRAMUSCULAR; INTRAVENOUS
Status: DISCONTINUED | OUTPATIENT
Start: 2018-02-24 | End: 2018-02-24

## 2018-02-24 RX ORDER — ASPIRIN 325 MG
325 TABLET, DELAYED RELEASE (ENTERIC COATED) ORAL 2 TIMES DAILY
Qty: 60 TABLET | Refills: 0 | Status: ON HOLD | OUTPATIENT
Start: 2018-02-24 | End: 2018-03-13

## 2018-02-24 RX ORDER — PROPOFOL 10 MG/ML
VIAL (ML) INTRAVENOUS
Status: DISCONTINUED | OUTPATIENT
Start: 2018-02-24 | End: 2018-02-24

## 2018-02-24 RX ORDER — SODIUM CHLORIDE 0.9 % (FLUSH) 0.9 %
3 SYRINGE (ML) INJECTION
Status: DISCONTINUED | OUTPATIENT
Start: 2018-02-24 | End: 2018-02-24 | Stop reason: HOSPADM

## 2018-02-24 RX ORDER — DIPHENHYDRAMINE HCL 25 MG
25 CAPSULE ORAL EVERY 6 HOURS PRN
Status: DISCONTINUED | OUTPATIENT
Start: 2018-02-24 | End: 2018-02-28 | Stop reason: HOSPADM

## 2018-02-24 RX ORDER — ACETAMINOPHEN 10 MG/ML
INJECTION, SOLUTION INTRAVENOUS
Status: DISCONTINUED | OUTPATIENT
Start: 2018-02-24 | End: 2018-02-24

## 2018-02-24 RX ORDER — ONDANSETRON HYDROCHLORIDE 8 MG/1
8 TABLET, FILM COATED ORAL EVERY 8 HOURS PRN
Qty: 30 TABLET | Refills: 0 | Status: ON HOLD | OUTPATIENT
Start: 2018-02-24 | End: 2018-03-13 | Stop reason: HOSPADM

## 2018-02-24 RX ORDER — ESMOLOL HYDROCHLORIDE 10 MG/ML
INJECTION INTRAVENOUS
Status: DISCONTINUED | OUTPATIENT
Start: 2018-02-24 | End: 2018-02-24

## 2018-02-24 RX ORDER — OXYCODONE AND ACETAMINOPHEN 10; 325 MG/1; MG/1
1 TABLET ORAL EVERY 4 HOURS PRN
Status: DISCONTINUED | OUTPATIENT
Start: 2018-02-24 | End: 2018-02-28 | Stop reason: HOSPADM

## 2018-02-24 RX ORDER — ONDANSETRON HYDROCHLORIDE 2 MG/ML
INJECTION, SOLUTION INTRAMUSCULAR; INTRAVENOUS
Status: DISCONTINUED | OUTPATIENT
Start: 2018-02-24 | End: 2018-02-24

## 2018-02-24 RX ORDER — ASPIRIN 325 MG
325 TABLET, DELAYED RELEASE (ENTERIC COATED) ORAL 2 TIMES DAILY
Status: DISCONTINUED | OUTPATIENT
Start: 2018-02-24 | End: 2018-02-28 | Stop reason: HOSPADM

## 2018-02-24 RX ORDER — HYDROMORPHONE HYDROCHLORIDE 1 MG/ML
0.5 INJECTION, SOLUTION INTRAMUSCULAR; INTRAVENOUS; SUBCUTANEOUS
Status: DISCONTINUED | OUTPATIENT
Start: 2018-02-24 | End: 2018-02-24

## 2018-02-24 RX ORDER — SODIUM CHLORIDE 9 MG/ML
INJECTION, SOLUTION INTRAVENOUS CONTINUOUS PRN
Status: DISCONTINUED | OUTPATIENT
Start: 2018-02-24 | End: 2018-02-24

## 2018-02-24 RX ORDER — OXYCODONE AND ACETAMINOPHEN 5; 325 MG/1; MG/1
1 TABLET ORAL EVERY 4 HOURS PRN
Status: DISCONTINUED | OUTPATIENT
Start: 2018-02-24 | End: 2018-02-28 | Stop reason: HOSPADM

## 2018-02-24 RX ORDER — FENTANYL CITRATE 50 UG/ML
INJECTION, SOLUTION INTRAMUSCULAR; INTRAVENOUS
Status: DISCONTINUED | OUTPATIENT
Start: 2018-02-24 | End: 2018-02-24

## 2018-02-24 RX ORDER — LIDOCAINE HCL/PF 100 MG/5ML
SYRINGE (ML) INTRAVENOUS
Status: DISCONTINUED | OUTPATIENT
Start: 2018-02-24 | End: 2018-02-24

## 2018-02-24 RX ORDER — GLYCOPYRROLATE 0.2 MG/ML
INJECTION INTRAMUSCULAR; INTRAVENOUS
Status: DISCONTINUED | OUTPATIENT
Start: 2018-02-24 | End: 2018-02-24

## 2018-02-24 RX ORDER — CEFAZOLIN SODIUM 1 G/3ML
INJECTION, POWDER, FOR SOLUTION INTRAMUSCULAR; INTRAVENOUS
Status: DISCONTINUED | OUTPATIENT
Start: 2018-02-24 | End: 2018-02-24

## 2018-02-24 RX ORDER — VANCOMYCIN HYDROCHLORIDE 1 G/20ML
INJECTION, POWDER, LYOPHILIZED, FOR SOLUTION INTRAVENOUS
Status: DISCONTINUED | OUTPATIENT
Start: 2018-02-24 | End: 2018-02-24 | Stop reason: HOSPADM

## 2018-02-24 RX ORDER — HYDROMORPHONE HYDROCHLORIDE 1 MG/ML
0.2 INJECTION, SOLUTION INTRAMUSCULAR; INTRAVENOUS; SUBCUTANEOUS
Status: DISCONTINUED | OUTPATIENT
Start: 2018-02-24 | End: 2018-02-24

## 2018-02-24 RX ORDER — MORPHINE SULFATE 2 MG/ML
4 INJECTION, SOLUTION INTRAMUSCULAR; INTRAVENOUS
Status: DISCONTINUED | OUTPATIENT
Start: 2018-02-24 | End: 2018-02-28 | Stop reason: HOSPADM

## 2018-02-24 RX ORDER — OXYCODONE AND ACETAMINOPHEN 10; 325 MG/1; MG/1
1 TABLET ORAL EVERY 4 HOURS PRN
Qty: 90 TABLET | Refills: 0 | Status: ON HOLD | OUTPATIENT
Start: 2018-02-24 | End: 2018-03-13

## 2018-02-24 RX ADMIN — FENTANYL CITRATE 25 MCG: 50 INJECTION, SOLUTION INTRAMUSCULAR; INTRAVENOUS at 12:02

## 2018-02-24 RX ADMIN — VANCOMYCIN HYDROCHLORIDE 1 G: 1 INJECTION, POWDER, LYOPHILIZED, FOR SOLUTION INTRAVENOUS at 11:02

## 2018-02-24 RX ADMIN — CEFAZOLIN 1 G: 330 INJECTION, POWDER, FOR SOLUTION INTRAMUSCULAR; INTRAVENOUS at 09:02

## 2018-02-24 RX ADMIN — Medication 4 MG: at 08:02

## 2018-02-24 RX ADMIN — OXYCODONE HYDROCHLORIDE AND ACETAMINOPHEN 1 TABLET: 10; 325 TABLET ORAL at 05:02

## 2018-02-24 RX ADMIN — SODIUM CHLORIDE: 0.9 INJECTION, SOLUTION INTRAVENOUS at 07:02

## 2018-02-24 RX ADMIN — FENTANYL CITRATE 50 MCG: 50 INJECTION, SOLUTION INTRAMUSCULAR; INTRAVENOUS at 09:02

## 2018-02-24 RX ADMIN — ACETAMINOPHEN 1000 MG: 10 INJECTION, SOLUTION INTRAVENOUS at 08:02

## 2018-02-24 RX ADMIN — TRANEXAMIC ACID 3000 MG: 100 INJECTION, SOLUTION INTRAVENOUS at 10:02

## 2018-02-24 RX ADMIN — DOCUSATE SODIUM 100 MG: 100 CAPSULE, LIQUID FILLED ORAL at 11:02

## 2018-02-24 RX ADMIN — GLYCOPYRROLATE 0.2 MG: 0.2 INJECTION, SOLUTION INTRAMUSCULAR; INTRAVENOUS at 08:02

## 2018-02-24 RX ADMIN — FENTANYL CITRATE 25 MCG: 50 INJECTION, SOLUTION INTRAMUSCULAR; INTRAVENOUS at 08:02

## 2018-02-24 RX ADMIN — OXYCODONE HYDROCHLORIDE AND ACETAMINOPHEN 1 TABLET: 10; 325 TABLET ORAL at 12:02

## 2018-02-24 RX ADMIN — CEFTRIAXONE SODIUM 2 G: 2 INJECTION, POWDER, FOR SOLUTION INTRAMUSCULAR; INTRAVENOUS at 11:02

## 2018-02-24 RX ADMIN — PROPOFOL 50 MG: 10 INJECTION, EMULSION INTRAVENOUS at 07:02

## 2018-02-24 RX ADMIN — CEFAZOLIN 2 G: 330 INJECTION, POWDER, FOR SOLUTION INTRAMUSCULAR; INTRAVENOUS at 06:02

## 2018-02-24 RX ADMIN — CEFAZOLIN 2 G: 330 INJECTION, POWDER, FOR SOLUTION INTRAMUSCULAR; INTRAVENOUS at 09:02

## 2018-02-24 RX ADMIN — METOPROLOL TARTRATE 1 MG: 5 INJECTION, SOLUTION INTRAVENOUS at 10:02

## 2018-02-24 RX ADMIN — ONDANSETRON 4 MG: 2 INJECTION, SOLUTION INTRAMUSCULAR; INTRAVENOUS at 08:02

## 2018-02-24 RX ADMIN — ASPIRIN 325 MG: 325 TABLET, DELAYED RELEASE ORAL at 11:02

## 2018-02-24 RX ADMIN — LIDOCAINE HYDROCHLORIDE 100 MG: 20 INJECTION, SOLUTION INTRAVENOUS at 07:02

## 2018-02-24 RX ADMIN — FENTANYL CITRATE 50 MCG: 50 INJECTION, SOLUTION INTRAMUSCULAR; INTRAVENOUS at 08:02

## 2018-02-24 RX ADMIN — SODIUM CHLORIDE 1000 MG: 9 INJECTION, SOLUTION INTRAVENOUS at 11:02

## 2018-02-24 RX ADMIN — FENTANYL CITRATE 50 MCG: 50 INJECTION, SOLUTION INTRAMUSCULAR; INTRAVENOUS at 07:02

## 2018-02-24 RX ADMIN — MIDAZOLAM HYDROCHLORIDE 2 MG: 1 INJECTION, SOLUTION INTRAMUSCULAR; INTRAVENOUS at 07:02

## 2018-02-24 RX ADMIN — SODIUM CHLORIDE, SODIUM GLUCONATE, SODIUM ACETATE, POTASSIUM CHLORIDE, MAGNESIUM CHLORIDE, SODIUM PHOSPHATE, DIBASIC, AND POTASSIUM PHOSPHATE: .53; .5; .37; .037; .03; .012; .00082 INJECTION, SOLUTION INTRAVENOUS at 10:02

## 2018-02-24 RX ADMIN — FENTANYL CITRATE 25 MCG: 50 INJECTION, SOLUTION INTRAMUSCULAR; INTRAVENOUS at 11:02

## 2018-02-24 RX ADMIN — OXYCODONE HYDROCHLORIDE AND ACETAMINOPHEN 1 TABLET: 10; 325 TABLET ORAL at 11:02

## 2018-02-24 RX ADMIN — ESMOLOL HYDROCHLORIDE 10 MG: 10 INJECTION INTRAVENOUS at 11:02

## 2018-02-24 RX ADMIN — Medication 4 MG: at 01:02

## 2018-02-24 RX ADMIN — PROPOFOL 150 MG: 10 INJECTION, EMULSION INTRAVENOUS at 07:02

## 2018-02-24 RX ADMIN — METOPROLOL TARTRATE 1 MG: 5 INJECTION, SOLUTION INTRAVENOUS at 09:02

## 2018-02-24 RX ADMIN — METOPROLOL TARTRATE 1 MG: 5 INJECTION, SOLUTION INTRAVENOUS at 08:02

## 2018-02-24 RX ADMIN — FENTANYL CITRATE 25 MCG: 50 INJECTION, SOLUTION INTRAMUSCULAR; INTRAVENOUS at 01:02

## 2018-02-24 RX ADMIN — VANCOMYCIN HYDROCHLORIDE 1.5 G: 1 INJECTION, POWDER, LYOPHILIZED, FOR SOLUTION INTRAVENOUS at 09:02

## 2018-02-24 RX ADMIN — MORPHINE SULFATE 2 MG: 2 INJECTION, SOLUTION INTRAMUSCULAR; INTRAVENOUS at 04:02

## 2018-02-24 RX ADMIN — FENTANYL CITRATE 25 MCG: 50 INJECTION, SOLUTION INTRAMUSCULAR; INTRAVENOUS at 10:02

## 2018-02-24 NOTE — H&P
[facility]  Orthopedics  H&P    Patient Name: Katelyn Huynh  MRN: [id number]  Admission Date: 2/23/2018  Primary Care Provider: Daniel Garcia MD    Subjective:     Principal Problem:Wound dehiscence    Chief Complaint:   Chief Complaint   Patient presents with    Wound Dehiscence     left knee replacement 2/9; stitches removed two days ago, wound started opening         HPI: Katelyn Huynh is a 68 yo female s/p left TKA on 2/9 with Dr. Salinas here presenting for wound issues. Patient was discharged to SNF and has been doing well from rehab. Since surgery has had mild drainage, but today, noted an opening of the wound. Denies fevers, chills, sweats, or increased knee pain. Denies fall or trauma to extremity.    Past Medical History:   Diagnosis Date    Arthritis     Cataract     Glaucoma     History of iritis     OD    Hypertension     Meningioma     Uveitis        Past Surgical History:   Procedure Laterality Date    Brain tumor surgery      had a brain tumor removed    CATARACT EXTRACTION      OU    CATARACT EXTRACTION BILATERAL W/ ANTERIOR VITRECTOMY      CHOLECYSTECTOMY      CRANIOTOMY      KNEE SURGERY  2-24-15    right TKR    YAG Right 1/20/2016    Dr. Kaiser       Review of patient's allergies indicates:   Allergen Reactions    Cortisone Hives and Swelling     Swelling of the tongue       Current Facility-Administered Medications   Medication    0.9%  NaCl infusion    [START ON 2/24/2018] aspirin tablet 325 mg    ceFAZolin injection 2 g    docusate sodium capsule 100 mg    [START ON 2/25/2018] hydroCHLOROthiazide tablet 50 mg    ondansetron disintegrating tablet 4 mg    [START ON 2/25/2018] valsartan tablet 80 mg     Current Outpatient Prescriptions   Medication Sig    ASCORBATE CALCIUM (VITAMIN C ORAL) Take by mouth once daily.     aspirin 325 MG tablet Take 1 tablet (325 mg total) by mouth 2 (two) times daily.    docusate sodium  (COLACE) 100 MG capsule Take 1 capsule (100 mg total) by mouth 2 (two) times daily.    hydrochlorothiazide (HYDRODIURIL) 25 MG tablet Take 2 tablets (50 mg total) by mouth once daily. (Patient taking differently: Take 50 mg by mouth every morning. )    ondansetron (ZOFRAN-ODT) 4 MG TbDL Take 1 tablet (4 mg total) by mouth every 6 (six) hours as needed.    oxyCODONE-acetaminophen (PERCOCET)  mg per tablet Take 1 tablet by mouth every 4 (four) hours as needed for Pain.    valsartan (DIOVAN) 80 MG tablet TAKE 1 TABLET(80 MG) BY MOUTH EVERY DAY (Patient taking differently: TAKE 1 TABLET(80 MG) BY MOUTH EVERY DAY  am)    betamethasone dipropionate (DIPROLENE) 0.05 % cream Apply topically 2 (two) times daily.    triamcinolone acetonide 0.1% (KENALOG) 0.1 % cream JOSE ELIAS EXT AA TID     Facility-Administered Medications Ordered in Other Encounters   Medication    [MAR Hold - Suspended Admission] acetaminophen tablet 650 mg    [MAR Hold - Suspended Admission] aspirin EC tablet 325 mg    [MAR Hold - Suspended Admission] calcium carbonate 200 mg calcium (500 mg) chewable tablet 500 mg    [MAR Hold - Suspended Admission] docusate sodium 1 enema    [MAR Hold - Suspended Admission] famotidine tablet 20 mg    [MAR Hold - Suspended Admission] hydroCHLOROthiazide tablet 50 mg    [MAR Hold - Suspended Admission] magnesium hydroxide 400 mg/5 ml suspension 2,400 mg    [MAR Hold - Suspended Admission] metoprolol tartrate (LOPRESSOR) split tablet 12.5 mg    [MAR Hold - Suspended Admission] ondansetron disintegrating tablet 4 mg    [MAR Hold - Suspended Admission] oxyCODONE immediate release tablet 10 mg    [MAR Hold - Suspended Admission] oxyCODONE immediate release tablet 5 mg    [MAR Hold - Suspended Admission] polyethylene glycol packet 17 g    [MAR Hold - Suspended Admission] pregabalin capsule 75 mg    [MAR Hold - Suspended Admission] ramelteon tablet 8 mg    [MAR Hold - Suspended Admission] senna-docusate  "8.6-50 mg per tablet 1 tablet    [MAR Hold - Suspended Admission] valsartan tablet 80 mg     Family History     Problem Relation (Age of Onset)    Cataracts Mother, Sister, Maternal Aunt    Diabetes Mother, Father, Sister, Maternal Aunt    Glaucoma Mother, Brother, Maternal Aunt, Maternal Uncle    Hypertension Mother, Father    Stroke Sister        Social History Main Topics    Smoking status: Former Smoker     Packs/day: 0.10     Years: 3.00     Types: Cigarettes     Quit date: 4/26/1973    Smokeless tobacco: Never Used    Alcohol use 1.2 oz/week     2 Glasses of wine per week      Comment: social- glass on wine on occasions    Drug use: No    Sexual activity: Not on file     ROS   ROS: denies chest pain, shortness of breath, nausea, vomiting, numbness or tingling of extremities    Objective:     Vital Signs (Most Recent):  Temp: 98.1 °F (36.7 °C) (02/23/18 1723)  Pulse: 92 (02/23/18 1846)  Resp: 20 (02/23/18 1846)  BP: 126/67 (02/23/18 1846)  SpO2: (!) 91 % (02/23/18 1846) Vital Signs (24h Range):  Temp:  [98.1 °F (36.7 °C)] 98.1 °F (36.7 °C)  Pulse:  [86-94] 92  Resp:  [17-20] 20  SpO2:  [91 %-98 %] 91 %  BP: (112-135)/(60-67) 126/67     Weight: 120.6 kg (265 lb 14 oz)  Height: 5' 4.5" (163.8 cm)  Body mass index is 44.93 kg/m².    No intake or output data in the 24 hours ending 02/23/18 2048    Ortho/SPM Exam   Vitals: Afebrile.  Vital signs stable.  General: No acute distress.  HEENT: Normocephalic. Atraumatic. Sclera anicteric. No tracheal deviation.  Cardio: Regular rate and rhythm.  Chest: No increased work of breathing.  Abdominal: Nondistended.  Extremities: No cyanosis.  No clubbing.  No edema.  Palpable pulses.  Skin: No generalized rash.  Neuro: Awake. Alert. Oriented to person, place, time, and situation.  Psych: Normal appearance. Cooperative.  Appropriate mood.  Appropriate affect.      LLE:  5 cm wound dehiscence probing to soft tissue  Minimal pain with ROM of knee  Painless ROM of hip and " ankle  EHL, FHL, GC, TA intact  Brisk capillary refill  SILT L2-S1    Significant Labs: All pertinent labs within the past 24 hours have been reviewed.    Significant Imaging: X-Ray: I have reviewed all pertinent results/findings and my personal findings are:  left knee: stable appearing hardware    Assessment/Plan:     * Wound dehiscence    Katelyn Huynh is a 66 yo female s/p left TKA with wound dehiscence    - Admit to orthopedics  - To OR tomorrow for I&D and possible poly exchange of left knee  - Ancef ordered q6  - NPO midnight    Procedure Note: left knee irrigation  Patient was explained risks, benefits, and alternatives to treatment and verbalized consent to proceed. Time out was performed and patient name, , site, and procedure were confirmed. Wound was thoroughly irrigated with 2 L of normal saline. Wound was covered with betadine soaked gauze. Soft dressings were applied of ABD, cast padding, and ACE bandage.            Tom Coello MD  Orthopedics  [facility]

## 2018-02-24 NOTE — SUBJECTIVE & OBJECTIVE
Past Medical History:   Diagnosis Date    Arthritis     Cataract     Glaucoma     History of iritis     OD    Hypertension     Meningioma     Uveitis        Past Surgical History:   Procedure Laterality Date    Brain tumor surgery      had a brain tumor removed    CATARACT EXTRACTION      OU    CATARACT EXTRACTION BILATERAL W/ ANTERIOR VITRECTOMY      CHOLECYSTECTOMY      CRANIOTOMY      KNEE SURGERY  2-24-15    right TKR    YAG Right 1/20/2016    Dr. Kaiser       Review of patient's allergies indicates:   Allergen Reactions    Cortisone Hives and Swelling     Swelling of the tongue       Current Facility-Administered Medications   Medication    0.9%  NaCl infusion    [START ON 2/24/2018] aspirin tablet 325 mg    ceFAZolin injection 2 g    docusate sodium capsule 100 mg    [START ON 2/25/2018] hydroCHLOROthiazide tablet 50 mg    ondansetron disintegrating tablet 4 mg    [START ON 2/25/2018] valsartan tablet 80 mg     Current Outpatient Prescriptions   Medication Sig    ASCORBATE CALCIUM (VITAMIN C ORAL) Take by mouth once daily.     aspirin 325 MG tablet Take 1 tablet (325 mg total) by mouth 2 (two) times daily.    docusate sodium (COLACE) 100 MG capsule Take 1 capsule (100 mg total) by mouth 2 (two) times daily.    hydrochlorothiazide (HYDRODIURIL) 25 MG tablet Take 2 tablets (50 mg total) by mouth once daily. (Patient taking differently: Take 50 mg by mouth every morning. )    ondansetron (ZOFRAN-ODT) 4 MG TbDL Take 1 tablet (4 mg total) by mouth every 6 (six) hours as needed.    oxyCODONE-acetaminophen (PERCOCET)  mg per tablet Take 1 tablet by mouth every 4 (four) hours as needed for Pain.    valsartan (DIOVAN) 80 MG tablet TAKE 1 TABLET(80 MG) BY MOUTH EVERY DAY (Patient taking differently: TAKE 1 TABLET(80 MG) BY MOUTH EVERY DAY  am)    betamethasone dipropionate (DIPROLENE) 0.05 % cream Apply topically 2 (two) times daily.    triamcinolone acetonide 0.1% (KENALOG) 0.1  % cream JOSE ELIAS EXT AA TID     Facility-Administered Medications Ordered in Other Encounters   Medication    [MAR Hold - Suspended Admission] acetaminophen tablet 650 mg    [MAR Hold - Suspended Admission] aspirin EC tablet 325 mg    [MAR Hold - Suspended Admission] calcium carbonate 200 mg calcium (500 mg) chewable tablet 500 mg    [MAR Hold - Suspended Admission] docusate sodium 1 enema    [MAR Hold - Suspended Admission] famotidine tablet 20 mg    [MAR Hold - Suspended Admission] hydroCHLOROthiazide tablet 50 mg    [MAR Hold - Suspended Admission] magnesium hydroxide 400 mg/5 ml suspension 2,400 mg    [MAR Hold - Suspended Admission] metoprolol tartrate (LOPRESSOR) split tablet 12.5 mg    [MAR Hold - Suspended Admission] ondansetron disintegrating tablet 4 mg    [MAR Hold - Suspended Admission] oxyCODONE immediate release tablet 10 mg    [MAR Hold - Suspended Admission] oxyCODONE immediate release tablet 5 mg    [MAR Hold - Suspended Admission] polyethylene glycol packet 17 g    [MAR Hold - Suspended Admission] pregabalin capsule 75 mg    [MAR Hold - Suspended Admission] ramelteon tablet 8 mg    [MAR Hold - Suspended Admission] senna-docusate 8.6-50 mg per tablet 1 tablet    [MAR Hold - Suspended Admission] valsartan tablet 80 mg     Family History     Problem Relation (Age of Onset)    Cataracts Mother, Sister, Maternal Aunt    Diabetes Mother, Father, Sister, Maternal Aunt    Glaucoma Mother, Brother, Maternal Aunt, Maternal Uncle    Hypertension Mother, Father    Stroke Sister        Social History Main Topics    Smoking status: Former Smoker     Packs/day: 0.10     Years: 3.00     Types: Cigarettes     Quit date: 4/26/1973    Smokeless tobacco: Never Used    Alcohol use 1.2 oz/week     2 Glasses of wine per week      Comment: social- glass on wine on occasions    Drug use: No    Sexual activity: Not on file     ROS   ROS: denies chest pain, shortness of breath, nausea, vomiting, numbness or  "tingling of extremities    Objective:     Vital Signs (Most Recent):  Temp: 98.1 °F (36.7 °C) (02/23/18 1723)  Pulse: 92 (02/23/18 1846)  Resp: 20 (02/23/18 1846)  BP: 126/67 (02/23/18 1846)  SpO2: (!) 91 % (02/23/18 1846) Vital Signs (24h Range):  Temp:  [98.1 °F (36.7 °C)] 98.1 °F (36.7 °C)  Pulse:  [86-94] 92  Resp:  [17-20] 20  SpO2:  [91 %-98 %] 91 %  BP: (112-135)/(60-67) 126/67     Weight: 120.6 kg (265 lb 14 oz)  Height: 5' 4.5" (163.8 cm)  Body mass index is 44.93 kg/m².    No intake or output data in the 24 hours ending 02/23/18 2048    Ortho/SPM Exam   Vitals: Afebrile.  Vital signs stable.  General: No acute distress.  HEENT: Normocephalic. Atraumatic. Sclera anicteric. No tracheal deviation.  Cardio: Regular rate and rhythm.  Chest: No increased work of breathing.  Abdominal: Nondistended.  Extremities: No cyanosis.  No clubbing.  No edema.  Palpable pulses.  Skin: No generalized rash.  Neuro: Awake. Alert. Oriented to person, place, time, and situation.  Psych: Normal appearance. Cooperative.  Appropriate mood.  Appropriate affect.      LLE:  5 cm wound dehiscence probing to soft tissue  Minimal pain with ROM of knee  Painless ROM of hip and ankle  EHL, FHL, GC, TA intact  Brisk capillary refill  SILT L2-S1    Significant Labs: All pertinent labs within the past 24 hours have been reviewed.    Significant Imaging: X-Ray: I have reviewed all pertinent results/findings and my personal findings are:  left knee: stable appearing hardware  "

## 2018-02-24 NOTE — HPI
Katelyn Huynh is a 68 yo female s/p left TKA on 2/9 with Dr. Salinas here presenting for wound issues. Patient was discharged to SNF and has been doing well from rehab. Since surgery has had mild drainage, but today, noted an opening of the wound. Denies fevers, chills, sweats, or increased knee pain. Denies fall or trauma to extremity.

## 2018-02-24 NOTE — ASSESSMENT & PLAN NOTE
-S/p ORIF  -NWB to ESDRAS but may use platform walker  -F/U with Dr. Cl Wong on discharge from SNF ([facility])

## 2018-02-24 NOTE — ASSESSMENT & PLAN NOTE
- today, been tachy  -EKG  -adequate renal function on todays labs  -started on low dose metoprolol 12.5mg bid  -continue to monitor a adjust as needed

## 2018-02-24 NOTE — ANESTHESIA PREPROCEDURE EVALUATION
Pre-operative evaluation for Procedure(s) (LRB):  INCISION AND DRAINAGE-KNEE - left - shady - set up like total knee (Left)    Katelyn Huynh is a 67 y.o. female presents with a chief complaint of wound dehiscence.  Patient had a left TKA on February 9 2018.  She has been in [facility] rehabilitation since discharge.  Staples were removed from her surgical incision 2 days prior.  Yesterday, she noticed some red leakage from the wound.  Today after PT, the therapist noticed a large amount of bloody drainage and recommended that the patient presents to the ED.  Patient denies any explicit trauma.  No anticoagulation, she does take aspirin 325 daily.  Patient denies any fevers or weakness. No hx of anesthesia complications. PMHx of obesity and HTN. No hx of anesthesia complications. Pt with left wrist injury.     LDA:   R AC 20 g PIV     Prev airway: none on record     Patient Active Problem List   Diagnosis    History of meningioma    Iritis - Both Eyes    Essential (primary) hypertension    Pseudophakia - Both Eyes    After-cataract, obscuring vision - Both Eyes    Ocular hypertension - Both Eyes    CME (cystoid macular edema) - Right Eye    Posterior tibial tendon dysfunction    Foot pain    Post-operative state - Both Eyes    Refractive error - Both Eyes    Steroid responders - Both Eyes    After-cataract, unspecified    Right knee DJD    Osteoarthritis of right knee    Knee joint replacement by other means    Left knee pain    Abnormality of gait    Bilateral posterior capsular opacification    Essential hypertension    Left posterior capsular opacification    Insufficiency of tear film of both eyes    Morbid obesity    Prediabetes    Varicose veins of both lower extremities    Edema    Primary osteoarthritis of left knee    Left wrist fracture s/p ORIF    S/P TKR (total knee replacement),  left    Morbid obesity with BMI of 50.0-59.9, adult    Chronic idiopathic constipation    Tachycardia    Hyperkalemia       Review of patient's allergies indicates:   Allergen Reactions    Cortisone Hives and Swelling     Swelling of the tongue        Current Facility-Administered Medications on File Prior to Encounter   Medication Dose Route Frequency Provider Last Rate Last Dose    [MAR Hold - Suspended Admission] acetaminophen tablet 650 mg  650 mg Oral Q6H PRN Jackelin Miller MD        [MAR Hold - Suspended Admission] aspirin EC tablet 325 mg  325 mg Oral BID Jackelin Miller MD   325 mg at 02/23/18 0957    [MAR Hold - Suspended Admission] calcium carbonate 200 mg calcium (500 mg) chewable tablet 500 mg  500 mg Oral BID PRN Jackelin Miller MD        [MAR Hold - Suspended Admission] docusate sodium 1 enema  1 enema Rectal Daily PRN Jackelin Miller MD        [MAR Hold - Suspended Admission] famotidine tablet 20 mg  20 mg Oral BID Jackelin Miller MD   20 mg at 02/23/18 0957    [MAR Hold - Suspended Admission] hydroCHLOROthiazide tablet 50 mg  50 mg Oral QAM Jackelin Miller MD   50 mg at 02/23/18 0637    [MAR Hold - Suspended Admission] magnesium hydroxide 400 mg/5 ml suspension 2,400 mg  30 mL Oral Daily PRN Jackelin Miller MD        [MAR Hold - Suspended Admission] metoprolol tartrate (LOPRESSOR) split tablet 12.5 mg  12.5 mg Oral BID Vandana Santoyo NP   12.5 mg at 02/23/18 0957    [MAR Hold - Suspended Admission] ondansetron disintegrating tablet 4 mg  4 mg Oral Q6H PRN Jackelin Miller MD   4 mg at 02/18/18 0903    [MAR Hold - Suspended Admission] oxyCODONE immediate release tablet 10 mg  10 mg Oral Q3H PRN Jackelin Miller MD   10 mg at 02/23/18 1435    [MAR Hold - Suspended Admission] oxyCODONE immediate release tablet 5 mg  5 mg Oral Q3H PRN Jackelin Miller MD   5 mg at 02/19/18 0058    [MAR Hold - Suspended Admission] polyethylene glycol packet 17 g  17 g Oral Daily Jackelin Miller MD   17 g  at 02/23/18 0957    [MAR Hold - Suspended Admission] pregabalin capsule 75 mg  75 mg Oral QHS Jackelin Miller MD   75 mg at 02/22/18 2027    [MAR Hold - Suspended Admission] ramelteon tablet 8 mg  8 mg Oral Nightly PRN Jackelin Miller MD   8 mg at 02/20/18 0004    [MAR Hold - Suspended Admission] senna-docusate 8.6-50 mg per tablet 1 tablet  1 tablet Oral BID Jackelin Miller MD   1 tablet at 02/23/18 0957    [MAR Hold - Suspended Admission] valsartan tablet 80 mg  80 mg Oral Daily Jackelin Miller MD   80 mg at 02/23/18 0957     Current Outpatient Prescriptions on File Prior to Encounter   Medication Sig Dispense Refill    ASCORBATE CALCIUM (VITAMIN C ORAL) Take by mouth once daily.       aspirin 325 MG tablet Take 1 tablet (325 mg total) by mouth 2 (two) times daily. 60 tablet 0    docusate sodium (COLACE) 100 MG capsule Take 1 capsule (100 mg total) by mouth 2 (two) times daily. 60 capsule 0    hydrochlorothiazide (HYDRODIURIL) 25 MG tablet Take 2 tablets (50 mg total) by mouth once daily. (Patient taking differently: Take 50 mg by mouth every morning. ) 180 tablet 3    ondansetron (ZOFRAN-ODT) 4 MG TbDL Take 1 tablet (4 mg total) by mouth every 6 (six) hours as needed. 30 tablet 0    oxyCODONE-acetaminophen (PERCOCET)  mg per tablet Take 1 tablet by mouth every 4 (four) hours as needed for Pain. 90 tablet 0    valsartan (DIOVAN) 80 MG tablet TAKE 1 TABLET(80 MG) BY MOUTH EVERY DAY (Patient taking differently: TAKE 1 TABLET(80 MG) BY MOUTH EVERY DAY  am) 90 tablet 0    betamethasone dipropionate (DIPROLENE) 0.05 % cream Apply topically 2 (two) times daily. 45 g 1    triamcinolone acetonide 0.1% (KENALOG) 0.1 % cream JOSE ELIAS EXT AA TID  2       Past Surgical History:   Procedure Laterality Date    Brain tumor surgery      had a brain tumor removed    CATARACT EXTRACTION      OU    CATARACT EXTRACTION BILATERAL W/ ANTERIOR VITRECTOMY      CHOLECYSTECTOMY      CRANIOTOMY      KNEE SURGERY  2-24-15     right TKR    YAG Right 1/20/2016    Dr. Kaiser       Social History     Social History    Marital status:      Spouse name: N/A    Number of children: N/A    Years of education: N/A     Occupational History    Not on file.     Social History Main Topics    Smoking status: Former Smoker     Packs/day: 0.10     Years: 3.00     Types: Cigarettes     Quit date: 4/26/1973    Smokeless tobacco: Never Used    Alcohol use 1.2 oz/week     2 Glasses of wine per week      Comment: social- glass on wine on occasions    Drug use: No    Sexual activity: Not on file     Other Topics Concern    Not on file     Social History Narrative    No narrative on file         Vital Signs Range (Last 24H):  Temp:  [36.7 °C (98.1 °F)-36.9 °C (98.4 °F)]   Pulse:  []   Resp:  [17-20]   BP: (112-135)/(59-67)   SpO2:  [91 %-98 %]       CBC:   Recent Labs      02/22/18   0438   WBC  8.47   RBC  3.75*   HGB  11.0*   HCT  34.2*   PLT  350   MCV  91   MCH  29.3   MCHC  32.2       CMP:   Recent Labs      02/22/18   0437   NA  142   K  4.7   CL  101   CO2  33*   BUN  12   CREATININE  0.8   GLU  77   MG  1.8   PHOS  3.1   CALCIUM  9.2       INR  No results for input(s): PT, INR, PROTIME, APTT in the last 72 hours.        Diagnostic Studies:      EKG:  Vent. Rate : 089 BPM     Atrial Rate : 089 BPM     P-R Int : 120 ms          QRS Dur : 082 ms      QT Int : 378 ms       P-R-T Axes : 066 024 042 degrees     QTc Int : 459 ms       Normal sinus rhythm  Nonspecific ST segment abnormality  Abnormal ECG  When compared with ECG of 26-JAN-2015 09:49,  Nonspecific ST abnormality is now present  Confirmed by ARACELI VIERA MD ([id number]) on 2/20/2018 12:52:15 PM    Referred By: MARY ALICE WALLACE           Confirmed By:ARACELI VIERA MD    2D Echo:  None on record         Anesthesia Evaluation    I have reviewed the Patient Summary Reports.     I have reviewed the Medications.     Review of Systems  Anesthesia Hx:  No problems with previous  Anesthesia Denies Hx of Anesthetic complications  History of prior surgery of interest to airway management or planning:  Denies Personal Hx of Anesthesia complications.   Hematology/Oncology:     Oncology Normal    -- Anemia:   EENT/Dental:EENT/Dental Normal   Cardiovascular:   Hypertension    Pulmonary:  Pulmonary Normal    Renal/:  Renal/ Normal     Hepatic/GI:  Hepatic/GI Normal    Musculoskeletal:   Arthritis     Neurological:  Neurology Normal    Endocrine:  Endocrine Normal    Dermatological:  Skin Normal    Psych:  Psychiatric Normal           Physical Exam  General:  Well nourished, Obesity    Airway/Jaw/Neck:  Airway Findings: Mouth Opening: Normal Tongue: Normal  General Airway Assessment: Adult  Mallampati: II  TM Distance: Normal, at least 6 cm  Jaw/Neck Findings:  Neck ROM: Normal ROM     Eyes/Ears/Nose:  EYES/EARS/NOSE FINDINGS: Normal   Dental:  Dental Findings: Multiple missing teeth         Mental Status:  Mental Status Findings:  Cooperative, Alert and Oriented         Anesthesia Plan  Type of Anesthesia, risks & benefits discussed:  Anesthesia Type:  general, MAC, regional  Patient's Preference:   Intra-op Monitoring Plan: standard ASA monitors  Intra-op Monitoring Plan Comments:   Post Op Pain Control Plan:   Post Op Pain Control Plan Comments:   Induction:   IV  Beta Blocker:  Patient is on a Beta-Blocker and has received one dose within the past 24 hours (No further documentation required).       Informed Consent: Patient understands risks and agrees with Anesthesia plan.  Questions answered. Anesthesia consent signed with patient.  ASA Score: 3     Day of Surgery Review of History & Physical:    H&P update referred to the surgeon.     Anesthesia Plan Notes: Consent left with RN in ED         Ready For Surgery From Anesthesia Perspective.

## 2018-02-24 NOTE — TRANSFER OF CARE
"Anesthesia Transfer of Care Note    Patient: Katelyn Huynh    Procedure(s) Performed: Procedure(s) (LRB):  INCISION AND DRAINAGE-KNEE - left - shady - set up like total knee (Left)  REVISION-ARTHROPLASTY-KNEE (Left)  REPAIR-RUPTURE-QUADRICEP (Left)    Patient location: PACU    Anesthesia Type: general    Transport from OR: Transported from OR on 6-10 L/min O2 by face mask with adequate spontaneous ventilation    Post pain: adequate analgesia    Post assessment: no apparent anesthetic complications and tolerated procedure well    Post vital signs: stable    Level of consciousness: awake and alert    Nausea/Vomiting: no nausea/vomiting    Complications: none    Transfer of care protocol was followed      Last vitals:   Visit Vitals  /72 (BP Location: Right arm, Patient Position: Lying)   Pulse 94   Temp 36.5 °C (97.7 °F) (Temporal)   Resp 15   Ht 5' 4.5" (1.638 m)   Wt 120.6 kg (265 lb 14 oz)   SpO2 100%   Breastfeeding? No   BMI 44.93 kg/m²     "

## 2018-02-24 NOTE — PROGRESS NOTES
[facility]  Orthopedics  Progress Note    Patient Name: Katelyn Huynh  MRN: [id number]  Admission Date: 2/23/2018  Hospital Length of Stay: 0 days  Attending Provider: No att. providers found  Primary Care Provider: Daniel Garcia MD  Follow-up For: Procedure(s) (LRB):  INCISION AND DRAINAGE-KNEE - left - shady - set up like total knee (Left)    Post-Operative Day: Day of Surgery  Subjective:     Principal Problem:Wound dehiscence    Principal Orthopedic Problem: same    Interval History: Patient seen and examined at bedside.  No acute events overnight.  Pain controlled.      Review of patient's allergies indicates:   Allergen Reactions    Cortisone Hives and Swelling     Swelling of the tongue       Current Facility-Administered Medications   Medication    0.9%  NaCl infusion    aspirin tablet 325 mg    ceFAZolin injection 2 g    docusate sodium capsule 100 mg    [START ON 2/25/2018] hydroCHLOROthiazide tablet 50 mg    ondansetron disintegrating tablet 4 mg    [START ON 2/25/2018] valsartan tablet 80 mg     Facility-Administered Medications Ordered in Other Encounters   Medication    [MAR Hold - Suspended Admission] acetaminophen tablet 650 mg    [MAR Hold - Suspended Admission] aspirin EC tablet 325 mg    [MAR Hold - Suspended Admission] calcium carbonate 200 mg calcium (500 mg) chewable tablet 500 mg    [MAR Hold - Suspended Admission] docusate sodium 1 enema    [MAR Hold - Suspended Admission] famotidine tablet 20 mg    [MAR Hold - Suspended Admission] hydroCHLOROthiazide tablet 50 mg    [MAR Hold - Suspended Admission] magnesium hydroxide 400 mg/5 ml suspension 2,400 mg    [MAR Hold - Suspended Admission] metoprolol tartrate (LOPRESSOR) split tablet 12.5 mg    [MAR Hold - Suspended Admission] ondansetron disintegrating tablet 4 mg    [MAR Hold - Suspended Admission] oxyCODONE immediate release tablet 10 mg    [MAR Hold - Suspended Admission] oxyCODONE  "immediate release tablet 5 mg    [MAR Hold - Suspended Admission] polyethylene glycol packet 17 g    [MAR Hold - Suspended Admission] pregabalin capsule 75 mg    [MAR Hold - Suspended Admission] ramelteon tablet 8 mg    [MAR Hold - Suspended Admission] senna-docusate 8.6-50 mg per tablet 1 tablet    [MAR Hold - Suspended Admission] valsartan tablet 80 mg     Objective:     Vital Signs (Most Recent):  Temp: 98.9 °F (37.2 °C) (02/24/18 0555)  Pulse: 91 (02/24/18 0555)  Resp: 16 (02/24/18 0555)  BP: 133/61 (02/24/18 0555)  SpO2: (!) 94 % (02/24/18 0555) Vital Signs (24h Range):  Temp:  [98.1 °F (36.7 °C)-98.9 °F (37.2 °C)] 98.9 °F (37.2 °C)  Pulse:  [86-98] 91  Resp:  [16-20] 16  SpO2:  [91 %-98 %] 94 %  BP: (112-146)/(60-72) 133/61     Weight: 120.6 kg (265 lb 14 oz)  Height: 5' 4.5" (163.8 cm)  Body mass index is 44.93 kg/m².      Intake/Output Summary (Last 24 hours) at 02/24/18 0723  Last data filed at 02/24/18 0500   Gross per 24 hour   Intake              870 ml   Output                0 ml   Net              870 ml       Ortho/SPM Exam   AAOx4  NAD  RRR  No increased WOB    LLE:  Dressings with some drainage  SILT and motor intact T/SP/DP  WWP extremities        Significant Labs: All pertinent labs within the past 24 hours have been reviewed.    Significant Imaging: None    Assessment/Plan:     * Wound dehiscence    Katelyn Huynh is a 66 yo female s/p left TKA with wound dehiscence    - To OR today for I&D and possible poly exchange of left knee  - Will continue ancef postop  - NPO               Tom Coello MD  Orthopedics  [facility]  "

## 2018-02-24 NOTE — PLAN OF CARE
Problem: Patient Care Overview  Goal: Plan of Care Review  Outcome: Ongoing (interventions implemented as appropriate)  Patient progressing towards goals. Neurovascularly  Intact. Bed in low position and call bell in reach.

## 2018-02-24 NOTE — ED PROVIDER NOTES
Encounter Date: 2/23/2018       History     Chief Complaint   Patient presents with    Wound Dehiscence     left knee replacement 2/9; stitches removed two days ago, wound started opening      68 yo W presents with a chief complaint of wound dehiscence.  Patient had a left TKA on February 9.  She has been in [facility] since discharge.  Staples were removed from her surgical incision 2 days prior.  Yesterday she noticed some red leakage from the wound.  Today after PT, the therapist noticed a large amount of bloody drainage and recommended that the patient presents to the ED.  Patient denies any explicit trauma.  No anticoagulation, she does take aspirin 325 daily.  Patient denies any fevers or weakness.  Pain is currently 3-4 out of 10 which is her baseline.  No weakness or paresthesias.          Review of patient's allergies indicates:   Allergen Reactions    Cortisone Hives and Swelling     Swelling of the tongue     Past Medical History:   Diagnosis Date    Arthritis     Cataract     Glaucoma     History of iritis     OD    Hypertension     Meningioma     Uveitis      Past Surgical History:   Procedure Laterality Date    Brain tumor surgery      had a brain tumor removed    CATARACT EXTRACTION      OU    CATARACT EXTRACTION BILATERAL W/ ANTERIOR VITRECTOMY      CHOLECYSTECTOMY      CRANIOTOMY      KNEE SURGERY  2-24-15    right TKR    YAG Right 1/20/2016    Dr. Kaiser     Family History   Problem Relation Age of Onset    Glaucoma Mother     Hypertension Mother     Diabetes Mother     Cataracts Mother     Hypertension Father     Diabetes Father     Diabetes Sister     Cataracts Sister     Stroke Sister      minor stroke    Glaucoma Brother     Glaucoma Maternal Aunt     Diabetes Maternal Aunt     Cataracts Maternal Aunt     Glaucoma Maternal Uncle     Amblyopia Neg Hx     Blindness Neg Hx     Macular degeneration Neg Hx     Retinal detachment Neg Hx      Strabismus Neg Hx      Social History   Substance Use Topics    Smoking status: Former Smoker     Packs/day: 0.10     Years: 3.00     Types: Cigarettes     Quit date: 4/26/1973    Smokeless tobacco: Never Used    Alcohol use 1.2 oz/week     2 Glasses of wine per week      Comment: social- glass on wine on occasions     Review of Systems   Constitutional: Negative for fever.   HENT: Negative for sore throat.    Respiratory: Negative for shortness of breath.    Cardiovascular: Negative for chest pain.   Gastrointestinal: Negative for nausea.   Genitourinary: Negative for dysuria.   Musculoskeletal: Positive for arthralgias. Negative for back pain.   Skin: Positive for wound. Negative for rash.   Neurological: Negative for weakness.   Hematological: Does not bruise/bleed easily.       Physical Exam     Initial Vitals [02/23/18 1723]   BP Pulse Resp Temp SpO2   135/61 86 18 98.1 °F (36.7 °C) 98 %      MAP       85.67         Physical Exam    Nursing note and vitals reviewed.  Constitutional: She appears well-developed and well-nourished. She is not diaphoretic. No distress.   HENT:   Head: Normocephalic and atraumatic.   Eyes: EOM are normal. Pupils are equal, round, and reactive to light. Right eye exhibits no discharge. Left eye exhibits no discharge. No scleral icterus.   Neck: Normal range of motion. Neck supple. No JVD present.   Cardiovascular: Normal rate, regular rhythm, normal heart sounds and intact distal pulses. Exam reveals no gallop and no friction rub.    No murmur heard.  Pulmonary/Chest: Breath sounds normal. No respiratory distress. She has no wheezes. She has no rhonchi. She has no rales. She exhibits no tenderness.   Abdominal: Soft. Bowel sounds are normal. She exhibits no distension and no mass. There is no tenderness. There is no rebound and no guarding.   Musculoskeletal: She exhibits edema and tenderness.        Left knee: She exhibits swelling, effusion and laceration.        Legs:  Left  knee surgical wound with 6cm area in midline that has dehisced. Scant blood drainage. No surrounding erythema, warmth, or tenderness    Left upper extremity in splint    Lymphadenopathy:     She has no cervical adenopathy.   Neurological: She is alert and oriented to person, place, and time. She has normal strength. No sensory deficit.   Skin: Skin is warm and dry. Capillary refill takes less than 2 seconds.   Psychiatric: She has a normal mood and affect.         ED Course   Procedures  Labs Reviewed   CBC W/ AUTO DIFFERENTIAL   COMPREHENSIVE METABOLIC PANEL   MAGNESIUM   PHOSPHORUS   PROTIME-INR   URINALYSIS   TYPE & SCREEN     EKG Readings: (Independently Interpreted)   Rhythm: Normal Sinus Rhythm. Heart Rate: 89. ST Segments: Normal ST Segments. T Waves: Normal. Axis: Normal.          Medical Decision Making:   History:   I obtained history from: someone other than patient.  Old Medical Records: I decided to obtain old medical records.  Initial Assessment:   68 yo W presents with surgical wound dehiscence. My differential includes, but is not limited to: Wound dehiscence, postop infection, hemorrhage.  Orthopedics consulted and will admit for surgical closure.                     Clinical Impression:   The primary encounter diagnosis was Wound dehiscence. A diagnosis of Preop cardiovascular exam was also pertinent to this visit.                           Rogelio Winslow MD  02/23/18 1904       Rogelio Winslow MD  02/23/18 1900

## 2018-02-24 NOTE — NURSING TRANSFER
Nursing Transfer Note      2/24/2018     Transfer To: 540a    Transfer via bed    Transfer with none    Transported by pct    Medicines sent: none    Chart send with patient: Yes    Notified: spouse    Patient reassessed at: 2/24/18

## 2018-02-24 NOTE — DISCHARGE SUMMARY
[facility]  Department of Hospital Medicine  Discharge Summary      Patient Name: Katelyn Huynh  MRN: [id number]  Admission Date: 2/16/2018  Hospital Length of Stay: 7 days  Discharge Date and Time: 2/23/18  Attending Physician: Daisy Walker MD   Discharging Provider: Vandana Santoyo NP  Primary Care Provider: Daniel Garcia MD    Chief Complaint/Reason for Admission: S/P TKR (total knee replacement), left and left radius fracture    History of Present Illness:  Patient is a 67 y.o. female with HTN, morbid obesity who presents to SNF after a fall with left distal radius fracture requiring ORIF with bone allograft on 2/14/2018 by Dr. Wong at [facility].  The patient recently had a left TKR to treat osteoarthritis on 2/9 by Dr. Salinas and was discharged to home with home health on 2/10.  She states she was doing well at home after TKR until the fall on 2/13.  She landed on her buttocks and denies trauma to her surgical knee.  X-ray of left knee on 2/16 at [facility] showed TKA with components well seated and no acute fractures of foreign bodies.  Patient complains of 7-8/10 pain to her left knee and left wrist.  The pain is controlled with oxycodone.  The patient's Aquacel dressing was saturated and had begun to leak beneath the hydrocolloid on arrival to SNF.  The patient states this occurred in the hospital.  She has not had a BM in > 1 week. The patient has been admitted to SNF for ongoing PT/OT due to insufficient progress to go home safely from the hospital.    Hospital Course:   The patient was admitted at [facility] from 2/13 to 2/16/2018.  2/16: Patient admitted to SNF for ongoing Pt/Ot following a hospitalization for left distal radius fracture s/p ORIF and recent left TKR.  2/19: Patient seen at bedside doing well, no complaints, labs reviewed.  2/21/18 Pt seen at bedside this morning. Staples removed without difficulty. Incision is well healed. Cast intact to left  wrist. Pt will continue to f/u with Dr. Pablo for her wrist. (orthopedic NP)  2/21: Patient seen at bedside doing well, K+ improved  2/23: Patient with continued oozing to left knee incision, dressing removed, incision well approximated with pin point oozing to mid incision from old staple site. Instructed to place pressure dressing. Notified MICHAEL Anderson NP orthopedics, place pressure dressing keep knee immobilized. At approx 1615 called to room to assess increase drainage to left knee incision after therapy, once dressing was removed mid incisional opening was noted, ABD pads and ace wraps were applied, instructed to send patient to ED. Patient and family at bedside was notified. Report given to Dr. Dowell at [facility] ED.     Significant Diagnostic Studies:     Recent Labs  Lab 02/19/18 0416 02/22/18  0438   WBC 8.43 8.47   HGB 11.0* 11.0*   HCT 33.8* 34.2*    350       Recent Labs  Lab 02/19/18 0416 02/20/18  0521 02/22/18  0437     --  142   K 5.5* 4.5 4.7     --  101   CO2 31*  --  33*   BUN 12  --  12   CREATININE 0.8  --  0.8   CALCIUM 9.0  --  9.2   PROT  --   --   --    BILITOT  --   --   --    ALKPHOS  --   --   --    ALT  --   --   --    AST  --   --   --      Lab Results   Component Value Date    LABPROT 10.5 02/23/2018    ALBUMIN 3.1 (L) 02/23/2018     Lab Results   Component Value Date    CALCIUM 9.6 02/23/2018    PHOS 3.4 02/23/2018       Final Active Diagnoses:    Diagnosis Date Noted POA    PRINCIPAL PROBLEM:  Primary osteoarthritis of left knee [M17.12] 02/09/2018 Yes    S/P TKR (total knee replacement), left [Z96.652] 02/17/2018 Not Applicable    Tachycardia [R00.0] 02/19/2018 Unknown    Hyperkalemia [E87.5] 02/19/2018 Unknown    Morbid obesity with BMI of 50.0-59.9, adult [E66.01, Z68.43] 02/17/2018 Not Applicable    Chronic idiopathic constipation [K59.04] 02/17/2018 Yes    Left wrist fracture s/p ORIF [S62.102A] 02/16/2018 Yes    Essential (primary) hypertension [I10]  05/20/2013 Yes      Problems Resolved During this Admission:    Diagnosis Date Noted Date Resolved POA      * Primary osteoarthritis of left knee    -S/p TKR on 2/9  -See plan below.        S/P TKR (total knee replacement), left    -continue PT/OT to increase ambulation, ADL performance and endurance  -WBAT to LLE  -continue oxycodone for pain control prn  -keep polar ice in place when not in therapy (patient to have brought from home)  -continue ASA for DVT prophylaxis; continue famotidine while taking high dose ASA  -Ortho NP to assess surgical wound; Aquacel removed when admitted; island dressing change daily with monitoring of wound  -Ortho NP to assess patient while on SNF weekly  -continue lyrica after knee replacement for pain relief and ROM; discontinue on discharge from SNF or earlier for any adverse effects        Hyperkalemia    -K+ 5.5 2/19 now 4.7  -EKG  -given kayexalate  -repeat BMP in AM        Tachycardia    - today, been tachy  -EKG  -adequate renal function on todays labs  -started on low dose metoprolol 12.5mg bid  -continue to monitor a adjust as needed        Chronic idiopathic constipation    -Occurs at home and worsened with narcotic usage and immobility  -Continue senokot-s and miralax   -Milk of magnesia is effective for the patient at home so ordered prn if scheduled laxatives ineffective        Morbid obesity with BMI of 50.0-59.9, adult    -Portion control while at SNF with modeling at home  -Referral to weight loss clinic if patient interested        Left wrist fracture s/p ORIF    -S/p ORIF  -NWB to LUE but may use platform walker  -F/U with Dr. Cl Wong on discharge from SNF ([facility])        Essential (primary) hypertension    -Chronic and mildly elevated  -Continue therapy with valsartan, HCTZ  -Low Na diet  -will continue to monitor and adjust regimen as necessary            Discharged Condition: stable    Disposition: send to ED and patient was admitted    Follow  Up:  Follow-up Information     Cl Wong Jr, MD. Go on 3/2/2018.    Specialty:  Orthopedic Surgery  Why:  at 10:40am  Contact information:  [address]  [address]  [facility] [id number] [phone]               Medications:  Transfer Medications (for Discharge Readmit only):   Current Facility-Administered Medications   Medication Dose Route Frequency Provider Last Rate Last Dose    [MAR Hold - Suspended Admission] acetaminophen tablet 650 mg  650 mg Oral Q6H PRN Jackelin Miller MD        [MAR Hold - Suspended Admission] aspirin EC tablet 325 mg  325 mg Oral BID Jackelin Miller MD   325 mg at 02/23/18 0957    [MAR Hold - Suspended Admission] calcium carbonate 200 mg calcium (500 mg) chewable tablet 500 mg  500 mg Oral BID PRN Jackelin Miller MD        [MAR Hold - Suspended Admission] docusate sodium 1 enema  1 enema Rectal Daily PRN Jackelin Miller MD        [MAR Hold - Suspended Admission] famotidine tablet 20 mg  20 mg Oral BID Jackelin Miller MD   20 mg at 02/23/18 0957    [MAR Hold - Suspended Admission] hydroCHLOROthiazide tablet 50 mg  50 mg Oral QAM Jackelin Miller MD   50 mg at 02/23/18 0637    [MAR Hold - Suspended Admission] magnesium hydroxide 400 mg/5 ml suspension 2,400 mg  30 mL Oral Daily PRN Jackelin Miller MD        [MAR Hold - Suspended Admission] metoprolol tartrate (LOPRESSOR) split tablet 12.5 mg  12.5 mg Oral BID Vandana Santoyo NP   12.5 mg at 02/23/18 0957    [MAR Hold - Suspended Admission] ondansetron disintegrating tablet 4 mg  4 mg Oral Q6H PRN Jackelin Miller MD   4 mg at 02/18/18 0903    [MAR Hold - Suspended Admission] oxyCODONE immediate release tablet 10 mg  10 mg Oral Q3H PRN Jackelin Miller MD   10 mg at 02/23/18 1435    [MAR Hold - Suspended Admission] oxyCODONE immediate release tablet 5 mg  5 mg Oral Q3H PRN Jackelin Miller MD   5 mg at 02/19/18 0058    [MAR Hold - Suspended Admission] polyethylene glycol packet 17 g  17 g Oral Daily Jackelin Miller MD   17  g at 02/23/18 0957    [MAR Hold - Suspended Admission] pregabalin capsule 75 mg  75 mg Oral QHS Jackelin Miller MD   75 mg at 02/22/18 2027    [MAR Hold - Suspended Admission] ramelteon tablet 8 mg  8 mg Oral Nightly PRN Jackelin Miller MD   8 mg at 02/20/18 0004    [MAR Hold - Suspended Admission] senna-docusate 8.6-50 mg per tablet 1 tablet  1 tablet Oral BID Jackelin Miller MD   1 tablet at 02/23/18 0957    [MAR Hold - Suspended Admission] valsartan tablet 80 mg  80 mg Oral Daily Jackelin Miller MD   80 mg at 02/23/18 0957     Current Outpatient Prescriptions   Medication Sig Dispense Refill    ASCORBATE CALCIUM (VITAMIN C ORAL) Take by mouth once daily.       hydrochlorothiazide (HYDRODIURIL) 25 MG tablet Take 2 tablets (50 mg total) by mouth once daily. (Patient taking differently: Take 50 mg by mouth every morning. ) 180 tablet 3    valsartan (DIOVAN) 80 MG tablet TAKE 1 TABLET(80 MG) BY MOUTH EVERY DAY (Patient taking differently: TAKE 1 TABLET(80 MG) BY MOUTH EVERY DAY  am) 90 tablet 0    aspirin 325 MG tablet Take 1 tablet (325 mg total) by mouth 2 (two) times daily. 60 tablet 0    betamethasone dipropionate (DIPROLENE) 0.05 % cream Apply topically 2 (two) times daily. 45 g 1    docusate sodium (COLACE) 100 MG capsule Take 1 capsule (100 mg total) by mouth 2 (two) times daily. 60 capsule 0    ondansetron (ZOFRAN-ODT) 4 MG TbDL Take 1 tablet (4 mg total) by mouth every 6 (six) hours as needed. 30 tablet 0    oxyCODONE-acetaminophen (PERCOCET)  mg per tablet Take 1 tablet by mouth every 4 (four) hours as needed for Pain. 90 tablet 0    triamcinolone acetonide 0.1% (KENALOG) 0.1 % cream JOSE ELIAS EXT AA TID  2     Facility-Administered Medications Ordered in Other Encounters   Medication Dose Route Frequency Provider Last Rate Last Dose    0.9%  NaCl infusion   Intravenous Continuous Tom Bo MD        [START ON 2/24/2018] aspirin tablet 325 mg  325 mg Oral BID Tom Maldonado  MD Betzy        ceFAZolin injection 2 g  2 g Intravenous Q6H Tom Bo MD        docusate sodium capsule 100 mg  100 mg Oral BID Tom Bo MD        [START ON 2/25/2018] hydroCHLOROthiazide tablet 50 mg  50 mg Oral Daily Tom Bo MD        ondansetron disintegrating tablet 4 mg  4 mg Oral Q6H PRN Tom Bo MD        [START ON 2/25/2018] valsartan tablet 80 mg  80 mg Oral Daily Tom Bo MD         Time spent on the discharge of patient: 15 minutes    Vandana Santoyo NP  Department of Hospital Medicine  [facility]

## 2018-02-24 NOTE — ASSESSMENT & PLAN NOTE
-Occurs at home and worsened with narcotic usage and immobility  -Continue senokot-s and miralax   -Milk of magnesia is effective for the patient at home so ordered prn if scheduled laxatives ineffective

## 2018-02-24 NOTE — CONSULTS
[facility]  Infectious Disease  Consult Note    Patient Name: Katelyn Huynh  MRN: [id number]  Admission Date: 2/23/2018  Hospital Length of Stay: 0 days  Attending Physician: DONG Martinez MD  Primary Care Provider: Daniel Garcia MD     Isolation Status: No active isolations      Inpatient consult to Infectious Diseases  Consult performed by: JOE OSEI  Consult ordered by: MOODY BRAN  Reason for consult: s/p TKA, wound dehiscence; s/p I&D 2/24        ID Consult acknowledged.  Patient in OR and not seen today.  Chart reviewed.  Patient is 67 year old woman s/p left TKA on 2/9 who has been at SNF since surgery. Sutures removed 2/21 and wound dehiscence noted yesterday.  She was admitted yesterday for I&D and possible poly exchange today.  On IV vancomycin and ceftriaxone.  Surgical cultures pending. Afebrile. No leukocytosis.     Continue empiric vancomycin and ceftriaxone pending culture results.  Full consult and recommendations to follow tomorrow.      For any questions or concerns in the interim, please page ID staff on call for our service, Dr. Dar Durant    Thank you.   KAVON Gayle, ANP-C  [phone] pager,  [phone]

## 2018-02-24 NOTE — ASSESSMENT & PLAN NOTE
-Chronic and mildly elevated  -Continue therapy with valsartan, HCTZ  -Low Na diet  -will continue to monitor and adjust regimen as necessary

## 2018-02-24 NOTE — SUBJECTIVE & OBJECTIVE
Principal Problem:Wound dehiscence    Principal Orthopedic Problem: same    Interval History: Patient seen and examined at bedside.  No acute events overnight.  Pain controlled.      Review of patient's allergies indicates:   Allergen Reactions    Cortisone Hives and Swelling     Swelling of the tongue       Current Facility-Administered Medications   Medication    0.9%  NaCl infusion    aspirin tablet 325 mg    ceFAZolin injection 2 g    docusate sodium capsule 100 mg    [START ON 2/25/2018] hydroCHLOROthiazide tablet 50 mg    ondansetron disintegrating tablet 4 mg    [START ON 2/25/2018] valsartan tablet 80 mg     Facility-Administered Medications Ordered in Other Encounters   Medication    [MAR Hold - Suspended Admission] acetaminophen tablet 650 mg    [MAR Hold - Suspended Admission] aspirin EC tablet 325 mg    [MAR Hold - Suspended Admission] calcium carbonate 200 mg calcium (500 mg) chewable tablet 500 mg    [MAR Hold - Suspended Admission] docusate sodium 1 enema    [MAR Hold - Suspended Admission] famotidine tablet 20 mg    [MAR Hold - Suspended Admission] hydroCHLOROthiazide tablet 50 mg    [MAR Hold - Suspended Admission] magnesium hydroxide 400 mg/5 ml suspension 2,400 mg    [MAR Hold - Suspended Admission] metoprolol tartrate (LOPRESSOR) split tablet 12.5 mg    [MAR Hold - Suspended Admission] ondansetron disintegrating tablet 4 mg    [MAR Hold - Suspended Admission] oxyCODONE immediate release tablet 10 mg    [MAR Hold - Suspended Admission] oxyCODONE immediate release tablet 5 mg    [MAR Hold - Suspended Admission] polyethylene glycol packet 17 g    [MAR Hold - Suspended Admission] pregabalin capsule 75 mg    [MAR Hold - Suspended Admission] ramelteon tablet 8 mg    [MAR Hold - Suspended Admission] senna-docusate 8.6-50 mg per tablet 1 tablet    [MAR Hold - Suspended Admission] valsartan tablet 80 mg     Objective:     Vital Signs (Most Recent):  Temp: 98.9 °F (37.2 °C)  "(02/24/18 0555)  Pulse: 91 (02/24/18 0555)  Resp: 16 (02/24/18 0555)  BP: 133/61 (02/24/18 0555)  SpO2: (!) 94 % (02/24/18 0555) Vital Signs (24h Range):  Temp:  [98.1 °F (36.7 °C)-98.9 °F (37.2 °C)] 98.9 °F (37.2 °C)  Pulse:  [86-98] 91  Resp:  [16-20] 16  SpO2:  [91 %-98 %] 94 %  BP: (112-146)/(60-72) 133/61     Weight: 120.6 kg (265 lb 14 oz)  Height: 5' 4.5" (163.8 cm)  Body mass index is 44.93 kg/m².      Intake/Output Summary (Last 24 hours) at 02/24/18 0723  Last data filed at 02/24/18 0500   Gross per 24 hour   Intake              870 ml   Output                0 ml   Net              870 ml       Ortho/SPM Exam   AAOx4  NAD  RRR  No increased WOB    LLE:  Dressings with some drainage  SILT and motor intact T/SP/DP  WWP extremities        Significant Labs: All pertinent labs within the past 24 hours have been reviewed.    Significant Imaging: None  "

## 2018-02-24 NOTE — ED PROVIDER NOTES
Encounter Date: 2/23/2018    SCRIBE #1 NOTE: I, Caitlin Avina, am scribing for, and in the presence of,  Dr. Winslow. I have scribed the entire note.       History     Chief Complaint   Patient presents with    Wound Dehiscence     left knee replacement 2/9; stitches removed two days ago, wound started opening      HPI  Review of patient's allergies indicates:   Allergen Reactions    Cortisone Hives and Swelling     Swelling of the tongue     Past Medical History:   Diagnosis Date    Arthritis     Cataract     Glaucoma     History of iritis     OD    Hypertension     Meningioma     Uveitis      Past Surgical History:   Procedure Laterality Date    Brain tumor surgery      had a brain tumor removed    CATARACT EXTRACTION      OU    CATARACT EXTRACTION BILATERAL W/ ANTERIOR VITRECTOMY      CHOLECYSTECTOMY      CRANIOTOMY      KNEE SURGERY  2-24-15    right TKR    YAG Right 1/20/2016    Dr. Kaiser     Family History   Problem Relation Age of Onset    Glaucoma Mother     Hypertension Mother     Diabetes Mother     Cataracts Mother     Hypertension Father     Diabetes Father     Diabetes Sister     Cataracts Sister     Stroke Sister      minor stroke    Glaucoma Brother     Glaucoma Maternal Aunt     Diabetes Maternal Aunt     Cataracts Maternal Aunt     Glaucoma Maternal Uncle     Amblyopia Neg Hx     Blindness Neg Hx     Macular degeneration Neg Hx     Retinal detachment Neg Hx     Strabismus Neg Hx      Social History   Substance Use Topics    Smoking status: Former Smoker     Packs/day: 0.10     Years: 3.00     Types: Cigarettes     Quit date: 4/26/1973    Smokeless tobacco: Never Used    Alcohol use 1.2 oz/week     2 Glasses of wine per week      Comment: social- glass on wine on occasions     Review of Systems    Physical Exam     Initial Vitals [02/23/18 1723]   BP Pulse Resp Temp SpO2   135/61 86 18 98.1 °F (36.7 °C) 98 %      MAP       85.67         Physical  Exam    ED Course   Procedures  Labs Reviewed - No data to display                       [unfilled]        Clinical Impression:   The encounter diagnosis was Wound dehiscence.

## 2018-02-24 NOTE — PROGRESS NOTES
MD ordered sed rate, according to lab, it could not be drawn due to not enough blood, so has to be reordered

## 2018-02-24 NOTE — PT/OT/SLP PROGRESS
Occupational Therapy      Patient Name:  Katelyn Huynh   MRN:  [id number]    Patient not seen today secondary to Unavailable (Comment) (in OR). Will follow-up tomorrow.    KAROLINE Hsasan  2/24/2018

## 2018-02-25 LAB
ALBUMIN SERPL BCP-MCNC: 2.5 G/DL
ALP SERPL-CCNC: 92 U/L
ALT SERPL W/O P-5'-P-CCNC: 14 U/L
ANION GAP SERPL CALC-SCNC: 8 MMOL/L
AST SERPL-CCNC: 25 U/L
BASOPHILS # BLD AUTO: 0.05 K/UL
BASOPHILS NFR BLD: 0.5 %
BILIRUB SERPL-MCNC: 0.8 MG/DL
BUN SERPL-MCNC: 8 MG/DL
CALCIUM SERPL-MCNC: 8 MG/DL
CHLORIDE SERPL-SCNC: 103 MMOL/L
CO2 SERPL-SCNC: 27 MMOL/L
CREAT SERPL-MCNC: 0.7 MG/DL
DIFFERENTIAL METHOD: ABNORMAL
EOSINOPHIL # BLD AUTO: 0.1 K/UL
EOSINOPHIL NFR BLD: 0.9 %
ERYTHROCYTE [DISTWIDTH] IN BLOOD BY AUTOMATED COUNT: 14.2 %
EST. GFR  (AFRICAN AMERICAN): >60 ML/MIN/1.73 M^2
EST. GFR  (NON AFRICAN AMERICAN): >60 ML/MIN/1.73 M^2
GLUCOSE SERPL-MCNC: 125 MG/DL
HCT VFR BLD AUTO: 33.5 %
HGB BLD-MCNC: 10.8 G/DL
IMM GRANULOCYTES # BLD AUTO: 0.04 K/UL
IMM GRANULOCYTES NFR BLD AUTO: 0.4 %
LYMPHOCYTES # BLD AUTO: 1.1 K/UL
LYMPHOCYTES NFR BLD: 10.7 %
MAGNESIUM SERPL-MCNC: 1.8 MG/DL
MCH RBC QN AUTO: 28.7 PG
MCHC RBC AUTO-ENTMCNC: 32.2 G/DL
MCV RBC AUTO: 89 FL
MONOCYTES # BLD AUTO: 0.9 K/UL
MONOCYTES NFR BLD: 9.1 %
NEUTROPHILS # BLD AUTO: 8.1 K/UL
NEUTROPHILS NFR BLD: 78.4 %
NRBC BLD-RTO: 0 /100 WBC
PHOSPHATE SERPL-MCNC: 3.3 MG/DL
PLATELET # BLD AUTO: 396 K/UL
PMV BLD AUTO: 9.3 FL
POTASSIUM SERPL-SCNC: 4.2 MMOL/L
PROT SERPL-MCNC: 6.8 G/DL
RBC # BLD AUTO: 3.76 M/UL
SODIUM SERPL-SCNC: 138 MMOL/L
WBC # BLD AUTO: 10.33 K/UL

## 2018-02-25 PROCEDURE — 97165 OT EVAL LOW COMPLEX 30 MIN: CPT

## 2018-02-25 PROCEDURE — 97535 SELF CARE MNGMENT TRAINING: CPT

## 2018-02-25 PROCEDURE — 36415 COLL VENOUS BLD VENIPUNCTURE: CPT

## 2018-02-25 PROCEDURE — 25000003 PHARM REV CODE 250: Performed by: STUDENT IN AN ORGANIZED HEALTH CARE EDUCATION/TRAINING PROGRAM

## 2018-02-25 PROCEDURE — 63600175 PHARM REV CODE 636 W HCPCS: Performed by: NURSE PRACTITIONER

## 2018-02-25 PROCEDURE — 25000003 PHARM REV CODE 250: Performed by: NURSE PRACTITIONER

## 2018-02-25 PROCEDURE — 85025 COMPLETE CBC W/AUTO DIFF WBC: CPT

## 2018-02-25 PROCEDURE — 97530 THERAPEUTIC ACTIVITIES: CPT

## 2018-02-25 PROCEDURE — 63600175 PHARM REV CODE 636 W HCPCS: Performed by: STUDENT IN AN ORGANIZED HEALTH CARE EDUCATION/TRAINING PROGRAM

## 2018-02-25 PROCEDURE — 80053 COMPREHEN METABOLIC PANEL: CPT

## 2018-02-25 PROCEDURE — 83735 ASSAY OF MAGNESIUM: CPT

## 2018-02-25 PROCEDURE — 97161 PT EVAL LOW COMPLEX 20 MIN: CPT

## 2018-02-25 PROCEDURE — 84100 ASSAY OF PHOSPHORUS: CPT

## 2018-02-25 RX ADMIN — OXYCODONE HYDROCHLORIDE AND ACETAMINOPHEN 1 TABLET: 10; 325 TABLET ORAL at 03:02

## 2018-02-25 RX ADMIN — DOCUSATE SODIUM 100 MG: 100 CAPSULE, LIQUID FILLED ORAL at 09:02

## 2018-02-25 RX ADMIN — ASPIRIN 325 MG: 325 TABLET, DELAYED RELEASE ORAL at 08:02

## 2018-02-25 RX ADMIN — OXYCODONE HYDROCHLORIDE AND ACETAMINOPHEN 1 TABLET: 10; 325 TABLET ORAL at 09:02

## 2018-02-25 RX ADMIN — DOCUSATE SODIUM 100 MG: 100 CAPSULE, LIQUID FILLED ORAL at 08:02

## 2018-02-25 RX ADMIN — ASPIRIN 325 MG: 325 TABLET, DELAYED RELEASE ORAL at 09:02

## 2018-02-25 RX ADMIN — OXYCODONE HYDROCHLORIDE AND ACETAMINOPHEN 1 TABLET: 10; 325 TABLET ORAL at 08:02

## 2018-02-25 RX ADMIN — CEFTRIAXONE SODIUM 2 G: 2 INJECTION, POWDER, FOR SOLUTION INTRAMUSCULAR; INTRAVENOUS at 11:02

## 2018-02-25 RX ADMIN — OXYCODONE HYDROCHLORIDE AND ACETAMINOPHEN 1 TABLET: 10; 325 TABLET ORAL at 12:02

## 2018-02-25 RX ADMIN — VANCOMYCIN HCL-SODIUM CHLORIDE IV SOLN 1.5 GM/250ML-0.9% 1500 MG: 1.5-0.9/25 SOLUTION at 03:02

## 2018-02-25 RX ADMIN — OXYCODONE HYDROCHLORIDE AND ACETAMINOPHEN 1 TABLET: 10; 325 TABLET ORAL at 04:02

## 2018-02-25 NOTE — PLAN OF CARE
Problem: Physical Therapy Goal  Goal: Physical Therapy Goal  Goals to be met by: 3/3/18     Patient will increase functional independence with mobility by performin. Supine to sit with Stand-by Assistance  2. Sit to supine with Stand-by Assistance  3. Sit to stand transfer with Contact Guard Assistance  4. Bed to chair transfer with Contact Guard Assistance using Rolling Walker  5. Gait  x 100 feet with Contact Guard Assistance using Rolling Walker.   6. Lower extremity exercise program x 30 reps per handout, with independence    Outcome: Ongoing (interventions implemented as appropriate)  PT eval complete and POC initiated.

## 2018-02-25 NOTE — PLAN OF CARE
Problem: Patient Care Overview  Goal: Plan of Care Review  Outcome: Ongoing (interventions implemented as appropriate)  Pt AA0x3 and VSS. Two side rails up, bed locked, call light within reach. No falls noted as these precautions remain. Pt free of skin breakdown as the pt moves well independently. Right knee incision clean, dry, and intact with ACE wrap. Hemovac and wound vac in place. Knee brace on. Pt up to chair with PT. FCDs on and functioning. Pain controlled well with PRN meds. Hourly rounds made and no complaints at this time noted. Will resume with plan of care.

## 2018-02-25 NOTE — PT/OT/SLP EVAL
Physical Therapy Evaluation    Patient Name:  Katelyn Huynh   MRN:  [id number]    Recommendations:     Discharge Recommendations:  nursing facility, skilled   Discharge Equipment Recommendations:  (TBD at next level of care)   Barriers to discharge: None    Assessment:     Katelyn Huynh is a 67 y.o. female admitted with a medical diagnosis of Wound dehiscence.  She presents with the below impairments/functional limitations.  Pt tolerated evaluation well, but was largely limited by anxiety during functional activity.  Pt is a good candidate for skilled PT services to address the below deficits and to increase functional independence.      Rehab Prognosis: good; patient would benefit from acute skilled PT services to address these deficits and reach maximum level of function.      Rehab Identified impairments/functional limitations:   weakness, impaired endurance, impaired functional mobilty, gait instability, impaired balance, decreased lower extremity function, decreased ROM, edema, orthopedic precautions, impaired skin, decreased safety awareness, pain    Recent Surgery: Procedure(s) (LRB):  INCISION AND DRAINAGE-KNEE - left - shady - set up like total knee (Left)  REVISION-ARTHROPLASTY-KNEE (Left)  REPAIR-RUPTURE-QUADRICEP (Left) 1 Day Post-Op    Plan:     During this hospitalization, patient to be seen daily to address the above listed problems via gait training, therapeutic activities, neuromuscular re-education, therapeutic exercises  · Plan of Care Expires:  03/27/18   Plan of Care Reviewed with: patient    Subjective     Communicated with RN prior to session.  Patient found supine in bed upon PT entry to room, agreeable to evaluation.      Chief Complaint: anxiety with activity  Patient comments/goals: to return to SNF    Pain/Comfort:  · Pain Rating 1:  (did not rate)    Patients cultural, spiritual, Hindu conflicts given the current situation: none noted    Living  Environment:  Pt lives at home with sibling and .   Previous level of function: PTA pt was fully independent without use of AD or AE.  Roles and Routines: wife, sister  Equipment Owned: bedside commode, walker, rolling  Assistance upon Discharge: pt will have limited family assist upon d/c    Objective:     Patient found with: wound vac, peripheral IV     General Precautions: Standard, fall   Orthopedic Precautions:LUE non weight bearing, LLE weight bearing as tolerated   Braces: Hinged knee brace (UE splint; knee brace locked in ext)     Exams:    Cognitive/Psychosocial Skills:     -       Oriented to: Person, Place, Time and Situation   -       Follows Commands/attention:Follows multistep  commands  -       Communication: clear/fluent  -       Safety awareness/insight to disability: intact     Physical Exams:  · Gross Motor Coordination:  WFL  · Postural Exam:  Patient presented with the following abnormalities:    · -       Rounded shoulders  · Sensation:    · -       Intact  · Skin Integrity/Edema:      · -       Skin integrity: Visible skin intact  · RLE ROM: WFL  · RLE Strength: WFL  · LLE ROM: WFL except knee not tested  · LLE Strength: WFL except knee not tested    Functional Mobility:    Bed Mobility:    · Patient completed Scooting/Bridging with minimum assistance  · Patient completed Supine to Sit with moderate assistance     Transfers:  · Patient completed Sit <> Stand Transfer with maximal assistance  with  no assistive device   · Patient completed Bed <> Chair Transfer using Stand Pivot technique with maximal assistance with no assistive device     Pt expressed anxiety and a fear of falling during transfer.     Gait:  Unable to perform today    AM-PAC 6 CLICK MOBILITY  Total Score:12     Therapeutic Activities and Exercises:  · PT evaluation performed.  · Pt educated on role of PT, safety with functional mobility.   · Pt given HEP of AP, QS, and GS to be performed 5x/daily    Patient left up in  chair with all lines intact, call button in reach and RN notified.    GOALS:    Physical Therapy Goals        Problem: Physical Therapy Goal    Goal Priority Disciplines Outcome Goal Variances Interventions   Physical Therapy Goal     PT/OT, PT Ongoing (interventions implemented as appropriate)     Description:  Goals to be met by: 3/3/18     Patient will increase functional independence with mobility by performin. Supine to sit with Stand-by Assistance  2. Sit to supine with Stand-by Assistance  3. Sit to stand transfer with Contact Guard Assistance  4. Bed to chair transfer with Contact Guard Assistance using Rolling Walker  5. Gait  x 100 feet with Contact Guard Assistance using Rolling Walker.   6. Lower extremity exercise program x 30 reps per handout, with independence                      History:     Past Medical History:   Diagnosis Date    Arthritis     Cataract     Glaucoma     History of iritis     OD    Hypertension     Meningioma     Uveitis        Past Surgical History:   Procedure Laterality Date    Brain tumor surgery      had a brain tumor removed    CATARACT EXTRACTION      OU    CATARACT EXTRACTION BILATERAL W/ ANTERIOR VITRECTOMY      CHOLECYSTECTOMY      CRANIOTOMY      KNEE SURGERY  2-24-15    right TKR    YAG Right 2016    Dr. Kaiser       Time Tracking:     PT Received On: 18  PT Start Time: 1300     PT Stop Time: 1330  PT Total Time (min): 30 min     Billable Minutes: Evaluation 20 and Therapeutic Activity 10      Prashant Stinson, PT, DPT  2018   [phone]

## 2018-02-25 NOTE — OP NOTE
?[facility]   OPERATIVE REPORT   ORTHOPAEDIC SURGERY   PROVIDER: DR. XAVIER ÁLVAREZ    PATIENT INFORMATION   Katelyn Huynh 67 y.o. female 1950   MRN: [id number]   LOCATION: [facility]     DATE OF PROCEDURE: 2/24/2018     PREOPERATIVE DIAGNOSES:   Left total knee wound dehiscence    POSTOPERATIVE DIAGNOSES:   Left total knee wound complex dehiscence with presumed infection  Left periprosthetic knee complete quadriceps rupture    PROCEDURES PERFORMED:   Left revision total knee arthroplasty with polyethylene exchange (CPT 37797, mod-52)  Left knee periprosthetic quadriceps tendon repair (CPT 34262, mod-22)  Left total knee irrigation and excisional debridement with revision wound closure (CPT 75756)  Left knee placement of a negative pressure dressing (<50 cm2) (CPT 94178)    COMPLEX PROCEDURE:    There was an altered surgical field. There was abnormal anatomy. There was major scarring due to the revision nature of the case to the area involved. Complexity of the service was much greater than the normative procedure.  There was increased time, intensity and technical difficulty of the procedure, severity of the patient's condition and mental effort required. This was a highly complicated procedure that required advanced arthroscopic skill in order to safely and technically perform it.  Nevertheless the repair achieved was excellent.     Surgeon(s) and Role:     * DONG Álvarez MD - Primary     * Amanda Beal MD - Resident - Assisting     * Angelo Kenney MD - Resident - Assisting    ANESTHESIA: General    ESTIMATED BLOOD LOSS: 200 mL    IMPLANTS:   Implant Name Type Inv. Item Serial No.  Lot No. LRB No. Used   TIBIAL POST STAB SZ 4 11X3 CINDY - [id number]  TIBIAL POST STAB SZ 4 11X3 CINDY  URBANARA ESTHER. [id number] Left 1   TIBIAL POST STAB SZ 4 13X3 CINDY - [id number]   TIBIAL POST STAB SZ 4 13X3 CINDY   GLENNYRayspan ESTHER. [id number] Left 1       SPECIMENS:   Specimen (12h ago through future)    None        COMPLICATIONS: None.     INTRAOPERATIVE COUNTS: Correct.     PROPHYLACTIC IV ANTIBIOTICS: Given per [facility] Protocol.    INDICATIONS FOR THE PROCEDURE: The patient presented to the emergency department with complaint of left knee wound dehiscence approximately 2 weeks status post total replacement with my colleague Dr. Jasen Salinas.  The patient did report some fairly persistent drainage from the wound preceding dehiscence.  Notably she denies any significant discrete injury event to the knee since surgery although she did fall 2 days after her procedure resulting in a left distal radius fracture which also required surgery.  She has been indicated for surgical wound exploration with irrigation and debridement and treatment as indicated.  The details of such were discussed at length prior to proceeding.    DETAILS OF PROCEDURE: The patient was met in the preoperative holding area and final informed consent was performed.  The operative site was identified and marked.  The patient was brought to the operating room and placed supine on the operating room table.  Gen. anesthesia was administered.  Antibiotics were held for culture.  The nonsterile dressings were removed and a nonsterile tourniquet was placed high on the left thigh. The left lower extremity was then prepped and draped in usual sterile fashion for knee surgery.  A verbal timeout was performed.    Digital palpation within the wound revealed a defect down to the prosthesis.  The full extent of the previous incision was reopened.  The dehiscence appeared to be complete down to the prosthesis.  It was at this time that a full-thickness rupture of the quadriceps tendon was noted.  The leg was elevated and the tourniquet was then inflated to 300 mmHg.  Careful irrigation and debridement of the wound was performed to remove all hematoma and nonviable tissue using a combination of rongeur, curettes, and a  scalpel.  The extensor mechanism was carefully examined.  The quadriceps tear was intrasubstance over its lateral portion and traversed in an oblique fashion to become more insertional over the medial patella.  There also was a vertical longitudinal component along the lateral retinaculum in addition to the previous medial parapatellar arthrotomy.  There was no patellar fracture or disruption of the polyethylene component.  The patellar tendon and distal insertion also was intact.     The medial and lateral gutters were reestablished and wide synovectomy was performed.  No frankly infected or necrotic tissue was encountered.  Deep cultures were taken of both fluid and tissue.  Ancef and vancomycin were then dosed.  Previous polyethylene spacer was removed.  The total knee was then irrigated and the following fashion: Normal saline with a Pulsavac, dilute Betadine solution with cystoscopy tubing, Hibiclens scrub brushes over the metal prostheses, normal saline again with the Pulsavac, and finally Bactisure via Pulsavac.  A new 11 mm polyethylene spacer was then impacted into position.  The knee was carefully examined and at that time it was noted that there was notable valgus greater than varus significant laxity both in full extension and 30° of flexion.  The knee was examined in both flexion and extension and there was felt to be some degree of hyperextension with the spacer in place.  Given the valgus laxity in particular, it was felt that a larger poly would be beneficial.  The new polyethylene was removed and a 13 mm trial poly-was placed.  This appeared to be more appropriately fitting providing improved varus to valgus stability, while still maintaining full extension and appropriate flexion gapping.  The final 13 mm poly-was then impacted.    Attention was then turned towards the extensor mechanism.  A scalpel was used to split and reflect the peritenon layer of the quadriceps tendon.  Unfortunately this  tissue was quite degenerative, as was the tendon itself which was somewhat friable. A hybrid quadriceps tendon repair was required.  #5 FiberWire was used to whipstitch up and down the quadriceps tendon in locking [place] fashion.  Similarly, #2 FiberWire was used to again whipstitch up and down the tendon as was the residual stump from the superior pole patella which measured approximately 4 cm in length over its lateral extent.  Under C-arm fluoroscopy, trans-patellar tunnels were drilled using a 2.0 mm drill bit from proximal to distal with care taken to avoid damage to the patellar button.  A Spinlister suture passer with passing sutures were used to shuttle the #5 FiberWire sutures across the 2 transosseous tunnels.  These 2 suture ends were then shuttled towards one another over the midline of the distal pole patella in preparation for tying. The #2 FiberWire suture ends were then tied with the knee in full extension to reapproximate the trans-tendinous portion of the rupture. The #5 FiberWire suture ends were then tied to one another to complete the transosseous portion of the repair.  Additional #2 FiberWire suture was then used to pants-over-vest reapproximate the medial portion of the previous parapatellar arthrotomy with the knee in approximately 30 degrees of flexion to ensure engagement in the trochlea. This improved the axial alignment of the patella.  The FiberWire was also used to more loosely reapproximate the lateral portion of the retinacular and tendinous rupture.  The remainder of the medial arthrotomy and lateral retinacular split was repaired carefully using #1 Vicryl suture. Prior to final repair, a Hemovac drain was placed deep in the knee and shuttled out the skin. This was a complex repair in its entirety. At the conclusion of the procedure the knee was flexed to 60° with confirmed intact extensor mechanism.     The tourniquet was released.  All significant bleeders were cauterized.  The wound  was once again thoroughly irrigated.  The deep dermal, continuous, skin layers were closed in standard fashion.  Knee joint was injected with tranexamic acid through the drain.  An incisional wound VAC was placed and sterile dressings were applied.  At the conclusion of the case the patient was found to have soft compartments with normal perfusion distally.    The patient was then extubated and transferred to the postanesthesia care unit in stable condition.    POSTOPERATIVE PLAN OF CARE: The patient will remain on empiric antibiotic treatment and we will follow up on cultures. Follow quadriceps repair rehabilitation protocol.

## 2018-02-25 NOTE — SUBJECTIVE & OBJECTIVE
"Principal Problem:Wound dehiscence    Principal Orthopedic Problem: same    Interval History: Patient seen and examined at bedside.  No acute events overnight.  Pain controlled. Drain output 130 mL.    Review of patient's allergies indicates:   Allergen Reactions    Cortisone Hives and Swelling     Swelling of the tongue       Current Facility-Administered Medications   Medication    aspirin EC tablet 325 mg    cefTRIAXone (ROCEPHIN) 2 g in dextrose 5 % 50 mL IVPB    diphenhydrAMINE capsule 25 mg    docusate sodium capsule 100 mg    hydroCHLOROthiazide tablet 50 mg    morphine injection 2 mg    morphine injection 4 mg    ondansetron disintegrating tablet 4 mg    oxyCODONE-acetaminophen  mg per tablet 1 tablet    oxyCODONE-acetaminophen 5-325 mg per tablet 1 tablet    tranexamic acid (CYKLOKAPRON) 3,000 mg in sodium chloride 0.9% 100 mL    valsartan tablet 80 mg    vancomycin 1 g in 0.9% sodium chloride 250 mL IVPB (ready to mix system)     Objective:     Vital Signs (Most Recent):  Temp: 97 °F (36.1 °C) (02/25/18 0434)  Pulse: (!) 115 (02/25/18 0434)  Resp: 16 (02/25/18 0434)  BP: (!) 117/58 (02/25/18 0434)  SpO2: (!) 94 % (02/25/18 0434) Vital Signs (24h Range):  Temp:  [95.6 °F (35.3 °C)-98.2 °F (36.8 °C)] 97 °F (36.1 °C)  Pulse:  [] 115  Resp:  [10-24] 16  SpO2:  [94 %-100 %] 94 %  BP: ()/(57-78) 117/58     Weight: 120.6 kg (265 lb 14 oz)  Height: 5' 4.5" (163.8 cm)  Body mass index is 44.93 kg/m².      Intake/Output Summary (Last 24 hours) at 02/25/18 0708  Last data filed at 02/25/18 0500   Gross per 24 hour   Intake             1845 ml   Output              530 ml   Net             1315 ml       Ortho/SPM Exam     AAOx4  NAD  RRR  No increased WOB    LLE:  Dressings intact  Wound vac in place  HKB  SILT and motor intact T/SP/DP  WWP extremities        Significant Labs: All pertinent labs within the past 24 hours have been reviewed.    Significant Imaging: None  "

## 2018-02-25 NOTE — PT/OT/SLP EVAL
"Occupational Therapy   Evaluation    Name: aKtelyn Huynh  MRN: [id number]  Admitting Diagnosis:  Wound dehiscence 1 Day Post-Op    Recommendations:     Discharge Recommendations: nursing facility, skilled  Discharge Equipment Recommendations:  none  Barriers to discharge:  Decreased caregiver support, Inaccessible home environment    History:     Occupational Profile:  Living Environment: Pt lives at home with sibling and .   Previous level of function: PTA pt was fully independent without use of AD or AE.  Roles and Routines: wife, sister  Equipment Owned:   (TBD in next level of care)  Assistance upon Discharge: pt will have limited family assist upon d/c    Past Medical History:   Diagnosis Date    Arthritis     Cataract     Glaucoma     History of iritis     OD    Hypertension     Meningioma     Uveitis        Past Surgical History:   Procedure Laterality Date    Brain tumor surgery      had a brain tumor removed    CATARACT EXTRACTION      OU    CATARACT EXTRACTION BILATERAL W/ ANTERIOR VITRECTOMY      CHOLECYSTECTOMY      CRANIOTOMY      KNEE SURGERY  2-24-15    right TKR    YAG Right 1/20/2016    Dr. Kaiser       Subjective     "I've got the fear in me." Pt is very fearful of falling or injuring those who assist her with physical transfers.     Chief Complaint: Fear of falling.   Patient/Family stated goals: Pt agreeable to progressing towards independence with ADLs and functional mobility.   Communicated with: RN prior to session.  Pain/Comfort:  · Pain Rating 1: other (see comments) (did not rate)  · Pain Addressed 1: Pre-medicate for activity (pt had just recieved pain meds prior to visit)    Patients cultural, spiritual, Restoration conflicts given the current situation: none    Objective:     Patient found with: wound vac, knee immobilizer, peripheral IV    General Precautions: Standard, fall   Orthopedic Precautions:LUE non weight bearing, LLE weight bearing as " tolerated   Braces:  (UE splinted ) Recent ORIF L distal radius     Occupational Performance:    Bed Mobility:    · Patient completed Rolling/Turning to Left with  minimum assistance  · Patient completed Rolling/Turning to Right with minimum assistance  · Patient completed Scooting/Bridging with minimum assistance  · Patient completed Supine to Sit with moderate assistance  · Patient completed Sit to Supine with DNT    Functional Mobility/Transfers:  · Patient completed Sit <> Stand Transfer with maximal assistance  with  no assistive device   · Patient completed Bed <> Chair Transfer using Stand Pivot technique with maximal assistance with no assistive device  Functional Mobility: pt completed transfer only on this date secondary to fear of falling and first time up.  She does require max A without AD.  Likely would perform well with platform walker.     Activities of Daily Living:  · Feeding:  stand by assistance pt requires set up assist   · Grooming: dnt    · Bathing: independence and maximal assistance    · UB Dressing: independence for donning gown like robe  · LB Dressing: total assistance for donning socks  · Toileting: recommending purewick at this time due to level of difficulty with transfers and fear of falling. Pt would require max A x 2 for transfer to [facility] would require L LE to be elevated as it is not able to bend. Purewick would minimize risk for skin breakdown.    Cognitive/Visual Perceptual:  Cognitive/Psychosocial Skills:     -       Oriented to: Person, Place, Time and Situation   -       Follows Commands/attention:Follows two-step commands  -       Safety awareness/insight to disability: intact   -       Mood/Affect/Coping skills/emotional control: Appropriate to situation and fearful of falling  Visual/Perceptual:      -Intact    Physical Exam:  Balance: -       poor  Upper Extremity Range of Motion:     -       Right Upper Extremity: WFL  -       Left Upper Extremity: WFL except DNT below  "elbow due to recent ORIF L distal radius  Upper Extremity Strength:    -       Right Upper Extremity: WFL  -       Left Upper Extremity: WFL except DNT below elbow  Neurological: -       intact    Patient left up in chair with all lines intact and call button in reach . RN notified recommendations for [place]    [facility] 6 Click:  [facility] Total Score: 11    Treatment & Education:  · Adjusted brace which was not able to be closed initially  · Educated on role of OT in acute care setting  · Educated on cEducation:  · ompensatory dressing techniques     Assessment:     Katelyn Huynh is a 67 y.o. female with a medical diagnosis of Wound dehiscence.  She presents with decline in ADLs and functional mobility. Pt would benefit from skilled OT services in order to maximize independence with ADLs and facilitate safe discharge..  Performance deficits affecting function are weakness, impaired self care skills, impaired sensation, decreased upper extremity function, decreased lower extremity function, impaired functional mobilty, decreased safety awareness.      Rehab Prognosis:  Good; patient would benefit from acute skilled OT services to address these deficits and reach maximum level of function.         Clinical Decision Makin.  OT Low:  "Pt evaluation falls under low complexity for evaluation coding due to performance deficits noted in 1-3 areas as stated above and 0 co-morbities affecting current functional status. Data obtained from problem focused assessments. No modifications or assistance was required for completion of evaluation. Only brief occupational profile and history review completed."     Plan:     Patient to be seen 6 x/week to address the above listed problems via self-care/home management, therapeutic activities, therapeutic exercises, neuromuscular re-education  · Plan of Care Expires: 18  · Plan of Care Reviewed with: patient    This Plan of care has been discussed with the " patient who was involved in its development and understands and is in agreement with the identified goals and treatment plan    GOALS:    Occupational Therapy Goals        Problem: Occupational Therapy Goal    Goal Priority Disciplines Outcome Interventions   Occupational Therapy Goal     OT, PT/OT Ongoing (interventions implemented as appropriate)    Description:  Goals to be met by: 3/2/2018     Patient will increase functional independence with ADLs by performing:    Feeding with Set-up Assistance.  UE Dressing with set up assistance.  LE Dressing with Moderate Assistance.  Grooming while seated with Minimal Assistance.  Toileting from bedside commode with Moderate Assistance for hygiene and clothing management.   Supine to sit with Minimal Assistance.  Stand pivot transfers with Minimal Assistance with use of platform rolling walker.                      Time Tracking:     OT Date of Treatment: 02/25/18  OT Start Time: 1300  OT Stop Time: 1330  OT Total Time (min): 30 min    Billable Minutes:Evaluation 15  Self Care/Home Management 15    KAROLINE Hassan  2/25/2018

## 2018-02-25 NOTE — PROGRESS NOTES
"[facility]  Orthopedics  Progress Note    Patient Name: Katelyn Huynh  MRN: [id number]  Admission Date: 2/23/2018  Hospital Length of Stay: 0 days  Attending Provider: DONG Martinez MD  Primary Care Provider: Daniel Garcia MD  Follow-up For: Procedure(s) (LRB):  INCISION AND DRAINAGE-KNEE - left - shady - set up like total knee (Left)  REVISION-ARTHROPLASTY-KNEE (Left)  REPAIR-RUPTURE-QUADRICEP (Left)    Post-Operative Day: 1 Day Post-Op  Subjective:     Principal Problem:Wound dehiscence    Principal Orthopedic Problem: same    Interval History: Patient seen and examined at bedside.  No acute events overnight.  Pain controlled. Drain output 130 mL.    Review of patient's allergies indicates:   Allergen Reactions    Cortisone Hives and Swelling     Swelling of the tongue       Current Facility-Administered Medications   Medication    aspirin EC tablet 325 mg    cefTRIAXone (ROCEPHIN) 2 g in dextrose 5 % 50 mL IVPB    diphenhydrAMINE capsule 25 mg    docusate sodium capsule 100 mg    hydroCHLOROthiazide tablet 50 mg    morphine injection 2 mg    morphine injection 4 mg    ondansetron disintegrating tablet 4 mg    oxyCODONE-acetaminophen  mg per tablet 1 tablet    oxyCODONE-acetaminophen 5-325 mg per tablet 1 tablet    tranexamic acid (CYKLOKAPRON) 3,000 mg in sodium chloride 0.9% 100 mL    valsartan tablet 80 mg    vancomycin 1 g in 0.9% sodium chloride 250 mL IVPB (ready to mix system)     Objective:     Vital Signs (Most Recent):  Temp: 97 °F (36.1 °C) (02/25/18 0434)  Pulse: (!) 115 (02/25/18 0434)  Resp: 16 (02/25/18 0434)  BP: (!) 117/58 (02/25/18 0434)  SpO2: (!) 94 % (02/25/18 0434) Vital Signs (24h Range):  Temp:  [95.6 °F (35.3 °C)-98.2 °F (36.8 °C)] 97 °F (36.1 °C)  Pulse:  [] 115  Resp:  [10-24] 16  SpO2:  [94 %-100 %] 94 %  BP: ()/(57-78) 117/58     Weight: 120.6 kg (265 lb 14 oz)  Height: 5' 4.5" (163.8 cm)  Body mass index is 44.93 " kg/m².      Intake/Output Summary (Last 24 hours) at 02/25/18 0708  Last data filed at 02/25/18 0500   Gross per 24 hour   Intake             1845 ml   Output              530 ml   Net             1315 ml       Ortho/SPM Exam     AAOx4  NAD  RRR  No increased WOB    LLE:  Dressings intact  Wound vac in place  HKB  SILT and motor intact T/SP/DP  WWP extremities        Significant Labs: All pertinent labs within the past 24 hours have been reviewed.    Significant Imaging: None    Assessment/Plan:     * Wound dehiscence    Katelyn Huynh is a 68 yo female s/p left TKA I&D and quad tendon repair    - WBAT in hinged knee brace locked in extension  - vanc, ceftriaxone   - ID consult  - Follow cultures  - ASA for DVT prophylaxis  - Dispo: likely SNF again when ready              Tom Coello MD  Orthopedics  [facility]

## 2018-02-25 NOTE — CONSULTS
[facility]  Infectious Disease  Consult Note    Patient Name: Katelyn Huyhn  MRN: [id number]  Admission Date: 2/23/2018  Hospital Length of Stay: 0 days  Attending Physician: Jasen Salinas MD  Primary Care Provider: Daniel Garcia MD     Isolation Status: No active isolations    Patient information was obtained from patient and past medical records.      Consults  Assessment/Plan:     * Wound dehiscence       Ms. Katelyn Huynh is a 67 year old woman with HTN and arthritis who is s/p left TKA on 2/9 with surgical wound dehiscence.   Per patient she was initially discharged home and subsequently fell, fracturing her left wrist. She denies falling forward onto the left knee.  She subsequently was admitted to SNF.  Wound had had mild serous drainage since surgery.   Sutures removed on 2/21 and wound opened. She underwent I&D, poly exchange and quad tendon repair yesterday 2/24.  Currently on empiric vancomycin and ceftriaxone.  Operative cultures are pending. Gram stain negative.   Afebrile, no leukocytosis, hemodynamically stable.  CRP 23.2, ESR 52     -  Continue empiric IV vancomycin and ceftriaxone for now pending culture results.  Increase vancomycin to 1500 q 12 (done)   -  Will follow cultures and adjust abx accordingly.    -  Will follow up tomorrow with final recommendations.     Patient seen by, and plan discussed with, ID staff  Discussed with Primary Team            Thank you.   Please call for any questions or concerns.  KAVON Gayle, ANP-C  [phone]    Subjective:     Principal Problem: Wound dehiscence    HPI:   Ms. Katelyn Huynh is a 67 year old woman with HTN and arthritis who is s/p left TKA on 2/9 with surgical wound dehiscence.   Per patient she was initially discharged home and subsequently fell, fracturing her left wrist. She denies falling forward onto the left knee.  She subsequently was admitted to SNF.  Wound had had mild serous drainage since  surgery.   Sutures removed on 2/21 and wound opened. She underwent I&D, poly exchange, and quad tendon repair yesterday 2/24.  Currently on empiric vancomycin and ceftriaxone.  Operative cultures are pending. Gram stain negative.   Afebrile, no leukocytosis, hemodynamically stable.  CRP 23.2, ESR 52.  Pain well controlled.        Past Medical History:   Diagnosis Date    Arthritis     Cataract     Glaucoma     History of iritis     OD    Hypertension     Meningioma     Uveitis        Past Surgical History:   Procedure Laterality Date    Brain tumor surgery      had a brain tumor removed    CATARACT EXTRACTION      OU    CATARACT EXTRACTION BILATERAL W/ ANTERIOR VITRECTOMY      CHOLECYSTECTOMY      CRANIOTOMY      KNEE SURGERY  2-24-15    right TKR    YAG Right 1/20/2016    Dr. Kaiser       Review of patient's allergies indicates:   Allergen Reactions    Cortisone Hives and Swelling     Swelling of the tongue       Medications:  Prescriptions Prior to Admission   Medication Sig    ASCORBATE CALCIUM (VITAMIN C ORAL) Take by mouth once daily.     hydrochlorothiazide (HYDRODIURIL) 25 MG tablet Take 2 tablets (50 mg total) by mouth once daily. (Patient taking differently: Take 50 mg by mouth every morning. )    valsartan (DIOVAN) 80 MG tablet TAKE 1 TABLET(80 MG) BY MOUTH EVERY DAY (Patient taking differently: TAKE 1 TABLET(80 MG) BY MOUTH EVERY DAY  am)    [DISCONTINUED] aspirin 325 MG tablet Take 1 tablet (325 mg total) by mouth 2 (two) times daily.    [DISCONTINUED] docusate sodium (COLACE) 100 MG capsule Take 1 capsule (100 mg total) by mouth 2 (two) times daily.    [DISCONTINUED] ondansetron (ZOFRAN-ODT) 4 MG TbDL Take 1 tablet (4 mg total) by mouth every 6 (six) hours as needed.    [DISCONTINUED] oxyCODONE-acetaminophen (PERCOCET)  mg per tablet Take 1 tablet by mouth every 4 (four) hours as needed for Pain.    betamethasone dipropionate (DIPROLENE) 0.05 % cream Apply topically 2  (two) times daily.    triamcinolone acetonide 0.1% (KENALOG) 0.1 % cream JOSE ELIAS EXT AA TID     Antibiotics     Start     Stop Route Frequency Ordered    02/24/18 2015  vancomycin 1 g in 0.9% sodium chloride 250 mL IVPB (ready to mix system)  (Vancomycin IVPB with levels panel)      -- IV Every 24 hours (non-standard times) 02/24/18 1009    02/24/18 1115  cefTRIAXone (ROCEPHIN) 2 g in dextrose 5 % 50 mL IVPB      -- IV Every 24 hours (non-standard times) 02/24/18 1009    02/23/18 2045  ceFAZolin injection 2 g      02/24 1114 IV Every 6 hours (non-standard times) 02/23/18 1938        Antifungals     None        Antivirals     None           Immunization History   Administered Date(s) Administered    Influenza 11/03/2009, 02/26/2015    Influenza - Trivalent - PF (ADULT) 02/26/2015    Pneumococcal Conjugate - 13 Valent 02/26/2015    Pneumococcal Polysaccharide - 23 Valent 08/09/2017    Tdap 04/27/2010    Zoster 08/26/2014       Family History     Problem Relation (Age of Onset)    Cataracts Mother, Sister, Maternal Aunt    Diabetes Mother, Father, Sister, Maternal Aunt    Glaucoma Mother, Brother, Maternal Aunt, Maternal Uncle    Hypertension Mother, Father    Stroke Sister        Social History     Social History    Marital status:      Spouse name: N/A    Number of children: N/A    Years of education: N/A     Social History Main Topics    Smoking status: Former Smoker     Packs/day: 0.10     Years: 3.00     Types: Cigarettes     Quit date: 4/26/1973    Smokeless tobacco: Never Used    Alcohol use 1.2 oz/week     2 Glasses of wine per week      Comment: social- glass on wine on occasions    Drug use: No    Sexual activity: Not Asked     Other Topics Concern    None     Social History Narrative    None     Review of Systems   Constitutional: Positive for activity change. Negative for appetite change, chills, diaphoresis, fatigue and fever.   HENT: Negative for congestion, sore throat and trouble  swallowing.    Respiratory: Negative for cough, shortness of breath and wheezing.    Cardiovascular: Negative for chest pain, palpitations and leg swelling.   Gastrointestinal: Positive for constipation. Negative for abdominal pain, diarrhea, nausea and vomiting.   Genitourinary: Negative for difficulty urinating, dysuria, frequency and hematuria.   Musculoskeletal: Positive for arthralgias. Negative for back pain, joint swelling and neck pain.   Skin: Negative for rash and wound.   Neurological: Negative for dizziness, speech difficulty, weakness, numbness and headaches.   Psychiatric/Behavioral: Negative for agitation and confusion. The patient is not nervous/anxious.      Objective:     Vital Signs (Most Recent):  Temp: 97.4 °F (36.3 °C) (02/25/18 0747)  Pulse: 107 (02/25/18 0747)  Resp: 14 (02/25/18 0747)  BP: (!) 110/51 (02/25/18 0747)  SpO2: (!) 94 % (02/25/18 0747) Vital Signs (24h Range):  Temp:  [95.6 °F (35.3 °C)-98.2 °F (36.8 °C)] 97.4 °F (36.3 °C)  Pulse:  [] 107  Resp:  [10-24] 14  SpO2:  [94 %-100 %] 94 %  BP: ()/(51-78) 110/51     Weight: 120.6 kg (265 lb 14 oz)  Body mass index is 44.93 kg/m².    Estimated Creatinine Clearance: 103.5 mL/min (based on SCr of 0.7 mg/dL).    Physical Exam   Constitutional: She is oriented to person, place, and time. She appears well-developed and well-nourished. No distress.   HENT:   Head: Normocephalic and atraumatic.   Eyes: Conjunctivae are normal. No scleral icterus.   Neck: Normal range of motion. Neck supple.   Cardiovascular: Normal rate, regular rhythm and normal heart sounds.    Pulmonary/Chest: Effort normal and breath sounds normal. No respiratory distress.   Abdominal: Soft. There is no tenderness.   Musculoskeletal: She exhibits no edema.   Left knee with surgical dressing/wound vac in place    Left wrist wrapped and splinted.  Good distal c/s/m   Neurological: She is alert and oriented to person, place, and time.   Skin: Skin is warm and dry.  No rash noted. She is not diaphoretic.   Psychiatric: She has a normal mood and affect. Her behavior is normal.   Vitals reviewed.      Significant Labs:   Blood Culture: No results for input(s): LABBLOO in the last 4320 hours.  CBC:   Recent Labs  Lab 02/23/18 2009 02/24/18  1030 02/25/18  0603   WBC 9.28 9.12 10.33   HGB 12.9 11.4* 10.8*   HCT 39.5 34.6* 33.5*   * 294 396*     CMP:   Recent Labs  Lab 02/23/18 2009 02/24/18  1030 02/25/18  0603    140 138   K 5.7* 4.4 4.2   CL 99 106 103   CO2 27 24 27    129* 125*   BUN 13 8 8   CREATININE 0.8 0.8 0.7   CALCIUM 9.6 8.4* 8.0*   PROT 8.9* 7.0 6.8   ALBUMIN 3.1* 2.5* 2.5*   BILITOT 0.7 0.6 0.8   ALKPHOS 97 95 92   AST 30 18 25   ALT 16 12 14   ANIONGAP 10 10 8   EGFRNONAA >60.0 >60.0 >60.0     Wound Culture:   Recent Labs  Lab 02/24/18  0959 02/24/18  1005   LABAERO No growth No growth       Significant Imaging: I have reviewed all pertinent imaging results/findings within the past 24 hours.

## 2018-02-25 NOTE — PLAN OF CARE
Problem: Occupational Therapy Goal  Goal: Occupational Therapy Goal  Goals to be met by: 3/2/2018     Patient will increase functional independence with ADLs by performing:    Feeding with Set-up Assistance.  UE Dressing with set up assistance.  LE Dressing with Moderate Assistance.  Grooming while seated with Minimal Assistance.  Toileting from bedside commode with Moderate Assistance for hygiene and clothing management.   Supine to sit with Minimal Assistance.  Stand pivot transfers with Minimal Assistance with use of platform rolling walker.    Outcome: Ongoing (interventions implemented as appropriate)  Evaluation completed.  OT plan of care developed and reviewed with patient.     Recommend return to SNF once medically stable.  Recommend purewick be utilized to reduce fall risk and risk for skin breakdown.  Pt requires max A x 2 for transfers without device.     KAROLINE Soto  2/25/2018  Rehab Services

## 2018-02-25 NOTE — HPI
Ms. Katelyn Huynh is a 67 year old woman with HTN and arthritis who is s/p left TKA on 2/9 with surgical wound dehiscence.   Per patient she was initially discharged home and subsequently fell, fracturing her left wrist. She denies falling forward onto the left knee.  She subsequently was admitted to SNF.  Wound had had mild serous drainage since surgery.   Sutures removed on 2/21 and wound opened. She underwent I&D, poly exchange, and quad tendon repair yesterday 2/24.  Currently on empiric vancomycin and ceftriaxone.  Operative cultures are pending. Gram stain negative.   Afebrile, no leukocytosis, hemodynamically stable.  CRP 23.2, ESR 52.  Pain well controlled.

## 2018-02-25 NOTE — SUBJECTIVE & OBJECTIVE
Past Medical History:   Diagnosis Date    Arthritis     Cataract     Glaucoma     History of iritis     OD    Hypertension     Meningioma     Uveitis        Past Surgical History:   Procedure Laterality Date    Brain tumor surgery      had a brain tumor removed    CATARACT EXTRACTION      OU    CATARACT EXTRACTION BILATERAL W/ ANTERIOR VITRECTOMY      CHOLECYSTECTOMY      CRANIOTOMY      KNEE SURGERY  2-24-15    right TKR    YAG Right 1/20/2016    Dr. Kaiser       Review of patient's allergies indicates:   Allergen Reactions    Cortisone Hives and Swelling     Swelling of the tongue       Medications:  Prescriptions Prior to Admission   Medication Sig    ASCORBATE CALCIUM (VITAMIN C ORAL) Take by mouth once daily.     hydrochlorothiazide (HYDRODIURIL) 25 MG tablet Take 2 tablets (50 mg total) by mouth once daily. (Patient taking differently: Take 50 mg by mouth every morning. )    valsartan (DIOVAN) 80 MG tablet TAKE 1 TABLET(80 MG) BY MOUTH EVERY DAY (Patient taking differently: TAKE 1 TABLET(80 MG) BY MOUTH EVERY DAY  am)    [DISCONTINUED] aspirin 325 MG tablet Take 1 tablet (325 mg total) by mouth 2 (two) times daily.    [DISCONTINUED] docusate sodium (COLACE) 100 MG capsule Take 1 capsule (100 mg total) by mouth 2 (two) times daily.    [DISCONTINUED] ondansetron (ZOFRAN-ODT) 4 MG TbDL Take 1 tablet (4 mg total) by mouth every 6 (six) hours as needed.    [DISCONTINUED] oxyCODONE-acetaminophen (PERCOCET)  mg per tablet Take 1 tablet by mouth every 4 (four) hours as needed for Pain.    betamethasone dipropionate (DIPROLENE) 0.05 % cream Apply topically 2 (two) times daily.    triamcinolone acetonide 0.1% (KENALOG) 0.1 % cream JOSE ELIAS EXT AA TID     Antibiotics     Start     Stop Route Frequency Ordered    02/24/18 2015  vancomycin 1 g in 0.9% sodium chloride 250 mL IVPB (ready to mix system)  (Vancomycin IVPB with levels panel)      -- IV Every 24 hours (non-standard times) 02/24/18  1009    02/24/18 1115  cefTRIAXone (ROCEPHIN) 2 g in dextrose 5 % 50 mL IVPB      -- IV Every 24 hours (non-standard times) 02/24/18 1009    02/23/18 2045  ceFAZolin injection 2 g      02/24 1114 IV Every 6 hours (non-standard times) 02/23/18 1938        Antifungals     None        Antivirals     None           Immunization History   Administered Date(s) Administered    Influenza 11/03/2009, 02/26/2015    Influenza - Trivalent - PF (ADULT) 02/26/2015    Pneumococcal Conjugate - 13 Valent 02/26/2015    Pneumococcal Polysaccharide - 23 Valent 08/09/2017    Tdap 04/27/2010    Zoster 08/26/2014       Family History     Problem Relation (Age of Onset)    Cataracts Mother, Sister, Maternal Aunt    Diabetes Mother, Father, Sister, Maternal Aunt    Glaucoma Mother, Brother, Maternal Aunt, Maternal Uncle    Hypertension Mother, Father    Stroke Sister        Social History     Social History    Marital status:      Spouse name: N/A    Number of children: N/A    Years of education: N/A     Social History Main Topics    Smoking status: Former Smoker     Packs/day: 0.10     Years: 3.00     Types: Cigarettes     Quit date: 4/26/1973    Smokeless tobacco: Never Used    Alcohol use 1.2 oz/week     2 Glasses of wine per week      Comment: social- glass on wine on occasions    Drug use: No    Sexual activity: Not Asked     Other Topics Concern    None     Social History Narrative    None     Review of Systems   Constitutional: Positive for activity change. Negative for appetite change, chills, diaphoresis, fatigue and fever.   HENT: Negative for congestion, sore throat and trouble swallowing.    Respiratory: Negative for cough, shortness of breath and wheezing.    Cardiovascular: Negative for chest pain, palpitations and leg swelling.   Gastrointestinal: Positive for constipation. Negative for abdominal pain, diarrhea, nausea and vomiting.   Genitourinary: Negative for difficulty urinating, dysuria, frequency  and hematuria.   Musculoskeletal: Positive for arthralgias. Negative for back pain, joint swelling and neck pain.   Skin: Negative for rash and wound.   Neurological: Negative for dizziness, speech difficulty, weakness, numbness and headaches.   Psychiatric/Behavioral: Negative for agitation and confusion. The patient is not nervous/anxious.      Objective:     Vital Signs (Most Recent):  Temp: 97.4 °F (36.3 °C) (02/25/18 0747)  Pulse: 107 (02/25/18 0747)  Resp: 14 (02/25/18 0747)  BP: (!) 110/51 (02/25/18 0747)  SpO2: (!) 94 % (02/25/18 0747) Vital Signs (24h Range):  Temp:  [95.6 °F (35.3 °C)-98.2 °F (36.8 °C)] 97.4 °F (36.3 °C)  Pulse:  [] 107  Resp:  [10-24] 14  SpO2:  [94 %-100 %] 94 %  BP: ()/(51-78) 110/51     Weight: 120.6 kg (265 lb 14 oz)  Body mass index is 44.93 kg/m².    Estimated Creatinine Clearance: 103.5 mL/min (based on SCr of 0.7 mg/dL).    Physical Exam   Constitutional: She is oriented to person, place, and time. She appears well-developed and well-nourished. No distress.   HENT:   Head: Normocephalic and atraumatic.   Eyes: Conjunctivae are normal. No scleral icterus.   Neck: Normal range of motion. Neck supple.   Cardiovascular: Normal rate, regular rhythm and normal heart sounds.    Pulmonary/Chest: Effort normal and breath sounds normal. No respiratory distress.   Abdominal: Soft. There is no tenderness.   Musculoskeletal: She exhibits no edema.   Left knee with surgical dressing/wound vac in place    Left wrist wrapped and splinted.  Good distal c/s/m   Neurological: She is alert and oriented to person, place, and time.   Skin: Skin is warm and dry. No rash noted. She is not diaphoretic.   Psychiatric: She has a normal mood and affect. Her behavior is normal.   Vitals reviewed.      Significant Labs:   Blood Culture: No results for input(s): LABBLOO in the last 4320 hours.  CBC:   Recent Labs  Lab 02/23/18 2009 02/24/18  1030 02/25/18  0603   WBC 9.28 9.12 10.33   HGB 12.9  11.4* 10.8*   HCT 39.5 34.6* 33.5*   * 294 396*     CMP:   Recent Labs  Lab 02/23/18 2009 02/24/18  1030 02/25/18  0603    140 138   K 5.7* 4.4 4.2   CL 99 106 103   CO2 27 24 27    129* 125*   BUN 13 8 8   CREATININE 0.8 0.8 0.7   CALCIUM 9.6 8.4* 8.0*   PROT 8.9* 7.0 6.8   ALBUMIN 3.1* 2.5* 2.5*   BILITOT 0.7 0.6 0.8   ALKPHOS 97 95 92   AST 30 18 25   ALT 16 12 14   ANIONGAP 10 10 8   EGFRNONAA >60.0 >60.0 >60.0     Wound Culture:   Recent Labs  Lab 02/24/18  0959 02/24/18  1005   LABAERO No growth No growth       Significant Imaging: I have reviewed all pertinent imaging results/findings within the past 24 hours.

## 2018-02-26 LAB
CRP SERPL-MCNC: 106.7 MG/L
ERYTHROCYTE [SEDIMENTATION RATE] IN BLOOD BY WESTERGREN METHOD: 61 MM/HR
PROCALCITONIN SERPL IA-MCNC: 0.05 NG/ML
VANCOMYCIN TROUGH SERPL-MCNC: 18.4 UG/ML

## 2018-02-26 PROCEDURE — 63600175 PHARM REV CODE 636 W HCPCS: Performed by: STUDENT IN AN ORGANIZED HEALTH CARE EDUCATION/TRAINING PROGRAM

## 2018-02-26 PROCEDURE — 11000001 HC ACUTE MED/SURG PRIVATE ROOM

## 2018-02-26 PROCEDURE — 25000003 PHARM REV CODE 250: Performed by: STUDENT IN AN ORGANIZED HEALTH CARE EDUCATION/TRAINING PROGRAM

## 2018-02-26 PROCEDURE — C1751 CATH, INF, PER/CENT/MIDLINE: HCPCS

## 2018-02-26 PROCEDURE — 97530 THERAPEUTIC ACTIVITIES: CPT

## 2018-02-26 PROCEDURE — 25000003 PHARM REV CODE 250: Performed by: NURSE PRACTITIONER

## 2018-02-26 PROCEDURE — 86580 TB INTRADERMAL TEST: CPT | Performed by: STUDENT IN AN ORGANIZED HEALTH CARE EDUCATION/TRAINING PROGRAM

## 2018-02-26 PROCEDURE — 84145 PROCALCITONIN (PCT): CPT

## 2018-02-26 PROCEDURE — 36415 COLL VENOUS BLD VENIPUNCTURE: CPT

## 2018-02-26 PROCEDURE — 36569 INSJ PICC 5 YR+ W/O IMAGING: CPT

## 2018-02-26 PROCEDURE — 63600175 PHARM REV CODE 636 W HCPCS: Performed by: NURSE PRACTITIONER

## 2018-02-26 PROCEDURE — 25000003 PHARM REV CODE 250: Performed by: ORTHOPAEDIC SURGERY

## 2018-02-26 PROCEDURE — 02HV33Z INSERTION OF INFUSION DEVICE INTO SUPERIOR VENA CAVA, PERCUTANEOUS APPROACH: ICD-10-PCS | Performed by: ORTHOPAEDIC SURGERY

## 2018-02-26 PROCEDURE — 76937 US GUIDE VASCULAR ACCESS: CPT

## 2018-02-26 PROCEDURE — A4216 STERILE WATER/SALINE, 10 ML: HCPCS | Performed by: ORTHOPAEDIC SURGERY

## 2018-02-26 PROCEDURE — 97110 THERAPEUTIC EXERCISES: CPT

## 2018-02-26 PROCEDURE — 97535 SELF CARE MNGMENT TRAINING: CPT

## 2018-02-26 PROCEDURE — 99233 SBSQ HOSP IP/OBS HIGH 50: CPT | Mod: ,,, | Performed by: PHYSICIAN ASSISTANT

## 2018-02-26 PROCEDURE — 97116 GAIT TRAINING THERAPY: CPT

## 2018-02-26 PROCEDURE — 85651 RBC SED RATE NONAUTOMATED: CPT

## 2018-02-26 PROCEDURE — 86140 C-REACTIVE PROTEIN: CPT

## 2018-02-26 PROCEDURE — 80202 ASSAY OF VANCOMYCIN: CPT

## 2018-02-26 RX ORDER — SODIUM CHLORIDE 0.9 % (FLUSH) 0.9 %
10 SYRINGE (ML) INJECTION
Status: DISCONTINUED | OUTPATIENT
Start: 2018-02-26 | End: 2018-02-28 | Stop reason: HOSPADM

## 2018-02-26 RX ORDER — SODIUM CHLORIDE 0.9 % (FLUSH) 0.9 %
10 SYRINGE (ML) INJECTION EVERY 6 HOURS
Status: DISCONTINUED | OUTPATIENT
Start: 2018-02-26 | End: 2018-02-28 | Stop reason: HOSPADM

## 2018-02-26 RX ADMIN — VANCOMYCIN HCL-SODIUM CHLORIDE IV SOLN 1.5 GM/250ML-0.9% 1500 MG: 1.5-0.9/25 SOLUTION at 01:02

## 2018-02-26 RX ADMIN — ASPIRIN 325 MG: 325 TABLET, DELAYED RELEASE ORAL at 09:02

## 2018-02-26 RX ADMIN — Medication 10 ML: at 06:02

## 2018-02-26 RX ADMIN — OXYCODONE HYDROCHLORIDE AND ACETAMINOPHEN 1 TABLET: 10; 325 TABLET ORAL at 08:02

## 2018-02-26 RX ADMIN — VALSARTAN 80 MG: 40 TABLET ORAL at 08:02

## 2018-02-26 RX ADMIN — DOCUSATE SODIUM 100 MG: 100 CAPSULE, LIQUID FILLED ORAL at 09:02

## 2018-02-26 RX ADMIN — TUBERCULIN PURIFIED PROTEIN DERIVATIVE 5 UNITS: 5 INJECTION, SOLUTION INTRADERMAL at 10:02

## 2018-02-26 RX ADMIN — HYDROCHLOROTHIAZIDE 50 MG: 25 TABLET ORAL at 08:02

## 2018-02-26 RX ADMIN — ASPIRIN 325 MG: 325 TABLET, DELAYED RELEASE ORAL at 08:02

## 2018-02-26 RX ADMIN — OXYCODONE HYDROCHLORIDE AND ACETAMINOPHEN 1 TABLET: 10; 325 TABLET ORAL at 07:02

## 2018-02-26 RX ADMIN — CEFTRIAXONE SODIUM 2 G: 2 INJECTION, POWDER, FOR SOLUTION INTRAMUSCULAR; INTRAVENOUS at 11:02

## 2018-02-26 RX ADMIN — OXYCODONE HYDROCHLORIDE AND ACETAMINOPHEN 1 TABLET: 10; 325 TABLET ORAL at 10:02

## 2018-02-26 RX ADMIN — OXYCODONE HYDROCHLORIDE AND ACETAMINOPHEN 1 TABLET: 10; 325 TABLET ORAL at 01:02

## 2018-02-26 RX ADMIN — DOCUSATE SODIUM 100 MG: 100 CAPSULE, LIQUID FILLED ORAL at 08:02

## 2018-02-26 NOTE — PROGRESS NOTES
"[facility]  Orthopedics  Progress Note    Patient Name: Katelyn Huynh  MRN: [id number]  Admission Date: 2/23/2018  Hospital Length of Stay: 0 days  Attending Provider: Jasen Salinas MD  Primary Care Provider: Daniel Garcia MD  Follow-up For: Procedure(s) (LRB):  INCISION AND DRAINAGE-KNEE - left - shady - set up like total knee (Left)  REVISION-ARTHROPLASTY-KNEE (Left)  REPAIR-RUPTURE-QUADRICEP (Left)    Post-Operative Day: 2 Days Post-Op  Subjective:     Principal Problem:Wound dehiscence    Principal Orthopedic Problem: same    Interval History: Patient seen and examined at bedside.  No acute events overnight.  Pain controlled. Drain output 135 mL.  Transfers with PT yesterday.    Review of patient's allergies indicates:   Allergen Reactions    Cortisone Hives and Swelling     Swelling of the tongue       Current Facility-Administered Medications   Medication    aspirin EC tablet 325 mg    cefTRIAXone (ROCEPHIN) 2 g in dextrose 5 % 50 mL IVPB    diphenhydrAMINE capsule 25 mg    docusate sodium capsule 100 mg    hydroCHLOROthiazide tablet 50 mg    morphine injection 2 mg    morphine injection 4 mg    ondansetron disintegrating tablet 4 mg    oxyCODONE-acetaminophen  mg per tablet 1 tablet    oxyCODONE-acetaminophen 5-325 mg per tablet 1 tablet    tranexamic acid (CYKLOKAPRON) 3,000 mg in sodium chloride 0.9% 100 mL    valsartan tablet 80 mg    vancomycin (VANCOCIN) 1,500 mg in sodium chloride 0.9% 250 mL IVPB     Objective:     Vital Signs (Most Recent):  Temp: 98.2 °F (36.8 °C) (02/26/18 0441)  Pulse: 105 (02/26/18 0441)  Resp: 18 (02/26/18 0441)  BP: (!) 111/56 (02/26/18 0441)  SpO2: 96 % (02/26/18 0441) Vital Signs (24h Range):  Temp:  [96.7 °F (35.9 °C)-98.2 °F (36.8 °C)] 98.2 °F (36.8 °C)  Pulse:  [] 105  Resp:  [14-19] 18  SpO2:  [94 %-100 %] 96 %  BP: (103-132)/(50-61) 111/56     Weight: 120.6 kg (265 lb 14 oz)  Height: 5' 4.5" (163.8 cm)  Body " mass index is 44.93 kg/m².      Intake/Output Summary (Last 24 hours) at 02/26/18 0604  Last data filed at 02/26/18 0300   Gross per 24 hour   Intake             1010 ml   Output              135 ml   Net              875 ml       Ortho/SPM Exam     AAOx4  NAD  RRR  No increased WOB    LLE:  Dressings intact  Wound vac in place  HKB  SILT and motor intact T/SP/DP  WWP extremities        Significant Labs: All pertinent labs within the past 24 hours have been reviewed.    Significant Imaging: None    Assessment/Plan:     * Wound dehiscence    Katelyn Huynh is a 68 yo female s/p left TKA I&D and quad tendon repair    - WBAT in hinged knee brace locked in extension  - vanc, ceftriaxone   - ID consult  - Follow cultures  - drain: 135cc; DC today  - labs: ESR, CRP, PC pending  - Dispo: likely SNF again when ready        Left wrist fracture s/p ORIF    Splint in place        Essential (primary) hypertension    Home meds              Isidro Harper MD  Orthopedics  [facility]

## 2018-02-26 NOTE — PLAN OF CARE
POD 1 s/p left TKA revision, quad tendon repair, I&D and negative pressure dressing placement. PT/OT ordered to eval and treat. PT/OT recommended SNF placement. Patient currently lives with her spouse and sister. Patient has good family support at home. CM completed discharge assessment and planning with patient. Patient verbalized understanding. All questions and concerns addressed. SW and CM will continue to follow for any additional needs. Plan A to discharge to SNF as soon as medically stable. Plan B to discharge home with home health.     Patient requested to return to [facility]. Patient stated she at [facility] prior to this admission. CM updated SW.     Discharge pending facility acceptance, authorization, final ID recs and PICC line placement if needed.    PCP: Daniel Garcia MD    Pharmacy:   [facility] [address] [place] [address] [address] [facility]  [address]  [facility] [address]  Phone: [phone] Fax: [phone]    Payor: BLUE CROSS BLUE SHIELD / [facility] / Product Type: Commercial /      02/26/18 0915   Discharge Assessment   Assessment Type Discharge Planning Assessment   Confirmed/corrected address and phone number on facesheet? Yes   Assessment information obtained from? Patient;Medical Record   Expected Length of Stay (days) 4   Communicated expected length of stay with patient/caregiver yes   Prior to hospitilization cognitive status: Alert/Oriented   Prior to hospitalization functional status: Assistive Equipment   Current cognitive status: Alert/Oriented   Current Functional Status: Assistive Equipment   Facility Arrived From: [facility]   Lives With spouse;sibling(s)  (sister)   Able to Return to Prior Arrangements yes   Is patient able to care for self after discharge? Yes   Who are your caregiver(s) and their phone number(s)? spouse- Venkatesh Huynh [phone], [phone]   Patient's perception of discharge disposition  skilled nursing facility   Readmission Within The Last 30 Days no previous admission in last 30 days   Patient currently being followed by outpatient case management? No   Patient currently receives any other outside agency services? No   Equipment Currently Used at Home bedside commode;walker, rolling;bath bench;cane, straight   Do you have any problems affording any of your prescribed medications? No   Is the patient taking medications as prescribed? yes   Does the patient have transportation home? Yes   Transportation Available family or friend will provide   Does the patient receive services at the Coumadin Clinic? No   Discharge Plan A Skilled Nursing Facility   Discharge Plan B Home Health;Home with family   Patient/Family In Agreement With Plan yes

## 2018-02-26 NOTE — PT/OT/SLP PROGRESS
Occupational Therapy   Treatment    Name: Katelyn Huynh  MRN: [id number]  Admitting Diagnosis:  Wound dehiscence  2 Days Post-Op    Recommendations:     Discharge Recommendations: nursing facility, skilled  Discharge Equipment Recommendations:   (TBD)  Barriers to discharge:  Decreased caregiver support, Inaccessible home environment    Subjective     Communicated with: RN prior to session.  Pain/Comfort:  · Pain Rating 1: 5/10    Patients cultural, spiritual, Jain conflicts given the current situation: None    Objective:     Patient found with: wound vac, peripheral IV    General Precautions: Standard, fall   Orthopedic Precautions:LUE non weight bearing, LLE weight bearing as tolerated   Braces: Hinged knee brace (L UE splint and HKB locked in extension.)     Occupational Performance:    Bed Mobility:    · Patient completed Supine to Sit with minimum assistance     Functional Mobility/Transfers:  · Patient completed Sit <> Stand Transfer with moderate assistance  with  platform walker   · Patient completed Bed <> Chair Transfer using Stand Pivot technique with minimum assistance with platform walker      Activities of Daily Living:  · UB Dressing: maximal assistance to don hospital gown.  Educated pt on adaptive techniques for UE dressing.  · LB Dressing: minimum assistance to don/doff sock on L foot.  Pt required extended time to perform task.    Patient left up in chair with all lines intact, call button in reach and RN notified    [facility] 6 Click:  AMPA Total Score: 19    Treatment & Education:  Pt was able to perform AROM IP flex/ext exercises and L shoulder flexion exercises 1x15 while sitting up in chair.  Pt was only able to achieve approx. 80* of L shoulder flexion.  PROM to L shoulder performed for shoulder flexion 1x15 to 90* while sitting up in chair.  Education:    Assessment:     Katelyn Huynh is a 67 y.o. female with a medical diagnosis of Wound dehiscence.  She was  able to perform supine/sit T/F c min A and sit/stand T/F c mod A and bed/chair T/F c min A.  Has 2+/5 L shoudler strength and L  strength is WFL.  Able to perform UB dressing c max A and LB dressing c min A.  Pt is improving.  Performance deficits affecting function are weakness, impaired endurance, impaired self care skills, impaired functional mobilty, impaired balance, decreased upper extremity function.      Rehab Prognosis:  Good; patient would benefit from acute skilled OT services to address these deficits and reach maximum level of function.       Plan:     Patient to be seen 6 x/week to address the above listed problems via self-care/home management, therapeutic activities, therapeutic exercises  · Plan of Care Expires: 03/25/18  · Plan of Care Reviewed with: patient    This Plan of care has been discussed with the patient who was involved in its development and understands and is in agreement with the identified goals and treatment plan    GOALS:    Occupational Therapy Goals        Problem: Occupational Therapy Goal    Goal Priority Disciplines Outcome Interventions   Occupational Therapy Goal     OT, PT/OT Ongoing (interventions implemented as appropriate)    Description:  Goals to be met by: 3/2/2018     Patient will increase functional independence with ADLs by performing:    Feeding with Set-up Assistance.  UE Dressing with set up assistance.  LE Dressing with Moderate Assistance.  Grooming while seated with Minimal Assistance.  Toileting from bedside commode with Moderate Assistance for hygiene and clothing management.   Supine to sit with Minimal Assistance.  Stand pivot transfers with Minimal Assistance with use of platform rolling walker.                      Time Tracking:     OT Date of Treatment: 02/26/18  OT Start Time: 0850  OT Stop Time: 0915  OT Total Time (min): 25 min    Billable Minutes:Self Care/Home Management 13  Therapeutic Exercise 12    KAROLINE New  2/26/2018

## 2018-02-26 NOTE — PLAN OF CARE
[facility]    HOME HEALTH ORDERS  FACE TO FACE ENCOUNTER    Patient Name: Katelyn Huynh  YOB: 1950    PCP: Daniel Garcia MD   PCP Address: [address] [facility] [address] / [facility] [address]  PCP Phone Number: [phone]  PCP Fax: [phone]    Encounter Date: 02/26/2018    Admit to Home Health    Diagnoses:  Active Hospital Problems    Diagnosis  POA    *Wound dehiscence [T81.30XA]  Yes    Left wrist fracture s/p ORIF [S62.102A]  Yes    Essential (primary) hypertension [I10]  Yes      Resolved Hospital Problems    Diagnosis Date Resolved POA   No resolved problems to display.       Future Appointments  Date Time Provider Department Center   3/2/2018 10:40 AM Cl Wong Jr., MD [facility]   3/12/2018 9:30 AM Shelley Up NP [facility] ORTHO [facility]   3/21/2018 8:15 AM Jasen Salinas MD [facility]     Follow-up Information     Jasen Salinas MD. Go in 2 weeks.    Specialty:  Orthopedic Surgery  Why:  For wound re-check  Contact information:  [address]  [facility] [address]  [phone]                     I have seen and examined this patient face to face today. My clinical findings that support the need for the home health skilled services and home bound status are the following:  Weakness/numbness causing balance and gait disturbance due to Joint Replacement making it taxing to leave home.    Allergies:  Review of patient's allergies indicates:   Allergen Reactions    Cortisone Hives and Swelling     Swelling of the tongue       Diet: regular diet    Activities: WBAT LLE in HKB locked in extension    Home Health Admitting Clinician:   SN/PT to complete comprehensive assessment including routine vital signs. Instruct on disease process and s/s of complications to report to MD. Follow specific home health arthoplasty protocol. Review/verify medication list sent home with the patient at time of discharge  and instruct  patient/caregiver as needed. If coumadin ordered, coumadin clinic to manage INR with INR draws 2x per week with a goal to maintain INR between 1.8 and 2.2. Frequency may be adjusted depending on start of care date.    Notify MD if SBP > 160 or < 90; DBP > 90 or < 50; HR > 120 or < 50; Temp > 101    Home Medical Equipment:  Walker, 3-1 bedside commode, transfer tub bench    CONSULTS:    Physical Therapy may admit if patient not on coumadin, PT to perform comprehensive assessment if performing admit visit and generate therapy plan of care. Evaluate for home safety and equipment needs; Establish/upgrade home exercise program. Perform/instruct on therapeutic exercises, gait training, transfer training, and Range of Motion.      MISCELLANEOUS CARE:  LABS:  SN to perform labs:  CBC, CMP, ESR, CRP and vanc trough;  Frequency: Weekly ; Duration: 2 week(s).  HOME INFUSION THERAPY:   SN to perform Infusion Therapy/Central Line Care.  Review Central Line Care & Central Line Flush with patient.    Administer (drug and dose): IV vancomycin 1500 mg q12h; vanc trough goal: 15-20    Last dose given: 2/26/18 at 1300                         Home dose due: 2/27/18 at 0100  Estimated end date: 3/10/18    Administer (drug and dose): IV ceftriaxone 2 g q24h;  Last dose given: 2/26/18 at 1115                         Home dose due: 2/27/18 at 1115  Estimated end date: 3/10/18    Scrub the Hub: Prior to accessing the line, always perform a 30 second alcohol scrub  Each lumen of the central line is to be flushed at least daily with 10 mL Normal Saline and 3 mL Heparin flush (100 units/mL)  Skilled Nurse (SN) may draw blood from IV access  Blood Draw Procedure:   - Aspirate at least 5 mL of blood   - Discard   - Obtain specimen   - Change posiflow cap   - Flush with 20 mL Normal Saline followed by a                 3-5 mL Heparin flush (100 units/mL)  Central :   - Sterile dressing changes are done weekly and as needed.   -  Use chlor-hexadine scrub to cleanse site, apply Biopatch to insertion site,       apply securement device dressing   - Posi-flow caps are changed weekly and after EVERY lab draw.   - If sterile gauze is under dressing to control oozing,                 dressing change must be performed every 24 hours until gauze is not needed.    WOUND CARE ORDERS:  Assess Surgical Incision/DSRG each TX  Aquacel AG drsg applied post-op leave on 14 days post op. Call MD if any drainage reaches border to border of drsg horizontally, s/s of infection, temp >101, induration, swelling or redness.  If dressing is removed per MD order, then apply island dressing, change/teach caregiver to perform daily dressing change if island dressing present.    Medications: Review discharge medications with patient and family and provide education.      Current Discharge Medication List      START taking these medications    Details   aspirin (ECOTRIN) 325 MG EC tablet Take 1 tablet (325 mg total) by mouth 2 (two) times daily.  Qty: 60 tablet, Refills: 0      ondansetron (ZOFRAN) 8 MG tablet Take 1 tablet (8 mg total) by mouth every 8 (eight) hours as needed for Nausea.  Qty: 30 tablet, Refills: 0         CONTINUE these medications which have CHANGED    Details   docusate sodium (COLACE) 100 MG capsule Take 1 capsule (100 mg total) by mouth 2 (two) times daily.  Qty: 60 capsule, Refills: 0      oxyCODONE-acetaminophen (PERCOCET)  mg per tablet Take 1 tablet by mouth every 4 (four) hours as needed for Pain.  Qty: 90 tablet, Refills: 0         CONTINUE these medications which have NOT CHANGED    Details   ASCORBATE CALCIUM (VITAMIN C ORAL) Take by mouth once daily.       hydrochlorothiazide (HYDRODIURIL) 25 MG tablet Take 2 tablets (50 mg total) by mouth once daily.  Qty: 180 tablet, Refills: 3      valsartan (DIOVAN) 80 MG tablet TAKE 1 TABLET(80 MG) BY MOUTH EVERY DAY  Qty: 90 tablet, Refills: 0      betamethasone dipropionate (DIPROLENE) 0.05 %  cream Apply topically 2 (two) times daily.  Qty: 45 g, Refills: 1    Associated Diagnoses: Contact dermatitis, unspecified contact dermatitis type, unspecified trigger      triamcinolone acetonide 0.1% (KENALOG) 0.1 % cream JOSE ELIAS EXT AA TID  Refills: 2         STOP taking these medications       aspirin 325 MG tablet Comments:   Reason for Stopping:         ondansetron (ZOFRAN-ODT) 4 MG TbDL Comments:   Reason for Stopping:               I certify that this patient is confined to her home and needs intermittent skilled nursing care, physical therapy and occupational therapy.

## 2018-02-26 NOTE — PLAN OF CARE
Problem: Patient Care Overview  Goal: Plan of Care Review  Outcome: Ongoing (interventions implemented as appropriate)  Pt in no acute distress. Pain well-controlled with oral pain medication. VSS. FCD in place. Call light within reach. Q2H rounding, fall precautions in place. Will continue to monitor.

## 2018-02-26 NOTE — ANESTHESIA POSTPROCEDURE EVALUATION
"Anesthesia Post Evaluation    Patient: Katelyn Huynh    Procedure(s) Performed: Procedure(s) (LRB):  INCISION AND DRAINAGE-KNEE - left - shady - set up like total knee (Left)  REVISION-ARTHROPLASTY-KNEE (Left)  REPAIR-RUPTURE-QUADRICEP (Left)    Final Anesthesia Type: general  Patient location during evaluation: PACU  Patient participation: Yes- Able to Participate  Level of consciousness: awake and alert and oriented  Post-procedure vital signs: reviewed and stable  Pain management: adequate  Airway patency: patent  PONV status at discharge: No PONV  Anesthetic complications: no      Cardiovascular status: blood pressure returned to baseline  Respiratory status: unassisted  Hydration status: euvolemic  Follow-up not needed.        Visit Vitals  BP (!) 111/56 (BP Location: Right arm, Patient Position: Lying)   Pulse 105   Temp 36.8 °C (98.2 °F) (Oral)   Resp 18   Ht 5' 4.5" (1.638 m)   Wt 120.6 kg (265 lb 14 oz)   SpO2 96%   Breastfeeding? No   BMI 44.93 kg/m²       Pain/Cheli Score: Pain Assessment Performed: Yes (2/26/2018  4:48 AM)  Presence of Pain: non-verbal indicators absent (Pt observed sleeping comfortably) (2/26/2018  4:48 AM)  Pain Rating Prior to Med Admin: 5 (2/26/2018  7:02 AM)  Pain Rating Post Med Admin: 6 (2/25/2018  9:49 AM)      "

## 2018-02-26 NOTE — SUBJECTIVE & OBJECTIVE
"Principal Problem:Wound dehiscence    Principal Orthopedic Problem: same    Interval History: Patient seen and examined at bedside.  No acute events overnight.  Pain controlled. Drain output 135 mL.  Transfers with PT yesterday.    Review of patient's allergies indicates:   Allergen Reactions    Cortisone Hives and Swelling     Swelling of the tongue       Current Facility-Administered Medications   Medication    aspirin EC tablet 325 mg    cefTRIAXone (ROCEPHIN) 2 g in dextrose 5 % 50 mL IVPB    diphenhydrAMINE capsule 25 mg    docusate sodium capsule 100 mg    hydroCHLOROthiazide tablet 50 mg    morphine injection 2 mg    morphine injection 4 mg    ondansetron disintegrating tablet 4 mg    oxyCODONE-acetaminophen  mg per tablet 1 tablet    oxyCODONE-acetaminophen 5-325 mg per tablet 1 tablet    tranexamic acid (CYKLOKAPRON) 3,000 mg in sodium chloride 0.9% 100 mL    valsartan tablet 80 mg    vancomycin (VANCOCIN) 1,500 mg in sodium chloride 0.9% 250 mL IVPB     Objective:     Vital Signs (Most Recent):  Temp: 98.2 °F (36.8 °C) (02/26/18 0441)  Pulse: 105 (02/26/18 0441)  Resp: 18 (02/26/18 0441)  BP: (!) 111/56 (02/26/18 0441)  SpO2: 96 % (02/26/18 0441) Vital Signs (24h Range):  Temp:  [96.7 °F (35.9 °C)-98.2 °F (36.8 °C)] 98.2 °F (36.8 °C)  Pulse:  [] 105  Resp:  [14-19] 18  SpO2:  [94 %-100 %] 96 %  BP: (103-132)/(50-61) 111/56     Weight: 120.6 kg (265 lb 14 oz)  Height: 5' 4.5" (163.8 cm)  Body mass index is 44.93 kg/m².      Intake/Output Summary (Last 24 hours) at 02/26/18 0604  Last data filed at 02/26/18 0300   Gross per 24 hour   Intake             1010 ml   Output              135 ml   Net              875 ml       Ortho/SPM Exam     AAOx4  NAD  RRR  No increased WOB    LLE:  Dressings intact  Wound vac in place  HKB  SILT and motor intact T/SP/DP  WWP extremities        Significant Labs: All pertinent labs within the past 24 hours have been reviewed.    Significant Imaging: " None

## 2018-02-26 NOTE — PROGRESS NOTES
[facility]  Infectious Disease  Progress Note    Patient Name: Katelyn Huynh  MRN: [id number]  Admission Date: 2/23/2018  Length of Stay: 0 days  Attending Physician: Jasen Salinas MD  Primary Care Provider: Daniel Garcia MD    Isolation Status: No active isolations  Assessment/Plan:      * Wound dehiscence    Ms. Katelyn Huynh is a 67 year old woman with HTN and arthritis who is s/p left TKA on 2/9 with surgical wound dehiscence.   Per patient she was initially discharged home and subsequently fell, fracturing her left wrist. She denies falling forward onto the left knee.  She subsequently was admitted to SNF.  Wound had had mild serous drainage since surgery.   Sutures removed on 2/21 and wound opened. She underwent I&D, poly exchange and quad tendon repair on 2/24.  Currently on empiric vancomycin and ceftriaxone.  Operative cultures are NGTD; Gram stain negative.   Afebrile, no leukocytosis, hemodynamically stable.    - CRP up to 106 today from 23 two days ago (post-op?)  - ESR 61  - procalcitonin normal  - afebrile, no leukocytosis, clinically stable  - vanc trough therapeutic at 18.4    Plan:  1. Continue empiric IV vancomycin 1500 mg IV q 12 hours and ceftriaxone 2 gram IV q 24 hours; vanc trough goal: 15-20  2. Recommend 2 weeks of therapy with f/u in ID clinic in 2 weeks to assess if therapy to be extended (estimated end date: 3/10/18)  3. Would monitor weekly CBC, CMP, ESR, CRP and vanc trough while on therapy   4. F/u in ID clinic around 3/10 for end of antibiotic therapy  5. Will sign off            Anticipated Disposition: to SNF with IV abx  Thank you for your consult. I will sign off. Please contact us if you have any additional questions.  GRADY Shelley, pager: [phone]  Infectious Disease  [facility]    Subjective:     Principal Problem:Wound dehiscence    HPI:   Ms. Katelyn Huynh is a 67 year old woman with HTN and arthritis who is  s/p left TKA on 2/9 with surgical wound dehiscence.   Per patient she was initially discharged home and subsequently fell, fracturing her left wrist. She denies falling forward onto the left knee.  She subsequently was admitted to SNF.  Wound had had mild serous drainage since surgery.   Sutures removed on 2/21 and wound opened. She underwent I&D, poly exchange, and quad tendon repair yesterday 2/24.  Currently on empiric vancomycin and ceftriaxone.  Operative cultures are pending. Gram stain negative.   Afebrile, no leukocytosis, hemodynamically stable.  CRP 23.2, ESR 52.  Pain well controlled.      Interval History: NAEO; feels okay today; anticipating discharge to SNF tomorrow. Afebrile. Knee pain controlled.     Review of Systems   Constitutional: Positive for activity change. Negative for appetite change, chills, diaphoresis, fatigue and fever.   HENT: Negative for congestion, sore throat and trouble swallowing.    Respiratory: Negative for cough, shortness of breath and wheezing.    Cardiovascular: Negative for chest pain, palpitations and leg swelling.   Gastrointestinal: Negative for abdominal pain, constipation, diarrhea, nausea and vomiting.   Genitourinary: Negative for difficulty urinating, dysuria, frequency and hematuria.   Musculoskeletal: Positive for arthralgias. Negative for back pain, joint swelling and neck pain.   Skin: Negative for rash and wound.   Neurological: Negative for dizziness, speech difficulty, weakness, numbness and headaches.   Psychiatric/Behavioral: Negative for agitation and confusion. The patient is not nervous/anxious.      Objective:     Vital Signs (Most Recent):  Temp: 97.4 °F (36.3 °C) (02/26/18 1127)  Pulse: 93 (02/26/18 1127)  Resp: 16 (02/26/18 1127)  BP: (!) 148/69 (02/26/18 1127)  SpO2: 96 % (02/26/18 1127) Vital Signs (24h Range):  Temp:  [96.7 °F (35.9 °C)-98.2 °F (36.8 °C)] 97.4 °F (36.3 °C)  Pulse:  [] 93  Resp:  [16-19] 16  SpO2:  [96 %-100 %] 96 %  BP:  (103-148)/(50-82) 148/69     Weight: 120.6 kg (265 lb 14 oz)  Body mass index is 44.93 kg/m².    Estimated Creatinine Clearance: 103.5 mL/min (based on SCr of 0.7 mg/dL).    Physical Exam   Constitutional: She is oriented to person, place, and time. She appears well-developed and well-nourished. No distress.   HENT:   Head: Normocephalic and atraumatic.   Eyes: Conjunctivae are normal. No scleral icterus.   Neck: Normal range of motion. Neck supple.   Cardiovascular: Normal rate, regular rhythm and normal heart sounds.    Pulmonary/Chest: Effort normal and breath sounds normal. No respiratory distress.   Abdominal: Soft. There is no tenderness.   Musculoskeletal: She exhibits no edema.   Left knee with surgical dressing/wound vac in place    Left wrist wrapped and splinted.    Neurological: She is alert and oriented to person, place, and time.   Skin: Skin is warm and dry. No rash noted. She is not diaphoretic.   Psychiatric: She has a normal mood and affect. Her behavior is normal.   Vitals reviewed.      Significant Labs:   Bilirubin:   Recent Labs  Lab 02/23/18 2009 02/24/18  1030 02/25/18  0603   BILITOT 0.7 0.6 0.8     CBC:   Recent Labs  Lab 02/25/18  0603   WBC 10.33   HGB 10.8*   HCT 33.5*   *     CMP:   Recent Labs  Lab 02/25/18  0603      K 4.2      CO2 27   *   BUN 8   CREATININE 0.7   CALCIUM 8.0*   PROT 6.8   ALBUMIN 2.5*   BILITOT 0.8   ALKPHOS 92   AST 25   ALT 14   ANIONGAP 8   EGFRNONAA >60.0     Wound Culture:   Recent Labs  Lab 02/24/18  0959 02/24/18  1005   LABAERO No growth No growth       Significant Imaging: I have reviewed all pertinent imaging results/findings within the past 24 hours.

## 2018-02-26 NOTE — PT/OT/SLP PROGRESS
Physical Therapy Treatment    Patient Name:  Katelyn Huynh   MRN:  [id number]    Recommendations:     Discharge Recommendations:  nursing facility, skilled   Discharge Equipment Recommendations:  (TBD at next level of care)   Barriers to discharge: None    Assessment:     Katelyn Huynh is a 67 y.o. female admitted with a medical diagnosis of Wound dehiscence.  She presents with the following impairments/functional limitations:  weakness, impaired endurance, impaired functional mobilty, impaired balance, decreased upper extremity function, decreased lower extremity function, pain, decreased ROM, orthopedic precautions, edema.    Pt with improved tolerance to session. Continues to require PT assistance for t/f's, but was able to amb safely out in hallway with platform walker. Shortened R step length due to difficulty weightbearing on LLE. Pt remains limited by weight, weakness, and early onset of fatigue and pain. Pt would continue to benefit from PT services to improve independence with functional mobility, increase strength, and safety education. Pt remains appropriate for SNF placement upon d/c.     Rehab Prognosis:  Good; patient would benefit from acute skilled PT services to address these deficits and reach maximum level of function.      Recent Surgery: Procedure(s) (LRB):  INCISION AND DRAINAGE-KNEE - left - shady - set up like total knee (Left)  REVISION-ARTHROPLASTY-KNEE (Left)  REPAIR-RUPTURE-QUADRICEP (Left) 2 Days Post-Op    Plan:     During this hospitalization, patient to be seen daily to address the above listed problems via gait training, therapeutic activities, therapeutic exercises, neuromuscular re-education  · Plan of Care Expires:  03/27/18   Plan of Care Reviewed with: patient    Subjective     Communicated with nsg prior to session.  Patient found up in chair upon PT entry to room, agreeable to treatment.      Chief Complaint: impaired functional mobility  Patient  comments/goals: return to PLOF  Pain/Comfort:  · Pain Rating 1: 5/10  · Location - Side 1: Left  · Location - Orientation 1: generalized  · Location 1: knee  · Pain Addressed 1: Pre-medicate for activity, Reposition, Distraction    Patients cultural, spiritual, Adventist conflicts given the current situation: none    Objective:     Patient found with: wound vac, peripheral IV     General Precautions: Standard, fall   Orthopedic Precautions:LUE non weight bearing, LLE weight bearing as tolerated   Braces: Hinged knee brace     Functional Mobility:  · Bed Mobility:     · Scooting: stand by assistance  · Sit to Supine: moderate assistance  · Transfers:     · 2x Sit to Stand:  moderate assistance and maximal assistance with platform walker  · 1st trial from bedside commode, second trial from bedside chair  · Variable assistance level required  · Difficulty rising from bedside chair; needed two attempts to reach full upright position  · Gait: 1x30 ft with platform walker and CGA with IV pole and wound vac in tow  · 3 point gait pattern with VC's for AD management  · Gait was slowed and antalgic with notable difficulty bearing weight through LLE  · Shortened right step length  · Required seated rest break      AM-PAC 6 CLICK MOBILITY  Turning over in bed (including adjusting bedclothes, sheets and blankets)?: 3  Sitting down on and standing up from a chair with arms (e.g., wheelchair, bedside commode, etc.): 2  Moving from lying on back to sitting on the side of the bed?: 3  Moving to and from a bed to a chair (including a wheelchair)?: 3  Need to walk in hospital room?: 3  Climbing 3-5 steps with a railing?: 1  Total Score: 15       Therapeutic Activities and Exercises:   Pt able to stand with platform walker and SBA for ~2 minutes while pericare performed.      Pt performed 30 reps of:  1. AP  2. QS  3. GS    Pt educated on:  1. Role of PT and POC  2. Safety with AD during ambulation and t/f  3. Assistance level  required for mobility  4. Length of stay and d/c plans  5. Importance of OOB activity to promote healing        Patient left supine with all lines intact and call button in reach..    GOALS:    Physical Therapy Goals        Problem: Physical Therapy Goal    Goal Priority Disciplines Outcome Goal Variances Interventions   Physical Therapy Goal     PT/OT, PT Ongoing (interventions implemented as appropriate)     Description:  Goals to be met by: 3/3/18     Patient will increase functional independence with mobility by performin. Supine to sit with Stand-by Assistance  2. Sit to supine with Stand-by Assistance  3. Sit to stand transfer with Contact Guard Assistance  4. Bed to chair transfer with Contact Guard Assistance using Rolling Walker  5. Gait  x 100 feet with Contact Guard Assistance using Rolling Walker.   6. Lower extremity exercise program x 30 reps per handout, with independence                      Time Tracking:     PT Received On: 18  PT Start Time: 1306     PT Stop Time: 1330  PT Total Time (min): 24 min     Billable Minutes: Gait Training 15 and Therapeutic Activity 9    Treatment Type: Treatment  PT/PTA: PT           Marge Araujo, SPT  2018

## 2018-02-26 NOTE — PLAN OF CARE
11:02 AM  SW received notification that pt will need SNF. Pt's preference is [facility]. Faxed referral via [facility]. CM placed consult.     Nickie Chua LMSW   [facility]  Ext [phone]

## 2018-02-26 NOTE — PROCEDURES
"Katelyn Huynh is a 67 y.o. female patient.    Temp: 97.4 °F (36.3 °C) (02/26/18 1127)  Pulse: 93 (02/26/18 1127)  Resp: 16 (02/26/18 1127)  BP: (!) 148/69 (02/26/18 1127)  SpO2: 96 % (02/26/18 1127)  Weight: 120.6 kg (265 lb 14 oz) (02/23/18 1958)  Height: 5' 4.5" (163.8 cm) (02/23/18 1958)    PICC  Date/Time: 2/26/2018 3:25 PM  Performed by: JAYME MILLER  Consent Done: Yes  Time out: Immediately prior to procedure a time out was called to verify the correct patient, procedure, equipment, support staff and site/side marked as required  Indications: med administration and vascular access  Anesthesia: local infiltration  Local anesthetic: lidocaine 1% without epinephrine  Anesthetic Total (mL): 3  Preparation: skin prepped with ChloraPrep  Skin prep agent dried: skin prep agent completely dried prior to procedure  Sterile barriers: all five maximum sterile barriers used - cap, mask, sterile gown, sterile gloves, and large sterile sheet  Hand hygiene: hand hygiene performed prior to central venous catheter insertion  Location details: right brachial  Catheter type: double lumen  Catheter size: 5 Fr  Catheter Length: 40cm    Ultrasound guidance: yes  Vessel Caliber: medium and patent, compressibility normal  Vascular Doppler: not done  Needle advanced into vessel with real time Ultrasound guidance.  Guidewire confirmed in vessel.  Image recorded and saved.  Sterile sheath used.  Number of attempts: 1  Post-procedure: blood return through all ports, chlorhexidine patch and sterile dressing applied  Technical procedures used: 3cg  Specimens: No  Implants: No  Assessment: placement verified by x-ray  Complications: none        Ara Salas  2/26/2018  "

## 2018-02-26 NOTE — CONSULTS
Double lumen PICC placed in right brachial vein of MERNA, 40cm in length with 0cm exposed and 51cm arm circumference. Lot#[id number].

## 2018-02-26 NOTE — PLAN OF CARE
Problem: Physical Therapy Goal  Goal: Physical Therapy Goal  Goals to be met by: 3/3/18     Patient will increase functional independence with mobility by performin. Supine to sit with Stand-by Assistance  2. Sit to supine with Stand-by Assistance  3. Sit to stand transfer with Contact Guard Assistance  4. Bed to chair transfer with Contact Guard Assistance using Rolling Walker  5. Gait  x 100 feet with Contact Guard Assistance using Rolling Walker.   6. Lower extremity exercise program x 30 reps per handout, with independence     Outcome: Ongoing (interventions implemented as appropriate)  Pt progressing towards goals. Cont POC.

## 2018-02-26 NOTE — PLAN OF CARE
02/26/2018  8:16 AM    Patient sent to ER 2/23/2018 for evaluation of Lt TKA wound dehiscence and admitted.    Future Appointments  Date Time Provider Department Center   3/2/2018 10:40 AM [facility]   3/21/2018 8:15 AM Jasen Salinas MD [facility]        02/26/18 0815   Final Note   Assessment Type Discharge Planning Assessment   Discharge Disposition Admitted     Brianna Herman RN, CM Skilled  [id number]

## 2018-02-26 NOTE — PLAN OF CARE
Problem: Patient Care Overview  Goal: Plan of Care Review  Outcome: Ongoing (interventions implemented as appropriate)  Patient resting in bed comfortably. IV intact and infusing IV ABX with no signs of irritation. Wound vac in place and functioning. Fall precautions maintained with no falls noted. Call light in reach bed locked and in lowest position. Non-skid socks on while out of bed. Patient instructed to call for assistance. Skin integrity maintained as patient is assisted with frequent positioning. C/o pain managed with prn meds. No other complaints or concerns. Progressing towards goals. Will continue to monitor and follow plan of care.

## 2018-02-26 NOTE — SUBJECTIVE & OBJECTIVE
Interval History: NAEO; feels okay today; anticipating discharge to SNF tomorrow. Afebrile. Knee pain controlled.     Review of Systems   Constitutional: Positive for activity change. Negative for appetite change, chills, diaphoresis, fatigue and fever.   HENT: Negative for congestion, sore throat and trouble swallowing.    Respiratory: Negative for cough, shortness of breath and wheezing.    Cardiovascular: Negative for chest pain, palpitations and leg swelling.   Gastrointestinal: Negative for abdominal pain, constipation, diarrhea, nausea and vomiting.   Genitourinary: Negative for difficulty urinating, dysuria, frequency and hematuria.   Musculoskeletal: Positive for arthralgias. Negative for back pain, joint swelling and neck pain.   Skin: Negative for rash and wound.   Neurological: Negative for dizziness, speech difficulty, weakness, numbness and headaches.   Psychiatric/Behavioral: Negative for agitation and confusion. The patient is not nervous/anxious.      Objective:     Vital Signs (Most Recent):  Temp: 97.4 °F (36.3 °C) (02/26/18 1127)  Pulse: 93 (02/26/18 1127)  Resp: 16 (02/26/18 1127)  BP: (!) 148/69 (02/26/18 1127)  SpO2: 96 % (02/26/18 1127) Vital Signs (24h Range):  Temp:  [96.7 °F (35.9 °C)-98.2 °F (36.8 °C)] 97.4 °F (36.3 °C)  Pulse:  [] 93  Resp:  [16-19] 16  SpO2:  [96 %-100 %] 96 %  BP: (103-148)/(50-82) 148/69     Weight: 120.6 kg (265 lb 14 oz)  Body mass index is 44.93 kg/m².    Estimated Creatinine Clearance: 103.5 mL/min (based on SCr of 0.7 mg/dL).    Physical Exam   Constitutional: She is oriented to person, place, and time. She appears well-developed and well-nourished. No distress.   HENT:   Head: Normocephalic and atraumatic.   Eyes: Conjunctivae are normal. No scleral icterus.   Neck: Normal range of motion. Neck supple.   Cardiovascular: Normal rate, regular rhythm and normal heart sounds.    Pulmonary/Chest: Effort normal and breath sounds normal. No respiratory distress.    Abdominal: Soft. There is no tenderness.   Musculoskeletal: She exhibits no edema.   Left knee with surgical dressing/wound vac in place    Left wrist wrapped and splinted.    Neurological: She is alert and oriented to person, place, and time.   Skin: Skin is warm and dry. No rash noted. She is not diaphoretic.   Psychiatric: She has a normal mood and affect. Her behavior is normal.   Vitals reviewed.      Significant Labs:   Bilirubin:   Recent Labs  Lab 02/23/18  2009 02/24/18  1030 02/25/18  0603   BILITOT 0.7 0.6 0.8     CBC:   Recent Labs  Lab 02/25/18  0603   WBC 10.33   HGB 10.8*   HCT 33.5*   *     CMP:   Recent Labs  Lab 02/25/18  0603      K 4.2      CO2 27   *   BUN 8   CREATININE 0.7   CALCIUM 8.0*   PROT 6.8   ALBUMIN 2.5*   BILITOT 0.8   ALKPHOS 92   AST 25   ALT 14   ANIONGAP 8   EGFRNONAA >60.0     Wound Culture:   Recent Labs  Lab 02/24/18  0959 02/24/18  1005   LABAERO No growth No growth       Significant Imaging: I have reviewed all pertinent imaging results/findings within the past 24 hours.

## 2018-02-26 NOTE — PLAN OF CARE
Problem: Occupational Therapy Goal  Goal: Occupational Therapy Goal  Goals to be met by: 3/2/2018     Patient will increase functional independence with ADLs by performing:    Feeding with Set-up Assistance.  UE Dressing with set up assistance.  LE Dressing with Moderate Assistance.  Grooming while seated with Minimal Assistance.  Toileting from bedside commode with Moderate Assistance for hygiene and clothing management.   Supine to sit with Minimal Assistance.  Stand pivot transfers with Minimal Assistance with use of platform rolling walker.     Cont. POC       Passed

## 2018-02-27 LAB — BACTERIA SPEC AEROBE CULT: NO GROWTH

## 2018-02-27 PROCEDURE — 63600175 PHARM REV CODE 636 W HCPCS: Performed by: STUDENT IN AN ORGANIZED HEALTH CARE EDUCATION/TRAINING PROGRAM

## 2018-02-27 PROCEDURE — 25000003 PHARM REV CODE 250: Performed by: ORTHOPAEDIC SURGERY

## 2018-02-27 PROCEDURE — 25000003 PHARM REV CODE 250: Performed by: STUDENT IN AN ORGANIZED HEALTH CARE EDUCATION/TRAINING PROGRAM

## 2018-02-27 PROCEDURE — 25000003 PHARM REV CODE 250: Performed by: NURSE PRACTITIONER

## 2018-02-27 PROCEDURE — 97530 THERAPEUTIC ACTIVITIES: CPT

## 2018-02-27 PROCEDURE — 63600175 PHARM REV CODE 636 W HCPCS: Performed by: NURSE PRACTITIONER

## 2018-02-27 PROCEDURE — 97535 SELF CARE MNGMENT TRAINING: CPT

## 2018-02-27 PROCEDURE — 97116 GAIT TRAINING THERAPY: CPT

## 2018-02-27 PROCEDURE — A4216 STERILE WATER/SALINE, 10 ML: HCPCS | Performed by: ORTHOPAEDIC SURGERY

## 2018-02-27 PROCEDURE — 11000001 HC ACUTE MED/SURG PRIVATE ROOM

## 2018-02-27 RX ORDER — VALSARTAN 40 MG/1
80 TABLET ORAL DAILY
Status: CANCELLED | OUTPATIENT
Start: 2018-02-28

## 2018-02-27 RX ORDER — SODIUM CHLORIDE 0.9 % (FLUSH) 0.9 %
10 SYRINGE (ML) INJECTION EVERY 6 HOURS
Status: CANCELLED | OUTPATIENT
Start: 2018-02-27

## 2018-02-27 RX ORDER — HYDROCHLOROTHIAZIDE 25 MG/1
50 TABLET ORAL DAILY
Status: CANCELLED | OUTPATIENT
Start: 2018-02-28

## 2018-02-27 RX ORDER — DIPHENHYDRAMINE HCL 25 MG
25 CAPSULE ORAL EVERY 6 HOURS PRN
Status: CANCELLED | OUTPATIENT
Start: 2018-02-27

## 2018-02-27 RX ORDER — OXYCODONE AND ACETAMINOPHEN 5; 325 MG/1; MG/1
1 TABLET ORAL EVERY 4 HOURS PRN
Status: CANCELLED | OUTPATIENT
Start: 2018-02-27

## 2018-02-27 RX ORDER — ONDANSETRON 4 MG/1
4 TABLET, ORALLY DISINTEGRATING ORAL EVERY 6 HOURS PRN
Status: CANCELLED | OUTPATIENT
Start: 2018-02-27

## 2018-02-27 RX ORDER — SODIUM CHLORIDE 0.9 % (FLUSH) 0.9 %
10 SYRINGE (ML) INJECTION
Status: CANCELLED | OUTPATIENT
Start: 2018-02-27

## 2018-02-27 RX ORDER — DOCUSATE SODIUM 100 MG/1
100 CAPSULE, LIQUID FILLED ORAL 2 TIMES DAILY
Status: CANCELLED | OUTPATIENT
Start: 2018-02-27

## 2018-02-27 RX ORDER — ASPIRIN 325 MG
325 TABLET, DELAYED RELEASE (ENTERIC COATED) ORAL 2 TIMES DAILY
Status: CANCELLED | OUTPATIENT
Start: 2018-02-27

## 2018-02-27 RX ORDER — OXYCODONE AND ACETAMINOPHEN 10; 325 MG/1; MG/1
1 TABLET ORAL EVERY 4 HOURS PRN
Status: CANCELLED | OUTPATIENT
Start: 2018-02-27

## 2018-02-27 RX ADMIN — CEFTRIAXONE SODIUM 2 G: 2 INJECTION, POWDER, FOR SOLUTION INTRAMUSCULAR; INTRAVENOUS at 11:02

## 2018-02-27 RX ADMIN — HYDROCHLOROTHIAZIDE 50 MG: 25 TABLET ORAL at 08:02

## 2018-02-27 RX ADMIN — Medication 10 ML: at 12:02

## 2018-02-27 RX ADMIN — VALSARTAN 80 MG: 40 TABLET ORAL at 08:02

## 2018-02-27 RX ADMIN — DOCUSATE SODIUM 100 MG: 100 CAPSULE, LIQUID FILLED ORAL at 09:02

## 2018-02-27 RX ADMIN — ASPIRIN 325 MG: 325 TABLET, DELAYED RELEASE ORAL at 09:02

## 2018-02-27 RX ADMIN — OXYCODONE HYDROCHLORIDE AND ACETAMINOPHEN 1 TABLET: 10; 325 TABLET ORAL at 10:02

## 2018-02-27 RX ADMIN — OXYCODONE HYDROCHLORIDE AND ACETAMINOPHEN 1 TABLET: 10; 325 TABLET ORAL at 09:02

## 2018-02-27 RX ADMIN — Medication 10 ML: at 06:02

## 2018-02-27 RX ADMIN — OXYCODONE HYDROCHLORIDE AND ACETAMINOPHEN 1 TABLET: 10; 325 TABLET ORAL at 05:02

## 2018-02-27 RX ADMIN — DOCUSATE SODIUM 100 MG: 100 CAPSULE, LIQUID FILLED ORAL at 08:02

## 2018-02-27 RX ADMIN — VANCOMYCIN HCL-SODIUM CHLORIDE IV SOLN 1.5 GM/250ML-0.9% 1500 MG: 1.5-0.9/25 SOLUTION at 07:02

## 2018-02-27 RX ADMIN — OXYCODONE HYDROCHLORIDE AND ACETAMINOPHEN 1 TABLET: 10; 325 TABLET ORAL at 04:02

## 2018-02-27 RX ADMIN — ASPIRIN 325 MG: 325 TABLET, DELAYED RELEASE ORAL at 08:02

## 2018-02-27 RX ADMIN — VANCOMYCIN HCL-SODIUM CHLORIDE IV SOLN 1.5 GM/250ML-0.9% 1500 MG: 1.5-0.9/25 SOLUTION at 01:02

## 2018-02-27 NOTE — PLAN OF CARE
Problem: Patient Care Overview  Goal: Plan of Care Review  Outcome: Ongoing (interventions implemented as appropriate)  Patient resting in bed comfortably. PICC intact with no signs of irritation. Fall precautions maintained with no falls noted. Call light in reach bed locked and in lowest position. Non-skid socks on while out of bed. patient instructed to call for assistance. Skin integrity maintained as patient is assisted with frequent positioning. C/o pain managed with prn meds. No other complaints or concerns. Progressing towards goals. Will continue to monitor and follow plan of care.

## 2018-02-27 NOTE — PLAN OF CARE
Addendum on 2/28/18 at 0920: CLARE notified that patient did not discharge on 2/17/18 as planned. SW was notified after hours that insurance auth was received and patient was cleared to transfer to [facility]. SW stated she called the [facility] unit and informed staff of authorization. Patient did not discharge. No transportation set up and on-call SW not contacted by staff. Plans for patient to discharge today.    POD 2 s/p left TKA revision, quad tendon repair, I&D and negative pressure dressing placement. Patient has an incisional wound vac in place. Patient has a PICC line and final ID recommendations are for IV Vancomycin and IV Ceftriaxone with an estimated end date of 3/10/18; see ID note for details. PT/OT recommended SNF placement. Plans to discharge patient to [facility] today. Patient verbalized understanding. All questions and concerns addressed. MATT and CLARE will continue to follow for any additional needs.      Future Appointments  Date Time Provider Department Center   3/2/2018 10:40 AM Cl Wong Jr., MD [facility]   3/12/2018 9:30 AM Shelley Up NP [facility]   3/21/2018 8:15 AM Jasen Salinas MD [facility]        02/27/18 1412   Final Note   Assessment Type Final Discharge Note   Discharge Disposition SNF  ([facility])   Hospital Follow Up  Appt(s) scheduled? Yes   Discharge plans and expectations educations in teach back method with documentation complete? Yes

## 2018-02-27 NOTE — SUBJECTIVE & OBJECTIVE
"Principal Problem:Wound dehiscence    Principal Orthopedic Problem: same    Interval History: Patient seen and examined at bedside.  No acute events overnight.  Pain controlled.  Walked 74 feet with PT yesterday.    Review of patient's allergies indicates:   Allergen Reactions    Cortisone Hives and Swelling     Swelling of the tongue       Current Facility-Administered Medications   Medication    aspirin EC tablet 325 mg    cefTRIAXone (ROCEPHIN) 2 g in dextrose 5 % 50 mL IVPB    diphenhydrAMINE capsule 25 mg    docusate sodium capsule 100 mg    hydroCHLOROthiazide tablet 50 mg    morphine injection 2 mg    morphine injection 4 mg    ondansetron disintegrating tablet 4 mg    oxyCODONE-acetaminophen  mg per tablet 1 tablet    oxyCODONE-acetaminophen 5-325 mg per tablet 1 tablet    sodium chloride 0.9% flush 10 mL    And    sodium chloride 0.9% flush 10 mL    tranexamic acid (CYKLOKAPRON) 3,000 mg in sodium chloride 0.9% 100 mL    valsartan tablet 80 mg    vancomycin (VANCOCIN) 1,500 mg in sodium chloride 0.9% 250 mL IVPB     Objective:     Vital Signs (Most Recent):  Temp: 97.4 °F (36.3 °C) (02/27/18 1300)  Pulse: 88 (02/27/18 1300)  Resp: 17 (02/27/18 1300)  BP: 120/60 (02/27/18 1300)  SpO2: 97 % (02/27/18 1300) Vital Signs (24h Range):  Temp:  [97.2 °F (36.2 °C)-98.5 °F (36.9 °C)] 97.4 °F (36.3 °C)  Pulse:  [] 88  Resp:  [16-17] 17  SpO2:  [96 %-99 %] 97 %  BP: (110-144)/(54-73) 120/60     Weight: 120.6 kg (265 lb 14 oz)  Height: 5' 4.5" (163.8 cm)  Body mass index is 44.93 kg/m².      Intake/Output Summary (Last 24 hours) at 02/27/18 1317  Last data filed at 02/27/18 1125   Gross per 24 hour   Intake             1040 ml   Output                0 ml   Net             1040 ml       Ortho/SPM Exam     AAOx4  NAD  RRR  No increased WOB    LLE:  Dressings intact  Wound vac in place  HKB  SILT and motor intact T/SP/DP  WWP extremities        Significant Labs: All pertinent labs within the " past 24 hours have been reviewed.    Significant Imaging: None

## 2018-02-27 NOTE — PROGRESS NOTES
[facility]  Orthopedics  Progress Note    Patient Name: Katelyn Huynh  MRN: [id number]  Admission Date: 2/23/2018  Hospital Length of Stay: 1 days  Attending Provider: Jasen Salinas MD  Primary Care Provider: Daniel Garcia MD  Follow-up For: Procedure(s) (LRB):  INCISION AND DRAINAGE-KNEE - left - shady - set up like total knee (Left)  REVISION-ARTHROPLASTY-KNEE (Left)  REPAIR-RUPTURE-QUADRICEP (Left)    Post-Operative Day: 3 Days Post-Op  Subjective:     Principal Problem:Wound dehiscence    Principal Orthopedic Problem: same    Interval History: Patient seen and examined at bedside.  No acute events overnight.  Pain controlled.  Walked 74 feet with PT yesterday.    Review of patient's allergies indicates:   Allergen Reactions    Cortisone Hives and Swelling     Swelling of the tongue       Current Facility-Administered Medications   Medication    aspirin EC tablet 325 mg    cefTRIAXone (ROCEPHIN) 2 g in dextrose 5 % 50 mL IVPB    diphenhydrAMINE capsule 25 mg    docusate sodium capsule 100 mg    hydroCHLOROthiazide tablet 50 mg    morphine injection 2 mg    morphine injection 4 mg    ondansetron disintegrating tablet 4 mg    oxyCODONE-acetaminophen  mg per tablet 1 tablet    oxyCODONE-acetaminophen 5-325 mg per tablet 1 tablet    sodium chloride 0.9% flush 10 mL    And    sodium chloride 0.9% flush 10 mL    tranexamic acid (CYKLOKAPRON) 3,000 mg in sodium chloride 0.9% 100 mL    valsartan tablet 80 mg    vancomycin (VANCOCIN) 1,500 mg in sodium chloride 0.9% 250 mL IVPB     Objective:     Vital Signs (Most Recent):  Temp: 97.4 °F (36.3 °C) (02/27/18 1300)  Pulse: 88 (02/27/18 1300)  Resp: 17 (02/27/18 1300)  BP: 120/60 (02/27/18 1300)  SpO2: 97 % (02/27/18 1300) Vital Signs (24h Range):  Temp:  [97.2 °F (36.2 °C)-98.5 °F (36.9 °C)] 97.4 °F (36.3 °C)  Pulse:  [] 88  Resp:  [16-17] 17  SpO2:  [96 %-99 %] 97 %  BP: (110-144)/(54-73) 120/60     Weight:  "120.6 kg (265 lb 14 oz)  Height: 5' 4.5" (163.8 cm)  Body mass index is 44.93 kg/m².      Intake/Output Summary (Last 24 hours) at 02/27/18 1317  Last data filed at 02/27/18 1125   Gross per 24 hour   Intake             1040 ml   Output                0 ml   Net             1040 ml       Ortho/SPM Exam     AAOx4  NAD  RRR  No increased WOB    LLE:  Dressings intact  Wound vac in place  HKB  SILT and motor intact T/SP/DP  WWP extremities        Significant Labs: All pertinent labs within the past 24 hours have been reviewed.    Significant Imaging: None    Assessment/Plan:     * Wound dehiscence    Katelyn Huynh is a 68 yo female s/p left TKA I&D and quad tendon repair    - WBAT in hinged knee brace locked in extension  - vanc, ceftriaxone   - ID consult  - Follow cultures; NGTD  - drain: removed  - labs: reviewed  - Dispo: SNF pending insurance approval        Left wrist fracture s/p ORIF    Removed sutures  Placed in short arm cast        Essential (primary) hypertension    Home meds              Isidro Harper MD  Orthopedics  [facility]  "

## 2018-02-27 NOTE — PLAN OF CARE
12:24 PM  SW received notification from Manuela with [facility] that pt has been approved, pending insurance authorization.    Nickie Chua LMSW   [facility]  Ext [phone]

## 2018-02-27 NOTE — PT/OT/SLP PROGRESS
Occupational Therapy   Treatment    Name: Katelyn Huynh  MRN: [id number]  Admitting Diagnosis:  Wound dehiscence  3 Days Post-Op    Recommendations:     Discharge Recommendations: nursing facility, skilled  Discharge Equipment Recommendations:   (TBD at next level of care)  Barriers to discharge:  Decreased caregiver support, Inaccessible home environment    Subjective     Communicated with: RN prior to session.  Pain/Comfort:  · Pain Rating 1: 4/10  · Location - Side 1: Left  · Location - Orientation 1: generalized  · Location 1: wrist  · Pain Addressed 1: Reposition, Distraction  · Pain Rating Post-Intervention 1: 4/10    Patients cultural, spiritual, [employer]ec[employer]al conflicts given the current situation: none stated     Objective:     Patient found with: PICC line, wound vac    General Precautions: Standard, fall   Orthopedic Precautions:LUE weight bearing as tolerated, LLE weight bearing as tolerated (LUE WBAT through her elbow )   Braces: Hinged knee brace     Occupational Performance:    Bed Mobility:    · Patient completed Scooting/Bridging with supervision  · Patient completed Supine to Sit with minimum assistance and with leg lift     Functional Mobility/Transfers:  · Patient completed Sit <> Stand Transfer with minimum assistance on 2nd attempt  with  platform walker - pt unable to clear buttock region from bed on 1st attempt   · Patient completed Bed <> Chair Transfer using Step Transfer technique with contact guard assistance with platform walker  · Functional Mobility: Pt ambulated short household distance ~10 ft to bedside chair w/ platform walker and CGA - no signs of LOB or SOB noted.     Activities of Daily Living:  · Feeding:  supervision and set-up. Pt able to compensate feeding tasks and utensil management with R hand   · UB Dressing: minimum assistance to don back gown   · LB Dressing: minimum assistance to don L sock. Pt doffed L sock Mod I and then demonstrated good initiation to meño L  sock but unable to manage pinky toe into sock    Patient left up in chair with all lines intact and call button in reach     [facility] 6 Click: Self-care  [facility] Total Score: 19    Treatment & Education:  Pt educated on:   - OT POC  - Importance of OOB activity with staff assist to maximize recovery   - safety with functional mobility, hand placement on platform walker and WBAT through L elbow  - UB dressing compensatory strategy using one-handed technique for pullover tops  - LB dressing technique in long-sitting to facilitate task of donning L sock w/out assist. Pt was already sitting EOB at this time, so OT demonstrated. Pt reported she would like to attempt the technique next time she is in bed.   - All assist levels for mobility listed above  Education:  - Pt tolerated L UE exercises well completing 2x10 shoulder flexion and abduction - pt performed shoulder abduction w/ AAROM    Assessment:     Katelyn Huynh is a 67 y.o. female with a medical diagnosis of Wound dehiscence.  She presents with good motivation and participation with therapy. She demonstrates improvements with both functional transfers and self-care (dressing & feeding) tasks. However, she continues to present with difficulty with functional mobility requiring Min A to mobilize LLE in bed and sit<>stand from EOB. Pt continues to benefit from acute OT to address the below listed impairments:      Performance deficits affecting function are weakness, impaired endurance, impaired self care skills, impaired functional mobilty, gait instability, impaired balance, decreased lower extremity function, decreased upper extremity function, pain, decreased ROM, orthopedic precautions, impaired fine motor.      Rehab Prognosis:  Good; patient would benefit from acute skilled OT services to address these deficits and reach maximum level of function.       Plan:     Patient to be seen 6 x/week to address the above listed problems via self-care/home  management, therapeutic activities, therapeutic exercises  · Plan of Care Expires: 03/25/18  · Plan of Care Reviewed with: patient    This Plan of care has been discussed with the patient who was involved in its development and understands and is in agreement with the identified goals and treatment plan    GOALS:    Occupational Therapy Goals        Problem: Occupational Therapy Goal    Goal Priority Disciplines Outcome Interventions   Occupational Therapy Goal     OT, PT/OT Ongoing (interventions implemented as appropriate)    Description:  Goals to be met by: 3/2/2018     Patient will increase functional independence with ADLs by performing:    Feeding with Set-up Assistance. -- Met   UE Dressing with set up assistance.  LE Dressing with Moderate Assistance. -- Met   Grooming while seated with Minimal Assistance.  Toileting from bedside commode with Moderate Assistance for hygiene and clothing management.   Supine to sit with Minimal Assistance. -- Met  Stand pivot transfers with Minimal Assistance with use of platform rolling walker. -- Met                        Time Tracking:     OT Date of Treatment: 02/27/18  OT Start Time: 0902  OT Stop Time: 0929  OT Total Time (min): 27 min    Billable Minutes:Self Care/Home Management 19  Therapeutic Activity 8    Hailey Stephen, OT  2/27/2018

## 2018-02-27 NOTE — PLAN OF CARE
Problem: Occupational Therapy Goal  Goal: Occupational Therapy Goal  Goals to be met by: 3/2/2018     Patient will increase functional independence with ADLs by performing:    Feeding with Set-up Assistance. -- Met   UE Dressing with set up assistance.  LE Dressing with Moderate Assistance. -- Met   Grooming while seated with Minimal Assistance.  Toileting from bedside commode with Moderate Assistance for hygiene and clothing management.   Supine to sit with Minimal Assistance. -- Met  Stand pivot transfers with Minimal Assistance with use of platform rolling walker. -- Met      Outcome: Ongoing (interventions implemented as appropriate)  Pt is progressing very well towards all functional outcomes at this time. Remaining goals continue to be appropriate     Comments: Cont OT POC

## 2018-02-27 NOTE — PT/OT/SLP PROGRESS
Physical Therapy Treatment    Patient Name:  Katelyn Huynh   MRN:  [id number]    Recommendations:     Discharge Recommendations:  nursing facility, skilled   Discharge Equipment Recommendations:  (TBD)   Barriers to discharge: None    Assessment:     Katelyn Huynh is a 67 y.o. female admitted with a medical diagnosis of Wound dehiscence.  She presents with the below impairments/functional limitations.  Pt tolerated treatment well today.  Pt continues to be slight anxious during treatment, but is progressing well.  Pt will continue to benefit from skilled PT services to address the below deficits and to increase functional independence.      Rehab Prognosis:  good; patient would benefit from acute skilled PT services to address these deficits and reach maximum level of function.      Rehab Identified impairments/functional limitations:   weakness, impaired endurance, impaired functional mobilty, gait instability, impaired balance, decreased lower extremity function, decreased ROM, edema, orthopedic precautions, impaired skin    Recent Surgery: Procedure(s) (LRB):  INCISION AND DRAINAGE-KNEE - left - shady - set up like total knee (Left)  REVISION-ARTHROPLASTY-KNEE (Left)  REPAIR-RUPTURE-QUADRICEP (Left) 3 Days Post-Op    Plan:     During this hospitalization, patient to be seen daily to address the above listed problems via gait training, therapeutic exercises, therapeutic activities, neuromuscular re-education  · Plan of Care Expires:  03/27/18   Plan of Care Reviewed with: patient    Subjective     Communicated with RN prior to session.  Patient found seated in bedside chair upon PT entry to room, agreeable to treatment.      Chief Complaint: needing to urinate    Pain/Comfort:  · Pain Rating 1: 2/10  · Location - Side 1: Left  · Location - Orientation 1: generalized  · Location 1: knee  · Pain Addressed 1: Reposition, Distraction  · Pain Rating Post-Intervention 1: 2/10    Patients  cultural, spiritual, [employer]ian conflicts given the current situation: none noted    Objective:     Patient found with: PICC line, wound vac     General Precautions: Standard, fall   Orthopedic Precautions:LUE weight bearing as tolerated, LLE weight bearing as tolerated   Braces: Hinged knee brace (locked in ext;)     Functional Mobility:    Transfers:  · Patient completed Sit <> Stand Transfer   · From bedside chair with maximum assistance with no assistive device.  Pt attempted 2 trials from bedside chair with L platform walker, but unable to complete.    · From bedside commode with Min A with L platform walker.     · Patient completed Toilet Transfer Stand Pivot technique with minimum assistance with L platform walker.      Gait:  Pt ambulated ~74 feet with L platforming walker and CGA.  Good understanding of 3 point gait pattern.  Pt able to increased B step length today and appeared to have less antalgic gait.       AM-PAC 6 CLICK MOBILITY  Turning over in bed (including adjusting bedclothes, sheets and blankets)?: 3  Sitting down on and standing up from a chair with arms (e.g., wheelchair, bedside commode, etc.): 2  Moving from lying on back to sitting on the side of the bed?: 3  Moving to and from a bed to a chair (including a wheelchair)?: 3  Need to walk in hospital room?: 3  Climbing 3-5 steps with a railing?: 1  Total Score: 15     Therapeutic Activities and Exercises:  · Pt toileted at bedside commode.  · Pt able to maintain balance with no issues.    · Pt required set up assistance to perform hygiene  · Pt educated on PT POC and updated on progress  · Pt encouraged to continue performing QS, AP, and GS as recommended on HEP.  Pt states that she has been compliant and enjoys performing exercises.      Pt safe to transfer with RN staff    Patient left up in chair with all lines intact, call button in reach and RN notified..    GOALS:    Physical Therapy Goals        Problem: Physical Therapy Goal    Goal  Priority Disciplines Outcome Goal Variances Interventions   Physical Therapy Goal     PT/OT, PT Ongoing (interventions implemented as appropriate)     Description:  Goals to be met by: 3/3/18     Patient will increase functional independence with mobility by performin. Supine to sit with Stand-by Assistance  2. Sit to supine with Stand-by Assistance  3. Sit to stand transfer with Contact Guard Assistance  4. Bed to chair transfer with Contact Guard Assistance using Rolling Walker  5. Gait  x 100 feet with Contact Guard Assistance using Rolling Walker.   6. Lower extremity exercise program x 30 reps per handout, with independence                      Time Tracking:     PT Received On: 18  PT Start Time: 1035     PT Stop Time: 1100  PT Total Time (min): 25 min     Billable Minutes: Gait Training 15 and Therapeutic Activity 10    Prashant Stinson, PT, DPT  2018   [phone]

## 2018-02-28 ENCOUNTER — PATIENT OUTREACH (OUTPATIENT)
Dept: ADMINISTRATIVE | Facility: CLINIC | Age: 68
End: 2018-02-28

## 2018-02-28 ENCOUNTER — HOSPITAL ENCOUNTER (INPATIENT)
Facility: HOSPITAL | Age: 68
LOS: 13 days | Discharge: HOME HEALTH | DRG: 560 | End: 2018-03-13
Attending: INTERNAL MEDICINE | Admitting: HOSPITALIST
Payer: COMMERCIAL

## 2018-02-28 VITALS
DIASTOLIC BLOOD PRESSURE: 64 MMHG | SYSTOLIC BLOOD PRESSURE: 127 MMHG | HEIGHT: 65 IN | TEMPERATURE: 97 F | RESPIRATION RATE: 18 BRPM | BODY MASS INDEX: 44.3 KG/M2 | OXYGEN SATURATION: 95 % | HEART RATE: 100 BPM | WEIGHT: 265.88 LBS

## 2018-02-28 DIAGNOSIS — S62.102A LEFT WRIST FRACTURE: ICD-10-CM

## 2018-02-28 DIAGNOSIS — E66.01 MORBID OBESITY WITH BMI OF 50.0-59.9, ADULT: ICD-10-CM

## 2018-02-28 DIAGNOSIS — M17.12 PRIMARY OSTEOARTHRITIS OF LEFT KNEE: ICD-10-CM

## 2018-02-28 DIAGNOSIS — B37.2 YEAST DERMATITIS: ICD-10-CM

## 2018-02-28 DIAGNOSIS — K59.04 CHRONIC IDIOPATHIC CONSTIPATION: ICD-10-CM

## 2018-02-28 DIAGNOSIS — I10 ESSENTIAL (PRIMARY) HYPERTENSION: ICD-10-CM

## 2018-02-28 DIAGNOSIS — S76.112D QUADRICEPS TENDON RUPTURE, LEFT, SUBSEQUENT ENCOUNTER: ICD-10-CM

## 2018-02-28 DIAGNOSIS — Z96.652 S/P TKR (TOTAL KNEE REPLACEMENT), LEFT: ICD-10-CM

## 2018-02-28 DIAGNOSIS — S62.102D CLOSED FRACTURE OF LEFT WRIST WITH ROUTINE HEALING, SUBSEQUENT ENCOUNTER: ICD-10-CM

## 2018-02-28 DIAGNOSIS — T81.30XA WOUND DEHISCENCE: Primary | ICD-10-CM

## 2018-02-28 LAB
BACTERIA SPEC AEROBE CULT: NO GROWTH
CRP SERPL-MCNC: 59.1 MG/L
ERYTHROCYTE [SEDIMENTATION RATE] IN BLOOD BY WESTERGREN METHOD: 90 MM/HR
PROCALCITONIN SERPL IA-MCNC: <0.02 NG/ML
VANCOMYCIN TROUGH SERPL-MCNC: 17.7 UG/ML

## 2018-02-28 PROCEDURE — 25000003 PHARM REV CODE 250: Performed by: STUDENT IN AN ORGANIZED HEALTH CARE EDUCATION/TRAINING PROGRAM

## 2018-02-28 PROCEDURE — A4216 STERILE WATER/SALINE, 10 ML: HCPCS | Performed by: ORTHOPAEDIC SURGERY

## 2018-02-28 PROCEDURE — 86140 C-REACTIVE PROTEIN: CPT

## 2018-02-28 PROCEDURE — A4216 STERILE WATER/SALINE, 10 ML: HCPCS | Performed by: STUDENT IN AN ORGANIZED HEALTH CARE EDUCATION/TRAINING PROGRAM

## 2018-02-28 PROCEDURE — 12000000 HC SNF SEMI-PRIVATE ROOM

## 2018-02-28 PROCEDURE — 85651 RBC SED RATE NONAUTOMATED: CPT

## 2018-02-28 PROCEDURE — 63600175 PHARM REV CODE 636 W HCPCS: Performed by: STUDENT IN AN ORGANIZED HEALTH CARE EDUCATION/TRAINING PROGRAM

## 2018-02-28 PROCEDURE — 84145 PROCALCITONIN (PCT): CPT

## 2018-02-28 PROCEDURE — 25000003 PHARM REV CODE 250: Performed by: ORTHOPAEDIC SURGERY

## 2018-02-28 PROCEDURE — 80202 ASSAY OF VANCOMYCIN: CPT

## 2018-02-28 RX ORDER — ONDANSETRON 4 MG/1
4 TABLET, ORALLY DISINTEGRATING ORAL EVERY 6 HOURS PRN
Status: DISCONTINUED | OUTPATIENT
Start: 2018-02-28 | End: 2018-03-14 | Stop reason: HOSPADM

## 2018-02-28 RX ORDER — OXYCODONE AND ACETAMINOPHEN 5; 325 MG/1; MG/1
1 TABLET ORAL EVERY 4 HOURS PRN
Status: DISCONTINUED | OUTPATIENT
Start: 2018-02-28 | End: 2018-03-14 | Stop reason: HOSPADM

## 2018-02-28 RX ORDER — HYDROCHLOROTHIAZIDE 25 MG/1
50 TABLET ORAL DAILY
Status: DISCONTINUED | OUTPATIENT
Start: 2018-03-01 | End: 2018-03-14 | Stop reason: HOSPADM

## 2018-02-28 RX ORDER — VALSARTAN 80 MG/1
80 TABLET ORAL DAILY
Status: DISCONTINUED | OUTPATIENT
Start: 2018-03-01 | End: 2018-03-14 | Stop reason: HOSPADM

## 2018-02-28 RX ORDER — SODIUM CHLORIDE 0.9 % (FLUSH) 0.9 %
10 SYRINGE (ML) INJECTION EVERY 6 HOURS
Status: DISCONTINUED | OUTPATIENT
Start: 2018-02-28 | End: 2018-03-14 | Stop reason: HOSPADM

## 2018-02-28 RX ORDER — SODIUM CHLORIDE 0.9 % (FLUSH) 0.9 %
10 SYRINGE (ML) INJECTION
Status: DISCONTINUED | OUTPATIENT
Start: 2018-02-28 | End: 2018-03-14 | Stop reason: HOSPADM

## 2018-02-28 RX ORDER — DIPHENHYDRAMINE HCL 25 MG
25 CAPSULE ORAL EVERY 6 HOURS PRN
Status: DISCONTINUED | OUTPATIENT
Start: 2018-02-28 | End: 2018-03-14 | Stop reason: HOSPADM

## 2018-02-28 RX ORDER — DOCUSATE SODIUM 100 MG/1
100 CAPSULE, LIQUID FILLED ORAL 2 TIMES DAILY
Status: DISCONTINUED | OUTPATIENT
Start: 2018-02-28 | End: 2018-03-01

## 2018-02-28 RX ORDER — OXYCODONE AND ACETAMINOPHEN 10; 325 MG/1; MG/1
1 TABLET ORAL EVERY 4 HOURS PRN
Status: DISCONTINUED | OUTPATIENT
Start: 2018-02-28 | End: 2018-03-14 | Stop reason: HOSPADM

## 2018-02-28 RX ORDER — ASPIRIN 325 MG
325 TABLET, DELAYED RELEASE (ENTERIC COATED) ORAL 2 TIMES DAILY
Status: DISCONTINUED | OUTPATIENT
Start: 2018-02-28 | End: 2018-03-14 | Stop reason: HOSPADM

## 2018-02-28 RX ADMIN — OXYCODONE HYDROCHLORIDE AND ACETAMINOPHEN 1 TABLET: 10; 325 TABLET ORAL at 06:02

## 2018-02-28 RX ADMIN — OXYCODONE HYDROCHLORIDE AND ACETAMINOPHEN 1 TABLET: 10; 325 TABLET ORAL at 11:02

## 2018-02-28 RX ADMIN — Medication 10 ML: at 06:02

## 2018-02-28 RX ADMIN — ASPIRIN 325 MG: 325 TABLET, DELAYED RELEASE ORAL at 09:02

## 2018-02-28 RX ADMIN — DOCUSATE SODIUM 100 MG: 100 CAPSULE, LIQUID FILLED ORAL at 09:02

## 2018-02-28 RX ADMIN — DOCUSATE SODIUM 100 MG: 100 CAPSULE, LIQUID FILLED ORAL at 08:02

## 2018-02-28 RX ADMIN — Medication 10 ML: at 12:02

## 2018-02-28 RX ADMIN — VALSARTAN 80 MG: 40 TABLET ORAL at 09:02

## 2018-02-28 RX ADMIN — HYDROCHLOROTHIAZIDE 50 MG: 25 TABLET ORAL at 09:02

## 2018-02-28 RX ADMIN — VANCOMYCIN HYDROCHLORIDE 1500 MG: 10 INJECTION, POWDER, LYOPHILIZED, FOR SOLUTION INTRAVENOUS at 11:02

## 2018-02-28 RX ADMIN — ASPIRIN 325 MG: 325 TABLET, DELAYED RELEASE ORAL at 08:02

## 2018-02-28 NOTE — DISCHARGE SUMMARY
[facility]  Orthopedics  Discharge Summary      Patient Name: Katelyn Huynh  MRN: [id number]  Admission Date: 2/23/2018  Hospital Length of Stay: 2 days  Discharge Date and Time: 2/28/2018 12:28 PM  Attending Physician: Jasen Salinas MD  Discharging Provider: Isidro Harper MD  Primary Care Provider: Daniel Garcia MD    HPI: Katelyn Huynh is a 66 yo female s/p left TKA on 2/9 with Dr. Salinas here presenting for wound issues. Patient was discharged to SNF and has been doing well from rehab. Since surgery has had mild drainage, but today, noted an opening of the wound. Denies fevers, chills, sweats, or increased knee pain. Denies fall or trauma to extremity.    Procedure(s) (LRB):  INCISION AND DRAINAGE-KNEE - left - shady - set up like total knee (Left)  REVISION-ARTHROPLASTY-KNEE (Left)  REPAIR-RUPTURE-QUADRICEP (Left)      Hospital Course: Patient presented for above.  Tolerated it well and was discharged home POD4 after voiding, tolerating diet, ambulating, pain controlled.  Discharge instructions, follow-up appointment, and med rec are below.      Consults         Status Ordering Provider     Inpatient consult to Infectious Diseases  Once     Provider:  (Not yet assigned)    Completed MOODY BRAN     Inpatient consult to Orthopedic Surgery  Once     Provider:  (Not yet assigned)    Completed KIM HDZ     [facility]  Once     Provider:  (Not yet assigned)    Completed ISIDRO HARPER     Inpatient consult to [facility]  Once     Provider:  (Not yet assigned)    Acknowledged ISIDRO HARPER     Inpatient consult to Social Work  Once     Provider:  (Not yet assigned)    Acknowledged LATIA AVERY              Pending Diagnostic Studies:     Procedure Component Value Units Date/Time    VANCOMYCIN, TROUGH before 4th dose [[id number]] Collected:  02/28/18 0600    Order Status:  Sent Lab Status:  No result      Specimen:  Blood from Blood         Final Active Diagnoses:    Diagnosis Date Noted POA    PRINCIPAL PROBLEM:  Wound dehiscence [T81.30XA] 02/23/2018 Yes    Left wrist fracture s/p ORIF [S62.102A] 02/16/2018 Yes    Essential (primary) hypertension [I10] 05/20/2013 Yes      Problems Resolved During this Admission:    Diagnosis Date Noted Date Resolved POA      Discharged Condition: stable    Disposition: Home or Self Care    Follow Up:  Follow-up Information     Jasen Salinas MD. Go in 2 weeks.    Specialty:  Orthopedic Surgery  Why:  For wound re-check  Contact information:  [address]  [facility] [address]  [phone]                 Patient Instructions:   No discharge procedures on file.  Medications:  Reconciled Home Medications:   Discharge Medication List as of 2/28/2018 12:30 PM      START taking these medications    Details   aspirin (ECOTRIN) 325 MG EC tablet Take 1 tablet (325 mg total) by mouth 2 (two) times daily., Starting Sat 2/24/2018, Until Mon 3/26/2018, Print      ondansetron (ZOFRAN) 8 MG tablet Take 1 tablet (8 mg total) by mouth every 8 (eight) hours as needed for Nausea., Starting Sat 2/24/2018, Print         CONTINUE these medications which have CHANGED    Details   docusate sodium (COLACE) 100 MG capsule Take 1 capsule (100 mg total) by mouth 2 (two) times daily., Starting Sat 2/24/2018, Print      oxyCODONE-acetaminophen (PERCOCET)  mg per tablet Take 1 tablet by mouth every 4 (four) hours as needed for Pain., Starting Sat 2/24/2018, Print         CONTINUE these medications which have NOT CHANGED    Details   ASCORBATE CALCIUM (VITAMIN C ORAL) Take by mouth once daily. , Historical Med      betamethasone dipropionate (DIPROLENE) 0.05 % cream Apply topically 2 (two) times daily., Starting Wed 8/9/2017, Until Wed 1/24/2018, Normal      hydrochlorothiazide (HYDRODIURIL) 25 MG tablet Take 2 tablets (50 mg total) by mouth once daily., Starting Tue 3/28/2017, Normal       triamcinolone acetonide 0.1% (KENALOG) 0.1 % cream JOSE ELIAS EXT AA TID, Historical Med      valsartan (DIOVAN) 80 MG tablet TAKE 1 TABLET(80 MG) BY MOUTH EVERY DAY, Normal         STOP taking these medications       aspirin 325 MG tablet Comments:   Reason for Stopping:         ondansetron (ZOFRAN-ODT) 4 MG TbDL Comments:   Reason for Stopping:               Isidro Harper MD  Orthopedics  [facility]

## 2018-02-28 NOTE — PROGRESS NOTES
Patient admitted to room 316 B via wheelchair per transport .  Patient assisted into bed per PCT and Nurse,. Patient non weight bearing to left leg and left wrist.  Patient uinable to stand for stand up scale. Patient oriented to room and given call light.

## 2018-02-28 NOTE — SUBJECTIVE & OBJECTIVE
"Principal Problem:Wound dehiscence    Principal Orthopedic Problem: same    Interval History: Patient seen and examined at bedside.  No acute events overnight.  Pain controlled.  Walked 74 feet with PT yesterday.    Review of patient's allergies indicates:   Allergen Reactions    Cortisone Hives and Swelling     Swelling of the tongue       Current Facility-Administered Medications   Medication    aspirin EC tablet 325 mg    cefTRIAXone (ROCEPHIN) 2 g in dextrose 5 % 50 mL IVPB    diphenhydrAMINE capsule 25 mg    docusate sodium capsule 100 mg    hydroCHLOROthiazide tablet 50 mg    morphine injection 2 mg    morphine injection 4 mg    ondansetron disintegrating tablet 4 mg    oxyCODONE-acetaminophen  mg per tablet 1 tablet    oxyCODONE-acetaminophen 5-325 mg per tablet 1 tablet    sodium chloride 0.9% flush 10 mL    And    sodium chloride 0.9% flush 10 mL    tranexamic acid (CYKLOKAPRON) 3,000 mg in sodium chloride 0.9% 100 mL    valsartan tablet 80 mg    vancomycin (VANCOCIN) 1,500 mg in sodium chloride 0.9% 250 mL IVPB     Objective:     Vital Signs (Most Recent):  Temp: 97.1 °F (36.2 °C) (02/28/18 0444)  Pulse: 110 (02/28/18 0444)  Resp: 18 (02/28/18 0444)  BP: (!) 120/56 (02/28/18 0444)  SpO2: (!) 94 % (02/28/18 0444) Vital Signs (24h Range):  Temp:  [97.1 °F (36.2 °C)-97.9 °F (36.6 °C)] 97.1 °F (36.2 °C)  Pulse:  [] 110  Resp:  [16-18] 18  SpO2:  [92 %-99 %] 94 %  BP: (119-134)/(56-73) 120/56     Weight: 120.6 kg (265 lb 14 oz)  Height: 5' 4.5" (163.8 cm)  Body mass index is 44.93 kg/m².      Intake/Output Summary (Last 24 hours) at 02/28/18 0537  Last data filed at 02/27/18 1728   Gross per 24 hour   Intake              720 ml   Output                0 ml   Net              720 ml       Ortho/SPM Exam     AAOx4  NAD  RRR  No increased WOB    LLE:  Dressings intact  Wound vac in place  HKB  SILT and motor intact T/SP/DP  WWP extremities        Significant Labs: All pertinent labs " within the past 24 hours have been reviewed.    Significant Imaging: None

## 2018-02-28 NOTE — PLAN OF CARE
10:30 AM  SW received notification that pt is ready for discharge. Nurse to call report to [phone] to [facility].     Arranged transportation with [facility]. [facility] will arrive at 11:30AM.     Informed RN, Genny.     Nickie Chua, NADIR   [facility]  Ext [phone]

## 2018-02-28 NOTE — PROGRESS NOTES
[facility]  Orthopedics  Progress Note    Patient Name: Katelyn Huynh  MRN: [id number]  Admission Date: 2/23/2018  Hospital Length of Stay: 2 days  Attending Provider: Jasen Salinas MD  Primary Care Provider: Daniel Garcia MD  Follow-up For: Procedure(s) (LRB):  INCISION AND DRAINAGE-KNEE - left - shady - set up like total knee (Left)  REVISION-ARTHROPLASTY-KNEE (Left)  REPAIR-RUPTURE-QUADRICEP (Left)    Post-Operative Day: 4 Days Post-Op  Subjective:     Principal Problem:Wound dehiscence    Principal Orthopedic Problem: same    Interval History: Patient seen and examined at bedside.  No acute events overnight.  Pain controlled.  Walked 74 feet with PT yesterday.    Review of patient's allergies indicates:   Allergen Reactions    Cortisone Hives and Swelling     Swelling of the tongue       Current Facility-Administered Medications   Medication    aspirin EC tablet 325 mg    cefTRIAXone (ROCEPHIN) 2 g in dextrose 5 % 50 mL IVPB    diphenhydrAMINE capsule 25 mg    docusate sodium capsule 100 mg    hydroCHLOROthiazide tablet 50 mg    morphine injection 2 mg    morphine injection 4 mg    ondansetron disintegrating tablet 4 mg    oxyCODONE-acetaminophen  mg per tablet 1 tablet    oxyCODONE-acetaminophen 5-325 mg per tablet 1 tablet    sodium chloride 0.9% flush 10 mL    And    sodium chloride 0.9% flush 10 mL    tranexamic acid (CYKLOKAPRON) 3,000 mg in sodium chloride 0.9% 100 mL    valsartan tablet 80 mg    vancomycin (VANCOCIN) 1,500 mg in sodium chloride 0.9% 250 mL IVPB     Objective:     Vital Signs (Most Recent):  Temp: 97.1 °F (36.2 °C) (02/28/18 0444)  Pulse: 110 (02/28/18 0444)  Resp: 18 (02/28/18 0444)  BP: (!) 120/56 (02/28/18 0444)  SpO2: (!) 94 % (02/28/18 0444) Vital Signs (24h Range):  Temp:  [97.1 °F (36.2 °C)-97.9 °F (36.6 °C)] 97.1 °F (36.2 °C)  Pulse:  [] 110  Resp:  [16-18] 18  SpO2:  [92 %-99 %] 94 %  BP: (119-134)/(56-73) 120/56  "    Weight: 120.6 kg (265 lb 14 oz)  Height: 5' 4.5" (163.8 cm)  Body mass index is 44.93 kg/m².      Intake/Output Summary (Last 24 hours) at 02/28/18 0552  Last data filed at 02/27/18 1728   Gross per 24 hour   Intake              720 ml   Output                0 ml   Net              720 ml       Ortho/SPM Exam     AAOx4  NAD  RRR  No increased WOB    LLE:  Dressings intact  Wound vac in place  HKB  SILT and motor intact T/SP/DP  WWP extremities        Significant Labs: All pertinent labs within the past 24 hours have been reviewed.    Significant Imaging: None    Assessment/Plan:     * Wound dehiscence    Katelyn Huynh is a 66 yo female s/p left TKA I&D and quad tendon repair    - WBAT in hinged knee brace locked in extension  - vanc, ceftriaxone   - ID consult  - Follow cultures; NGTD  - drain: removed  - labs: reviewed  - Dispo: SNF pending insurance approval        Left wrist fracture s/p ORIF    Removed sutures  Placed in short arm cast        Essential (primary) hypertension    Home meds              Isidro Harper MD  Orthopedics  [facility]  "

## 2018-02-28 NOTE — PROGRESS NOTES
Waiting for medications to be verified by pharmacy.  Waiting for Vancomycin to be delivered from, main campus.  Dr. Miller notified.

## 2018-02-28 NOTE — PLAN OF CARE
02/28/18 0923   Readmission Questionnaire   At the time of your discharge, did someone talk to you about what your health problems were? Yes   At the time of discharge, did someone talk to you about what to watch out for regarding worsening of your health problem? Yes   At the time of discharge, did someone talk to you about what to do if you experienced worsening of your health problem? Yes   At the time of discharge, did someone talk to you about which medication to take when you left the hospital and which ones to stop taking? Yes   At the time of discharge, did someone talk to you about when and where to follow up with a doctor after you left the hospital? Yes   What do you believe caused you to be sick enough to be re-admitted? scheduled surgery   How often do you need to have someone help you when you read instructions, pamphlets, or other written material from your doctor or pharmacy? Always   Do you have problems taking your medications as prescribed? No   Do you have any problems affording any of  your prescribed medications? No   Do you have problems obtaining/receiving your medications? No   Does the patient have transportation to healthcare appointments? Yes   Lives With spouse   Living Arrangements house   Does the patient have family/friends to help with healtcare needs after discharge? yes   Does your caregiver provide all the help you need? Yes   Are you currently feeling confused? No   Are you currently having problems thinking? No   Are you currently having memory problems? No   Have you felt down, depressed, or hopeless? 0   Have you felt little interest or pleasure in doing things? 0   In the last 7 days, my sleep quality was: good

## 2018-03-01 PROBLEM — S76.112D QUADRICEPS TENDON RUPTURE, LEFT, SUBSEQUENT ENCOUNTER: Status: ACTIVE | Noted: 2018-03-01

## 2018-03-01 LAB
ANION GAP SERPL CALC-SCNC: 9 MMOL/L
BASOPHILS # BLD AUTO: 0.04 K/UL
BASOPHILS NFR BLD: 0.5 %
BUN SERPL-MCNC: 7 MG/DL
CALCIUM SERPL-MCNC: 8.7 MG/DL
CHLORIDE SERPL-SCNC: 100 MMOL/L
CO2 SERPL-SCNC: 29 MMOL/L
CREAT SERPL-MCNC: 0.7 MG/DL
DIFFERENTIAL METHOD: ABNORMAL
EOSINOPHIL # BLD AUTO: 0.6 K/UL
EOSINOPHIL NFR BLD: 6.5 %
ERYTHROCYTE [DISTWIDTH] IN BLOOD BY AUTOMATED COUNT: 13.9 %
EST. GFR  (AFRICAN AMERICAN): >60 ML/MIN/1.73 M^2
EST. GFR  (NON AFRICAN AMERICAN): >60 ML/MIN/1.73 M^2
GLUCOSE SERPL-MCNC: 92 MG/DL
HCT VFR BLD AUTO: 29.8 %
HGB BLD-MCNC: 9.8 G/DL
IMM GRANULOCYTES # BLD AUTO: 0.03 K/UL
IMM GRANULOCYTES NFR BLD AUTO: 0.4 %
LYMPHOCYTES # BLD AUTO: 1.3 K/UL
LYMPHOCYTES NFR BLD: 15.5 %
MAGNESIUM SERPL-MCNC: 1.4 MG/DL
MCH RBC QN AUTO: 28.7 PG
MCHC RBC AUTO-ENTMCNC: 32.9 G/DL
MCV RBC AUTO: 87 FL
MONOCYTES # BLD AUTO: 0.7 K/UL
MONOCYTES NFR BLD: 8.3 %
NEUTROPHILS # BLD AUTO: 5.9 K/UL
NEUTROPHILS NFR BLD: 68.8 %
NRBC BLD-RTO: 0 /100 WBC
PHOSPHATE SERPL-MCNC: 3.4 MG/DL
PLATELET # BLD AUTO: 424 K/UL
PMV BLD AUTO: 9.7 FL
POTASSIUM SERPL-SCNC: 3.8 MMOL/L
RBC # BLD AUTO: 3.41 M/UL
SODIUM SERPL-SCNC: 138 MMOL/L
VANCOMYCIN TROUGH SERPL-MCNC: 15.3 UG/ML
WBC # BLD AUTO: 8.56 K/UL

## 2018-03-01 PROCEDURE — 25000003 PHARM REV CODE 250: Performed by: INTERNAL MEDICINE

## 2018-03-01 PROCEDURE — 25000003 PHARM REV CODE 250: Performed by: STUDENT IN AN ORGANIZED HEALTH CARE EDUCATION/TRAINING PROGRAM

## 2018-03-01 PROCEDURE — 97110 THERAPEUTIC EXERCISES: CPT

## 2018-03-01 PROCEDURE — 36415 COLL VENOUS BLD VENIPUNCTURE: CPT

## 2018-03-01 PROCEDURE — 97161 PT EVAL LOW COMPLEX 20 MIN: CPT

## 2018-03-01 PROCEDURE — 63600175 PHARM REV CODE 636 W HCPCS: Performed by: STUDENT IN AN ORGANIZED HEALTH CARE EDUCATION/TRAINING PROGRAM

## 2018-03-01 PROCEDURE — 97530 THERAPEUTIC ACTIVITIES: CPT

## 2018-03-01 PROCEDURE — A4216 STERILE WATER/SALINE, 10 ML: HCPCS | Performed by: STUDENT IN AN ORGANIZED HEALTH CARE EDUCATION/TRAINING PROGRAM

## 2018-03-01 PROCEDURE — 84100 ASSAY OF PHOSPHORUS: CPT

## 2018-03-01 PROCEDURE — 97535 SELF CARE MNGMENT TRAINING: CPT

## 2018-03-01 PROCEDURE — 80202 ASSAY OF VANCOMYCIN: CPT

## 2018-03-01 PROCEDURE — 85025 COMPLETE CBC W/AUTO DIFF WBC: CPT

## 2018-03-01 PROCEDURE — 97165 OT EVAL LOW COMPLEX 30 MIN: CPT

## 2018-03-01 PROCEDURE — 99305 1ST NF CARE MODERATE MDM 35: CPT | Mod: ,,, | Performed by: INTERNAL MEDICINE

## 2018-03-01 PROCEDURE — 83735 ASSAY OF MAGNESIUM: CPT

## 2018-03-01 PROCEDURE — 97116 GAIT TRAINING THERAPY: CPT

## 2018-03-01 PROCEDURE — 12000000 HC SNF SEMI-PRIVATE ROOM

## 2018-03-01 PROCEDURE — 80048 BASIC METABOLIC PNL TOTAL CA: CPT

## 2018-03-01 RX ORDER — FAMOTIDINE 20 MG/1
20 TABLET, FILM COATED ORAL 2 TIMES DAILY
Status: DISCONTINUED | OUTPATIENT
Start: 2018-03-01 | End: 2018-03-14 | Stop reason: HOSPADM

## 2018-03-01 RX ORDER — ADHESIVE BANDAGE
30 BANDAGE TOPICAL DAILY PRN
Status: DISCONTINUED | OUTPATIENT
Start: 2018-03-01 | End: 2018-03-14 | Stop reason: HOSPADM

## 2018-03-01 RX ORDER — POLYETHYLENE GLYCOL 3350 17 G/17G
17 POWDER, FOR SOLUTION ORAL DAILY
Status: DISCONTINUED | OUTPATIENT
Start: 2018-03-01 | End: 2018-03-14 | Stop reason: HOSPADM

## 2018-03-01 RX ORDER — LANOLIN ALCOHOL/MO/W.PET/CERES
400 CREAM (GRAM) TOPICAL 2 TIMES DAILY
Status: COMPLETED | OUTPATIENT
Start: 2018-03-01 | End: 2018-03-04

## 2018-03-01 RX ORDER — AMOXICILLIN 250 MG
1 CAPSULE ORAL 2 TIMES DAILY
Status: DISCONTINUED | OUTPATIENT
Start: 2018-03-01 | End: 2018-03-14 | Stop reason: HOSPADM

## 2018-03-01 RX ADMIN — HYDROCHLOROTHIAZIDE 50 MG: 25 TABLET ORAL at 10:03

## 2018-03-01 RX ADMIN — Medication 400 MG: at 08:03

## 2018-03-01 RX ADMIN — VALSARTAN 80 MG: 80 TABLET ORAL at 10:03

## 2018-03-01 RX ADMIN — Medication 10 ML: at 05:03

## 2018-03-01 RX ADMIN — FAMOTIDINE 20 MG: 20 TABLET, FILM COATED ORAL at 08:03

## 2018-03-01 RX ADMIN — Medication 10 ML: at 12:03

## 2018-03-01 RX ADMIN — CEFTRIAXONE SODIUM 2 G: 2 INJECTION, POWDER, FOR SOLUTION INTRAMUSCULAR; INTRAVENOUS at 11:03

## 2018-03-01 RX ADMIN — ASPIRIN 325 MG: 325 TABLET, DELAYED RELEASE ORAL at 08:03

## 2018-03-01 RX ADMIN — OXYCODONE HYDROCHLORIDE AND ACETAMINOPHEN 1 TABLET: 10; 325 TABLET ORAL at 10:03

## 2018-03-01 RX ADMIN — Medication 10 ML: at 06:03

## 2018-03-01 RX ADMIN — DOCUSATE SODIUM 100 MG: 100 CAPSULE, LIQUID FILLED ORAL at 10:03

## 2018-03-01 RX ADMIN — OXYCODONE HYDROCHLORIDE AND ACETAMINOPHEN 1 TABLET: 10; 325 TABLET ORAL at 08:03

## 2018-03-01 RX ADMIN — VANCOMYCIN HYDROCHLORIDE 1500 MG: 10 INJECTION, POWDER, LYOPHILIZED, FOR SOLUTION INTRAVENOUS at 12:03

## 2018-03-01 RX ADMIN — POLYETHYLENE GLYCOL 3350 17 G: 17 POWDER, FOR SOLUTION ORAL at 04:03

## 2018-03-01 RX ADMIN — ASPIRIN 325 MG: 325 TABLET, DELAYED RELEASE ORAL at 10:03

## 2018-03-01 RX ADMIN — STANDARDIZED SENNA CONCENTRATE AND DOCUSATE SODIUM 1 TABLET: 8.6; 5 TABLET, FILM COATED ORAL at 08:03

## 2018-03-01 NOTE — ASSESSMENT & PLAN NOTE
Continue wound vac although disconnected for several hours on transfer  Wound care nurse evaluated and suctioning well  Ortho NP to follow patient every week and determine discontinuation of the wound vac  Continue vanc and rocephin via PICC line with end date of 3/10  Vanc levels every 4th dose for goal of 15-20  Monitoring with weekly CRP, ESR and hepatic function tests  PICC line care

## 2018-03-01 NOTE — ASSESSMENT & PLAN NOTE
-continue PT/OT to increase ambulation, ADL performance and endurance  -WBAT to LLE  -continue oxycodone for pain control prn  -keep polar ice in place when not in therapy (patient to have brought from home)  -continue ASA for DVT prophylaxis; continue famotidine while taking high dose ASA  -continue wound vac to left knee  -Ortho NP to assess patient while at SNF weekly

## 2018-03-01 NOTE — PROGRESS NOTES
03/01/2018  2:48 PM    Discharge Planning-Met with patient in room to inform of discharge date of 3/13/2018. Discharge date written on dry erase board in room. Patient stated understanding to discharge date.  Brianna Herman RN, CM Skilled  [id number]

## 2018-03-01 NOTE — PT/OT/SLP EVAL
Occupational Therapy  Evaluation/tx    Katelyn Huynh   MRN: [id number]   Admitting Diagnosis: Wound dehiscence revision left knee, s/p I & D, repair of ruptured quadriceps (previous left wrist fx with short arm cast)      OT Date of Treatment: 03/01/18   OT Start Time: 0826  OT Stop Time: 0933  OT Total Time (min): 67 min    Billable Minutes:  Evaluation 20   Self-care 47    Diagnosis: Wound dehiscence revision left knee, s/p I & D, repair of ruptured quadriceps (previous left wrist fx with short arm cast)      Past Medical History:   Diagnosis Date    Arthritis     Cataract     Glaucoma     History of iritis     OD    Hypertension     Meningioma     Uveitis       Past Surgical History:   Procedure Laterality Date    Brain tumor surgery      had a brain tumor removed    CATARACT EXTRACTION      OU    CATARACT EXTRACTION BILATERAL W/ ANTERIOR VITRECTOMY      CHOLECYSTECTOMY      CRANIOTOMY      KNEE SURGERY  2-24-15    right TKR    YAG Right 1/20/2016    Dr. Kaiser         General Precautions: Standard, fall  Orthopedic Precautions: LLE weight bearing as tolerated ; LUE NWB  Braces: Hinged knee brace (locked in extension)    Do you have any cultural, spiritual, Christian conflicts, given your current situation?: none reported     Patient History:  Lives With: spouse  Living Arrangements: house  Home Accessibility: stairs to enter home  Home Layout: Able to live on 1st floor  Number of Stairs to Enter Home: 1  Stair Railings at Home: none  Transportation Available: family or friend will provide  Equipment Currently Used at Home: bedside commode, bath bench, walker, rolling, cane, straight    Prior level of function:   Bed Mobility/Transfers: needs device  Grooming: independent  Bathing: needs device  Upper Body Dressing: independent  Lower Body Dressing: independent  Toileting: independent  Driving License: Yes  Mode of Transportation: Car  Occupation: Retired  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 report she ambulated with rolling walker and was able to shower on TTB.  Pt. drove spouse who is blind to work.  Pt. also has a sister that lives with them and can assist.       Dominant hand: right    Subjective:  Communicated with nurse prior to session.  Pt. Reported that she had not had much draining from her wound vac  Chief Complaint: Pain in knee but bearable  Patient/Family stated goals: To get better and return home     Pain/Comfort  Pain Rating 1: 6/10  Location - Side 1: Left  Location 1: knee  Pain Addressed 1: Reposition, Distraction, Nurse notified  Pain Rating Post-Intervention 1: 7/10  Pain Rating 2:  (nurse notifed and pt. deferred medicine until after session)    Objective:   Patient found with: wound vac (supine in bed with hinged knee brace locked in extension  however significant edema noted as well as 2nd and 3rd strap not fastened on pt. And bottom of brace noted to be below ankle  ; LUE in short arm cast) .  Hinged knee brace adjusted by therapist to adjust for edema as well as to proper position at knee.     Cognitive Exam:  Oriented to: Person, Place, Time and Situation  Follows Commands/attention: Follows multistep  commands  Communication: clear/fluent  Memory:  No Deficits noted  Safety awareness/insight to disability: intact  Coping skills/emotional control: Appropriate to situation    Visual/perceptual:  wears glasses    Physical Exam:  Postural examination/scapula alignment:    -       Rounded shoulders  Skin integrity: sx site covered with wound vac/dressing  Edema: Significant  in LLE    Sensation:      -       Intact    Upper Extremity Range of Motion:  Right Upper Extremity: WFL with exception of shoulder ~ 90  Left Upper Extremity: WFL with exception of left wrist that is casted shoulder also limited to ~ 90    Upper Extremity Strength:  Right Upper Extremity: WFL   Left Upper Extremity: WFL with exception of left wrist region    Strength: right good; LUE unable 2/2 cast    Fine motor coordination:   Impaired on left     Gross motor coordination: WFL    Functional Status:  MDS G  Bed Mobility Functional Status: Min (A)  Bed Mobility Level of (A):  (supine to sit at LLE  Transfer Functional Status: CGA-Min (A) sit to stand from elevated bed; Pt. Ambulated from bed to hallway with CGA and use of PRW  Transfer Level of (A):  (with platform RW)  Walk in Room Functional Status: CGA-Min (A)  Walk In Room Level of (A):  (with PRW)  Dressing Functional Status: 3:mod(A) LBD with assist to manage underpants over hips in stand and minimal assist at left sock; SBA UBD  Eating Functional Status: mod(I) - (I)  Toilet Use Functional Status: mod(A)-Max(A) with bed pan on this date   Personal Hygiene Functional Status: S-SBA  Bathing Functional Status: Min (A) sponge bath to wash buttocks region  Moving from seated to standing position: Not steady, only able to stabilize with staff assistance  Surface-to-surface transfer (transfer between bed and chair or wheelchair): Not steady, only able to stabilize with staff assistance    MDS GG  Eating Performance: Set-up or clean-up assistance.  Oral Hygiene Performance: Setup or clean-up assistance  Toileting Hygiene Performance: Substantial/maximal assistance.  Sit to lying Performance: Partial/moderate assistance.  Lying to sitting on side of bed Performance: Partial/moderate assistance.  Sit to Stand Performance: Partial/moderate assistance., See goal below  Sit to Stand Goal: Supervision or touching assistance.  Chair/bed-to-chair transfer Performance: Partial/moderate assistance.    AMPAC 6 Click:  AMPAC Total Score: 18      Additional Treatment:  Pt. Educated on role of OT and pOC  Pt. Educated on safety with ADL tasks as well as transfers;   After ambulation to w/c pt. Noted to have draining from wound and wound vac not draining.  Wound care nurse came to assess and was able to get wound vac to work properly  and edema noted to significantly decrease in LLE; MD notified     Patient left up in chair with all lines intact for PT session    Assessment:  Katelyn Huynh is a 67 y.o. female with a medical diagnosis of Wound dehiscence  revision left knee, s/p I & D, repair of ruptured quadriceps (previous left wrist fx with short arm cast) pt. Presents with deficits in functional mobility, self-care skills, endurance, limitations 2/2 pain and significant edema initially on this date. Pt. Would benefit from continued OT services. Pt evaluation falls under low complexity for evaluation coding due to performance deficits noted in 1-3 areas as stated above and 1 co-morbities affecting current functional status. Data obtained from problem focused assessments.Minimal  modifications or assistance was required for completion of evaluation. Only brief occupational profile and history review completed.         Rehab identified problem list/impairments: impaired endurance, impaired self care skills, impaired functional mobilty, decreased lower extremity function, pain, edema, impaired skin    Rehab potential is good    Activity tolerance: Good    Discharge recommendations: home health OT (with supervision)     Barriers to discharge: Decreased caregiver support, Inaccessible home environment     Equipment recommendations:  (PRW)     GOALS:    Occupational Therapy Goals        Problem: Occupational Therapy Goal    Goal Priority Disciplines Outcome Interventions   Occupational Therapy Goal     OT, PT/OT     Description:  Goals to be met by: 2 weeks     Patient will increase functional independence with ADLs by performing:    UE Dressing with Set-up Assistance.  LE Dressing with Supervision.  Grooming while standing with Supervision.  Toileting from bedside commode with Supervision for hygiene and clothing management.   Bathing from  edge of bed with Supervision.  Supine to sit with Modified [place].  Stand pivot transfers  with Supervision with PRW  Toilet transfer to bedside commode with Supervision.  Pt. To be independent with HEP to improve level of endurance for BUE  Pt. To participate in dynamic standing activity x 10 minutes with Supervision                      PLAN: Patient to be seen 5 x/week to address the above listed problems via self-care/home management, therapeutic activities, therapeutic exercises  Plan of Care expires: 03/31/18  Plan of Care reviewed with: patient    KAROLINE Shen  03/01/2018

## 2018-03-01 NOTE — HPI
Chief Complaint/Reason for Admission: Wound dehiscence    History of Present Illness:  Patient is a 67 y.o. female with HTN, morbid obesity, recent left TKR who presents to SNF after readmission for dehiscence of her left knee incision.  The patient is known to me from previous SNF admission (See H/P 2/17/2018)  She required quadriceps tendon repair, I&D and revision of the left knee on 2/24/2018 by Dr. Martinez.  Her left wrist splint has been removed, sutures also removed and arm placed in short arm cast.  She complains of left knee pain 7/10 and left wrist pain 3/10. Knee pain exacerbated with ambulation and both pains are relieved with oxycodone at the current dose. She has an incisional wound vac in place and will complete 10 d of IV antibiotics with vanc and rocephin.The patient has been admitted to SNF for ongoing PT/OT due to insufficient progress to go home safely from the hospital.

## 2018-03-01 NOTE — PROGRESS NOTES
Called to patient's side by therapy to assess wound vac.  The wound vac dressing was saturated with drainage, none in vac tubing.  Wound vac machine changed to Freedom vac and vacuum intact, drainage noted in tubing, dressing depressed.  Prevena vac replaced and vacuum intact, dressing remains depressed. Plan of care discussed with patient who verbalized understanding. Dr. Miller and ROSA Santoyo, NP, notified.

## 2018-03-01 NOTE — PT/OT/SLP EVAL
PhysicalTherapy   Evaluation    Katelyn Huynh   MRN: [id number]     PT Received On: 03/01/18  PT Start Time: 0928     PT Stop Time: 1031    PT Total Time (min): 63 min       Billable Minutes:  Evaluation 15, Gait Training 10, Therapeutic Activity 25 and Therapeutic Exercise 13=48    Diagnosis: Wound dehiscence  Past Medical History:   Diagnosis Date    Arthritis     Cataract     Glaucoma     History of iritis     OD    Hypertension     Meningioma     Uveitis       Past Surgical History:   Procedure Laterality Date    Brain tumor surgery      had a brain tumor removed    CATARACT EXTRACTION      OU    CATARACT EXTRACTION BILATERAL W/ ANTERIOR VITRECTOMY      CHOLECYSTECTOMY      CRANIOTOMY      KNEE SURGERY  2-24-15    right TKR    YAG Right 1/20/2016    Dr. Kaiser         General Precautions: Standard, fall  Orthopedic Precautions: LLE weight bearing as tolerated, LUE non weight bearing (LUE/wrist NWB but okay to use platform on RW)   Braces: Hinged knee brace (locked in extension)         Patient History:  Lives With: spouse, sibling(s)  Living Arrangements: house  Home Accessibility: stairs to enter home  Home Layout: Able to live on 1st floor  Number of Stairs to Enter Home: 1  Stair Railings at Home: none  Transportation Available: family or friend will provide  Living Environment Comment: Lives w/ spouse who is blind but can assist and sister who can assist. PTA mod(I).   Equipment Currently Used at Home: bedside commode, bath bench, walker, rolling, cane, straight  DME owned (not currently used): single point cane    Previous Level of Function:  Ambulation Skills: needs device  Transfer Skills: needs device  ADL Skills: needs device    Subjective:  Communicated with patient prior to session.  Patient agreeable to session  Chief Complaint: pain  Patient goals: get well and go home    Pain/Comfort  Pain Rating 1: 6/10  Location - Side 1: Left  Location 1: knee  Pain Addressed 1:  Reposition, Distraction, Nurse notified (pt preferred meds after therap)  Pain Rating Post-Intervention 1: 7/10    Objective:  Patient found seated in w/c w/ left knee brace locked in extension and LLE elevated with Patient found with: wound vac    Cognitive Exam:  Oriented to: Person, Place, Time and Situation  Follows Commands/attention: Follows multistep  commands  Communication: clear/fluent  Safety awareness/insight to disability: intact    Physical Exam:  Postural examination/scapula alignment:    -       obesity. edema LLE    Skin integrity: LLE w/ bandaging and wound vac anterior knee  Edema: Severe LLE    Sensation:      -       Intact Light touch    Upper Extremity Range of Motion:  Right Upper Extremity: WFL except shoulders  Left Upper Extremity: WFL except shoulders    Upper Extremity Strength:  Right Upper Extremity: WFL except except shoulders  Left Upper Extremity: WFL except except shoulders    Lower Extremity Range of Motion:  Right Lower Extremity: WFL  Left Lower Extremity: Deficits:knee w/ brace locked in extension  Lower Extremity Strength:  Right Lower Extremity: WFL  Left Lower Extremity: Deficits:  HF; can initiate; hinged knee brace locked in extension, ankle WFL     Fine motor coordination:      Gross motor coordination: WFL    Functional Status:  MDS G  Bed Mobility Functional Status: mod(A)-Max(A)  Transfer Functional Status: mod(A)-Max(A)  Walk in Room Functional Status: CGA-Min (A)  Walk in Corridor Functional Status: CGA-Min (A)  Locomotion on Unit Functional Status: CGA-Min (A)  Locomotion Off Unit Functional Status: total(A)  Eval Only: Number of L/E limb <4/5 MMT: 1  Moving from seated to standing position: Not steady, only able to stabilize with staff assistance  Walking (with assistive device if used): Not steady, only able to stabilize with staff assistance  Turning around and facing the opposite direction while walking: Not steady, only able to stabilize with staff  assistance  Surface-to-surface transfer (transfer between bed and chair or wheelchair): Not steady, only able to stabilize with staff assistance    MDS GG  Sit to lying Performance: Partial/moderate assistance.  Lying to sitting on side of bed Performance: Partial/moderate assistance.  Sit to Stand Performance: Partial/moderate assistance.  Sit to Stand Goal: Supervision or touching assistance.  Does the resident walk?: Yes --> Continue to Walk 50 feet with two turns assessment  Walk 50 feet with two turns Performance: Not attempted due to medical condition or safety concerns  Does the resident use a wheelchair/scooter?: Yes --> Continue to Wheel 50 feet with two turns assessment  Wheel 50 feet with two turns Performance: Not attempted due to medical condition or safety concerns  Indicate the type of wheelchair/scooter used: Manual    AM-PAC 6 CLICK MOBILITY  Total Score:12    Bed Mobility:  Sit>Supine:moderate assist on mat, bed for LEs  Supine>Sit: moderate assist on mat for LEs    Transfers:  Sit<>Stand: w/ RW and moderate assist from w/c w/ left PRW and Left Knee brace locked in extension, second trial, min assist from w/c.  Stand Pivot Transfer: w/c>mat w/ left PRW and knee brace w/ min assist once standing. Sits to elevated mat.  SPT w/c>bed w/ min assist w/ L PRW and knee brace in extension, to elevated bed    Gait:  Amb w/ left PRW 38 feet w/ CGA/min assist. Left knee brace locked in extension. Patient manages PRW, mild FFP, w/c follow  Amb in room w/ L PRW ~4 feet to bed w/ CGA, as above     Wheelchair Mobility:  Not performed due to fatigue    Therex:  Quad sets,   Glute sets,   Ankle  Pumps,   hip abduction/adduction,  x20 reps w/ assist as needed.    Balance:  Sits EOM w/o LOB w/ supervision  Stands w/ L PRW and SBA    Additional Treatment:  Hinged knee brace (HKB) locked in extension adjusted. Patient assisted w/ positioning in bed w/ LLE elevated. Nurse present and providing meds and ice  afterwards.    Patient left supine with all lines intact, call button in MD daisy, nurse present and LLE elevated.    Assessment:  Katelyn Huynh is a 67 y.o. female with a medical diagnosis of Wound dehiscence. Patient previously at SNF and now returned. She had left TKA , d/c home  w/ HHPT and family assist. Experienced fall at home sustaining Left wrist fracture. Patient came to SNF, was NWB Left wrist but okay to use platform on RW and participating well in therapy and knee rehab. Patient experienced wound de hiscence and to [facility] for a wash out of left knee. Was found to have a quadriceps rupture; revision of left knee, quadriceps repair and is WBAT w/ HKB locked in extension and w/ wound vac in place; left wrist fracture had sutures removed and new cast. REturns to SNF for rehab. Patient is stable and complexity is thus LOW. She has deficits noted and will benefit from skilled therapy to address deficits and improve safety and functional mobiltiy to allow a safe discharge home w/ home health sercies and family assist.     Rehab identified problem list/impairments: weakness, impaired endurance, impaired functional mobilty, gait instability, impaired balance, decreased lower extremity function, decreased upper extremity function, pain, impaired skin, edema, orthopedic precautions    Rehab potential is good.    Activity tolerance: Good    Discharge recommendations: home with home health     Barriers to discharge: Decreased caregiver support    Equipment recommendations: wheelchair (platform for RW)     GOALS:    Physical Therapy Goals        Problem: Physical Therapy Goal    Goal Priority Disciplines Outcome Goal Variances Interventions   Physical Therapy Goal     PT/OT, PT      Description:  Goals to be met by: 14 days     Patient will increase functional independence with mobility by performin. Supine to sit with Stand-by Assistance  2. Sit to supine with Stand-by Assistance4  3. Sit  to stand transfer with Stand-by Assistance with RLE brace locked in extension and with left platform rolling walker  4. Bed to chair transfer with Stand-by Assistance  with RLE brace locked in extension and with left platform rolling walker  5. Gait  x 150 feet with Stand-by Assistance  with RLE brace locked in extension and with left platform rolling walker  6. Wheelchair propulsion x50 feet with Stand-by Assistance using bilateral upper extremities  7. Ascend/Descend 4 inch curb step with Contact Guard Assistance  with RLE brace locked in extension and with left platform rolling walker.  8. Lower extremity exercise program x20 reps per handout, with assistance as needed and gym therex                      PLAN:    Patient to be seen 5 x/week  to address the above listed problems via gait training, therapeutic activities, therapeutic exercises, wheelchair management/training  Plan of Care Expires: 03/31/18    Jennifer Oliveira, PT 3/1/2018

## 2018-03-01 NOTE — SUBJECTIVE & OBJECTIVE
Past Medical History:   Diagnosis Date    Arthritis     Cataract     Glaucoma     History of iritis     OD    Hypertension     Meningioma     Uveitis        Past Surgical History:   Procedure Laterality Date    Brain tumor surgery      had a brain tumor removed    CATARACT EXTRACTION      OU    CATARACT EXTRACTION BILATERAL W/ ANTERIOR VITRECTOMY      CHOLECYSTECTOMY      CRANIOTOMY      KNEE SURGERY  2-24-15    right TKR    YAG Right 1/20/2016    Dr. Kaiser       Review of patient's allergies indicates:   Allergen Reactions    Cortisone Hives and Swelling     Swelling of the tongue       Current Facility-Administered Medications on File Prior to Encounter   Medication    [DISCONTINUED] aspirin EC tablet 325 mg    [DISCONTINUED] cefTRIAXone (ROCEPHIN) 2 g in dextrose 5 % 50 mL IVPB    [DISCONTINUED] diphenhydrAMINE capsule 25 mg    [DISCONTINUED] docusate sodium capsule 100 mg    [DISCONTINUED] hydroCHLOROthiazide tablet 50 mg    [DISCONTINUED] morphine injection 2 mg    [DISCONTINUED] morphine injection 4 mg    [DISCONTINUED] ondansetron disintegrating tablet 4 mg    [DISCONTINUED] oxyCODONE-acetaminophen  mg per tablet 1 tablet    [DISCONTINUED] oxyCODONE-acetaminophen 5-325 mg per tablet 1 tablet    [DISCONTINUED] sodium chloride 0.9% flush 10 mL    [DISCONTINUED] sodium chloride 0.9% flush 10 mL    [DISCONTINUED] tranexamic acid (CYKLOKAPRON) 3,000 mg in sodium chloride 0.9% 100 mL    [DISCONTINUED] valsartan tablet 80 mg    [DISCONTINUED] vancomycin (VANCOCIN) 1,500 mg in sodium chloride 0.9% 250 mL IVPB     Current Outpatient Prescriptions on File Prior to Encounter   Medication Sig    ASCORBATE CALCIUM (VITAMIN C ORAL) Take by mouth once daily.     hydrochlorothiazide (HYDRODIURIL) 25 MG tablet Take 2 tablets (50 mg total) by mouth once daily. (Patient taking differently: Take 50 mg by mouth every morning. )    triamcinolone acetonide 0.1% (KENALOG) 0.1 % cream JOSE ELIAS  EXT AA TID    valsartan (DIOVAN) 80 MG tablet TAKE 1 TABLET(80 MG) BY MOUTH EVERY DAY (Patient taking differently: TAKE 1 TABLET(80 MG) BY MOUTH EVERY DAY  am)    aspirin (ECOTRIN) 325 MG EC tablet Take 1 tablet (325 mg total) by mouth 2 (two) times daily.    docusate sodium (COLACE) 100 MG capsule Take 1 capsule (100 mg total) by mouth 2 (two) times daily.    ondansetron (ZOFRAN) 8 MG tablet Take 1 tablet (8 mg total) by mouth every 8 (eight) hours as needed for Nausea.    oxyCODONE-acetaminophen (PERCOCET)  mg per tablet Take 1 tablet by mouth every 4 (four) hours as needed for Pain.     Family History     Problem Relation (Age of Onset)    Cataracts Mother, Sister, Maternal Aunt    Diabetes Mother, Father, Sister, Maternal Aunt    Glaucoma Mother, Brother, Maternal Aunt, Maternal Uncle    Hypertension Mother, Father    Stroke Sister        Social History Main Topics    Smoking status: Former Smoker     Packs/day: 0.10     Years: 3.00     Types: Cigarettes     Quit date: 4/26/1973    Smokeless tobacco: Never Used    Alcohol use 1.2 oz/week     2 Glasses of wine per week      Comment: social- glass on wine on occasions    Drug use: No    Sexual activity: Not on file     Review of Systems   Constitutional: no fever or chills  Eyes: no visual changes  ENT: no nasal congestion or sore throat  Respiratory: no cough or shortness of breath  Cardiovascular: no chest pain or palpitations  Gastrointestinal: no nausea or vomiting, no abdominal pain; last BM: 2/27 but feels constipated  Genitourinary: no hematuria or dysuria  Integument/Breast: no rash or pruritis  Hematologic/Lymphatic: no easy bruising or lymphadenopathy  Allergy/Immunology: no postnasal drip  Musculoskeletal: no arthralgias or myalgias  Neurological: no seizures or tremors  Behavioral/Psych: no auditory or visual hallucinations  Endocrine: no heat or cold intolerance      Objective:     Vital Signs (Most Recent):  Temp: 97.6 °F (36.4 °C)  (02/28/18 2041)  Pulse: 109 (02/28/18 2041)  Resp: 20 (02/28/18 2041)  BP: 131/69 (02/28/18 2041)  SpO2: 96 % (03/01/18 0700) Vital Signs (24h Range):  Temp:  [97.6 °F (36.4 °C)] 97.6 °F (36.4 °C)  Pulse:  [109] 109  Resp:  [20] 20  SpO2:  [93 %-96 %] 96 %  BP: (131)/(69) 131/69     Weight: 132.3 kg (291 lb 10.7 oz)  Body mass index is 50.06 kg/m².    Physical Exam  General: well developed, well nourished, no distress  HENT: Head:normocephalic, atraumatic. Ears:bilateral external ear canals normal. Nose: Nares normal. Septum midline. Mucosa normal. Throat: lips, mucosa, and tongue normal and no throat erythema.  Eyes: conjunctivae/corneas clear.  EOM normal  Neck: supple, symmetrical, trachea midline, no JVD and thyroid not enlarged.   Lungs:  clear to auscultation bilaterally and normal respiratory effort  Cardiovascular: Heart: regular rate and rhythm, S1, S2 normal, no murmur, click, rub or gallop. Chest Wall: no tenderness. Extremities: no cyanosis, 1+ LLE edema, no clubbing. Pulses: 2+ and symmetric.  Abdomen/Rectal: Abdomen: soft, non-tender non-distended; bowel sounds normal; no masses,  no organomegaly.   Skin: Skin color, texture, turgor normal. Incisional   Musculoskeletal:no clubbing, cyanosis; cast to left arm; hinged brace to left knee  Lymph Nodes: No cervical or supraclavicular adenopathy  Neurologic: Normal strength and tone on right.   Psych/Behavioral:  Alert and oriented, appropriate affect.    Significant Labs:     Recent Labs  Lab 02/24/18  1030 02/25/18  0603 03/01/18  0647   WBC 9.12 10.33 8.56   HGB 11.4* 10.8* 9.8*   HCT 34.6* 33.5* 29.8*    396* 424*         Recent Labs  Lab 02/23/18 2009 02/24/18  1030 02/25/18  0603 03/01/18  0647    140 138 138   K 5.7* 4.4 4.2 3.8   CL 99 106 103 100   CO2 27 24 27 29   BUN 13 8 8 7*   CREATININE 0.8 0.8 0.7 0.7   CALCIUM 9.6 8.4* 8.0* 8.7   PROT 8.9* 7.0 6.8  --    BILITOT 0.7 0.6 0.8  --    ALKPHOS 97 95 92  --    ALT 16 12 14  --    AST  30 18 25  --      Recent Results (from the past 24 hour(s))   CBC auto differential    Collection Time: 03/01/18  6:47 AM   Result Value Ref Range    WBC 8.56 3.90 - 12.70 K/uL    RBC 3.41 (L) 4.00 - 5.40 M/uL    Hemoglobin 9.8 (L) 12.0 - 16.0 g/dL    Hematocrit 29.8 (L) 37.0 - 48.5 %    MCV 87 82 - 98 fL    MCH 28.7 27.0 - 31.0 pg    MCHC 32.9 32.0 - 36.0 g/dL    RDW 13.9 11.5 - 14.5 %    Platelets 424 (H) 150 - 350 K/uL    MPV 9.7 9.2 - 12.9 fL    Immature Granulocytes 0.4 0.0 - 0.5 %    Gran # (ANC) 5.9 1.8 - 7.7 K/uL    Immature Grans (Abs) 0.03 0.00 - 0.04 K/uL    Lymph # 1.3 1.0 - 4.8 K/uL    Mono # 0.7 0.3 - 1.0 K/uL    Eos # 0.6 (H) 0.0 - 0.5 K/uL    Baso # 0.04 0.00 - 0.20 K/uL    nRBC 0 0 /100 WBC    Gran% 68.8 38.0 - 73.0 %    Lymph% 15.5 (L) 18.0 - 48.0 %    Mono% 8.3 4.0 - 15.0 %    Eosinophil% 6.5 0.0 - 8.0 %    Basophil% 0.5 0.0 - 1.9 %    Differential Method Automated    Basic metabolic panel    Collection Time: 03/01/18  6:47 AM   Result Value Ref Range    Sodium 138 136 - 145 mmol/L    Potassium 3.8 3.5 - 5.1 mmol/L    Chloride 100 95 - 110 mmol/L    CO2 29 23 - 29 mmol/L    Glucose 92 70 - 110 mg/dL    BUN, Bld 7 (L) 8 - 23 mg/dL    Creatinine 0.7 0.5 - 1.4 mg/dL    Calcium 8.7 8.7 - 10.5 mg/dL    Anion Gap 9 8 - 16 mmol/L    eGFR if African American >60.0 >60 mL/min/1.73 m^2    eGFR if non African American >60.0 >60 mL/min/1.73 m^2   Magnesium    Collection Time: 03/01/18  6:47 AM   Result Value Ref Range    Magnesium 1.4 (L) 1.6 - 2.6 mg/dL   Phosphorus    Collection Time: 03/01/18  6:47 AM   Result Value Ref Range    Phosphorus 3.4 2.7 - 4.5 mg/dL

## 2018-03-01 NOTE — NURSING
Nurse called pharmacy and inquired about 1830 vancomycin. Ara states she's waiting on . Will monitor

## 2018-03-01 NOTE — CLINICAL REVIEW
Clinical Pharmacy Chart Review Note      Admit Date: 2/28/2018   LOS: 1 day       Katelyn Huynh is a 67 y.o. female admitted to SNF for PT/OT after hospitalization for wound dehiscence.    Active Hospital Problems    Diagnosis  POA    *Wound dehiscence [T81.30XA]  Yes    S/P TKR (total knee replacement), left [Z96.652]  Not Applicable    Left wrist fracture s/p ORIF [S62.102A]  Yes    Primary osteoarthritis of left knee [M17.12]  Yes      Resolved Hospital Problems    Diagnosis Date Resolved POA   No resolved problems to display.     Review of patient's allergies indicates:   Allergen Reactions    Cortisone Hives and Swelling     Swelling of the tongue     Patient Active Problem List    Diagnosis Date Noted    Wound dehiscence 02/23/2018    Tachycardia 02/19/2018    Hyperkalemia 02/19/2018    S/P TKR (total knee replacement), left 02/17/2018    Morbid obesity with BMI of 50.0-59.9, adult 02/17/2018    Chronic idiopathic constipation 02/17/2018    Left wrist fracture s/p ORIF 02/16/2018    Primary osteoarthritis of left knee 02/09/2018    Morbid obesity 01/25/2018    Prediabetes 01/25/2018    Varicose veins of both lower extremities 01/25/2018    Edema 01/25/2018    Insufficiency of tear film of both eyes 06/01/2016    Left posterior capsular opacification 02/03/2016    Bilateral posterior capsular opacification 01/20/2016    Essential hypertension 01/20/2016    Left knee pain 11/06/2015    Abnormality of gait 11/06/2015    Knee joint replacement by other means 02/26/2015    Osteoarthritis of right knee 02/24/2015    Right knee DJD 01/13/2015    After-cataract, unspecified 08/25/2014    Steroid responders - Both Eyes 02/17/2014    Refractive error - Both Eyes 12/30/2013    Post-operative state - Both Eyes 11/04/2013    Posterior tibial tendon dysfunction 07/19/2013    Foot pain 07/19/2013    CME (cystoid macular edema) - Right Eye 07/08/2013    Ocular hypertension -  Both Eyes 06/10/2013    Iritis - Both Eyes 05/20/2013    Essential (primary) hypertension 05/20/2013    Pseudophakia - Both Eyes 05/20/2013    After-cataract, obscuring vision - Both Eyes 05/20/2013    History of meningioma 07/19/2012       Scheduled Meds:    aspirin  325 mg Oral BID    cefTRIAXone (ROCEPHIN) IVPB  2 g Intravenous Q24H    docusate sodium  100 mg Oral BID    hydroCHLOROthiazide  50 mg Oral Daily    sodium chloride 0.9%  10 mL Intravenous Q6H    valsartan  80 mg Oral Daily    vancomycin (VANCOCIN) IVPB  1,500 mg Intravenous Q12H     Continuous Infusions:   PRN Meds: diphenhydrAMINE, ondansetron, oxyCODONE-acetaminophen, oxyCODONE-acetaminophen, Flushing PICC Protocol **AND** sodium chloride 0.9% **AND** sodium chloride 0.9%    OBJECTIVE:     Vital Signs (Last 24H)  Temp:  [97.6 °F (36.4 °C)-97.8 °F (36.6 °C)]   Pulse:  [105-109]   Resp:  [18-20]   BP: (124-131)/(57-69)   SpO2:  [93 %-98 %]     Laboratory:  CBC:   Recent Labs  Lab 02/24/18  1030 02/25/18  0603 03/01/18  0647   WBC 9.12 10.33 8.56   RBC 3.87* 3.76* 3.41*   HGB 11.4* 10.8* 9.8*   HCT 34.6* 33.5* 29.8*    396* 424*   MCV 89 89 87   MCH 29.5 28.7 28.7   MCHC 32.9 32.2 32.9     BMP:   Recent Labs  Lab 02/23/18 2009 02/24/18  1030 02/25/18  0603    129* 125*    140 138   K 5.7* 4.4 4.2   CL 99 106 103   CO2 27 24 27   BUN 13 8 8   CREATININE 0.8 0.8 0.7   CALCIUM 9.6 8.4* 8.0*   MG 2.6  --  1.8     CMP:   Recent Labs  Lab 02/23/18 2009 02/24/18  1030 02/25/18  0603    129* 125*   CALCIUM 9.6 8.4* 8.0*   ALBUMIN 3.1* 2.5* 2.5*   PROT 8.9* 7.0 6.8    140 138   K 5.7* 4.4 4.2   CO2 27 24 27   CL 99 106 103   BUN 13 8 8   CREATININE 0.8 0.8 0.7   ALKPHOS 97 95 92   ALT 16 12 14   AST 30 18 25   BILITOT 0.7 0.6 0.8     LFTs:   Recent Labs  Lab 02/23/18 2009 02/24/18  1030 02/25/18  0603   ALT 16 12 14   AST 30 18 25   ALKPHOS 97 95 92   BILITOT 0.7 0.6 0.8   PROT 8.9* 7.0 6.8   ALBUMIN 3.1* 2.5*  2.5*     Coagulation:   Recent Labs  Lab 02/23/18 2009   INR 1.0     Cardiac markers: No results for input(s): CKMB, TROPONINT, MYOGLOBIN in the last 168 hours.  ABGs: No results for input(s): PH, PCO2, PO2, HCO3, POCSATURATED, BE in the last 168 hours.  Microbiology Results (last 7 days)     ** No results found for the last 168 hours. **        Specimen (12h ago through future)    None          Recent Labs  Lab 02/23/18  2133   COLORU Yellow   SPECGRAV 1.010   PHUR 7.0   PROTEINUA Negative   NITRITE Negative   LEUKOCYTESUR Negative   UROBILINOGEN 2.0     Others: No results for input(s): [id number], TSH, T4FREE, LABLIPI in the last 168 hours.    Invalid input(s): A1C      ASSESSMENT/PLAN:     Active Hospital Problems    Diagnosis    *Wound dehiscence   --Continue wound vac  --Vancomycin 1500 mg IV q12 hrs (stop date 3/10)  --Ceftriaxone 2gm IV q24 hrs (stop date 3/10)  --Monitor Vanc trough levels [id number] dose for goal of 15-20  --Monitor weekly CRP, ESR and hepatic function tests      S/P TKR (total knee replacement), left   --PT/OT: Oxycodone 5-325mg q4hrs prn moderate pain, 10-325mg q4hrs prn severe pain  --bowel regimen for constipation; hold for loose or frequent stools: Miralax daily; Senna- docusate twice daily; MOM 30 ml daily prn  --DVT prophylaxis:  mg twice daily (monitor CBC)  --Famotidine 20 mg twice daily for GI prophylaxis  Lab Results   Component Value Date    WBC 8.56 03/01/2018    HGB 9.8 (L) 03/01/2018    HCT 29.8 (L) 03/01/2018    MCV 87 03/01/2018     (H) 03/01/2018         Chronic idiopathic constipation  --Cont bowel regimen as above      Primary osteoarthritis of left knee   --S/P TKR, left, see plan above      Essential Hypertension  **Monitor BP  --Valsartan 80 mg daily (monitor BMP)  --HCTZ 50 mg daily (monitor BMP)    BP Readings from Last 3 Encounters:   02/28/18 131/69   02/28/18 127/64   02/23/18 135/61     BMP  Lab Results   Component Value Date      03/01/2018    K 3.8 03/01/2018     03/01/2018    CO2 29 03/01/2018    BUN 7 (L) 03/01/2018    CREATININE 0.7 03/01/2018    CALCIUM 8.7 03/01/2018    ANIONGAP 9 03/01/2018    ESTGFRAFRICA >60.0 03/01/2018    EGFRNONAA >60.0 03/01/2018                       I have reviewed the medications in compliance with CMS Regulation F329 of the [place] Appendix PP.      Reba Soliz, Pharm. D.  Clinical Pharmacist  [facility]\

## 2018-03-01 NOTE — ASSESSMENT & PLAN NOTE
Chronic and stable  Continue therapy with HCTZ and valsartan  -will continue to monitor and adjust regimen as necessary

## 2018-03-01 NOTE — ASSESSMENT & PLAN NOTE
Patient had sutures and splint removed by [facility] team when hospitalized  F/u with Dr. Wong on discharge from SNF if care not assumed at [facility]

## 2018-03-01 NOTE — HOSPITAL COURSE
[facility]  2/28: Patient admitted to SNF for ongoing PT/Ot following a hospitalization for wound dehiscence with quadriceps tendon repair, I&D, and revision of left knee  3/2: Patient seen at bedside, doing well, reports tolerable pain to both left wrist and left leg, wound vac in place with good suction.   3/7/2018 Pt seen at bedside. Incisional vac removed. Staples intact. No active drainage. Brace in place for quad tendon rupture. (ortho NP)  3/9: Patient seen at bedside, reports tolerable pain to both wrisit and left leg. Knee incision with island dressing with small amount of dried serosanguinous drainage to upper dressing, brace in use.   3/13: Patient seen at bedside, discussed discharge with patient and family, answered all questions. Patient discharge home with home health services.

## 2018-03-01 NOTE — PLAN OF CARE
Problem: Physical Therapy Goal  Goal: Physical Therapy Goal  Goals to be met by: 14 days     Patient will increase functional independence with mobility by performin. Supine to sit with Stand-by Assistance  2. Sit to supine with Stand-by Assistance4  3. Sit to stand transfer with Stand-by Assistance with RLE brace locked in extension and with left platform rolling walker  4. Bed to chair transfer with Stand-by Assistance  with RLE brace locked in extension and with left platform rolling walker  5. Gait  x 150 feet with Stand-by Assistance  with RLE brace locked in extension and with left platform rolling walker  6. Wheelchair propulsion x50 feet with Stand-by Assistance using bilateral upper extremities  7. Ascend/Descend 4 inch curb step with Contact Guard Assistance  with RLE brace locked in extension and with left platform rolling walker.  8. Lower extremity exercise program x20 reps per handout, with assistance as needed and gym therex    PT eval completed. Jennifer Oliveira, PT 3/1/2018

## 2018-03-01 NOTE — PT/OT/SLP DISCHARGE
Occupational Therapy Discharge Summary    Katelyn Huynh  MRN: [id number]   Principal Problem: Wound dehiscence      Patient Discharged from acute Occupational Therapy on 2/28/2018.  Please refer to prior OT note dated 2/27/2018 for functional status.    Assessment:      Patient appropriate for care in another setting.    Objective:     GOALS:    Occupational Therapy Goals        Problem: Occupational Therapy Goal    Goal Priority Disciplines Outcome Interventions   Occupational Therapy Goal     OT, PT/OT Ongoing (interventions implemented as appropriate)    Description:  Goals to be met by: 3/2/2018     Patient will increase functional independence with ADLs by performing:    Feeding with Set-up Assistance. -- Met   UE Dressing with set up assistance.  LE Dressing with Moderate Assistance. -- Met   Grooming while seated with Minimal Assistance.  Toileting from bedside commode with Moderate Assistance for hygiene and clothing management.   Supine to sit with Minimal Assistance. -- Met  Stand pivot transfers with Minimal Assistance with use of platform rolling walker. -- Met                        Reasons for Discontinuation of Therapy Services  Transfer to alternate level of care.      Plan:     Patient Discharged to: Skilled Nursing Facility    Hailey Stephen OT  3/1/2018

## 2018-03-01 NOTE — H&P
[facility]  Department of Hospital Medicine  History & Physical    Patient Name: Katelyn Huynh  MRN: [id number]  Code Status: Full Code  Admission Date: 2/28/2018  Attending Physician: Jackelin Miller MD   Primary Care Provider: Daniel Garcia MD    Subjective:     Principal Problem:Wound dehiscence     HPI: Chief Complaint/Reason for Admission: Wound dehiscence    History of Present Illness:  Patient is a 67 y.o. female with HTN, morbid obesity, recent left TKR who presents to SNF after readmission for dehiscence of her left knee incision.  The patient is known to me from previous SNF admission (See H/P 2/17/2018)  She required quadriceps tendon repair, I&D and revision of the left knee on 2/24/2018 by Dr. Martinez.  Her left wrist splint has been removed, sutures also removed and arm placed in short arm cast.  She complains of left knee pain 7/10 and left wrist pain 3/10. Knee pain exacerbated with ambulation and both pains are relieved with oxycodone at the current dose. She has an incisional wound vac in place and will complete 10 d of IV antibiotics with vanc and rocephin.    The patient has been admitted to SNF for ongoing PT/OT due to insufficient progress to go home safely from the hospital.    Records from last hospital stay reviewed and summarized above.     Past Medical History:   Diagnosis Date    Arthritis     Cataract     Glaucoma     History of iritis     OD    Hypertension     Meningioma     Uveitis        Past Surgical History:   Procedure Laterality Date    Brain tumor surgery      had a brain tumor removed    CATARACT EXTRACTION      OU    CATARACT EXTRACTION BILATERAL W/ ANTERIOR VITRECTOMY      CHOLECYSTECTOMY      CRANIOTOMY      KNEE SURGERY  2-24-15    right TKR    YAG Right 1/20/2016    Dr. Kaiser       Review of patient's allergies indicates:   Allergen Reactions    Cortisone Hives and Swelling     Swelling of the tongue       Current  Facility-Administered Medications on File Prior to Encounter   Medication    [DISCONTINUED] aspirin EC tablet 325 mg    [DISCONTINUED] cefTRIAXone (ROCEPHIN) 2 g in dextrose 5 % 50 mL IVPB    [DISCONTINUED] diphenhydrAMINE capsule 25 mg    [DISCONTINUED] docusate sodium capsule 100 mg    [DISCONTINUED] hydroCHLOROthiazide tablet 50 mg    [DISCONTINUED] morphine injection 2 mg    [DISCONTINUED] morphine injection 4 mg    [DISCONTINUED] ondansetron disintegrating tablet 4 mg    [DISCONTINUED] oxyCODONE-acetaminophen  mg per tablet 1 tablet    [DISCONTINUED] oxyCODONE-acetaminophen 5-325 mg per tablet 1 tablet    [DISCONTINUED] sodium chloride 0.9% flush 10 mL    [DISCONTINUED] sodium chloride 0.9% flush 10 mL    [DISCONTINUED] tranexamic acid (CYKLOKAPRON) 3,000 mg in sodium chloride 0.9% 100 mL    [DISCONTINUED] valsartan tablet 80 mg    [DISCONTINUED] vancomycin (VANCOCIN) 1,500 mg in sodium chloride 0.9% 250 mL IVPB     Current Outpatient Prescriptions on File Prior to Encounter   Medication Sig    ASCORBATE CALCIUM (VITAMIN C ORAL) Take by mouth once daily.     hydrochlorothiazide (HYDRODIURIL) 25 MG tablet Take 2 tablets (50 mg total) by mouth once daily. (Patient taking differently: Take 50 mg by mouth every morning. )    triamcinolone acetonide 0.1% (KENALOG) 0.1 % cream JOSE ELIAS EXT AA TID    valsartan (DIOVAN) 80 MG tablet TAKE 1 TABLET(80 MG) BY MOUTH EVERY DAY (Patient taking differently: TAKE 1 TABLET(80 MG) BY MOUTH EVERY DAY  am)    aspirin (ECOTRIN) 325 MG EC tablet Take 1 tablet (325 mg total) by mouth 2 (two) times daily.    docusate sodium (COLACE) 100 MG capsule Take 1 capsule (100 mg total) by mouth 2 (two) times daily.    ondansetron (ZOFRAN) 8 MG tablet Take 1 tablet (8 mg total) by mouth every 8 (eight) hours as needed for Nausea.    oxyCODONE-acetaminophen (PERCOCET)  mg per tablet Take 1 tablet by mouth every 4 (four) hours as needed for Pain.     Family History      Problem Relation (Age of Onset)    Cataracts Mother, Sister, Maternal Aunt    Diabetes Mother, Father, Sister, Maternal Aunt    Glaucoma Mother, Brother, Maternal Aunt, Maternal Uncle    Hypertension Mother, Father    Stroke Sister        Social History Main Topics    Smoking status: Former Smoker     Packs/day: 0.10     Years: 3.00     Types: Cigarettes     Quit date: 4/26/1973    Smokeless tobacco: Never Used    Alcohol use 1.2 oz/week     2 Glasses of wine per week      Comment: social- glass on wine on occasions    Drug use: No    Sexual activity: Not on file     Review of Systems   Constitutional: no fever or chills  Eyes: no visual changes  ENT: no nasal congestion or sore throat  Respiratory: no cough or shortness of breath  Cardiovascular: no chest pain or palpitations  Gastrointestinal: no nausea or vomiting, no abdominal pain; last BM: 2/27 but feels constipated  Genitourinary: no hematuria or dysuria  Integument/Breast: no rash or pruritis  Hematologic/Lymphatic: no easy bruising or lymphadenopathy  Allergy/Immunology: no postnasal drip  Musculoskeletal: no arthralgias or myalgias  Neurological: no seizures or tremors  Behavioral/Psych: no auditory or visual hallucinations  Endocrine: no heat or cold intolerance      Objective:     Vital Signs (Most Recent):  Temp: 97.6 °F (36.4 °C) (02/28/18 2041)  Pulse: 109 (02/28/18 2041)  Resp: 20 (02/28/18 2041)  BP: 131/69 (02/28/18 2041)  SpO2: 96 % (03/01/18 0700) Vital Signs (24h Range):  Temp:  [97.6 °F (36.4 °C)] 97.6 °F (36.4 °C)  Pulse:  [109] 109  Resp:  [20] 20  SpO2:  [93 %-96 %] 96 %  BP: (131)/(69) 131/69     Weight: 132.3 kg (291 lb 10.7 oz)  Body mass index is 50.06 kg/m².    Physical Exam  General: well developed, well nourished, no distress  HENT: Head:normocephalic, atraumatic. Ears:bilateral external ear canals normal. Nose: Nares normal. Septum midline. Mucosa normal. Throat: lips, mucosa, and tongue normal and no throat erythema.  Eyes:  conjunctivae/corneas clear.  EOM normal  Neck: supple, symmetrical, trachea midline, no JVD and thyroid not enlarged.   Lungs:  clear to auscultation bilaterally and normal respiratory effort  Cardiovascular: Heart: regular rate and rhythm, S1, S2 normal, no murmur, click, rub or gallop. Chest Wall: no tenderness. Extremities: no cyanosis, 1+ LLE edema, no clubbing. Pulses: 2+ and symmetric.  Abdomen/Rectal: Abdomen: soft, non-tender non-distended; bowel sounds normal; no masses,  no organomegaly.   Skin: Skin color, texture, turgor normal. Incisional   Musculoskeletal:no clubbing, cyanosis; cast to left arm; hinged brace to left knee  Lymph Nodes: No cervical or supraclavicular adenopathy  Neurologic: Normal strength and tone on right.   Psych/Behavioral:  Alert and oriented, appropriate affect.    Significant Labs:     Recent Labs  Lab 02/24/18  1030 02/25/18  0603 03/01/18  0647   WBC 9.12 10.33 8.56   HGB 11.4* 10.8* 9.8*   HCT 34.6* 33.5* 29.8*    396* 424*         Recent Labs  Lab 02/23/18 2009 02/24/18  1030 02/25/18  0603 03/01/18  0647    140 138 138   K 5.7* 4.4 4.2 3.8   CL 99 106 103 100   CO2 27 24 27 29   BUN 13 8 8 7*   CREATININE 0.8 0.8 0.7 0.7   CALCIUM 9.6 8.4* 8.0* 8.7   PROT 8.9* 7.0 6.8  --    BILITOT 0.7 0.6 0.8  --    ALKPHOS 97 95 92  --    ALT 16 12 14  --    AST 30 18 25  --      Recent Results (from the past 24 hour(s))   CBC auto differential    Collection Time: 03/01/18  6:47 AM   Result Value Ref Range    WBC 8.56 3.90 - 12.70 K/uL    RBC 3.41 (L) 4.00 - 5.40 M/uL    Hemoglobin 9.8 (L) 12.0 - 16.0 g/dL    Hematocrit 29.8 (L) 37.0 - 48.5 %    MCV 87 82 - 98 fL    MCH 28.7 27.0 - 31.0 pg    MCHC 32.9 32.0 - 36.0 g/dL    RDW 13.9 11.5 - 14.5 %    Platelets 424 (H) 150 - 350 K/uL    MPV 9.7 9.2 - 12.9 fL    Immature Granulocytes 0.4 0.0 - 0.5 %    Gran # (ANC) 5.9 1.8 - 7.7 K/uL    Immature Grans (Abs) 0.03 0.00 - 0.04 K/uL    Lymph # 1.3 1.0 - 4.8 K/uL    Mono # 0.7 0.3 -  1.0 K/uL    Eos # 0.6 (H) 0.0 - 0.5 K/uL    Baso # 0.04 0.00 - 0.20 K/uL    nRBC 0 0 /100 WBC    Gran% 68.8 38.0 - 73.0 %    Lymph% 15.5 (L) 18.0 - 48.0 %    Mono% 8.3 4.0 - 15.0 %    Eosinophil% 6.5 0.0 - 8.0 %    Basophil% 0.5 0.0 - 1.9 %    Differential Method Automated    Basic metabolic panel    Collection Time: 03/01/18  6:47 AM   Result Value Ref Range    Sodium 138 136 - 145 mmol/L    Potassium 3.8 3.5 - 5.1 mmol/L    Chloride 100 95 - 110 mmol/L    CO2 29 23 - 29 mmol/L    Glucose 92 70 - 110 mg/dL    BUN, Bld 7 (L) 8 - 23 mg/dL    Creatinine 0.7 0.5 - 1.4 mg/dL    Calcium 8.7 8.7 - 10.5 mg/dL    Anion Gap 9 8 - 16 mmol/L    eGFR if African American >60.0 >60 mL/min/1.73 m^2    eGFR if non African American >60.0 >60 mL/min/1.73 m^2   Magnesium    Collection Time: 03/01/18  6:47 AM   Result Value Ref Range    Magnesium 1.4 (L) 1.6 - 2.6 mg/dL   Phosphorus    Collection Time: 03/01/18  6:47 AM   Result Value Ref Range    Phosphorus 3.4 2.7 - 4.5 mg/dL             Assessment/Plan:     * Wound dehiscence    Continue wound vac although disconnected for several hours on transfer  Wound care nurse evaluated and suctioning well  Ortho NP to follow patient every week and determine discontinuation of the wound vac  Continue vanc and rocephin via PICC line with end date of 3/10  Vanc levels every 4th dose for goal of 15-20  Monitoring with weekly CRP, ESR and hepatic function tests  PICC line care              Primary osteoarthritis of left knee    S/p TKR on 2/9  See plan below.        S/P TKR (total knee replacement), left    -continue PT/OT to increase ambulation, ADL performance and endurance  -WBAT to LLE  -continue oxycodone for pain control prn  -keep polar ice in place when not in therapy (patient to have brought from home)  -continue ASA for DVT prophylaxis; continue famotidine while taking high dose ASA  -continue wound vac to left knee  -Ortho NP to assess patient while at SNF weekly           Quadriceps  tendon rupture, left, subsequent encounter    s/p repair on 2/24/2018  Continue WBAT in hinged knee brace locked in extension        Chronic idiopathic constipation     Occurs at home and worsened with narcotic usage and immobility  Continue senokot-s and miralax   Milk of magnesia is effective for the patient at home so ordered prn if scheduled laxatives ineffective             Morbid obesity with BMI of 50.0-59.9, adult    Portion control while at [facility] with modeling at home  Referral to weight loss clinic if patient interested        Left wrist fracture s/p ORIF    Patient had sutures and splint removed by [facility] team when hospitalized  F/u with Dr. Wong on discharge from SNF if care not assumed at [facility]          Essential (primary) hypertension    Chronic and stable  Continue therapy with HCTZ and valsartan  -will continue to monitor and adjust regimen as necessary            Future Appointments  Date Time Provider Department Center   3/2/2018 10:40 AM Cl Wong Jr., MD [facility]   3/12/2018 9:30 AM Shelley Up NP [facility]   3/21/2018 8:15 AM Jasen Salinas MD [facility]     Patient will need to f/u with Dr. Wong on discharge from [facility].    Patient's care plan and discharge planning will be discussed by the SNF team in IDT meeting weekly. Medications to be reviewed and discussed with the SNF unit clinical pharmacist.    Jackelin Miller MD  Department of Hospital Medicine  [facility]

## 2018-03-02 PROCEDURE — 97802 MEDICAL NUTRITION INDIV IN: CPT

## 2018-03-02 PROCEDURE — 97535 SELF CARE MNGMENT TRAINING: CPT

## 2018-03-02 PROCEDURE — 97530 THERAPEUTIC ACTIVITIES: CPT

## 2018-03-02 PROCEDURE — 25000003 PHARM REV CODE 250: Performed by: INTERNAL MEDICINE

## 2018-03-02 PROCEDURE — 25000003 PHARM REV CODE 250: Performed by: STUDENT IN AN ORGANIZED HEALTH CARE EDUCATION/TRAINING PROGRAM

## 2018-03-02 PROCEDURE — A4216 STERILE WATER/SALINE, 10 ML: HCPCS | Performed by: STUDENT IN AN ORGANIZED HEALTH CARE EDUCATION/TRAINING PROGRAM

## 2018-03-02 PROCEDURE — 97110 THERAPEUTIC EXERCISES: CPT

## 2018-03-02 PROCEDURE — 99309 SBSQ NF CARE MODERATE MDM 30: CPT | Mod: ,,, | Performed by: NURSE PRACTITIONER

## 2018-03-02 PROCEDURE — 12000000 HC SNF SEMI-PRIVATE ROOM

## 2018-03-02 PROCEDURE — 63600175 PHARM REV CODE 636 W HCPCS: Performed by: STUDENT IN AN ORGANIZED HEALTH CARE EDUCATION/TRAINING PROGRAM

## 2018-03-02 PROCEDURE — 97116 GAIT TRAINING THERAPY: CPT

## 2018-03-02 RX ADMIN — VANCOMYCIN HYDROCHLORIDE 1500 MG: 10 INJECTION, POWDER, LYOPHILIZED, FOR SOLUTION INTRAVENOUS at 12:03

## 2018-03-02 RX ADMIN — VALSARTAN 80 MG: 80 TABLET ORAL at 09:03

## 2018-03-02 RX ADMIN — Medication 10 ML: at 12:03

## 2018-03-02 RX ADMIN — OXYCODONE HYDROCHLORIDE AND ACETAMINOPHEN 1 TABLET: 5; 325 TABLET ORAL at 09:03

## 2018-03-02 RX ADMIN — ASPIRIN 325 MG: 325 TABLET, DELAYED RELEASE ORAL at 09:03

## 2018-03-02 RX ADMIN — OXYCODONE HYDROCHLORIDE AND ACETAMINOPHEN 1 TABLET: 10; 325 TABLET ORAL at 09:03

## 2018-03-02 RX ADMIN — STANDARDIZED SENNA CONCENTRATE AND DOCUSATE SODIUM 1 TABLET: 8.6; 5 TABLET, FILM COATED ORAL at 09:03

## 2018-03-02 RX ADMIN — HYDROCHLOROTHIAZIDE 50 MG: 25 TABLET ORAL at 09:03

## 2018-03-02 RX ADMIN — CEFTRIAXONE SODIUM 2 G: 2 INJECTION, POWDER, FOR SOLUTION INTRAMUSCULAR; INTRAVENOUS at 01:03

## 2018-03-02 RX ADMIN — FAMOTIDINE 20 MG: 20 TABLET, FILM COATED ORAL at 09:03

## 2018-03-02 RX ADMIN — Medication 10 ML: at 06:03

## 2018-03-02 RX ADMIN — OXYCODONE HYDROCHLORIDE AND ACETAMINOPHEN 1 TABLET: 10; 325 TABLET ORAL at 03:03

## 2018-03-02 RX ADMIN — Medication 400 MG: at 09:03

## 2018-03-02 RX ADMIN — VANCOMYCIN HYDROCHLORIDE 1500 MG: 10 INJECTION, POWDER, LYOPHILIZED, FOR SOLUTION INTRAVENOUS at 11:03

## 2018-03-02 RX ADMIN — Medication 10 ML: at 05:03

## 2018-03-02 RX ADMIN — POLYETHYLENE GLYCOL 3350 17 G: 17 POWDER, FOR SOLUTION ORAL at 09:03

## 2018-03-02 NOTE — PLAN OF CARE
Problem: Occupational Therapy Goal  Goal: Occupational Therapy Goal  Goals to be met by: 2 weeks     Patient will increase functional independence with ADLs by performing:    UE Dressing with Set-up Assistance.  Met  LE Dressing with Supervision.  Grooming while standing with Supervision.  Toileting from bedside commode with Supervision for hygiene and clothing management.   Bathing from  edge of bed with Supervision.  Supine to sit with Modified [place].  Stand pivot transfers with Supervision with PRW  Toilet transfer to bedside commode with Supervision.  Pt. To be independent with HEP to improve level of endurance for BUE.   Pt. To participate in dynamic standing activity x 10 minutes with Supervision     Outcome: Ongoing (interventions implemented as appropriate)  JAYESH Glez/MARLO      3/2/2018

## 2018-03-02 NOTE — ASSESSMENT & PLAN NOTE
Evaluated on 3/2/18  -continue PT/OT to increase ambulation, ADL performance and endurance  -WBAT to LLE  -continue oxycodone for pain control prn  -keep polar ice in place when not in therapy (patient to have brought from home), reports sister is bring today  -continue ASA for DVT prophylaxis; continue famotidine while taking high dose ASA  -continue wound vac to left knee  -Ortho NP to assess patient while at SNF weekly

## 2018-03-02 NOTE — PROGRESS NOTES
[facility] Department of Hospital Medicine  Progress Note    Patient Name: Katelyn Huynh  MRN: [id number]  Code Status: Full Code  Admission Date: 2/28/2018  Length of Stay: 2 days  Attending Physician: Jackelin Miller MD  Primary Care Provider: Daniel Garcia MD    Subjective:     Principal Problem:Wound dehiscence    Chief Complaint/Reason for Admission: Wound dehiscence    History of Present Illness:  Patient is a 67 y.o. female with HTN, morbid obesity, recent left TKR who presents to SNF after readmission for dehiscence of her left knee incision.  The patient is known to me from previous SNF admission (See H/P 2/17/2018)  She required quadriceps tendon repair, I&D and revision of the left knee on 2/24/2018 by Dr. Martinez.  Her left wrist splint has been removed, sutures also removed and arm placed in short arm cast.  She complains of left knee pain 7/10 and left wrist pain 3/10. Knee pain exacerbated with ambulation and both pains are relieved with oxycodone at the current dose. She has an incisional wound vac in place and will complete 10 d of IV antibiotics with vanc and rocephin.The patient has been admitted to SNF for ongoing PT/OT due to insufficient progress to go home safely from the hospital.    Hospital Course:  The patient was admitted at [facility] from 2/23 to 2/28/2018.  2/28: Patient admitted to SNF for ongoing PT/Ot following a hospitalization for wound dehiscence with quadriceps tendon repair, I&D, and revision of left knee  3/2: Patient seen at bedside, doing well, reports tolerable pain to both left wrist and left leg, wound vac in place with good suction.     Interval History: Patient seen at bedside, doing well, no acute events overnight.    Review of Systems   Constitutional: Negative for appetite change, chills, fatigue and fever.   HENT: Negative for trouble swallowing.    Respiratory: Negative for cough, chest tightness, shortness of breath and  wheezing.    Cardiovascular: Negative for chest pain, palpitations and leg swelling.   Gastrointestinal: Negative for abdominal pain, constipation, diarrhea and nausea.   Genitourinary: Negative for difficulty urinating, frequency and urgency.   Musculoskeletal: Positive for arthralgias and joint swelling. Negative for myalgias.        +pain 6/10 to both left wrist and left knee   Skin: Negative for rash.   Neurological: Negative for dizziness, weakness, light-headedness and headaches.   Psychiatric/Behavioral: Negative for sleep disturbance.     Scheduled Meds:   aspirin  325 mg Oral BID    cefTRIAXone (ROCEPHIN) IVPB  2 g Intravenous Q24H    famotidine  20 mg Oral BID    hydroCHLOROthiazide  50 mg Oral Daily    magnesium oxide  400 mg Oral BID    polyethylene glycol  17 g Oral Daily    senna-docusate 8.6-50 mg  1 tablet Oral BID    sodium chloride 0.9%  10 mL Intravenous Q6H    valsartan  80 mg Oral Daily    vancomycin (VANCOCIN) IVPB  1,500 mg Intravenous Q12H     Continuous Infusions:  PRN Meds:.diphenhydrAMINE, magnesium hydroxide 400 mg/5 ml, ondansetron, oxyCODONE-acetaminophen, oxyCODONE-acetaminophen, Flushing PICC Protocol **AND** sodium chloride 0.9% **AND** sodium chloride 0.9%    Objective:     Vital Signs (Most Recent):  Temp: 98.8 °F (37.1 °C) (03/02/18 0800)  Pulse: 98 (03/02/18 0800)  Resp: 18 (03/02/18 0800)  BP: 120/70 (03/02/18 0800)  SpO2: 97 % (03/02/18 0800) Vital Signs (24h Range):  Temp:  [98.8 °F (37.1 °C)] 98.8 °F (37.1 °C)  Pulse:  [] 98  Resp:  [8-18] 8  SpO2:  [95 %-97 %] 97 %  BP: (120-122)/(56-70) 120/70     Weight: 132.3 kg (291 lb 10.7 oz)  Body mass index is 50.06 kg/m².  No intake or output data in the 24 hours ending 03/02/18 1528   Physical Exam   Constitutional: She is oriented to person, place, and time. She appears well-developed and well-nourished. No distress.   Cardiovascular: Normal rate, regular rhythm and normal heart sounds.  Exam reveals no gallop and  no friction rub.    No murmur heard.  Pulmonary/Chest: Effort normal and breath sounds normal. No respiratory distress. She has no wheezes. She has no rales.   Abdominal: Soft. Bowel sounds are normal. She exhibits no distension. There is no tenderness.   Musculoskeletal: She exhibits edema. She exhibits no tenderness.   1+ edema to left leg, incision wound vac in place with good suction. Hinge knee brace in place locked in extension. Left arm cast noted   Neurological: She is alert and oriented to person, place, and time.   Skin: Skin is warm and dry. No rash noted. She is not diaphoretic. No cyanosis. Nails show no clubbing.   Psychiatric: She has a normal mood and affect. Her behavior is normal.     Significant Labs:    Recent Labs  Lab 02/24/18 1030 02/25/18 0603 03/01/18  0647   WBC 9.12 10.33 8.56   HGB 11.4* 10.8* 9.8*   HCT 34.6* 33.5* 29.8*    396* 424*       Recent Labs  Lab 02/23/18 2009 02/24/18 1030 02/25/18 0603 03/01/18  0647    140 138 138   K 5.7* 4.4 4.2 3.8   CL 99 106 103 100   CO2 27 24 27 29   BUN 13 8 8 7*   CREATININE 0.8 0.8 0.7 0.7   CALCIUM 9.6 8.4* 8.0* 8.7   PROT 8.9* 7.0 6.8  --    BILITOT 0.7 0.6 0.8  --    ALKPHOS 97 95 92  --    ALT 16 12 14  --    AST 30 18 25  --      Lab Results   Component Value Date    LABPROT 10.5 02/23/2018    ALBUMIN 2.5 (L) 02/25/2018     Lab Results   Component Value Date    CALCIUM 8.7 03/01/2018    PHOS 3.4 03/01/2018     Significant Imaging: n/a    Assessment/Plan:      * Wound dehiscence    Evaluated on 3/2/18  -Continue wound vac with good suction  -Wound care nurse evaluated and suctioning well  -Ortho NP to follow patient every week and determine discontinuation of the wound vac  -Continue vanc and rocephin via PICC line with end date of 3/10  -Vanc levels every 4th dose for goal of 15-20  -Monitoring with weekly CRP, ESR and hepatic function tests  -PICC line care  -f/u in ID clinic prior to abx end date        Primary  osteoarthritis of left knee    Evaluated on 3/2/18  -S/p TKR on 2/9  -See plan below.        S/P TKR (total knee replacement), left    Evaluated on 3/2/18  -continue PT/OT to increase ambulation, ADL performance and endurance  -WBAT to LLE  -continue oxycodone for pain control prn  -keep polar ice in place when not in therapy (patient to have brought from home), reports sister is bring today  -continue ASA for DVT prophylaxis; continue famotidine while taking high dose ASA  -continue wound vac to left knee  -Ortho NP to assess patient while at SNF weekly        Quadriceps tendon rupture, left, subsequent encounter    Evaluated on 3/2/18  -s/p repair on 2/24/2018  -Continue WBAT in hinged knee brace locked in extension        Chronic idiopathic constipation    Evaluated on 3/2/18  -occurs at home and worsened with narcotic usage and immobility  -continue bowel regimen with senokot-s and miralax  -MOM is effective at home, continue prn        Morbid obesity with BMI of 50.0-59.9, adult    Evaluated on 3/2/18  -Portion control while at SNF with modeling at home  -Referral to weight loss clinic if patient interested        Left wrist fracture s/p ORIF    Evaluated on 3/2/18  -Patient had sutures and splint removed by [facility] team when hospitalized  -F/u with Dr. Wong on discharge from SNF  -cast in place        Essential (primary) hypertension    Evaluated on 3/2/18  -Chronic and stable  -Continue therapy with HCTZ and valsartan  -intermittent tachycardic which was present with prior admission at SNF, if remains constant start low dose metoprolol  -will continue to monitor and adjust regimen as necessary          Future Appointments  Date Time Provider Department Center   3/12/2018 9:30 AM Shelley Up NP [facility] ORTH[facility] [address]   3/21/2018 8:15 AM Jasen Salinas MD [facility] BROOKE Santoyo NP  Department of Hospital Medicine  [facility]

## 2018-03-02 NOTE — ASSESSMENT & PLAN NOTE
Evaluated on 3/2/18  -s/p repair on 2/24/2018  -Continue WBAT in hinged knee brace locked in extension

## 2018-03-02 NOTE — PLAN OF CARE
Problem: Patient Care Overview  Goal: Plan of Care Review  Outcome: Ongoing (interventions implemented as appropriate)   03/01/18 6772   Coping/Psychosocial   Plan Of Care Reviewed With patient   Pt remains free of falls. Pt remains afebrile. ABT continued. Right PICC line in place. Pt has immobilizing  device to left knee. Ice applied throughout shift. Perineal care provided. Pt uses bedpan at this time. Able to turn self with assistance x's 1 needed. Wound vac in place with suction noted.

## 2018-03-02 NOTE — ASSESSMENT & PLAN NOTE
Evaluated on 3/2/18  -occurs at home and worsened with narcotic usage and immobility  -continue bowel regimen with senokot-s and miralax  -MOM is effective at home, continue prn

## 2018-03-02 NOTE — PT/OT/SLP PROGRESS
Physical Therapy  Treatment    Katelyn Huynh   MRN: [id number]   Admitting Diagnosis: Wound dehiscence    PT Received On: 03/02/18  Total Time (min): 60       Billable Minutes:  Gait Training 25, Therapeutic Activity 20 and Therapeutic Exercise 15    Treatment Type: Treatment  PT/PTA: PTA     PTA Visit Number: 1       General Precautions: Standard, fall  Orthopedic Precautions: LLE weight bearing as tolerated, LUE non weight bearing (LUE/wrist NWB but okay to use platform on RW)   Braces: Hinged knee brace (locked in extension)         Subjective:  Pt agreeable to therapy.     Pain/Comfort  Pain Rating 1: 7/10  Location - Side 1: Left  Location - Orientation 1: generalized  Location 1: knee  Pain Addressed 1: Reposition, Distraction, Other (see comments) (Pt stated she was waiting until after tx for pain meds)  Pain Rating Post-Intervention 1: 7/10    Objective:  Patient found sitting up in w/c in gym.  Patient found with: peripheral IV, wound vac       Functional Status:  MDS G  Bed Mobility Functional Status: CGA-Min (A)  Transfer Functional Status: CGA-Min (A)  Walk in Room Functional Status: CGA-Min (A)  Walk in Corridor Functional Status: CGA-Min (A)  Locomotion on Unit Functional Status: CGA-Min (A)  Moving from seated to standing position: Not steady, only able to stabilize with staff assistance  Walking (with assistive device if used): Not steady, only able to stabilize with staff assistance  Turning around and facing the opposite direction while walking: Not steady, only able to stabilize with staff assistance  Surface-to-surface transfer (transfer between bed and chair or wheelchair): Not steady, only able to stabilize with staff assistance          AM-PAC 6 CLICK MOBILITY  Total Score:13    Bed Mobility:  Sit>Supine: Min A for LLE mgmt to elevate to bed.      Transfers:  Sit<>Stand: CGA/Min A with L PRW. Pt needs assistance for lift off primarily needing to shift weight forward as she  transitions to AD.   Stand Pivot Transfer: Min A with L PRW with assistance for occasional AD mgmt, w/c > bed.     Gait:  Amb: Pt ambulated 55 ft and 46 ft with L PRW and CGA. Pt ambulates with slight flexed posture and slow noe. Pts LLE remains in full extension as braced and progresses first in sequence with RLE following in step-to pattern. Pt required extended sitting rest between trials. Pt states she feels fatigued after OT session.     Wheelchair Mobility:  Patient propels w/c 20 ft with RUE and RLE only with CGA and assist for IV pole. Pt too fatigued to continue attempt and trial ended.     Therex:  Pt performed RLE Therex sitting edge of mat: AP, LAQ, Seated Marching, GS, QS, Ball Squeezes x 30 reps. AP, GS, QS performed bilaterally.     Balance:  Pt with no LOB with all mobility this tx.     Additional Treatment:  Pts LLE properly positioned and elevated in bed with two pillow and hinged knee brace remaining properly locked in extension.     Patient left supine with call button in reach.    Assessment:  Katelyn Huynh is a 67 y.o. female with a medical diagnosis of Wound dehiscence.  Pt tolerated tx well with focus on gait training, transfers, bed mobility and therex. Pt progressing well and is most limited by her LLE pain and current weight bearing restriction on LUE. Pt manages L PRW well except in limited space such as at bedside in room. Pt will continue to benefit from skilled therapy to improve impairments listed below.     Rehab identified problem list/impairments: weakness, impaired endurance, impaired functional mobilty, gait instability, impaired balance, decreased lower extremity function, decreased upper extremity function, pain, impaired skin, edema, orthopedic precautions    Rehab potential is fair.    Activity tolerance: Fair    Discharge recommendations: home with home health     Barriers to discharge: Decreased caregiver support    Equipment recommendations: wheelchair  (platform for RW)     GOALS:    Physical Therapy Goals        Problem: Physical Therapy Goal    Goal Priority Disciplines Outcome Goal Variances Interventions   Physical Therapy Goal     PT/OT, PT Ongoing (interventions implemented as appropriate)     Description:  Goals to be met by: 14 days     Patient will increase functional independence with mobility by performin. Supine to sit with Stand-by Assistance  2. Sit to supine with Stand-by Assistance4  3. Sit to stand transfer with Stand-by Assistance with RLE brace locked in extension and with left platform rolling walker  4. Bed to chair transfer with Stand-by Assistance  with RLE brace locked in extension and with left platform rolling walker  5. Gait  x 150 feet with Stand-by Assistance  with RLE brace locked in extension and with left platform rolling walker  6. Wheelchair propulsion x50 feet with Stand-by Assistance using bilateral upper extremities  7. Ascend/Descend 4 inch curb step with Contact Guard Assistance  with RLE brace locked in extension and with left platform rolling walker.  8. Lower extremity exercise program x20 reps per handout, with assistance as needed and gym therex                      PLAN:    Patient to be seen 5 x/week  to address the above listed problems via gait training, therapeutic activities, therapeutic exercises, wheelchair management/training  Plan of Care expires: 18  Plan of Care reviewed with: patient    Thormargret WaldronWillian, PTA  2018

## 2018-03-02 NOTE — PROGRESS NOTES
"[facility]  Adult Nutrition  Progress Note    SUMMARY     Recommendations    Recommendation/Intervention: Continue low sodium diet, recommend boost plus bid to meet protein needs , RD to follow  Goals: PO meets 85% of EEN by one week     Communication of RD Recs: other (comment)    Continuum of Care Plan    Referral to Outpatient Services: home care    Reason for Assessment    Reason for Assessment: physician consult  Diagnosis:  (Wound dehiscence, sp L TKR with revision and I&D)  Relevent Medical History: Morbid obesity, HTN   Interdisciplinary Rounds: attended       Nutrition Discharge Planning: DC on low sodium diet    Nutrition Prescription Ordered    Current Diet Order: 2gm Na  Nutrition Order Comments: PO 75%           Oral Nutrition Supplement: none     Evaluation of Received Nutrients/Fluid Intake            Energy Calories Required: meeting needs              Fluid Required: meeting needs     Tolerance: tolerating     % Intake of Estimated Energy Needs: 75 - 100 %  % Meal Intake: 75%     Nutrition Risk Screen     Nutrition Risk Screen: no indicators present    Nutrition/Diet History    Patient Reported Diet/Restrictions/Preferences: general     Food Preferences: No cultural or Methodist food preferences                         Labs/Tests/Procedures/Meds          Pertinent Labs Comments: CRP 59, Mg 1.4     Pertinent Medications Comments: HCTZ, ASA, senna-docusate, famotidine, polyethlyene glycol, Abx    Physical Findings    Overall Physical Appearance: edematous, obese, nourished  Tubes:  (-)  Oral/Mouth Cavity: WDL  Skin: incision (wound vac)    Anthropometrics    Temp: 98.8 °F (37.1 °C)     Height: 5' 4" (162.6 cm)  Weight Method: Bed Scale  Weight: 123 kg (271 lb 2.7 oz)     Ideal Body Weight (IBW), Female: 120 lb     % Ideal Body Weight, Female (lb): 225.98 lb  BMI (Calculated): 46.6  BMI Grade: greater than 40 - morbid obesity  Weight Loss: unintentional (could be fluid losses)     "   Estimated/Assessed Needs  Weight Used For Calorie Calculations: 132 kg (291 lb 0.1 oz)    Energy Calorie Requirements (kcal): 1850 (no activity factor due to obesity)  Energy Need Method: [place]-St Jeor   RMR ([place]-St. Jeor Equation): 1850      Weight Used For Protein Calculations: 56 kg (123 lb 7.3 oz)  Protein Requirements: 84 (1.5 x IBW kg)    Fluid Requirements (mL): 1850 or per MD       RDA Method (mL): 1850         Assessment and Plan    S/P TKR (total knee replacement), left    Increased nutrient needs Related to (etiology):   Wound healing and infection    Signs and Symptoms (as evidenced by):   Wound vac, wound dehiscence,     Interventions/Recommendations (treatment strategy):  Low sodium diet, boost plus bid, RD to follow    Nutrition Diagnosis Status:   New              Monitor and Evaluation    Food and Nutrient Intake: energy intake  Food and Nutrient Adminstration: diet order     Physical Activity and Function: nutrition-related ADLs and IADLs  Anthropometric Measurements: weight change  Biochemical Data, Medical Tests and Procedures: glucose/endocrine profile, gastrointestinal profile, electrolyte and renal panel  Nutrition-Focused Physical Findings: extremities, muscles and bones    Nutrition Risk    Level of Risk:  (follow one time per week)    Nutrition Follow-Up     Yes

## 2018-03-02 NOTE — ASSESSMENT & PLAN NOTE
Evaluated on 3/2/18  -Patient had sutures and splint removed by [facility] team when hospitalized  -F/u with Dr. Wong on discharge from SNF  -cast in place

## 2018-03-02 NOTE — SUBJECTIVE & OBJECTIVE
Hospital Course:  The patient was admitted at [facility] from 2/23 to 2/28/2018.  2/28: Patient admitted to SNF for ongoing PT/Ot following a hospitalization for wound dehiscence with quadriceps tendon repair, I&D, and revision of left knee  3/2: Patient seen at bedside, doing well, reports tolerable pain to both left wrist and left leg, wound vac in place with good suction.     Interval History: Patient seen at bedside, doing well, no acute events overnight.    Review of Systems   Constitutional: Negative for appetite change, chills, fatigue and fever.   HENT: Negative for trouble swallowing.    Respiratory: Negative for cough, chest tightness, shortness of breath and wheezing.    Cardiovascular: Negative for chest pain, palpitations and leg swelling.   Gastrointestinal: Negative for abdominal pain, constipation, diarrhea and nausea.   Genitourinary: Negative for difficulty urinating, frequency and urgency.   Musculoskeletal: Positive for arthralgias and joint swelling. Negative for myalgias.        +pain 6/10 to both left wrist and left knee   Skin: Negative for rash.   Neurological: Negative for dizziness, weakness, light-headedness and headaches.   Psychiatric/Behavioral: Negative for sleep disturbance.     Scheduled Meds:   aspirin  325 mg Oral BID    cefTRIAXone (ROCEPHIN) IVPB  2 g Intravenous Q24H    famotidine  20 mg Oral BID    hydroCHLOROthiazide  50 mg Oral Daily    magnesium oxide  400 mg Oral BID    polyethylene glycol  17 g Oral Daily    senna-docusate 8.6-50 mg  1 tablet Oral BID    sodium chloride 0.9%  10 mL Intravenous Q6H    valsartan  80 mg Oral Daily    vancomycin (VANCOCIN) IVPB  1,500 mg Intravenous Q12H     Continuous Infusions:  PRN Meds:.diphenhydrAMINE, magnesium hydroxide 400 mg/5 ml, ondansetron, oxyCODONE-acetaminophen, oxyCODONE-acetaminophen, Flushing PICC Protocol **AND** sodium chloride 0.9% **AND** sodium chloride 0.9%    Objective:     Vital Signs (Most  Recent):  Temp: 98.8 °F (37.1 °C) (03/02/18 0800)  Pulse: 98 (03/02/18 0800)  Resp: 18 (03/02/18 0800)  BP: 120/70 (03/02/18 0800)  SpO2: 97 % (03/02/18 0800) Vital Signs (24h Range):  Temp:  [98.8 °F (37.1 °C)] 98.8 °F (37.1 °C)  Pulse:  [] 98  Resp:  [8-18] 8  SpO2:  [95 %-97 %] 97 %  BP: (120-122)/(56-70) 120/70     Weight: 132.3 kg (291 lb 10.7 oz)  Body mass index is 50.06 kg/m².  No intake or output data in the 24 hours ending 03/02/18 1528   Physical Exam   Constitutional: She is oriented to person, place, and time. She appears well-developed and well-nourished. No distress.   Cardiovascular: Normal rate, regular rhythm and normal heart sounds.  Exam reveals no gallop and no friction rub.    No murmur heard.  Pulmonary/Chest: Effort normal and breath sounds normal. No respiratory distress. She has no wheezes. She has no rales.   Abdominal: Soft. Bowel sounds are normal. She exhibits no distension. There is no tenderness.   Musculoskeletal: She exhibits edema. She exhibits no tenderness.   1+ edema to left leg, incision wound vac in place with good suction. Hinge knee brace in place locked in extension. Left arm cast noted   Neurological: She is alert and oriented to person, place, and time.   Skin: Skin is warm and dry. No rash noted. She is not diaphoretic. No cyanosis. Nails show no clubbing.   Psychiatric: She has a normal mood and affect. Her behavior is normal.     Significant Labs:    Recent Labs  Lab 02/24/18  1030 02/25/18  0603 03/01/18  0647   WBC 9.12 10.33 8.56   HGB 11.4* 10.8* 9.8*   HCT 34.6* 33.5* 29.8*    396* 424*       Recent Labs  Lab 02/23/18 2009 02/24/18  1030 02/25/18  0603 03/01/18  0647    140 138 138   K 5.7* 4.4 4.2 3.8   CL 99 106 103 100   CO2 27 24 27 29   BUN 13 8 8 7*   CREATININE 0.8 0.8 0.7 0.7   CALCIUM 9.6 8.4* 8.0* 8.7   PROT 8.9* 7.0 6.8  --    BILITOT 0.7 0.6 0.8  --    ALKPHOS 97 95 92  --    ALT 16 12 14  --    AST 30 18 25  --      Lab Results    Component Value Date    LABPROT 10.5 02/23/2018    ALBUMIN 2.5 (L) 02/25/2018     Lab Results   Component Value Date    CALCIUM 8.7 03/01/2018    PHOS 3.4 03/01/2018     Significant Imaging: n/a

## 2018-03-02 NOTE — ASSESSMENT & PLAN NOTE
Increased nutrient needs Related to (etiology):   Wound healing and infection    Signs and Symptoms (as evidenced by):   Wound vac, wound dehiscence,     Interventions/Recommendations (treatment strategy):  Low sodium diet, boost plus bid, RD to follow    Nutrition Diagnosis Status:   New

## 2018-03-02 NOTE — PROGRESS NOTES
Patient admitted to room 316 B via wheelchair per transport.  Patient assisted to bed per PCT and nurse.  Patient's wound vac intact and battery plugged into wall and working with no difficulty.  Call  Light in reach.

## 2018-03-02 NOTE — PROGRESS NOTES
03/02/2018  8:46 AM  Discharge Planning Assessment     Payor: BLUE CROSS BLUE SHIELD / Plan: [employer] [place] / Product Type: Commercial /     Expected length of stay:  [] 7 days   [] 10 days  [x] 14 days   [] 21 days   [] > 30 days    Communicated expected length of stay with patient/caregiver:  [x] Yes   [] No    Anticipated discharge date:  3/13/2018    Assessment information obtained from:  [x] Patient   [] Caregiver     Arrived from:   [x] Home   [] Assisted Living    [] Nursing Home   [] SNF   [] Rehab  [] LTACH   [] Group Home   [] Foster Care   [] Psych   [] Shelter   [] Homeless   [] Transfer  [] Correctional Facility  [] Name of Facility:      Patient currently lives with:    [] Alone   [x] Spouse   [] Daughter   [] Son   [] Grandparents   []  Parents   [] Siblings   [] Friends   [] Domestic Partner   [] Facility Resident      [] Foster Home    [] Other:       Extended Emergency Contact Information  Primary Emergency Contact: Venkatesh Huynh  Address: [address] [place] [address]  Home Phone: [phone]  Mobile Phone: [phone]  Relation: Spouse  Preferred language: English     Prior to hospitalization cognitive status:   [x] Alert/Oriented  [] No Deficits [] Risk of Harm to Self/Others   [] Not Oriented to Person   [] Not Oriented to Place   [] Not Oriented to Time   [] Coma/Sedated/Intubated  [] Judgement Impaired    []  Unable to Assess   [] Inappropriate Behavior  [] Infant/Toddler    Prior to hospitalization functional status:   [x] Independent   [x] Assistive Equipment   [] Assistive Person    [] Completely Dependent  [] Infant/Toddler/Child Appropriate   [] Infant/Toddler/Child Delayed    []  Adolescent     Current cognitive status:   [x] Alert/Oriented  [] No Deficits [] Risk of Harm to Self/Others   [] Not Oriented to Person   [] Not Oriented to Place   [] Not Oriented to Time   [] Coma/Sedated/Intubated  [] Judgement Impaired     []  Unable to Assess   [] Inappropriate Behavior  [] Infant/Toddler    Current functional status:     [] Independent   [x] Assistive Equipment   [x] Assistive Person     [] Completely Dependent   [] Infant/Toddler/Child Appropriate   [] Infant/Toddler/Child Delayed     [] Adolescent     Capacity to Care for Self:   Is patient able to return to prior living arrangements after discharge: [x] Yes  [] No     Is patient able to care for self after discharge?   [] Yes   [x] No     [] Pediatric     Does the patient have family/friends to assist after discharge?:  [x] Yes   [] No    [] N/A   Comments:  Sister      Patient/caregiver perception of discharge disposition:   [] Home   [x] Home with Family  [x] Home Health   [] SNF   [] Rehab   [] LTAC    []  New Nursing Home Placement  [] Return to Nursing Home    [] Shelter     [] Assisted Living  [] Foster Home   [] Other:      Readmit:   Has patient viky in the hospital in the last 30 days? [x] Yes   [] No     If YES, was patient admitted for the same reason?  [x] Yes   [] No       Home Health:   Patient currently receives home health services?:   [x] Yes   [] No     Patient previously received home health services and would like to resume services if necessary   [x] Yes   [] No      If YES, name of home health provider: [facility]    DME:   Patient currently uses DME:   [x] Yes   [] No        List of equipment currently used:     [] Wheelchair   [] Standard Walker  [x] Rolling Walker  []  Rollator    [] Oxygen    [] Portable oxygen   [] Nebulizer    [] Apnea Monitor    [] Crutches  [] Hospital Bed   []  Lift Device   [] Scooter [x] Cane     [] Prosthesis   [x]  BSC   [x] Tub Bench   [] Catheter Supplies    [] Ostomy Supplies   [] Trach Supplies     [] Suction Machine        [] Home Vent    [] Bipap   [] Other:         Medications:    Can the patient afford all prescribed medications?  [x] Yes   [] No     If NO, what medication:       Is the patient taking medications as prescribed?     [x] Yes   [] No    Financial Concerns:   Does the patient have any financial concerns?   [] Yes   [x] No      If YES, what are the concerns:      Transportation:   Does the patient have transportation to healthcare appointments?   [x] Yes   [] No     If YES, what means of transportation does the patient have?   [x] Car   [x] Family/Friend  [] Bicycle   [] Motorcycle   [] Public Transportation [] Ambulance[] Wheelchair van   [] Name of Provider    Dialysis:   Does the patient currently receive dialysis?   [] Yes   [x] No     Daniel Garcia MD   [address] [facility]  [address]  [place] [address]  [phone]         APS/CPS involved in the case:  [] Yes   [x] No   If YES, name of :     If YES, phone number of :        Discharge Plan A:  [] Home   [x] Home with Family  [x] Home Health   [] SNF   [] Rehab   [] LTAC   []  New Nursing Home Placement  [] Return to Nursing Home    [] Assisted Living    [] Shelter     [] Private Duty Nursing   [] Foster Home    [] Psych    [] Early Steps  [] WIC       [] Home Hospice   [] Inpatient Hospice   [] Other    Discharge Plan B:  [] Home   [] Home with Family  [] Home Health   [] SNF   [] Rehab   [] LTAC  []  New Nursing Home Placement   [] Return to  Nursing Home    [] Assisted Living   [] Shelter  [] Private Duty Nursing   [] Foster Home     [] Psych     [] Early Steps  [] WIC    [] Home Hospice     [] Inpatient Hospice   [] Other     [x] Patient and family in agreement with discharge plan.    Met with patient in room to discuss discharge plan and date. Patient AAO. Discharge plan is to return home with assist of sister as spouse is blind. Informed patient therapy recommends a 2 week SNF stay and that discharge would be 3/13/2018. Patient stated understanding to discharge date. Discharge date written on dry erase board in room. Patient stated using [facility] in the past and would like to use them again upon discharge from SNF. CM and SW will  continue to follow for any additional needs.  Brianna Herman RN, CM Skilled  [id number]

## 2018-03-02 NOTE — PT/OT/SLP PROGRESS
Occupational Therapy  Treatment    Katelyn Huynh   MRN: [id number]   Admitting Diagnosis: Wound dehiscence    OT Date of Treatment: 03/02/18  Total Time (min): 61 min    Billable Minutes:  Self Care/Home Management 45, Therapeutic Activity 8 and Therapeutic Exercise 8    General Precautions: Standard, fall  Orthopedic Precautions: LUE non weight bearing, LLE weight bearing as tolerated  Braces: Hinged knee brace    Do you have any cultural, spiritual, Muslim conflicts, given your current situation?: none reported    Subjective:  Communicated with nurse prior to session.      Pain/Comfort  Pain Rating 1: 3/10  Location - Side 1: Left  Location - Orientation 1: generalized  Location 1: knee  Pain Addressed 1: Pre-medicate for activity, Reposition, Distraction  Pain Rating Post-Intervention 1: 3/10    Objective:  Patient found with: wound vac (cast (L) UE)    Functional Status:  MDS G  Bed Mobility Functional Status: Min (A) (supine to sit with (A) (L) LE)    Transfer Functional Status: CGA (transition from sit to stand and SPT with platform walker.)    Dressing Functional Status: Min (A) (Set up required with donning pullover blouse. LBD donning pants, brief and socks needing Min (A). )    Personal Hygiene Functional Status: S-SBA (set up provided with grooming performed sitting EOB)      Moving from seated to standing position: Not steady, only able to stabilize with staff assistance  Walking (with assistive device if used): Not steady, only able to stabilize with staff assistance  Turning around and facing the opposite direction while walking: Not steady, only able to stabilize with staff assistance  Moving on and off the toilet: Not steady, only able to stabilize with staff assistance  Surface-to-surface transfer (transfer between bed and chair or wheelchair): Not steady, only able to stabilize with staff assistance     MDS GG  Eating Performance: Set-up or clean-up assistance.  Oral Hygiene  Performance: Setup or clean-up assistance  Toileting Hygiene Performance: Partial/moderate assistance.  Sit to lying Performance: Partial/moderate assistance.  Lying to sitting on side of bed Performance: Partial/moderate assistance.  Sit to Stand Performance: Partial/moderate assistance.  Sit to Stand Goal: Supervision or touching assistance.  Chair/bed-to-chair transfer Performance: Partial/moderate assistance.  Toilet transfer Performance: Partial/moderate assistance.     [facility] 6 Click:  [facility] Total Score: 18    OT Exercises: UE Ergometer performed 15 minutes with mod resistance using (R) UE only    Additional Treatment:  Toilet transfers: Min (A) with BSC adjusted to Pt's height and platform walker used to steady.      Patient left up in chair with all lines intact, call button in reach and nurse notified and Pt initiating Tx session with PT.    ASSESSMENT:  Katelyn Huynh is a 67 y.o. female with a medical diagnosis of Wound dehiscence .   Pt was agreeable to OT and tolerated Tx without incident but continues to require (A) to perform functional activities to completion. OT intervention required to safely perform functional mobility and functional transfers.  Pt is making progress but continues to require OT Intervention to perform functional transfers, functional standing activities, and self care tasks with standing component more independently. OT is recommended to further her functional (I)ce and safety. Goals remain appropriate and continued OT is recommended.      Rehab identified problem list/impairments: impaired endurance, impaired self care skills, impaired functional mobilty, decreased lower extremity function, pain, edema, impaired skin    Rehab potential is good    Activity tolerance: Good    Discharge recommendations: home health OT (with supervision)     Barriers to discharge: Decreased caregiver support, Inaccessible home environment     Equipment recommendations:  (PRW)     GOALS:     Occupational Therapy Goals        Problem: Occupational Therapy Goal    Goal Priority Disciplines Outcome Interventions   Occupational Therapy Goal     OT, PT/OT Ongoing (interventions implemented as appropriate)    Description:  Goals to be met by: 2 weeks     Patient will increase functional independence with ADLs by performing:    UE Dressing with Set-up Assistance.  Met  LE Dressing with Supervision.  Grooming while standing with Supervision.  Toileting from bedside commode with Supervision for hygiene and clothing management.   Bathing from  edge of bed with Supervision.  Supine to sit with Modified [place].  Stand pivot transfers with Supervision with PRW  Toilet transfer to bedside commode with Supervision.  Pt. To be independent with HEP to improve level of endurance for BUE.   Pt. To participate in dynamic standing activity x 10 minutes with Supervision                       Plan:  Patient to be seen 5 x/week to address the above listed problems via self-care/home management, therapeutic activities, therapeutic exercises  Plan of Care expires: 03/31/18  Plan of Care reviewed with: patient    JAYESH Glez/MARLO  03/02/2018

## 2018-03-02 NOTE — PLAN OF CARE
Problem: Patient Care Overview  Goal: Plan of Care Review  Outcome: Ongoing (interventions implemented as appropriate)   03/02/18 0524   Coping/Psychosocial   Plan Of Care Reviewed With patient       Problem: Pressure Ulcer Risk (Balta Scale) (Adult,Obstetrics,Pediatric)  Goal: Skin Integrity  Patient will demonstrate the desired outcomes by discharge/transition of care.   Outcome: Ongoing (interventions implemented as appropriate)   03/02/18 0524   Pressure Ulcer Risk (Balta Scale) (Adult,Obstetrics,Pediatric)   Skin Integrity making progress toward outcome

## 2018-03-02 NOTE — ASSESSMENT & PLAN NOTE
Evaluated on 3/2/18  -Portion control while at SNF with modeling at home  -Referral to weight loss clinic if patient interested

## 2018-03-02 NOTE — ASSESSMENT & PLAN NOTE
Evaluated on 3/2/18  -Continue wound vac with good suction  -Wound care nurse evaluated and suctioning well  -Ortho NP to follow patient every week and determine discontinuation of the wound vac  -Continue vanc and rocephin via PICC line with end date of 3/10  -Vanc levels every 4th dose for goal of 15-20  -Monitoring with weekly CRP, ESR and hepatic function tests  -PICC line care  -f/u in ID clinic prior to abx end date

## 2018-03-02 NOTE — ASSESSMENT & PLAN NOTE
Evaluated on 3/2/18  -Chronic and stable  -Continue therapy with HCTZ and valsartan  -intermittent tachycardic which was present with prior admission at SNF, if remains constant start low dose metoprolol  -will continue to monitor and adjust regimen as necessary

## 2018-03-02 NOTE — PLAN OF CARE
Problem: Patient Care Overview  Goal: Plan of Care Review  Outcome: Ongoing (interventions implemented as appropriate)    S/P TKR (total knee replacement), left     Increased nutrient needs Related to (etiology):   Wound healing and infection     Signs and Symptoms (as evidenced by):   Wound vac, wound dehiscence,      Interventions/Recommendations (treatment strategy):  Low sodium diet, boost plus bid, RD to follow     Nutrition Diagnosis Status:   New               Recommendation/Intervention: Continue low sodium diet, recommend boost plus bid to meet protein needs , RD to follow  Goals: PO meets 85% of EEN by one week

## 2018-03-03 LAB — VANCOMYCIN TROUGH SERPL-MCNC: 23.9 UG/ML

## 2018-03-03 PROCEDURE — 25000003 PHARM REV CODE 250: Performed by: STUDENT IN AN ORGANIZED HEALTH CARE EDUCATION/TRAINING PROGRAM

## 2018-03-03 PROCEDURE — 36415 COLL VENOUS BLD VENIPUNCTURE: CPT

## 2018-03-03 PROCEDURE — 25000003 PHARM REV CODE 250: Performed by: INTERNAL MEDICINE

## 2018-03-03 PROCEDURE — 97535 SELF CARE MNGMENT TRAINING: CPT

## 2018-03-03 PROCEDURE — 63600175 PHARM REV CODE 636 W HCPCS: Performed by: INTERNAL MEDICINE

## 2018-03-03 PROCEDURE — 12000000 HC SNF SEMI-PRIVATE ROOM

## 2018-03-03 PROCEDURE — 63600175 PHARM REV CODE 636 W HCPCS: Performed by: STUDENT IN AN ORGANIZED HEALTH CARE EDUCATION/TRAINING PROGRAM

## 2018-03-03 PROCEDURE — 80202 ASSAY OF VANCOMYCIN: CPT

## 2018-03-03 PROCEDURE — A4216 STERILE WATER/SALINE, 10 ML: HCPCS | Performed by: STUDENT IN AN ORGANIZED HEALTH CARE EDUCATION/TRAINING PROGRAM

## 2018-03-03 RX ORDER — VANCOMYCIN/0.9 % SOD CHLORIDE 1 G/100 ML
1000 PLASTIC BAG, INJECTION (ML) INTRAVENOUS
Status: DISCONTINUED | OUTPATIENT
Start: 2018-03-03 | End: 2018-03-11

## 2018-03-03 RX ADMIN — OXYCODONE HYDROCHLORIDE AND ACETAMINOPHEN 1 TABLET: 10; 325 TABLET ORAL at 08:03

## 2018-03-03 RX ADMIN — VANCOMYCIN HYDROCHLORIDE 1500 MG: 10 INJECTION, POWDER, LYOPHILIZED, FOR SOLUTION INTRAVENOUS at 12:03

## 2018-03-03 RX ADMIN — ASPIRIN 325 MG: 325 TABLET, DELAYED RELEASE ORAL at 12:03

## 2018-03-03 RX ADMIN — FAMOTIDINE 20 MG: 20 TABLET, FILM COATED ORAL at 12:03

## 2018-03-03 RX ADMIN — STANDARDIZED SENNA CONCENTRATE AND DOCUSATE SODIUM 1 TABLET: 8.6; 5 TABLET, FILM COATED ORAL at 12:03

## 2018-03-03 RX ADMIN — Medication 400 MG: at 10:03

## 2018-03-03 RX ADMIN — Medication 10 ML: at 05:03

## 2018-03-03 RX ADMIN — OXYCODONE HYDROCHLORIDE AND ACETAMINOPHEN 1 TABLET: 5; 325 TABLET ORAL at 12:03

## 2018-03-03 RX ADMIN — STANDARDIZED SENNA CONCENTRATE AND DOCUSATE SODIUM 1 TABLET: 8.6; 5 TABLET, FILM COATED ORAL at 10:03

## 2018-03-03 RX ADMIN — Medication 10 ML: at 06:03

## 2018-03-03 RX ADMIN — Medication 10 ML: at 12:03

## 2018-03-03 RX ADMIN — Medication 10 ML: at 01:03

## 2018-03-03 RX ADMIN — Medication 400 MG: at 12:03

## 2018-03-03 RX ADMIN — CEFTRIAXONE SODIUM 2 G: 2 INJECTION, POWDER, FOR SOLUTION INTRAMUSCULAR; INTRAVENOUS at 02:03

## 2018-03-03 RX ADMIN — ASPIRIN 325 MG: 325 TABLET, DELAYED RELEASE ORAL at 10:03

## 2018-03-03 RX ADMIN — HYDROCHLOROTHIAZIDE 50 MG: 25 TABLET ORAL at 12:03

## 2018-03-03 RX ADMIN — Medication 1 G: at 11:03

## 2018-03-03 RX ADMIN — VALSARTAN 80 MG: 80 TABLET ORAL at 12:03

## 2018-03-03 RX ADMIN — FAMOTIDINE 20 MG: 20 TABLET, FILM COATED ORAL at 10:03

## 2018-03-03 NOTE — PT/OT/SLP PROGRESS
Occupational Therapy  Treatment    Katelyn Huynh   MRN: [id number]   Admitting Diagnosis: Wound dehiscence    OT Date of Treatment: 03/03/18  Total Time (min): 45 min    Billable Minutes:  Self Care/Home Management 45    General Precautions: Standard, fall  Orthopedic Precautions: LUE non weight bearing, LLE weight bearing as tolerated  Braces: Hinged knee brace    Do you have any cultural, spiritual, Christianity conflicts, given your current situation?: none reported    Subjective:  Communicated with nsg prior to session.  I am doing well today    Pain/Comfort  Pain Rating 1: 7/10  Location - Side 1: Left  Location - Orientation 1: generalized  Location 1: knee  Pain Addressed 1: Reposition    Objective:  Patient found with: peripheral IV, wound vac    Functional Status:  MDS G  Bed Mobility Functional Status: CGA-Min (A) for LLE management   Transfer Functional Status: CGA-Min (A) from EOB <> 3n1 with PFRW and cues for safety  Dressing Functional Status: 2:CGA-Min (A) for LB dressing for BLE sock management and and UE dressing with SBA  Eating Functional Status: mod(I) - (I)  Toilet Use Functional Status: mod(A)-Max(A) from 3n1 with cleaning management   Personal Hygiene Functional Status: S-SBA  Bathing Functional Status: CGA-Min (A)  Moving from seated to standing position: Not steady, only able to stabilize with staff assistance  Walking (with assistive device if used): Not steady, only able to stabilize with staff assistance  Turning around and facing the opposite direction while walking: Not steady, only able to stabilize with staff assistance  Moving on and off the toilet: Not steady, only able to stabilize with staff assistance  Surface-to-surface transfer (transfer between bed and chair or wheelchair): Not steady, only able to stabilize with staff assistance           [facility] 6 Click:  [facility] Total Score: 18    Patient left EOB  with all lines intact, call button in reach and PCT  present    ASSESSMENT:  Katelyn Huynh is a 67 y.o. female with a medical diagnosis of Wound dehiscence Pt. participated well with session on this day.Pt demos physical deficits with balance  functional mobility, UB strength, endurance  level of functional indep with daily tasks and activities and selfcare skills .Pt. Will continue to benefit from continued OT to progress towards goals  .    Rehab identified problem list/impairments: impaired endurance, impaired self care skills, impaired functional mobilty, decreased lower extremity function, pain, edema, impaired skin    Rehab potential is fair    Activity tolerance: Fair    Discharge recommendations: home health OT (with supervision)     Barriers to discharge: Decreased caregiver support, Inaccessible home environment     Equipment recommendations:  (PRW)     GOALS:    Occupational Therapy Goals        Problem: Occupational Therapy Goal    Goal Priority Disciplines Outcome Interventions   Occupational Therapy Goal     OT, PT/OT Ongoing (interventions implemented as appropriate)    Description:  Goals to be met by: 2 weeks     Patient will increase functional independence with ADLs by performing:    UE Dressing with Set-up Assistance.  Met  LE Dressing with Supervision.  Grooming while standing with Supervision.  Toileting from bedside commode with Supervision for hygiene and clothing management.   Bathing from  edge of bed with Supervision.  Supine to sit with Modified [place].  Stand pivot transfers with Supervision with PRW  Toilet transfer to bedside commode with Supervision.  Pt. To be independent with HEP to improve level of endurance for BUE.   Pt. To participate in dynamic standing activity x 10 minutes with Supervision                   Plan:  Patient to be seen 5 x/week to address the above listed problems via self-care/home management, therapeutic activities, therapeutic exercises  Plan of Care expires: 03/31/18  Plan of Care reviewed  with: patient  A client care conference was performed between the [facility] and [facility], prior to treatment by GOMEZ, to discuss the patient's status, treatment plan and established goals.     JASON Wilson/MARLO 3/3/2018

## 2018-03-03 NOTE — PLAN OF CARE
Problem: Occupational Therapy Goal  Goal: Occupational Therapy Goal  Goals to be met by: 2 weeks     Patient will increase functional independence with ADLs by performing:    UE Dressing with Set-up Assistance.  Met  LE Dressing with Supervision.  Grooming while standing with Supervision.  Toileting from bedside commode with Supervision for hygiene and clothing management.   Bathing from  edge of bed with Supervision.  Supine to sit with Modified [place].  Stand pivot transfers with Supervision with PRW  Toilet transfer to bedside commode with Supervision.  Pt. To be independent with HEP to improve level of endurance for BUE.   Pt. To participate in dynamic standing activity x 10 minutes with Supervision      Outcome: Ongoing (interventions implemented as appropriate)  .    Comments: .

## 2018-03-04 PROCEDURE — 63600175 PHARM REV CODE 636 W HCPCS: Performed by: STUDENT IN AN ORGANIZED HEALTH CARE EDUCATION/TRAINING PROGRAM

## 2018-03-04 PROCEDURE — 97530 THERAPEUTIC ACTIVITIES: CPT

## 2018-03-04 PROCEDURE — 25000003 PHARM REV CODE 250: Performed by: INTERNAL MEDICINE

## 2018-03-04 PROCEDURE — 97116 GAIT TRAINING THERAPY: CPT

## 2018-03-04 PROCEDURE — 12000000 HC SNF SEMI-PRIVATE ROOM

## 2018-03-04 PROCEDURE — A4216 STERILE WATER/SALINE, 10 ML: HCPCS | Performed by: STUDENT IN AN ORGANIZED HEALTH CARE EDUCATION/TRAINING PROGRAM

## 2018-03-04 PROCEDURE — 25000003 PHARM REV CODE 250: Performed by: STUDENT IN AN ORGANIZED HEALTH CARE EDUCATION/TRAINING PROGRAM

## 2018-03-04 PROCEDURE — 63600175 PHARM REV CODE 636 W HCPCS: Performed by: INTERNAL MEDICINE

## 2018-03-04 RX ADMIN — ASPIRIN 325 MG: 325 TABLET, DELAYED RELEASE ORAL at 09:03

## 2018-03-04 RX ADMIN — Medication 10 ML: at 07:03

## 2018-03-04 RX ADMIN — STANDARDIZED SENNA CONCENTRATE AND DOCUSATE SODIUM 1 TABLET: 8.6; 5 TABLET, FILM COATED ORAL at 09:03

## 2018-03-04 RX ADMIN — Medication 10 ML: at 01:03

## 2018-03-04 RX ADMIN — FAMOTIDINE 20 MG: 20 TABLET, FILM COATED ORAL at 09:03

## 2018-03-04 RX ADMIN — CEFTRIAXONE SODIUM 2 G: 2 INJECTION, POWDER, FOR SOLUTION INTRAMUSCULAR; INTRAVENOUS at 01:03

## 2018-03-04 RX ADMIN — POLYETHYLENE GLYCOL 3350 17 G: 17 POWDER, FOR SOLUTION ORAL at 09:03

## 2018-03-04 RX ADMIN — OXYCODONE HYDROCHLORIDE AND ACETAMINOPHEN 1 TABLET: 10; 325 TABLET ORAL at 03:03

## 2018-03-04 RX ADMIN — Medication 10 ML: at 12:03

## 2018-03-04 RX ADMIN — Medication 10 ML: at 06:03

## 2018-03-04 RX ADMIN — Medication 1 G: at 11:03

## 2018-03-04 RX ADMIN — OXYCODONE HYDROCHLORIDE AND ACETAMINOPHEN 1 TABLET: 5; 325 TABLET ORAL at 09:03

## 2018-03-04 RX ADMIN — OXYCODONE HYDROCHLORIDE AND ACETAMINOPHEN 1 TABLET: 10; 325 TABLET ORAL at 07:03

## 2018-03-04 RX ADMIN — Medication 400 MG: at 09:03

## 2018-03-04 NOTE — PLAN OF CARE
Problem: Patient Care Overview  Goal: Plan of Care Review  Outcome: Ongoing (interventions implemented as appropriate)   03/04/18 0123   Coping/Psychosocial   Plan Of Care Reviewed With patient       Problem: Fall Risk (Adult)  Goal: Identify Related Risk Factors and Signs and Symptoms  Related risk factors and signs and symptoms are identified upon initiation of Human Response Clinical Practice Guideline (CPG)   Outcome: Ongoing (interventions implemented as appropriate)   03/04/18 0123   Fall Risk   Related Risk Factors (Fall Risk) gait/mobility problems;homeostatic imbalance

## 2018-03-04 NOTE — PLAN OF CARE
Problem: Physical Therapy Goal  Goal: Physical Therapy Goal  Goals to be met by: 14 days     Patient will increase functional independence with mobility by performin. Supine to sit with Stand-by Assistance  2. Sit to supine with Stand-by Assistance4  3. Sit to stand transfer with Stand-by Assistance with RLE brace locked in extension and with left platform rolling walker  4. Bed to chair transfer with Stand-by Assistance  with RLE brace locked in extension and with left platform rolling walker  5. Gait  x 150 feet with Stand-by Assistance  with RLE brace locked in extension and with left platform rolling walker  6. Wheelchair propulsion x50 feet with Stand-by Assistance using bilateral upper extremities  7. Ascend/Descend 4 inch curb step with Contact Guard Assistance  with RLE brace locked in extension and with left platform rolling walker.  8. Lower extremity exercise program x20 reps per handout, with assistance as needed and gym therex     Outcome: Ongoing (interventions implemented as appropriate)  Goals remain appropriate.

## 2018-03-05 LAB
ALBUMIN SERPL BCP-MCNC: 2.6 G/DL
ALP SERPL-CCNC: 92 U/L
ALT SERPL W/O P-5'-P-CCNC: 9 U/L
ANION GAP SERPL CALC-SCNC: 8 MMOL/L
AST SERPL-CCNC: 16 U/L
BACTERIA SPEC ANAEROBE CULT: NORMAL
BACTERIA SPEC ANAEROBE CULT: NORMAL
BASOPHILS # BLD AUTO: 0.05 K/UL
BASOPHILS NFR BLD: 0.6 %
BILIRUB DIRECT SERPL-MCNC: 0.2 MG/DL
BILIRUB SERPL-MCNC: 0.4 MG/DL
BUN SERPL-MCNC: 10 MG/DL
CALCIUM SERPL-MCNC: 8.8 MG/DL
CHLORIDE SERPL-SCNC: 99 MMOL/L
CO2 SERPL-SCNC: 30 MMOL/L
CREAT SERPL-MCNC: 0.7 MG/DL
CRP SERPL-MCNC: 17 MG/L
DIFFERENTIAL METHOD: ABNORMAL
EOSINOPHIL # BLD AUTO: 0.8 K/UL
EOSINOPHIL NFR BLD: 9.7 %
ERYTHROCYTE [DISTWIDTH] IN BLOOD BY AUTOMATED COUNT: 13.9 %
ERYTHROCYTE [SEDIMENTATION RATE] IN BLOOD BY WESTERGREN METHOD: 40 MM/HR
EST. GFR  (AFRICAN AMERICAN): >60 ML/MIN/1.73 M^2
EST. GFR  (NON AFRICAN AMERICAN): >60 ML/MIN/1.73 M^2
GLUCOSE SERPL-MCNC: 81 MG/DL
HCT VFR BLD AUTO: 29.9 %
HGB BLD-MCNC: 9.6 G/DL
IMM GRANULOCYTES # BLD AUTO: 0.05 K/UL
IMM GRANULOCYTES NFR BLD AUTO: 0.6 %
LYMPHOCYTES # BLD AUTO: 1.6 K/UL
LYMPHOCYTES NFR BLD: 19.3 %
MAGNESIUM SERPL-MCNC: 1.8 MG/DL
MCH RBC QN AUTO: 28.5 PG
MCHC RBC AUTO-ENTMCNC: 32.1 G/DL
MCV RBC AUTO: 89 FL
MONOCYTES # BLD AUTO: 0.8 K/UL
MONOCYTES NFR BLD: 10 %
NEUTROPHILS # BLD AUTO: 4.9 K/UL
NEUTROPHILS NFR BLD: 59.8 %
NRBC BLD-RTO: 0 /100 WBC
PHOSPHATE SERPL-MCNC: 3.5 MG/DL
PLATELET # BLD AUTO: 354 K/UL
PMV BLD AUTO: 9.4 FL
POTASSIUM SERPL-SCNC: 3.7 MMOL/L
PROT SERPL-MCNC: 6.5 G/DL
RBC # BLD AUTO: 3.37 M/UL
SODIUM SERPL-SCNC: 137 MMOL/L
VANCOMYCIN TROUGH SERPL-MCNC: 18.6 UG/ML
WBC # BLD AUTO: 8.24 K/UL

## 2018-03-05 PROCEDURE — 80076 HEPATIC FUNCTION PANEL: CPT

## 2018-03-05 PROCEDURE — 63600175 PHARM REV CODE 636 W HCPCS: Performed by: STUDENT IN AN ORGANIZED HEALTH CARE EDUCATION/TRAINING PROGRAM

## 2018-03-05 PROCEDURE — 80048 BASIC METABOLIC PNL TOTAL CA: CPT

## 2018-03-05 PROCEDURE — 85025 COMPLETE CBC W/AUTO DIFF WBC: CPT

## 2018-03-05 PROCEDURE — 63600175 PHARM REV CODE 636 W HCPCS: Performed by: INTERNAL MEDICINE

## 2018-03-05 PROCEDURE — 84100 ASSAY OF PHOSPHORUS: CPT

## 2018-03-05 PROCEDURE — 36415 COLL VENOUS BLD VENIPUNCTURE: CPT

## 2018-03-05 PROCEDURE — 83735 ASSAY OF MAGNESIUM: CPT

## 2018-03-05 PROCEDURE — 97535 SELF CARE MNGMENT TRAINING: CPT

## 2018-03-05 PROCEDURE — 80202 ASSAY OF VANCOMYCIN: CPT

## 2018-03-05 PROCEDURE — 97110 THERAPEUTIC EXERCISES: CPT

## 2018-03-05 PROCEDURE — 97116 GAIT TRAINING THERAPY: CPT

## 2018-03-05 PROCEDURE — 12000000 HC SNF SEMI-PRIVATE ROOM

## 2018-03-05 PROCEDURE — 86140 C-REACTIVE PROTEIN: CPT

## 2018-03-05 PROCEDURE — 97530 THERAPEUTIC ACTIVITIES: CPT

## 2018-03-05 PROCEDURE — 85651 RBC SED RATE NONAUTOMATED: CPT

## 2018-03-05 PROCEDURE — 25000003 PHARM REV CODE 250: Performed by: STUDENT IN AN ORGANIZED HEALTH CARE EDUCATION/TRAINING PROGRAM

## 2018-03-05 PROCEDURE — 25000003 PHARM REV CODE 250: Performed by: INTERNAL MEDICINE

## 2018-03-05 PROCEDURE — A4216 STERILE WATER/SALINE, 10 ML: HCPCS | Performed by: STUDENT IN AN ORGANIZED HEALTH CARE EDUCATION/TRAINING PROGRAM

## 2018-03-05 RX ADMIN — Medication 10 ML: at 12:03

## 2018-03-05 RX ADMIN — FAMOTIDINE 20 MG: 20 TABLET, FILM COATED ORAL at 08:03

## 2018-03-05 RX ADMIN — OXYCODONE HYDROCHLORIDE AND ACETAMINOPHEN 1 TABLET: 10; 325 TABLET ORAL at 08:03

## 2018-03-05 RX ADMIN — VALSARTAN 80 MG: 80 TABLET ORAL at 08:03

## 2018-03-05 RX ADMIN — STANDARDIZED SENNA CONCENTRATE AND DOCUSATE SODIUM 1 TABLET: 8.6; 5 TABLET, FILM COATED ORAL at 10:03

## 2018-03-05 RX ADMIN — ASPIRIN 325 MG: 325 TABLET, DELAYED RELEASE ORAL at 08:03

## 2018-03-05 RX ADMIN — ASPIRIN 325 MG: 325 TABLET, DELAYED RELEASE ORAL at 10:03

## 2018-03-05 RX ADMIN — OXYCODONE HYDROCHLORIDE AND ACETAMINOPHEN 1 TABLET: 10; 325 TABLET ORAL at 10:03

## 2018-03-05 RX ADMIN — HYDROCHLOROTHIAZIDE 50 MG: 25 TABLET ORAL at 08:03

## 2018-03-05 RX ADMIN — OXYCODONE HYDROCHLORIDE AND ACETAMINOPHEN 1 TABLET: 10; 325 TABLET ORAL at 01:03

## 2018-03-05 RX ADMIN — Medication 1 G: at 11:03

## 2018-03-05 RX ADMIN — FAMOTIDINE 20 MG: 20 TABLET, FILM COATED ORAL at 10:03

## 2018-03-05 RX ADMIN — CEFTRIAXONE SODIUM 2 G: 2 INJECTION, POWDER, FOR SOLUTION INTRAMUSCULAR; INTRAVENOUS at 01:03

## 2018-03-05 RX ADMIN — Medication 10 ML: at 11:03

## 2018-03-05 NOTE — PT/OT/SLP PROGRESS
Physical Therapy  Treatment    Katelyn Huynh   MRN: [id number]   Admitting Diagnosis: Wound dehiscence    PT Received On: 03/05/18  Total Time (min): 55       Billable Minutes:  Gait Training 25 and Therapeutic Activity 30    Treatment Type: Treatment  PT/PTA: PTA     PTA Visit Number: 1       General Precautions: Standard, fall  Orthopedic Precautions: LUE non weight bearing   Braces: Hinged knee brace         Subjective:  Pt agreeable to therapy.     Pain/Comfort  Pain Rating 1: 8/10  Location - Side 1: Left  Location - Orientation 1: generalized  Location 1: knee  Pain Addressed 1: Reposition, Distraction, Other (see comments) (Pt received pain meds at beginning of session. )  Pain Rating Post-Intervention 1: 8/10    Objective:  Patient found sitting EOB with OT completing pts prior session.  Patient found with: peripheral IV, wound vac       Functional Status:  MDS G  Bed Mobility Functional Status: CGA-Min (A)  Transfer Functional Status: mod(A)-Max(A)  Walk in Room Functional Status: CGA-Min (A)  Walk in Corridor Functional Status: CGA-Min (A)  Locomotion on Unit Functional Status: CGA-Min (A)  Moving from seated to standing position: Not steady, only able to stabilize with staff assistance  Walking (with assistive device if used): Not steady, only able to stabilize with staff assistance  Turning around and facing the opposite direction while walking: Not steady, only able to stabilize with staff assistance  Moving on and off the toilet: Not steady, only able to stabilize with staff assistance  Surface-to-surface transfer (transfer between bed and chair or wheelchair): Not steady, only able to stabilize with staff assistance          [facility] 6 CLICK MOBILITY  Total Score:13    Bed Mobility: performed x 2 attempts for improving technique.   Sit>Supine: Min A with LLE mgmt.   Supine>Sit: Min A with LLE mgmt.     Transfers:  Sit<>Stand: Min/Mod A with L PRW. Pt requires increased assistance from lower  surfaces.   Stand Pivot Transfer: CGA with L PRW, bed <> w/c and w/c <> mat.     Gait:  Amb: Pt ambulated 10 ft x 2 trials and 60 ft with L PRW and CGA/SBA. Pt with slight flexed posture and very slow noe. Pt maintains NWB on LUE.     Wheelchair Mobility:  Patient propels w/c 50 ft with Min A for steering. Pt propelled with RUE and RLE only.      Balance:  Pt demonstrates good sitting and standing balance with RUE support.     Additional Treatment:  Adjusted pts hinged knee brace on LLE for better fit while in supine on mat. Brace had slid down pts thigh slightly allowing for slight flexion.     Patient left supine with call button in reach.    Assessment:  Katelyn Huynh is a 67 y.o. female with a medical diagnosis of Wound dehiscence.  Pt tolerated tx well with focus on gait training, transfers, and bed mobility. Pt progressing well and maintains her LUE NWB status. Pt limited with all mobility by pain and fatigue. Pt will continue to benefit from skilled therapy for impairments listed below.     Rehab identified problem list/impairments: weakness, impaired endurance, impaired functional mobilty, gait instability, impaired balance, decreased lower extremity function, decreased upper extremity function, pain, impaired skin, edema, orthopedic precautions    Rehab potential is fair.    Activity tolerance: Fair    Discharge recommendations: home with home health     Barriers to discharge: Decreased caregiver support    Equipment recommendations: wheelchair (platform for RW)     GOALS:    Physical Therapy Goals        Problem: Physical Therapy Goal    Goal Priority Disciplines Outcome Goal Variances Interventions   Physical Therapy Goal     PT/OT, PT Ongoing (interventions implemented as appropriate)     Description:  Goals to be met by: 14 days     Patient will increase functional independence with mobility by performin. Supine to sit with Stand-by Assistance  2. Sit to supine with Stand-by  Assistance4  3. Sit to stand transfer with Stand-by Assistance with RLE brace locked in extension and with left platform rolling walker  4. Bed to chair transfer with Stand-by Assistance  with RLE brace locked in extension and with left platform rolling walker  5. Gait  x 150 feet with Stand-by Assistance  with RLE brace locked in extension and with left platform rolling walker  6. Wheelchair propulsion x50 feet with Stand-by Assistance using bilateral upper extremities  7. Ascend/Descend 4 inch curb step with Contact Guard Assistance  with RLE brace locked in extension and with left platform rolling walker.  8. Lower extremity exercise program x20 reps per handout, with assistance as needed and gym therex                      PLAN:    Patient to be seen 5 x/week  to address the above listed problems via gait training, therapeutic activities, therapeutic exercises, wheelchair management/training  Plan of Care expires: 03/31/18  Plan of Care reviewed with: patient    Thor Dorsey, PTA  03/05/2018

## 2018-03-05 NOTE — PLAN OF CARE
Problem: Patient Care Overview  Goal: Plan of Care Review  Outcome: Ongoing (interventions implemented as appropriate)   03/05/18 0006   Coping/Psychosocial   Plan Of Care Reviewed With patient       Problem: Fall Risk (Adult)  Goal: Identify Related Risk Factors and Signs and Symptoms  Related risk factors and signs and symptoms are identified upon initiation of Human Response Clinical Practice Guideline (CPG)   Outcome: Ongoing (interventions implemented as appropriate)   03/05/18 0006   Fall Risk   Related Risk Factors (Fall Risk) homeostatic imbalance;fear of falling;gait/mobility problems

## 2018-03-05 NOTE — PLAN OF CARE
Problem: Occupational Therapy Goal  Goal: Occupational Therapy Goal  Goals to be met by: 2 weeks     Patient will increase functional independence with ADLs by performing:    UE Dressing with Set-up Assistance.  Met  LE Dressing with Supervision.  Grooming while standing with Supervision.  Toileting from bedside commode with Supervision for hygiene and clothing management.   Bathing from  edge of bed with Supervision.  Supine to sit with Modified [place].  Stand pivot transfers with Supervision with PRW  Toilet transfer to bedside commode with Supervision.  Pt. To be independent with HEP to improve level of endurance for BUE.   Pt. To participate in dynamic standing activity x 10 minutes with Supervision      Outcome: Ongoing (interventions implemented as appropriate)  .

## 2018-03-05 NOTE — PLAN OF CARE
Problem: Physical Therapy Goal  Goal: Physical Therapy Goal  Goals to be met by: 14 days     Patient will increase functional independence with mobility by performin. Supine to sit with Stand-by Assistance  2. Sit to supine with Stand-by Assistance4  3. Sit to stand transfer with Stand-by Assistance with LLE brace locked in extension and with left platform rolling walker  4. Bed to chair transfer with Stand-by Assistance  with LLE brace locked in extension and with left platform rolling walker  5. Gait  x 150 feet with Stand-by Assistance  with LLE brace locked in extension and with left platform rolling walker  6. Wheelchair propulsion x50 feet with Stand-by Assistance using RUE/RLE   7. Ascend/Descend 4 inch curb step with Contact Guard Assistance  with LLE brace locked in extension and with left platform rolling walker.  8. Lower extremity exercise program x20 reps per handout, with assistance as needed and gym therex     Outcome: Ongoing (interventions implemented as appropriate)  Goals are appropriate but written for RLE. PTs LLE is the affected extremity.  W/c goal also needs to be updated for propulsion with RUE/RLE only.   3/5/2018 Goals corrected today. LLE is WBAT and w/ hinged knee brace locked in extension.  3/5/2018 Jennifer Oliveira, PT

## 2018-03-05 NOTE — PLAN OF CARE
Problem: Patient Care Overview  Goal: Plan of Care Review  Outcome: Ongoing (interventions implemented as appropriate)  AFEBRILE.CONTINUES IV ANTIBIOTICS.FALL PRECAUTIONS MAINTAINED NO INJURIES NOTED.NO PRESSURE ULCERS NOTED.

## 2018-03-05 NOTE — PLAN OF CARE
Problem: Occupational Therapy Goal  Goal: Occupational Therapy Goal  Goals to be met by: 2 weeks     Patient will increase functional independence with ADLs by performing:    UE Dressing with Set-up Assistance.  Met  LE Dressing with Supervision.  Grooming while standing with Supervision.  Toileting from bedside commode with Supervision for hygiene and clothing management.   Bathing from  edge of bed with Supervision.  Supine to sit with Modified [place].  Stand pivot transfers with Supervision with PRW  Toilet transfer to bedside commode with Supervision.  Pt. To be independent with HEP to improve level of endurance for BUE.   Pt. To participate in dynamic standing activity x 10 minutes with Supervision      Outcome: Ongoing (interventions implemented as appropriate)  .    Comments: .

## 2018-03-06 PROBLEM — B37.2 YEAST DERMATITIS: Status: ACTIVE | Noted: 2018-03-06

## 2018-03-06 PROCEDURE — 25000003 PHARM REV CODE 250: Performed by: STUDENT IN AN ORGANIZED HEALTH CARE EDUCATION/TRAINING PROGRAM

## 2018-03-06 PROCEDURE — A4216 STERILE WATER/SALINE, 10 ML: HCPCS | Performed by: STUDENT IN AN ORGANIZED HEALTH CARE EDUCATION/TRAINING PROGRAM

## 2018-03-06 PROCEDURE — 97535 SELF CARE MNGMENT TRAINING: CPT

## 2018-03-06 PROCEDURE — 97116 GAIT TRAINING THERAPY: CPT

## 2018-03-06 PROCEDURE — 63600175 PHARM REV CODE 636 W HCPCS: Performed by: INTERNAL MEDICINE

## 2018-03-06 PROCEDURE — 63600175 PHARM REV CODE 636 W HCPCS: Performed by: STUDENT IN AN ORGANIZED HEALTH CARE EDUCATION/TRAINING PROGRAM

## 2018-03-06 PROCEDURE — 25000003 PHARM REV CODE 250: Performed by: INTERNAL MEDICINE

## 2018-03-06 PROCEDURE — 97110 THERAPEUTIC EXERCISES: CPT

## 2018-03-06 PROCEDURE — 97530 THERAPEUTIC ACTIVITIES: CPT

## 2018-03-06 PROCEDURE — 25000003 PHARM REV CODE 250: Performed by: NURSE PRACTITIONER

## 2018-03-06 PROCEDURE — 12000000 HC SNF SEMI-PRIVATE ROOM

## 2018-03-06 RX ADMIN — VALSARTAN 80 MG: 80 TABLET ORAL at 09:03

## 2018-03-06 RX ADMIN — Medication 1 G: at 12:03

## 2018-03-06 RX ADMIN — STANDARDIZED SENNA CONCENTRATE AND DOCUSATE SODIUM 1 TABLET: 8.6; 5 TABLET, FILM COATED ORAL at 08:03

## 2018-03-06 RX ADMIN — MICONAZOLE NITRATE: 20 OINTMENT TOPICAL at 08:03

## 2018-03-06 RX ADMIN — Medication 10 ML: at 12:03

## 2018-03-06 RX ADMIN — FAMOTIDINE 20 MG: 20 TABLET, FILM COATED ORAL at 08:03

## 2018-03-06 RX ADMIN — OXYCODONE HYDROCHLORIDE AND ACETAMINOPHEN 1 TABLET: 10; 325 TABLET ORAL at 03:03

## 2018-03-06 RX ADMIN — ASPIRIN 325 MG: 325 TABLET, DELAYED RELEASE ORAL at 08:03

## 2018-03-06 RX ADMIN — Medication 10 ML: at 06:03

## 2018-03-06 RX ADMIN — OXYCODONE HYDROCHLORIDE AND ACETAMINOPHEN 1 TABLET: 10; 325 TABLET ORAL at 08:03

## 2018-03-06 RX ADMIN — FAMOTIDINE 20 MG: 20 TABLET, FILM COATED ORAL at 09:03

## 2018-03-06 RX ADMIN — OXYCODONE HYDROCHLORIDE AND ACETAMINOPHEN 1 TABLET: 10; 325 TABLET ORAL at 09:03

## 2018-03-06 RX ADMIN — STANDARDIZED SENNA CONCENTRATE AND DOCUSATE SODIUM 1 TABLET: 8.6; 5 TABLET, FILM COATED ORAL at 09:03

## 2018-03-06 RX ADMIN — Medication 1 G: at 11:03

## 2018-03-06 RX ADMIN — ASPIRIN 325 MG: 325 TABLET, DELAYED RELEASE ORAL at 09:03

## 2018-03-06 RX ADMIN — HYDROCHLOROTHIAZIDE 50 MG: 25 TABLET ORAL at 09:03

## 2018-03-06 RX ADMIN — CEFTRIAXONE SODIUM 2 G: 2 INJECTION, POWDER, FOR SOLUTION INTRAMUSCULAR; INTRAVENOUS at 11:03

## 2018-03-06 NOTE — PT/OT/SLP PROGRESS
Physical Therapy  Treatment    Katelyn Huynh   MRN: [id number]   Admitting Diagnosis: Wound dehiscence    PT Received On: 03/06/18          Billable Minutes:  Gait Training 23, Therapeutic Activity 15 and Therapeutic Exercise 25=63    Treatment Type: Treatment  PT/PTA: PT     PTA Visit Number: 0       General Precautions: Standard, fall  Orthopedic Precautions: LUE non weight bearing   Braces: Hinged knee brace         Subjective:  Communicated with patient prior to session.  Patient agreeabl to session    Pain/Comfort  Pain Rating 1: 5/10  Location - Side 1: Left  Location 1: knee  Pain Addressed 1: Distraction  Pain Rating Post-Intervention 1: 5/10    Objective:  Patient found sitting in w/c w/ HKB locked in extension and wound vac, IV in progress   with         Functional Status:  MDS G  Bed Mobility Functional Status: CGA-Min (A)  Transfer Functional Status: CGA-Min (A)  Walk in Room Functional Status: CGA-Min (A)  Walk in Corridor Functional Status: CGA-Min (A)  Locomotion on Unit Functional Status: CGA-Min (A)  Locomotion Off Unit Functional Status: mod(A)-Max(A)          AM-PAC 6 CLICK MOBILITY  Total Score:16    Bed Mobility:  Sit>Supine:min assist on mat, bed for RLE  Supine>Sit: on mat w/ min assist for RLE    Transfers:  Sit<>Stand: w/ L PRW and min assist for steadying for hip elevation to/from w/c x3 trials, to/from mat  Stand Pivot Transfer: w/ L PRW w/c<>mat w/min assist and to bed w/ L PRW and CGA      Gait:  Amb w/ L PRW and CGA to SBA. IV pole and wound  Vac in tow and w/c follow. LLE in HKB locked in extension. Amb 54; 60;8;12 feet. Seated rest breaks between trials. Maintains LUE weightbearing on forearm and NWB L wrist and LLE in extension   Therex:  Quad sets, BLE  Glute sets, BLE  Ankle  Pumps, BLE  hip abduction/adduction,BLE  heelslides, RLE only  SAQ, RLE only  x20 reps w/ assist as needed.      Additional Treatment:  Patient pedaled LBE (lower body ergometer) x15 minutes to  increase strength and endurance.w/ RLE only  Patient left supine with call button in reach and wound vac plugged in and LLE elevated and leg brace intact. (PCT is to refresh [place])    Assessment:  Katelyn Huynh is a 67 y.o. female with a medical diagnosis of Wound dehiscence.  She has a left Hinged knee brace locked in extension and is WBAT LLE. She has a cast on her L wrist and is allowed to weight bear on platform. She is improving w/ functional mobility and participating well. Patient will benefit from continued physical therapy to address deficits and improve safety and functional mobility. Continue with physical therapy plan of care. .    Rehab identified problem list/impairments: weakness, impaired endurance, impaired functional mobilty, gait instability, impaired balance, decreased lower extremity function, decreased upper extremity function, pain, impaired skin, edema, orthopedic precautions    Rehab potential is good.    Activity tolerance: Good    Discharge recommendations: home with home health     Barriers to discharge: Decreased caregiver support    Equipment recommendations: wheelchair (platform for RW)     GOALS:    Physical Therapy Goals        Problem: Physical Therapy Goal    Goal Priority Disciplines Outcome Goal Variances Interventions   Physical Therapy Goal     PT/OT, PT Ongoing (interventions implemented as appropriate)     Description:  Goals to be met by: 14 days     Patient will increase functional independence with mobility by performin. Supine to sit with Stand-by Assistance  2. Sit to supine with Stand-by Assistance4  3. Sit to stand transfer with Stand-by Assistance with LLE brace locked in extension and with left platform rolling walker  4. Bed to chair transfer with Stand-by Assistance  with LLE brace locked in extension and with left platform rolling walker  5. Gait  x 150 feet with Stand-by Assistance  with LLE brace locked in extension and with left platform  rolling walker  6. Wheelchair propulsion x50 feet with Stand-by Assistance using RUE/RLE   7. Ascend/Descend 4 inch curb step with Contact Guard Assistance  with LLE brace locked in extension and with left platform rolling walker.  8. Lower extremity exercise program x20 reps per handout, with assistance as needed and gym therex     Outcome: Ongoing (interventions implemented as appropriate)  Goals are appropriate but written for RLE. PTs LLE is the affected extremity.  W/c goal also needs to be updated for propulsion with RUE/RLE only.   3/5/2018 Goals corrected today. LLE is WBAT and w/ hinged knee brace locked in extension.  3/5/2018 Jennifer Oliveira, PT                     PLAN:    Patient to be seen 5 x/week  to address the above listed problems via gait training, therapeutic activities, therapeutic exercises, wheelchair management/training  Plan of Care expires: 03/31/18  Plan of Care reviewed with: patient    Jennifer Oliveira, PT  03/06/2018

## 2018-03-06 NOTE — PLAN OF CARE
Problem: Occupational Therapy Goal  Goal: Occupational Therapy Goal  Goals to be met by: 2 weeks     Patient will increase functional independence with ADLs by performing:    UE Dressing with Set-up Assistance.  Met  LE Dressing with Supervision.  Grooming while standing with Supervision.  Toileting from bedside commode with Supervision for hygiene and clothing management.   Bathing from  edge of bed with Supervision.  Supine to sit with Modified [place].  Stand pivot transfers with Supervision with PRW  Toilet transfer to bedside commode with Supervision.  Pt. To be independent with HEP to improve level of endurance for BUE.   Pt. To participate in dynamic standing activity x 10 minutes with Supervision      Pt. Tolerated session well.

## 2018-03-06 NOTE — PLAN OF CARE
Problem: Patient Care Overview  Goal: Plan of Care Review  Outcome: Ongoing (interventions implemented as appropriate)   03/06/18 0659   Coping/Psychosocial   Plan Of Care Reviewed With patient       Problem: Pressure Ulcer Risk (Balta Scale) (Adult,Obstetrics,Pediatric)  Goal: Skin Integrity  Patient will demonstrate the desired outcomes by discharge/transition of care.   Outcome: Ongoing (interventions implemented as appropriate)   03/06/18 0659   Pressure Ulcer Risk (Balta Scale) (Adult,Obstetrics,Pediatric)   Skin Integrity making progress toward outcome       Problem: Fall Risk (Adult)  Goal: Absence of Falls  Patient will demonstrate the desired outcomes by discharge/transition of care.   Outcome: Ongoing (interventions implemented as appropriate)   03/06/18 0659   Fall Risk (Adult)   Absence of Falls making progress toward outcome

## 2018-03-06 NOTE — PLAN OF CARE
Problem: Occupational Therapy Goal  Goal: Occupational Therapy Goal  Goals to be met by: 2 weeks     Patient will increase functional independence with ADLs by performing:    UE Dressing with Set-up Assistance.  Met  LE Dressing with Supervision.  Grooming while standing with Supervision.  Toileting from bedside commode with Supervision for hygiene and clothing management.   Bathing from  edge of bed with Supervision.  Supine to sit with Modified [place].  Stand pivot transfers with Supervision with PRW  Toilet transfer to bedside commode with Supervision.  Pt. To be independent with HEP to improve level of endurance for BUE.   Pt. To participate in dynamic standing activity x 10 minutes with Supervision      Pt. Tolerated session well.

## 2018-03-06 NOTE — PLAN OF CARE
Problem: Physical Therapy Goal  Goal: Physical Therapy Goal  Goals to be met by: 14 days     Patient will increase functional independence with mobility by performin. Supine to sit with Stand-by Assistance  2. Sit to supine with Stand-by Assistance4  3. Sit to stand transfer with Stand-by Assistance with LLE brace locked in extension and with left platform rolling walker  4. Bed to chair transfer with Stand-by Assistance  with LLE brace locked in extension and with left platform rolling walker  5. Gait  x 150 feet with Stand-by Assistance  with LLE brace locked in extension and with left platform rolling walker  6. Wheelchair propulsion x50 feet with Stand-by Assistance using RUE/RLE   7. Ascend/Descend 4 inch curb step with Contact Guard Assistance  with LLE brace locked in extension and with left platform rolling walker.  8. Lower extremity exercise program x20 reps per handout, with assistance as needed and gym therex     Outcome: Ongoing (interventions implemented as appropriate)  Goals are appropriate but written for RLE. PTs LLE is the affected extremity.  W/c goal also needs to be updated for propulsion with RUE/RLE only.   3/5/2018 Goals corrected today. LLE is WBAT and w/ hinged knee brace locked in extension.  3/5/2018 Jennifer Oliveira, PT   Goals revised for LLE. (rather than RLE) Jennifer Oliveira, PT 3/6/2018

## 2018-03-06 NOTE — PT/OT/SLP PROGRESS
Occupational Therapy  Treatment    Katelyn Huynh   MRN: [id number]   Admitting Diagnosis: Wound dehiscence    OT Date of Treatment: 03/06/18  Total Time (min): 101 min    Billable Minutes:  Self Care/Home Management 73 and Therapeutic activity 28    General Precautions: Standard, fall  Orthopedic Precautions: LUE non weight bearing, LLE weight bearing as tolerated  Braces: Hinged knee brace (locked in extension)    Do you have any cultural, spiritual, Pentec[employer]al conflicts, given your current situation?: none reported    Subjective:  Communicated with nurse prior to session.  Pt. Reported that she wanted to clean up.     Pain/Comfort  Pain Rating 1: 5/10  Location - Side 1: Left  Location 1: knee  Pain Addressed 1: Reposition, Distraction, Nurse notified  Pain Rating Post-Intervention 1: 5/10    Objective:  Patient found with:  (seated at EOB/knee brace unfastened at second and last strap by ankle also unfastened)  Pt. Instructed on need to have straps fastened at all times to maintain precautions and verbalized understanding.   Functional Status:  MDS G  Bed Mobility Functional Status: Min (A  Bed Mobility Level of (A):  (sit to supine with assist at LLE)  Transfer Functional Status: CGA-Min (A)  Transfer Level of (A):  (with PRW sit to stand x multiple trials from EOB elevated while performing ADL tasks  Dressing Functional Status: 2:Min (A) LBD to don shorts /UBD set up A  Dressing Level of (A) :  (with LBD)  Eating Functional Status: mod(I) - (I)  Personal Hygiene Functional Status: S seated EOB to brush teeth  Bathing Functional Status:Min (A) for standing portion when seated EOB   Moving from seated to standing position: Not steady, but able to stabilize without staff assistance  Surface-to-surface transfer (transfer between bed and chair or wheelchair): Not steady, only able to stabilize with staff assistance           AMPAC 6 Click:  AMPAC Total Score: 18    Additional Treatment:PM  session  [place] [facility] [place] Min A  Transfers with PRW from bed to bedside commode with Min A   Pt. Donned skirt with Min A  Pt. Ambulated from bed to hallway with PRW and CGA  Pt. Engaged in craft activity using BUE to trace and cut shamrock  Pt. Participated in dynamic standing activity of painting and decorating shamrock at table with SBA  Pt. Required min A for sit to stand from wheel chair with PRW and vc's to extend LLE forward when sitting as well as when standing       Patient left up in chair with in gym with PT present    ASSESSMENT:  Katelyn Huynh is a 67 y.o. female with a medical diagnosis of Wound dehiscence  And presents with deficits in self-care skills. Endurance, functional mobility and limitations also noted 2/2 orthopedic precautions.  Pain appears to be less on this date as compared to previous sessions.  Pt. Continues to require education on need for brace straps  to be fastened securely to maintain precautions for LLE..Pt. Motivated and participated well in therapy sessions on this date.    Rehab identified problem list/impairments: impaired endurance, impaired self care skills, impaired functional mobilty, decreased lower extremity function, pain, edema, impaired skin    Rehab potential is good    Activity tolerance: Good    Discharge recommendations: home health OT (with supervision)     Barriers to discharge: Decreased caregiver support, Inaccessible home environment     Equipment recommendations:  (PRW)     GOALS:    Occupational Therapy Goals        Problem: Occupational Therapy Goal    Goal Priority Disciplines Outcome Interventions   Occupational Therapy Goal     OT, PT/OT Ongoing (interventions implemented as appropriate)    Description:  Goals to be met by: 2 weeks     Patient will increase functional independence with ADLs by performing:    UE Dressing with Set-up Assistance.  Met  LE Dressing with Supervision.  Grooming while standing with Supervision.  Toileting  from bedside commode with Supervision for hygiene and clothing management.   Bathing from  edge of bed with Supervision.  Supine to sit with Modified [place].  Stand pivot transfers with Supervision with PRW  Toilet transfer to bedside commode with Supervision.  Pt. To be independent with HEP to improve level of endurance for BUE.   Pt. To participate in dynamic standing activity x 10 minutes with Supervision                       Plan:  Patient to be seen 5 x/week to address the above listed problems via self-care/home management, therapeutic activities, therapeutic exercises  Plan of Care expires: 03/31/18  Plan of Care reviewed with: patient    KAROLINE Shen  03/06/2018

## 2018-03-06 NOTE — PROGRESS NOTES
Wound care consulted for skin breakdown to the perineum.  The abdominal pannus and groin area are moist, red, itch, have red satellite lesions noted.    Plan of care discussed with patient who verbalized understanding.  Recommendations:  Miconazole ointment BID to abdominal pannus and groin area.      03/06/18 0850       Wound 03/06/18 0850 Moisture associated dermatitis;Other Perineum   Date First Assessed/Time First Assessed: 03/06/18 0850   Pre-existing: No  Wound Type: (c) Moisture associated dermatitis;Other  Location: Perineum   Wound Image    Wound WDL ex   Dressing Appearance Open to air;No dressing   Drainage Amount None   Appearance Red   Periwound Area Satellite lesion;Redness;Moist

## 2018-03-07 PROCEDURE — A4216 STERILE WATER/SALINE, 10 ML: HCPCS | Performed by: STUDENT IN AN ORGANIZED HEALTH CARE EDUCATION/TRAINING PROGRAM

## 2018-03-07 PROCEDURE — 63600175 PHARM REV CODE 636 W HCPCS: Performed by: STUDENT IN AN ORGANIZED HEALTH CARE EDUCATION/TRAINING PROGRAM

## 2018-03-07 PROCEDURE — 12000000 HC SNF SEMI-PRIVATE ROOM

## 2018-03-07 PROCEDURE — 97110 THERAPEUTIC EXERCISES: CPT

## 2018-03-07 PROCEDURE — 80202 ASSAY OF VANCOMYCIN: CPT

## 2018-03-07 PROCEDURE — 97530 THERAPEUTIC ACTIVITIES: CPT

## 2018-03-07 PROCEDURE — 25000003 PHARM REV CODE 250: Performed by: INTERNAL MEDICINE

## 2018-03-07 PROCEDURE — 99900058 HC 022 PAID UNDER SNF PPS

## 2018-03-07 PROCEDURE — 25000003 PHARM REV CODE 250: Performed by: STUDENT IN AN ORGANIZED HEALTH CARE EDUCATION/TRAINING PROGRAM

## 2018-03-07 PROCEDURE — 63600175 PHARM REV CODE 636 W HCPCS: Performed by: INTERNAL MEDICINE

## 2018-03-07 PROCEDURE — 97116 GAIT TRAINING THERAPY: CPT

## 2018-03-07 RX ADMIN — ASPIRIN 325 MG: 325 TABLET, DELAYED RELEASE ORAL at 10:03

## 2018-03-07 RX ADMIN — Medication 1 G: at 10:03

## 2018-03-07 RX ADMIN — ASPIRIN 325 MG: 325 TABLET, DELAYED RELEASE ORAL at 09:03

## 2018-03-07 RX ADMIN — FAMOTIDINE 20 MG: 20 TABLET, FILM COATED ORAL at 10:03

## 2018-03-07 RX ADMIN — Medication 10 ML: at 12:03

## 2018-03-07 RX ADMIN — MICONAZOLE NITRATE: 20 OINTMENT TOPICAL at 09:03

## 2018-03-07 RX ADMIN — STANDARDIZED SENNA CONCENTRATE AND DOCUSATE SODIUM 1 TABLET: 8.6; 5 TABLET, FILM COATED ORAL at 09:03

## 2018-03-07 RX ADMIN — STANDARDIZED SENNA CONCENTRATE AND DOCUSATE SODIUM 1 TABLET: 8.6; 5 TABLET, FILM COATED ORAL at 10:03

## 2018-03-07 RX ADMIN — OXYCODONE HYDROCHLORIDE AND ACETAMINOPHEN 1 TABLET: 10; 325 TABLET ORAL at 09:03

## 2018-03-07 RX ADMIN — Medication 1 G: at 12:03

## 2018-03-07 RX ADMIN — Medication 10 ML: at 05:03

## 2018-03-07 RX ADMIN — HYDROCHLOROTHIAZIDE 50 MG: 25 TABLET ORAL at 09:03

## 2018-03-07 RX ADMIN — CEFTRIAXONE SODIUM 2 G: 2 INJECTION, POWDER, FOR SOLUTION INTRAMUSCULAR; INTRAVENOUS at 01:03

## 2018-03-07 RX ADMIN — FAMOTIDINE 20 MG: 20 TABLET, FILM COATED ORAL at 09:03

## 2018-03-07 RX ADMIN — Medication 10 ML: at 06:03

## 2018-03-07 RX ADMIN — VALSARTAN 80 MG: 80 TABLET ORAL at 09:03

## 2018-03-07 RX ADMIN — OXYCODONE HYDROCHLORIDE AND ACETAMINOPHEN 1 TABLET: 10; 325 TABLET ORAL at 10:03

## 2018-03-07 NOTE — PT/OT/SLP PROGRESS
Occupational Therapy  Treatment    Katelyn Huynh   MRN: [id number]   Admitting Diagnosis: Wound dehiscence    OT Date of Treatment: 03/07/18  Total Time (min): 75 min    Billable Minutes:  Therapeutic Activity 55 and Therapeutic Exercise 20    General Precautions: Standard, fall  Orthopedic Precautions: LUE non weight bearing  Braces: Hinged knee brace    Do you have any cultural, spiritual, Denominational conflicts, given your current situation?: none reported    Subjective:  Communicated with nsg prior to session.  I am doing well today    Pain/Comfort  Pain Rating 1: 7/10  Location - Side 1: Left  Location - Orientation 1: generalized  Location 1: knee (and wrist)  Pain Addressed 1: Distraction, Reposition    Objective:    Pt. Seated in w/c on arrival  Functional Status:  MDS G  Bed Mobility Functional Status: CGA-Min (A) for supine to sit with RLE management  Transfer Functional Status: CGA-Min (A) from w/c and also then to EOB with PFRW  Dressing Functional Status: 3:mod(A)-Max(A) for LB dressing to doff pants in stance from EOB level  Eating Functional Status: mod(I) - (I)  Moving from seated to standing position: Not steady, only able to stabilize with staff assistance  Turning around and facing the opposite direction while walking: Not steady, only able to stabilize with staff assistance  Surface-to-surface transfer (transfer between bed and chair or wheelchair): Not steady, only able to stabilize with staff assistance           [facility] 6 Click:  [facility] Total Score: 18      Additional Treatment:  Pt. With BUE ROM on this day with shd flex, forward press, and RUE with bicep curls and add/abd to increase BUE ROM and strength  Pt. Also with simple standing act on this day with placement of pegs from one location to another with CGA and PFRW  Pt. Also with visual spacial relation board  Seated to increase reaching with shd ROM    Patient left with  in a.m. And in p.m. supine with all lines  intact and call button in reach    ASSESSMENT:  Katelyn Huynh is a 67 y.o. female with a medical diagnosis of Wound dehiscence Pt. participated well with session on this day.Pt demos physical deficits with balance  functional mobility, UB strength, endurance  level of functional indep with daily tasks and activities and selfcare skills .Pt. Will continue to benefit from continued OT to progress towards goals      Rehab identified problem list/impairments: impaired endurance, impaired self care skills, impaired functional mobilty, decreased lower extremity function, pain, edema, impaired skin    Rehab potential is fair    Activity tolerance: Fair    Discharge recommendations: home health OT (with supervision)     Barriers to discharge: Decreased caregiver support, Inaccessible home environment     Equipment recommendations:  (PRW)     GOALS:    Occupational Therapy Goals        Problem: Occupational Therapy Goal    Goal Priority Disciplines Outcome Interventions   Occupational Therapy Goal     OT, PT/OT Ongoing (interventions implemented as appropriate)    Description:  Goals to be met by: 2 weeks     Patient will increase functional independence with ADLs by performing:    UE Dressing with Set-up Assistance.  Met  LE Dressing with Supervision.  Grooming while standing with Supervision.  Toileting from bedside commode with Supervision for hygiene and clothing management.   Bathing from  edge of bed with Supervision.  Supine to sit with Modified [place].  Stand pivot transfers with Supervision with PRW  Toilet transfer to bedside commode with Supervision.  Pt. To be independent with HEP to improve level of endurance for BUE.   Pt. To participate in dynamic standing activity x 10 minutes with Supervision                   Plan:  Patient to be seen 5 x/week to address the above listed problems via self-care/home management, therapeutic activities, therapeutic exercises  Plan of Care expires:  03/31/18  Plan of Care reviewed with: patient    JASON Wilson/MARLO  03/07/2018

## 2018-03-07 NOTE — PROGRESS NOTES
[facility] Department of Hospital Medicine  Progress Note    Patient Name: Katelyn Huynh  MRN: [id number]  Code Status: Full Code  Admission Date: 2/28/2018  Length of Stay: 7 days  Attending Physician: Daisy Walker MD  Primary Care Provider: Daniel Garcia MD    Subjective:     Principal Problem:Wound dehiscence    HPI:  Chief Complaint/Reason for Admission: Wound dehiscence    History of Present Illness:  Patient is a 67 y.o. female with HTN, morbid obesity, recent left TKR who presents to SNF after readmission for dehiscence of her left knee incision.  The patient is known to me from previous SNF admission (See H/P 2/17/2018)  She required quadriceps tendon repair, I&D and revision of the left knee on 2/24/2018 by Dr. Martinez.  Her left wrist splint has been removed, sutures also removed and arm placed in short arm cast.  She complains of left knee pain 7/10 and left wrist pain 3/10. Knee pain exacerbated with ambulation and both pains are relieved with oxycodone at the current dose. She has an incisional wound vac in place and will complete 10 d of IV antibiotics with vanc and rocephin.The patient has been admitted to SNF for ongoing PT/OT due to insufficient progress to go home safely from the hospital.    Interval History: incisional wound vac removed this morning. Staples intact. New island dressing applied. No drainage noted.    Review of Systems   Constitutional: Negative.    Respiratory: Negative.    Cardiovascular: Negative.    Musculoskeletal:        Cast to right arm. Wound to left knee   Skin: Positive for wound.     Objective:     Vital Signs (Most Recent):  Temp: 98 °F (36.7 °C) (03/06/18 1945)  Pulse: 94 (03/06/18 1945)  Resp: 18 (03/06/18 1945)  BP: (!) 128/59 (03/07/18 0916)  SpO2: 96 % (03/06/18 1945) Vital Signs (24h Range):  Temp:  [98 °F (36.7 °C)] 98 °F (36.7 °C)  Pulse:  [94] 94  Resp:  [18] 18  SpO2:  [96 %] 96 %  BP: (128-134)/(59-78) 128/59     Weight:  123.2 kg (271 lb 9.7 oz)  Body mass index is 46.62 kg/m².  No intake or output data in the 24 hours ending 03/07/18 0926   Physical Exam   Constitutional: She is oriented to person, place, and time. She appears well-developed and well-nourished.   Pulmonary/Chest: Effort normal.   Musculoskeletal:   Cast to left arm and brace to left leg   Neurological: She is alert and oriented to person, place, and time.   Skin: Skin is warm and dry.   Psychiatric: She has a normal mood and affect. Her behavior is normal.           Assessment/Plan:        Assessment:   1 week s/p left  total knee arthroplasty washout with quad tendon rupture    Plan:  PT/OT  Wound care- island dressing change daily.  Pain Control  DVT Prophylaxis  Follow up: will remove staples in 2 weeks          Shelley Up NP  Department of Orthopedic Surgery  [facility]

## 2018-03-07 NOTE — PT/OT/SLP PROGRESS
Physical Therapy  Treatment    Katelyn Huynh   MRN: [id number]   Admitting Diagnosis: Wound dehiscence    PT Received On: 03/07/18          Billable Minutes:  Gait Training 15, Therapeutic Activity 15 and Therapeutic Exercise 24=54    Treatment Type: Treatment  PT/PTA: PT     PTA Visit Number: 0       General Precautions: Standard, fall  Orthopedic Precautions: LLE weight bearing as tolerated, LLE non weight bearing (L wrist NWB: okay to use platform on RW)   Braces: Hinged knee brace (locked in extension)         Subjective:  Communicated with patient prior to session.  Patient agreeable to session.    Pain/Comfort  Pain Rating 1: 5/10  Location - Side 1: Left  Location - Orientation 1: generalized  Location 1: knee  Pain Addressed 1: Reposition, Distraction  Pain Rating Post-Intervention 1: 5/10    Objective:  Patient found sitting EOB w/ left HKB locked in extension, but w/ minimal knee flexion  Patient instructed in technique for re-adjusting (directing re-adjustment) and importance of maintaining Left knee in extension.  Knee brace adjusted and remained in position during session.   with         Functional Status:  MDS G  Transfer Functional Status: CGA-Min (A)  Walk in Room Functional Status: S-SBA  Walk in Corridor Functional Status: S-SBA          AM-PAC 6 CLICK MOBILITY  Total Score:16    Bed Mobility:  Sit>Supine:NP  Supine>Sit: NP    Transfers:  Sit<>Stand: w/ L PRW and SBA from elevated bed; to/from w/c w/ L PRW w/ CGA and cues for slow descent  Stand Pivot Transfer: w/ LPRW and SBA  Patient maintains precautions    Gait:  Amb w/ L PRW and SBA 65 feet; 20 feet. HKB locked in extension LLE     Advanced Gait:  Stairs: NP  Curb Step: NP       Therex:  BLE x20  Quad sets,   Glute sets,   Ankle  Pumps,   hip abduction/adduction,  RLE only x20  LAQ   Hip flexion  x20 reps w/ assist as needed.  Additional Treatment:  Left knee brace adjusted and patient educated in adjustment  Patient pedaled LBE  (lower body ergometer) x15 minutes to increase strength and endurance for RLE only    Patient left up in chair with call button in reach.    Assessment:  Katelyn Huynh is a 67 y.o. female with a medical diagnosis of Wound dehiscence.  Her would vac was removed today. Patient progressing well. Education on managing/maintaining L HKB locked in extension w/ changes in position. Patient will benefit from continued physical therapy to address deficits and improve safety and functional mobility. Continue with physical therapy plan of care. .    Rehab identified problem list/impairments: weakness, impaired endurance, impaired functional mobilty, gait instability, impaired balance, decreased lower extremity function, decreased upper extremity function, pain, impaired skin, edema, orthopedic precautions    Rehab potential is good.    Activity tolerance: Good    Discharge recommendations: home with home health     Barriers to discharge: Decreased caregiver support    Equipment recommendations: wheelchair (platform for RW)     GOALS:    Physical Therapy Goals        Problem: Physical Therapy Goal    Goal Priority Disciplines Outcome Goal Variances Interventions   Physical Therapy Goal     PT/OT, PT Ongoing (interventions implemented as appropriate)     Description:  Goals to be met by: 14 days     Patient will increase functional independence with mobility by performin. Supine to sit with Stand-by Assistance  2. Sit to supine with Stand-by Assistance4  3. Sit to stand transfer with Stand-by Assistance with LLE brace locked in extension and with left platform rolling walker  4. Bed to chair transfer with Stand-by Assistance  with LLE brace locked in extension and with left platform rolling walker  5. Gait  x 150 feet with Stand-by Assistance  with LLE brace locked in extension and with left platform rolling walker  6. Wheelchair propulsion x50 feet with Stand-by Assistance using RUE/RLE   7. Ascend/Descend 4  inch curb step with Contact Guard Assistance  with LLE brace locked in extension and with left platform rolling walker.  8. Lower extremity exercise program x20 reps per handout, with assistance as needed and gym therex     Outcome: Ongoing (interventions implemented as appropriate)  Goals are appropriate but written for RLE. PTs LLE is the affected extremity.  W/c goal also needs to be updated for propulsion with RUE/RLE only.   3/5/2018 Goals corrected today. LLE is WBAT and w/ hinged knee brace locked in extension.  3/5/2018 Jennifer Oliveira, PT                     PLAN:    Patient to be seen 5 x/week  to address the above listed problems via gait training, therapeutic activities, therapeutic exercises, wheelchair management/training  Plan of Care expires: 03/31/18  Plan of Care reviewed with: patient    Jennifer Oliveira, PT  03/07/2018

## 2018-03-07 NOTE — PLAN OF CARE
Problem: Occupational Therapy Goal  Goal: Occupational Therapy Goal  Goals to be met by: 2 weeks     Patient will increase functional independence with ADLs by performing:    UE Dressing with Set-up Assistance.  Met  LE Dressing with Supervision.  Grooming while standing with Supervision.  Toileting from bedside commode with Supervision for hygiene and clothing management.   Bathing from  edge of bed with Supervision.  Supine to sit with Modified [place].  Stand pivot transfers with Supervision with PRW  Toilet transfer to bedside commode with Supervision.  Pt. To be independent with HEP to improve level of endurance for BUE.   Pt. To participate in dynamic standing activity x 10 minutes with Supervision      Outcome: Ongoing (interventions implemented as appropriate)  .

## 2018-03-07 NOTE — SUBJECTIVE & OBJECTIVE
Interval History: incisional wound vac removed this morning. Staples intact. New island dressing applied. No drainage noted.    Review of Systems   Constitutional: Negative.    Respiratory: Negative.    Cardiovascular: Negative.    Musculoskeletal:        Cast to right arm. Wound to left knee   Skin: Positive for wound.     Objective:     Vital Signs (Most Recent):  Temp: 98 °F (36.7 °C) (03/06/18 1945)  Pulse: 94 (03/06/18 1945)  Resp: 18 (03/06/18 1945)  BP: (!) 128/59 (03/07/18 0916)  SpO2: 96 % (03/06/18 1945) Vital Signs (24h Range):  Temp:  [98 °F (36.7 °C)] 98 °F (36.7 °C)  Pulse:  [94] 94  Resp:  [18] 18  SpO2:  [96 %] 96 %  BP: (128-134)/(59-78) 128/59     Weight: 123.2 kg (271 lb 9.7 oz)  Body mass index is 46.62 kg/m².  No intake or output data in the 24 hours ending 03/07/18 0926   Physical Exam   Constitutional: She is oriented to person, place, and time. She appears well-developed and well-nourished.   Pulmonary/Chest: Effort normal.   Musculoskeletal:   Cast to left arm and brace to left leg   Neurological: She is alert and oriented to person, place, and time.   Skin: Skin is warm and dry.   Psychiatric: She has a normal mood and affect. Her behavior is normal.

## 2018-03-07 NOTE — PLAN OF CARE
Problem: Physical Therapy Goal  Goal: Physical Therapy Goal  Goals to be met by: 14 days     Patient will increase functional independence with mobility by performin. Supine to sit with Stand-by Assistance  2. Sit to supine with Stand-by Assistance4  3. Sit to stand transfer with Stand-by Assistance with LLE brace locked in extension and with left platform rolling walker  4. Bed to chair transfer with Stand-by Assistance  with LLE brace locked in extension and with left platform rolling walker  5. Gait  x 150 feet with Stand-by Assistance  with LLE brace locked in extension and with left platform rolling walker  6. Wheelchair propulsion x50 feet with Stand-by Assistance using RUE/RLE   7. Ascend/Descend 4 inch curb step with Contact Guard Assistance  with LLE brace locked in extension and with left platform rolling walker.  8. Lower extremity exercise program x20 reps per handout, with assistance as needed and gym therex     Outcome: Ongoing (interventions implemented as appropriate)  Goals are appropriate but written for RLE. PTs LLE is the affected extremity.  W/c goal also needs to be updated for propulsion with RUE/RLE only.   3/5/2018 Goals corrected today. LLE is WBAT and w/ hinged knee brace locked in extension.  3/5/2018 Jennifer Oliveira, PT    Goals remain appropriate. Continue with Physical therapy Plan of Care. Jennifer Oliveira, PT 3/7/2018

## 2018-03-07 NOTE — PLAN OF CARE
Problem: Patient Care Overview  Goal: Plan of Care Review  Outcome: Ongoing (interventions implemented as appropriate)  Mrs Huynh received one 10/325 mg Oxycodone for c/o lt knee pain rated at 7 on a pain scale of 0-10. Wound Vac therapy remains in progress to lt knee incision.  Polar ice therapy applied to LLE. Safety measures maintained. Call light is within reach. Will continue with plan of care.

## 2018-03-08 ENCOUNTER — OFFICE VISIT (OUTPATIENT)
Dept: INFECTIOUS DISEASES | Facility: CLINIC | Age: 68
DRG: 560 | End: 2018-03-08
Attending: INTERNAL MEDICINE
Payer: COMMERCIAL

## 2018-03-08 ENCOUNTER — TELEPHONE (OUTPATIENT)
Dept: ORTHOPEDICS | Facility: CLINIC | Age: 68
End: 2018-03-08

## 2018-03-08 VITALS — HEIGHT: 64 IN | BODY MASS INDEX: 46.37 KG/M2 | WEIGHT: 271.63 LBS

## 2018-03-08 DIAGNOSIS — T81.30XA WOUND DEHISCENCE: Primary | ICD-10-CM

## 2018-03-08 LAB
ANION GAP SERPL CALC-SCNC: 6 MMOL/L
BASOPHILS # BLD AUTO: 0.06 K/UL
BASOPHILS NFR BLD: 0.8 %
BUN SERPL-MCNC: 9 MG/DL
CALCIUM SERPL-MCNC: 8.9 MG/DL
CHLORIDE SERPL-SCNC: 100 MMOL/L
CO2 SERPL-SCNC: 32 MMOL/L
CREAT SERPL-MCNC: 0.7 MG/DL
DIFFERENTIAL METHOD: ABNORMAL
EOSINOPHIL # BLD AUTO: 0.5 K/UL
EOSINOPHIL NFR BLD: 6.7 %
ERYTHROCYTE [DISTWIDTH] IN BLOOD BY AUTOMATED COUNT: 14.1 %
EST. GFR  (AFRICAN AMERICAN): >60 ML/MIN/1.73 M^2
EST. GFR  (NON AFRICAN AMERICAN): >60 ML/MIN/1.73 M^2
GLUCOSE SERPL-MCNC: 88 MG/DL
HCT VFR BLD AUTO: 29.8 %
HGB BLD-MCNC: 9.6 G/DL
IMM GRANULOCYTES # BLD AUTO: 0.05 K/UL
IMM GRANULOCYTES NFR BLD AUTO: 0.7 %
LYMPHOCYTES # BLD AUTO: 1.5 K/UL
LYMPHOCYTES NFR BLD: 20.4 %
MAGNESIUM SERPL-MCNC: 1.7 MG/DL
MCH RBC QN AUTO: 28.3 PG
MCHC RBC AUTO-ENTMCNC: 32.2 G/DL
MCV RBC AUTO: 88 FL
MONOCYTES # BLD AUTO: 0.8 K/UL
MONOCYTES NFR BLD: 11.1 %
NEUTROPHILS # BLD AUTO: 4.5 K/UL
NEUTROPHILS NFR BLD: 60.3 %
NRBC BLD-RTO: 0 /100 WBC
PHOSPHATE SERPL-MCNC: 3.3 MG/DL
PLATELET # BLD AUTO: 325 K/UL
PMV BLD AUTO: 9.8 FL
POTASSIUM SERPL-SCNC: 3.7 MMOL/L
RBC # BLD AUTO: 3.39 M/UL
SODIUM SERPL-SCNC: 138 MMOL/L
VANCOMYCIN TROUGH SERPL-MCNC: 20 UG/ML
WBC # BLD AUTO: 7.41 K/UL

## 2018-03-08 PROCEDURE — 97530 THERAPEUTIC ACTIVITIES: CPT

## 2018-03-08 PROCEDURE — A4216 STERILE WATER/SALINE, 10 ML: HCPCS | Performed by: STUDENT IN AN ORGANIZED HEALTH CARE EDUCATION/TRAINING PROGRAM

## 2018-03-08 PROCEDURE — 63600175 PHARM REV CODE 636 W HCPCS: Performed by: INTERNAL MEDICINE

## 2018-03-08 PROCEDURE — 36415 COLL VENOUS BLD VENIPUNCTURE: CPT

## 2018-03-08 PROCEDURE — 99215 OFFICE O/P EST HI 40 MIN: CPT | Mod: S$GLB,,, | Performed by: INTERNAL MEDICINE

## 2018-03-08 PROCEDURE — 3074F SYST BP LT 130 MM HG: CPT | Mod: S$GLB,,, | Performed by: INTERNAL MEDICINE

## 2018-03-08 PROCEDURE — 85025 COMPLETE CBC W/AUTO DIFF WBC: CPT

## 2018-03-08 PROCEDURE — 63600175 PHARM REV CODE 636 W HCPCS: Performed by: STUDENT IN AN ORGANIZED HEALTH CARE EDUCATION/TRAINING PROGRAM

## 2018-03-08 PROCEDURE — 80048 BASIC METABOLIC PNL TOTAL CA: CPT

## 2018-03-08 PROCEDURE — 12000000 HC SNF SEMI-PRIVATE ROOM

## 2018-03-08 PROCEDURE — 25000003 PHARM REV CODE 250: Performed by: STUDENT IN AN ORGANIZED HEALTH CARE EDUCATION/TRAINING PROGRAM

## 2018-03-08 PROCEDURE — 99999 PR PBB SHADOW E&M-EST. PATIENT-LVL II: CPT | Mod: PBBFAC,,, | Performed by: INTERNAL MEDICINE

## 2018-03-08 PROCEDURE — 25000003 PHARM REV CODE 250: Performed by: INTERNAL MEDICINE

## 2018-03-08 PROCEDURE — 97803 MED NUTRITION INDIV SUBSEQ: CPT

## 2018-03-08 PROCEDURE — 97535 SELF CARE MNGMENT TRAINING: CPT

## 2018-03-08 PROCEDURE — 97116 GAIT TRAINING THERAPY: CPT

## 2018-03-08 PROCEDURE — 84100 ASSAY OF PHOSPHORUS: CPT

## 2018-03-08 PROCEDURE — 83735 ASSAY OF MAGNESIUM: CPT

## 2018-03-08 PROCEDURE — 3078F DIAST BP <80 MM HG: CPT | Mod: S$GLB,,, | Performed by: INTERNAL MEDICINE

## 2018-03-08 RX ADMIN — Medication 1 G: at 12:03

## 2018-03-08 RX ADMIN — ASPIRIN 325 MG: 325 TABLET, DELAYED RELEASE ORAL at 08:03

## 2018-03-08 RX ADMIN — Medication 1 G: at 11:03

## 2018-03-08 RX ADMIN — VALSARTAN 80 MG: 80 TABLET ORAL at 08:03

## 2018-03-08 RX ADMIN — OXYCODONE HYDROCHLORIDE AND ACETAMINOPHEN 1 TABLET: 10; 325 TABLET ORAL at 08:03

## 2018-03-08 RX ADMIN — MICONAZOLE NITRATE: 20 OINTMENT TOPICAL at 10:03

## 2018-03-08 RX ADMIN — ASPIRIN 325 MG: 325 TABLET, DELAYED RELEASE ORAL at 10:03

## 2018-03-08 RX ADMIN — FAMOTIDINE 20 MG: 20 TABLET, FILM COATED ORAL at 10:03

## 2018-03-08 RX ADMIN — Medication 10 ML: at 11:03

## 2018-03-08 RX ADMIN — STANDARDIZED SENNA CONCENTRATE AND DOCUSATE SODIUM 1 TABLET: 8.6; 5 TABLET, FILM COATED ORAL at 10:03

## 2018-03-08 RX ADMIN — MICONAZOLE NITRATE: 20 OINTMENT TOPICAL at 08:03

## 2018-03-08 RX ADMIN — Medication 10 ML: at 05:03

## 2018-03-08 RX ADMIN — HYDROCHLOROTHIAZIDE 50 MG: 25 TABLET ORAL at 08:03

## 2018-03-08 RX ADMIN — FAMOTIDINE 20 MG: 20 TABLET, FILM COATED ORAL at 08:03

## 2018-03-08 RX ADMIN — CEFTRIAXONE SODIUM 2 G: 2 INJECTION, POWDER, FOR SOLUTION INTRAMUSCULAR; INTRAVENOUS at 02:03

## 2018-03-08 RX ADMIN — OXYCODONE HYDROCHLORIDE AND ACETAMINOPHEN 1 TABLET: 10; 325 TABLET ORAL at 12:03

## 2018-03-08 RX ADMIN — OXYCODONE HYDROCHLORIDE AND ACETAMINOPHEN 1 TABLET: 10; 325 TABLET ORAL at 10:03

## 2018-03-08 RX ADMIN — Medication 10 ML: at 12:03

## 2018-03-08 RX ADMIN — OXYCODONE HYDROCHLORIDE AND ACETAMINOPHEN 1 TABLET: 10; 325 TABLET ORAL at 05:03

## 2018-03-08 NOTE — NURSING
TO [facility] INFECTIOUS DISEASE CLINIC APPOINTMENT BY  [facility] WHEELCHAIR TRANSPORT SERVICE ACCOMPANIED BY TRANSPORTER.AWAKE AND ALERT ORIENTED X4 NO COMPLAINT OF DISCOMFORT VERBALIZED.

## 2018-03-08 NOTE — PLAN OF CARE
Problem: Occupational Therapy Goal  Goal: Occupational Therapy Goal  Goals to be met by: 2 weeks     Patient will increase functional independence with ADLs by performing:    UE Dressing with Set-up Assistance.  Met  LE Dressing with Supervision.  Grooming while standing with Supervision.  Toileting from bedside commode with Supervision for hygiene and clothing management.   Bathing from  edge of bed with Supervision.  Supine to sit with Modified [place].  Stand pivot transfers with Supervision with PRW  Toilet transfer to bedside commode with Supervision.  Pt. To be independent with HEP to improve level of endurance for BUE.   Pt. To participate in dynamic standing activity x 10 minutes with Supervision      Outcome: Ongoing (interventions implemented as appropriate)  .

## 2018-03-08 NOTE — PLAN OF CARE
Problem: Patient Care Overview  Goal: Plan of Care Review  Outcome: Ongoing (interventions implemented as appropriate)  AFEBRILE CONTINUES TO RECEIVE IV ANTIBIOTICS.NO PRESSURE ULCERS NOTED.FALL PRECAUTIONS MAINTAINED NO INJURIES NOTED.

## 2018-03-08 NOTE — PROGRESS NOTES
"[facility]  Adult Nutrition  Progress Note    SUMMARY       Recommendations    Continue 2gm Na diet, boost glucose bid, RD to follow  Refer to out pt clinic for pre-diabetes teaching  Reason for Assessment    Reason for Assessment: RD follow-up  Diagnosis:  (Wound dehiscence, sp L TKR with revision and I&D)  Relevant Medical History: Morbid obesity, HTN  Interdisciplinary Rounds: did not attend  General Information Comments: pt weight loss questioned to try different scale  Nutrition Discharge Planning: DC on low sodium diet       Lab/Procedures/Meds Reviewed        Physical Findings/Assessment    Overall Physical Appearance: edematous, obese, nourished  Tubes:  (-)  Oral/Mouth Cavity: WDL  Skin: incision (wound vac)    Estimated/Assessed Needs    Weight Used For Calorie Calculations: 132 kg (291 lb 0.1 oz)  Height: 5' 4" (162.6 cm)  Energy Calorie Requirements (kcal): 1850 (no activity factor due to obesity)  Energy Need Method: [place]-St Jeor  20 kcal/kg (kcal): 2640  25 kcal/kg (kcal): 3300  30 kcal/kg (kcal): 3960  35 kcal/kg (kcal): 4620  40 kcal/kg (kcal): 5280.01  45 kcal/kg (kcal): 5940.01  50 kcal/kg (kcal): 6600.01  RMR ([place]-St. Jeor Equation): 1850  Protein Requirements: 84 (1.5 x IBW kg)  Weight Used For Protein Calculations: 56 kg (123 lb 7.3 oz)  0.6 gm Protein (gm): 33.67  0.7 gm Protein (gm): 39.28  0.8 gm Protein (gm): 44.89  0.9 gm Protein (gm): 50.51  1.0 gm Protein (gm): 56.12  1.1 gm Protein (gm): 61.73  1.2 gm Protein (gm): 67.34  1.3 gm Protein (gm): 72.95  1.4 gm Protein (gm): 78.56  1.5 gm Protein (gm): 84.18  1.6 gm Protein (gm): 89.79  1.7 gm Protein (gm): 95.4  1.8 gm Protein (gm): 101.01  1.9 gm Protein (gm): 106.62  2.0 gm Protein (gm): 112.23  2.1 gm Protein (gm): 117.85  2.2 gm Protein (gm): 123.46  2.3 gm Protein (gm): 129.07  2.4 gm Protein (gm): 134.68  2.5 gm Protein (gm): 140.29  Fluid Requirements (mL): 1850 or per MD  RDA Method (mL): 1850 "       Nutrition Prescription Ordered    Current Diet Order: 2gm Na  Nutrition Order Comments: taking oral supplement eating %  Oral Nutrition Supplement: Boost plus bid    Evaluation of Received Nutrient/Fluid Intake    Energy Calories Required: meeting needs  Protein Required: meeting needs  Fluid Required: meeting needs  Tolerance: tolerating  % Intake of Estimated Energy Needs: 75 - 100 %  % Meal Intake: 75 - 100 %    Nutrition Risk    Level of Risk/Frequency of Follow-up:  (follow weekly)     Recommendations    Recommendation/Intervention: Continue 2gm Na diet, boost plus bid, RD to follow  Goals: PO meets 85% of EEN by one week  Nutrition Goal Status: goal met  Communication of RD Recs: other (comment)    Assessment and Plan       Morbid obesity with BMI of 50.0-59.9, adult     Related to (etiology):   Excessive caloric intake      Signs and Symptoms (as evidenced by):   Good po intake with BMI 51     Interventions/Recommendations (treatment strategy):  Low sodium diet, boost glucose bid     Nutrition Diagnosis Status: met         Monitor and Eval    Food and Nutrient Intake: energy intake  Food and Nutrient Adminstration: diet order  Physical Activity and Function: nutrition-related ADLs and IADLs  Anthropometric Measurements: weight change  Biochemical Data, Medical Tests and Procedures: glucose/endocrine profile, gastrointestinal profile, electrolyte and renal panel  Nutrition-Focused Physical Findings: extremities, muscles and bones     Nutrition Follow-Up    RD Follow-up?: Yes

## 2018-03-08 NOTE — PT/OT/SLP PROGRESS
Physical Therapy  Treatment    Katelyn Huynh   MRN: [id number]   Admitting Diagnosis: Wound dehiscence    PT Received On: 03/08/18          Billable Minutes:  Gait Training 15 and Therapeutic Activity 8=23    Treatment Type: Treatment  PT/PTA: PT     PTA Visit Number: 0       General Precautions: Standard, fall  Orthopedic Precautions: LLE weight bearing as tolerated, LLE non weight bearing (L wrist NWB: okay to use platform on RW)   Braces: Hinged knee brace (locked in extension)         Subjective:  Communicated with patient prior to session.  Patient reports has appointment today w/ ID    Pain/Comfort  Pain Rating 1: 5/10  Location - Side 1: Left  Location - Orientation 1: generalized  Location 1: knee  Pain Addressed 1: Distraction  Pain Rating Post-Intervention 1: 5/10    Objective:  Patient found seated EOB w/ HKB locked in ext with         Functional Status:  MDS G  Transfer Functional Status: S-SBA  Walk in Room Functional Status: S-SBA  Walk in Corridor Functional Status: S-SBA          AM-PAC 6 CLICK MOBILITY  Total Score:17    Bed Mobility:  Sit>Supine:NP  Supine>Sit: NP    Transfers:  Sit<>Stand: from elevated bed w/ L PRW and SBA      Gait:  Amb w/ L PRW and SBA w/ w/c in tow 65 feet.     Additional Treatment:  Brace adjusted sitting EOB.    Patient left up in chair with going to appointment.    Assessment:  Katelyn Huynh is a 67 y.o. female with a medical diagnosis of Wound dehiscence.  She is progressing well w/ gait and trasnfers. Patient will benefit from continued physical therapy to address deficits and improve safety and functional mobility. Continue with physical therapy plan of care. .    Rehab identified problem list/impairments: weakness, impaired endurance, impaired functional mobilty, gait instability, impaired balance, decreased lower extremity function, decreased upper extremity function, pain, impaired skin, edema, orthopedic precautions    Rehab potential is  good.    Activity tolerance: Good    Discharge recommendations: home with home health     Barriers to discharge: Decreased caregiver support    Equipment recommendations: wheelchair (platform for RW)     GOALS:    Physical Therapy Goals        Problem: Physical Therapy Goal    Goal Priority Disciplines Outcome Goal Variances Interventions   Physical Therapy Goal     PT/OT, PT Ongoing (interventions implemented as appropriate)     Description:  Goals to be met by: 14 days     Patient will increase functional independence with mobility by performin. Supine to sit with Stand-by Assistance  2. Sit to supine with Stand-by Assistance4  3. Sit to stand transfer with Stand-by Assistance with LLE brace locked in extension and with left platform rolling walker  4. Bed to chair transfer with Stand-by Assistance  with LLE brace locked in extension and with left platform rolling walker  5. Gait  x 150 feet with Stand-by Assistance  with LLE brace locked in extension and with left platform rolling walker  6. Wheelchair propulsion x50 feet with Stand-by Assistance using RUE/RLE   7. Ascend/Descend 4 inch curb step with Contact Guard Assistance  with LLE brace locked in extension and with left platform rolling walker.  8. Lower extremity exercise program x20 reps per handout, with assistance as needed and gym therex     Outcome: Ongoing (interventions implemented as appropriate)  Goals are appropriate but written for RLE. PTs LLE is the affected extremity.  W/c goal also needs to be updated for propulsion with RUE/RLE only.   3/5/2018 Goals corrected today. LLE is WBAT and w/ hinged knee brace locked in extension.  3/5/2018 Jennifer Oliveira, PT                     PLAN:    Patient to be seen 5 x/week  to address the above listed problems via gait training, therapeutic activities, therapeutic exercises, wheelchair management/training  Plan of Care expires: 18  Plan of Care reviewed with: patient    Jennifer Oliveira,  PT  03/08/2018

## 2018-03-08 NOTE — PROGRESS NOTES
"Subjective:      Patient ID: Katelyn Huynh is a 67 y.o. female.    Chief Complaint:Wound Infection      History of Present Illness    [facility] Followup:  Seen by ID team in hospital:  "  Wound dehiscence     Ms. Katelyn Huynh is a 67 year old woman with HTN and arthritis who is s/p left TKA on 2/9 with surgical wound dehiscence.   Per patient she was initially discharged home and subsequently fell, fracturing her left wrist. She denies falling forward onto the left knee.  She subsequently was admitted to SNF.  Wound had had mild serous drainage since surgery.   Sutures removed on 2/21 and wound opened. She underwent I&D, poly exchange and quad tendon repair on 2/24.  Currently on empiric vancomycin and ceftriaxone.  Operative cultures are NGTD; Gram stain negative.   Afebrile, no leukocytosis, hemodynamically stable.    - CRP up to 106 today from 23 two days ago (post-op?)  - ESR 61  - procalcitonin normal  - afebrile, no leukocytosis, clinically stable  - vanc trough therapeutic at 18.4     Plan:  1. Continue empiric IV vancomycin 1500 mg IV q 12 hours and ceftriaxone 2 gram IV q 24 hours; vanc trough goal: 15-20  2. Recommend 2 weeks of therapy with f/u in ID clinic in 2 weeks to assess if therapy to be extended (estimated end date: 3/10/18)  3. Would monitor weekly CBC, CMP, ESR, CRP and vanc trough while on therapy   4. F/u in ID clinic around 3/10 for end of antibiotic therapy  5. Will sign off        She has been receiving vanc/ceftriaxone in SNF.  Saw ortho yesterday, wound looks good, no signs of infection per [facility] phone call today  ROS  Objective:   Physical Exam   Wheelchair exam, leg taped to chair, arm in cast  Assessment:       1. Wound dehiscence          Plan:         esr, crp trending positively, vanc trough at 20.    Agree with ending therapy 3/10 for empiric trial, no concrete culture evidence of infection.    "

## 2018-03-08 NOTE — PLAN OF CARE
Problem: Physical Therapy Goal  Goal: Physical Therapy Goal  Goals to be met by: 14 days     Patient will increase functional independence with mobility by performin. Supine to sit with Stand-by Assistance  2. Sit to supine with Stand-by Assistance4  3. Sit to stand transfer with Stand-by Assistance with LLE brace locked in extension and with left platform rolling walker  4. Bed to chair transfer with Stand-by Assistance  with LLE brace locked in extension and with left platform rolling walker  5. Gait  x 150 feet with Stand-by Assistance  with LLE brace locked in extension and with left platform rolling walker  6. Wheelchair propulsion x50 feet with Stand-by Assistance using RUE/RLE   7. Ascend/Descend 4 inch curb step with Contact Guard Assistance  with LLE brace locked in extension and with left platform rolling walker.  8. Lower extremity exercise program x20 reps per handout, with assistance as needed and gym therex     Outcome: Ongoing (interventions implemented as appropriate)  Goals are appropriate but written for RLE. PTs LLE is the affected extremity.  W/c goal also needs to be updated for propulsion with RUE/RLE only.   3/5/2018 Goals corrected today. LLE is WBAT and w/ hinged knee brace locked in extension.  3/5/2018 Jennifer Oliveira, PT    Goals remain appropriate. Continue with Physical therapy Plan of Care. Jennifer Oliveira, PT 3/8/2018

## 2018-03-08 NOTE — TELEPHONE ENCOUNTER
----- Message from [employer] sent at 3/8/2018  2:12 PM CST -----  Contact:    is calling to speak with Staff regarding a letter to provide to his employer stating she will be released from [facility] on the 13th and he will need time off to care for her.      He can be reached at [phone].    Thank you.

## 2018-03-08 NOTE — PT/OT/SLP PROGRESS
Occupational Therapy  Treatment    Katelyn Huynh   MRN: [id number]   Admitting Diagnosis: Wound dehiscence    OT Date of Treatment: 03/08/18  Total Time (min): 54 min    Billable Minutes:  Self Care/Home Management 54    General Precautions: Standard, fall  Orthopedic Precautions: LUE non weight bearing  Braces: Hinged knee brace    Do you have any cultural, spiritual, Denominational conflicts, given your current situation?: none reported    Subjective:  Communicated with nsg prior to session.  I have my appointment today    Pain/Comfort  Pain Rating 1: 7/10  Location - Side 1: Left  Location - Orientation 1: generalized  Location 1: knee  Pain Addressed 1: Distraction    Objective:   Pt. Supine on arrival     Functional Status:  MDS G  Bed Mobility Functional Status: CGA-Min (A) for supine to sit and brace adjustment before sitting up Pt. With top two faster undone  Transfer Functional Status: CGA-Min (A) from EOB <>3n1 with cues for safety   Dressing Functional Status: 3:mod(A)-Max(A) for LB dressing with RLE management and (A) over hips instance with PFRW for bal  Eating Functional Status: mod(I) - (I)  Toilet Use Functional Status: mod(A)-Max(A) from 3n1 Pt. With (A) for through cleaning with BM  Personal Hygiene Functional Status: S-SBA  Bathing Functional Status: CGA-Min (A) for BLE feet  Moving from seated to standing position: Not steady, only able to stabilize with staff assistance  Turning around and facing the opposite direction while walking: Not steady, only able to stabilize with staff assistance  Moving on and off the toilet: Not steady, only able to stabilize with staff assistance  Surface-to-surface transfer (transfer between bed and chair or wheelchair): Not steady, only able to stabilize with staff assistance           AMPA 6 Click:  AMPAC Total Score: 18    Patient left seated EOB with all lines intact and call button in reach    ASSESSMENT:  Katelyn Huynh is a 67 y.o.  female with a medical diagnosis of Wound dehiscence Pt. participated well with session on this day.Pt demos physical deficits with balance  functional mobility, UB strength, endurance  level of functional indep with daily tasks and activities and selfcare skills .Pt. Will continue to benefit from continued OT to progress towards goals  .    Rehab identified problem list/impairments: impaired endurance, impaired self care skills, impaired functional mobilty, decreased lower extremity function, pain, edema, impaired skin    Rehab potential is fair    Activity tolerance: Fair    Discharge recommendations: home health OT (with supervision)     Barriers to discharge: Decreased caregiver support, Inaccessible home environment     Equipment recommendations:  (PRW)     GOALS:    Occupational Therapy Goals        Problem: Occupational Therapy Goal    Goal Priority Disciplines Outcome Interventions   Occupational Therapy Goal     OT, PT/OT Ongoing (interventions implemented as appropriate)    Description:  Goals to be met by: 2 weeks     Patient will increase functional independence with ADLs by performing:    UE Dressing with Set-up Assistance.  Met  LE Dressing with Supervision.  Grooming while standing with Supervision.  Toileting from bedside commode with Supervision for hygiene and clothing management.   Bathing from  edge of bed with Supervision.  Supine to sit with Modified [place].  Stand pivot transfers with Supervision with PRW  Toilet transfer to bedside commode with Supervision.  Pt. To be independent with HEP to improve level of endurance for BUE.   Pt. To participate in dynamic standing activity x 10 minutes with Supervision                   Plan:  Patient to be seen 5 x/week to address the above listed problems via self-care/home management, therapeutic activities, therapeutic exercises  Plan of Care expires: 03/31/18  Plan of Care reviewed with: patient    TEDDY Wilson  03/08/2018

## 2018-03-09 PROCEDURE — 25000003 PHARM REV CODE 250: Performed by: INTERNAL MEDICINE

## 2018-03-09 PROCEDURE — 99309 SBSQ NF CARE MODERATE MDM 30: CPT | Mod: ,,, | Performed by: NURSE PRACTITIONER

## 2018-03-09 PROCEDURE — 97116 GAIT TRAINING THERAPY: CPT

## 2018-03-09 PROCEDURE — 12000000 HC SNF SEMI-PRIVATE ROOM

## 2018-03-09 PROCEDURE — A4216 STERILE WATER/SALINE, 10 ML: HCPCS | Performed by: STUDENT IN AN ORGANIZED HEALTH CARE EDUCATION/TRAINING PROGRAM

## 2018-03-09 PROCEDURE — 25000003 PHARM REV CODE 250: Performed by: STUDENT IN AN ORGANIZED HEALTH CARE EDUCATION/TRAINING PROGRAM

## 2018-03-09 PROCEDURE — 97530 THERAPEUTIC ACTIVITIES: CPT

## 2018-03-09 PROCEDURE — 63600175 PHARM REV CODE 636 W HCPCS: Performed by: STUDENT IN AN ORGANIZED HEALTH CARE EDUCATION/TRAINING PROGRAM

## 2018-03-09 PROCEDURE — 97110 THERAPEUTIC EXERCISES: CPT

## 2018-03-09 PROCEDURE — 63600175 PHARM REV CODE 636 W HCPCS: Performed by: INTERNAL MEDICINE

## 2018-03-09 PROCEDURE — 97535 SELF CARE MNGMENT TRAINING: CPT

## 2018-03-09 RX ADMIN — ASPIRIN 325 MG: 325 TABLET, DELAYED RELEASE ORAL at 08:03

## 2018-03-09 RX ADMIN — Medication 10 ML: at 05:03

## 2018-03-09 RX ADMIN — MICONAZOLE NITRATE: 20 OINTMENT TOPICAL at 08:03

## 2018-03-09 RX ADMIN — OXYCODONE HYDROCHLORIDE AND ACETAMINOPHEN 1 TABLET: 10; 325 TABLET ORAL at 08:03

## 2018-03-09 RX ADMIN — OXYCODONE HYDROCHLORIDE AND ACETAMINOPHEN 1 TABLET: 10; 325 TABLET ORAL at 04:03

## 2018-03-09 RX ADMIN — FAMOTIDINE 20 MG: 20 TABLET, FILM COATED ORAL at 08:03

## 2018-03-09 RX ADMIN — Medication 10 ML: at 11:03

## 2018-03-09 RX ADMIN — CEFTRIAXONE SODIUM 2 G: 2 INJECTION, POWDER, FOR SOLUTION INTRAMUSCULAR; INTRAVENOUS at 04:03

## 2018-03-09 RX ADMIN — Medication 1 G: at 12:03

## 2018-03-09 RX ADMIN — Medication 10 ML: at 06:03

## 2018-03-09 RX ADMIN — STANDARDIZED SENNA CONCENTRATE AND DOCUSATE SODIUM 1 TABLET: 8.6; 5 TABLET, FILM COATED ORAL at 08:03

## 2018-03-09 RX ADMIN — Medication 1 G: at 11:03

## 2018-03-09 RX ADMIN — Medication 10 ML: at 12:03

## 2018-03-09 RX ADMIN — OXYCODONE HYDROCHLORIDE AND ACETAMINOPHEN 1 TABLET: 10; 325 TABLET ORAL at 12:03

## 2018-03-09 NOTE — NURSING
RETURNED TO ROOM FROM INFECTIOUS DISEASE  CLINIC APPOINTMENT BY WHEELCHAIR TO DINING ROOM FOR LUNCH AND GAMES.NO COMPLAINT OF DISCOMFORT VERBALIZED.TRANSPORTED BY [facility] TODAY.

## 2018-03-09 NOTE — ASSESSMENT & PLAN NOTE
Evaluated on 3/9/18  · s/p repair on 2/24/2018  · Continue WBAT in hinged knee brace locked in extension

## 2018-03-09 NOTE — TELEPHONE ENCOUNTER
----- Message from Hope Rojas MA sent at 3/8/2018  2:49 PM CST -----  Contact:   Hi, can you do this for her?    ----- Message -----  From: Judith Cox  Sent: 3/8/2018   2:12 PM  To: Brad Aggarwal S Staff     is calling to speak with Staff regarding a letter to provide to his employer stating she will be released from [facility] on the 13th and he will need time off to care for her.      He can be reached at [phone].    Thank you.

## 2018-03-09 NOTE — ASSESSMENT & PLAN NOTE
Evaluated on 3/9/18  · Chronic and stable  · Continue therapy with HCTZ and valsartan  · intermittent tachycardic which was present with prior admission at SNF, if remains constant start low dose metoprolol, currently HR controlled  · will continue to monitor and adjust regimen as necessary

## 2018-03-09 NOTE — PLAN OF CARE
Problem: Patient Care Overview  Goal: Plan of Care Review  Outcome: Ongoing (interventions implemented as appropriate)  AFEBRILE CONTINUES IV ANTIBIOTICS.NO  PRESSURE ULCERS NOTED.FALL PRECAUTIONS MAINTAINED NO INJURIES NOTED.

## 2018-03-09 NOTE — ASSESSMENT & PLAN NOTE
Evaluated on 3/9/18  · Continue wound vac with good suction  · Wound care nurse evaluated and suctioning well  · Ortho NP to follow patient every week   · Continue vanc and rocephin via PICC line with end date of 3/10  · Vanc levels every 4th dose for goal of 15-20  · Monitoring with weekly CRP, ESR and hepatic function tests  · PICC line care  · f/u in ID clinic prior to abx end date--seen in ID clinic yesterday and ABX to end on 3/10

## 2018-03-09 NOTE — PT/OT/SLP PROGRESS
Occupational Therapy  Treatment    Katelyn Huynh   MRN: [id number]   Admitting Diagnosis: Wound dehiscence    OT Date of Treatment: 03/09/18  Total Time (min): 53 min    Billable Minutes:  Self Care/Home Management 53    General Precautions: Standard, fall  Orthopedic Precautions: LUE non weight bearing  Braces: Hinged knee brace    Do you have any cultural, spiritual, Alevism conflicts, given your current situation?: none reported    Subjective:  Communicated with nsg prior to session.  I am doing well today    Pain/Comfort  Pain Rating 1: 5/10  Location - Side 1: Left  Location - Orientation 1: generalized  Location 1: knee  Pain Addressed 1: Distraction    Objective:   Pt. Supine on arrival    Functional Status:  MDS G  Bed Mobility Functional Status: CGA-Min (A) for supine to sit and brace adjustment before sitting up   Transfer Functional Status: CGA-Min (A) from EOB <>3n1 with cues for safety with PFRW  Dressing Functional Status: 3:mod(A)-Max(A) for LB dressing with RLE management and (A) over hips instance with PFRW for bal  Toilet Use Functional Status: mod(A) from 3n1 with Pt. With (A) for cleaning post pj area  Personal Hygiene Functional Status: S-SBA  Bathing Functional Status: CGA-Min (A) for BLE feet and post pj           AMPAC 6 Click:  AMPAC Total Score: 18    Patient left up in chair with all lines intact and call button in reach    ASSESSMENT:  Katelyn Huynh is a 67 y.o. female with a medical diagnosis of Wound dehiscence Pt. participated well with session on this day.Pt demos physical deficits with balance  functional mobility, UB strength, endurance  level of functional indep with daily tasks and activities and selfcare skills .Pt. Will continue to benefit from continued OT to progress towards goals  .    Rehab identified problem list/impairments: impaired endurance, impaired self care skills, impaired functional mobilty, decreased lower extremity function, pain,  edema, impaired skin    Rehab potential is fair    Activity tolerance: Fair    Discharge recommendations: home health OT (with supervision)     Barriers to discharge: Decreased caregiver support, Inaccessible home environment     Equipment recommendations:  (PRW)     GOALS:    Occupational Therapy Goals        Problem: Occupational Therapy Goal    Goal Priority Disciplines Outcome Interventions   Occupational Therapy Goal     OT, PT/OT Ongoing (interventions implemented as appropriate)    Description:  Goals to be met by: 2 weeks     Patient will increase functional independence with ADLs by performing:    UE Dressing with Set-up Assistance.  Met  LE Dressing with Supervision.  Grooming while standing with Supervision.  Toileting from bedside commode with Supervision for hygiene and clothing management.   Bathing from  edge of bed with Supervision.  Supine to sit with Modified [place].  Stand pivot transfers with Supervision with PRW  Toilet transfer to bedside commode with Supervision.  Pt. To be independent with HEP to improve level of endurance for BUE.   Pt. To participate in dynamic standing activity x 10 minutes with Supervision                   Plan:  Patient to be seen 5 x/week to address the above listed problems via self-care/home management, therapeutic activities, therapeutic exercises  Plan of Care expires: 03/31/18  Plan of Care reviewed with: patient    TEDDY Wilson  03/09/2018

## 2018-03-09 NOTE — ASSESSMENT & PLAN NOTE
Evaluated on 3/9/18  · Patient had sutures and splint removed by [facility] team when hospitalized  · F/u with Dr. Wong on discharge from SNF  · cast in place

## 2018-03-09 NOTE — TREATMENT PLAN
Rehab Services' DME recommendations    Katelyn Huynh  MRN: [id number]          [x] Walker Accessories left platform to fit rolling walker (patient has a RW)    [x] Wheelchair  Number of hours up in a wheelchair per day 8 hours    Style Standard    Seat Width 24    Seat Depth 20 or what ever is standard for 24 inches width    Back Height Standard    Leg Support Elevated leg rest and Swing Away    Arm Height Full and Swing Away    Lap Belt Velcro        Cushion Basic    Justification for Cushion for pressure relief      Justification for wheelchair order: (Please select all that apply) Caregiver is capable and willing to operate wheelchair safely, Patient's upper body strength is sufficient for propulsion, The patient has a cast, brace, or musculoskeletal condition which prevents 90 degree flexion of the knee, The patient has significant edema of the lower extremities that requires elevating leg rest, The patient requires the use of a wheelchair for ADLs within the home and Patient mobility limitations cannot be sufficiently resolved by the use of other ambulatory therapies        [x] Home health PT and OT        Jennifer Oliveira, PT 3/9/2018

## 2018-03-09 NOTE — PT/OT/SLP PROGRESS
Physical Therapy  Treatment    Katelyn Huynh   MRN: [id number]   Admitting Diagnosis: Wound dehiscence    PT Received On: 03/09/18          Billable Minutes:  Gait Training 15, Therapeutic Activity 10 and Therapeutic Exercise 20=45    Treatment Type: Treatment  PT/PTA: PT     PTA Visit Number: 0       General Precautions: Standard, fall  Orthopedic Precautions: LLE weight bearing as tolerated, LLE non weight bearing (L wrist NWB: okay to use platform on RW)   Braces: Hinged knee brace (locked in extension)         Subjective:  Communicated with patient prior to session.  Patient agreeable to session    Pain/Comfort  Pain Rating 1: 3/10  Location - Side 1: Left  Location - Orientation 1: generalized  Location 1: wrist  Pain Addressed 1: Reposition, Distraction, Pre-medicate for activity  Pain Rating Post-Intervention 1: 1/10    Objective:  Patient found sitting in w/c w/ IV in progress and LEft knee brace with         Functional Status:  MDS G  Bed Mobility Functional Status: CGA-Min (A)  Transfer Functional Status: CGA-Min (A)  Walk in Room Functional Status: S-SBA  Walk in Corridor Functional Status: S-SBA  Locomotion on Unit Functional Status: S-SBA  Locomotion Off Unit Functional Status: mod(A)-Max(A)          AM-PAC 6 CLICK MOBILITY  Total Score:16    Bed Mobility:  Sit>Supine:NP  Supine>Sit: NP    Transfers:  Sit<>Stand: from w/c w/ moderate assist and 2 unsuccessful attempts w/o Assist w/ L PRW  Stand Pivot Transfer: w/ L PRW and CGA to w/c      Gait:  Amb w/ L PRW and SBA 75 feet. IV pole in tow and w/c follow, including turns. Slow pace, FFp, patient manages PRW in turns     Therex:  Quad sets, BLE  Glute sets, BLE  Ankle  Pumps, BLE  hip abduction/adduction,LLE w/ assist  LAQ LLE  Hip flexion LLE  x20 reps w/ assist as needed.      Additional Treatment:  Patient pedaled LBE (lower body ergometer) x15 minutes to increase strength and endurance.w/ RLE only  HKB locked in extension adjusted w/  patient sitting in w/c. Patient educated on technique for adjusting and verbalzes understanding.  Patient left up in chair with all lines intact and call button in reach.    Assessment:  Katelyn Huynh is a 67 y.o. female with a medical diagnosis of Wound dehiscence.  She has a left HKB locked in extension secondary quadrriceps ruture. She is WBAT LLE. Her left wrist is casted but she is able to bear weight on forearm on platform attachment to RW.Patient progressing well. Need for assistance w/ trfs fluctuates. Patient will benefit from continued physical therapy to address deficits and improve safety and functional mobility. Continue with physical therapy plan of care. .    Rehab identified problem list/impairments: weakness, impaired endurance, impaired functional mobilty, gait instability, impaired balance, decreased lower extremity function, decreased upper extremity function, pain, impaired skin, edema, orthopedic precautions    Rehab potential is good.    Activity tolerance: Good    Discharge recommendations: home with home health     Barriers to discharge: Decreased caregiver support    Equipment recommendations: wheelchair (platform for RW)     GOALS:    Physical Therapy Goals        Problem: Physical Therapy Goal    Goal Priority Disciplines Outcome Goal Variances Interventions   Physical Therapy Goal     PT/OT, PT Ongoing (interventions implemented as appropriate)     Description:  Goals to be met by: 14 days     Patient will increase functional independence with mobility by performin. Supine to sit with Stand-by Assistance  2. Sit to supine with Stand-by Assistance4  3. Sit to stand transfer with Stand-by Assistance with LLE brace locked in extension and with left platform rolling walker  4. Bed to chair transfer with Stand-by Assistance  with LLE brace locked in extension and with left platform rolling walker  5. Gait  x 150 feet with Stand-by Assistance  with LLE brace locked in  extension and with left platform rolling walker  6. Wheelchair propulsion x50 feet with Stand-by Assistance using RUE/RLE   7. Ascend/Descend 4 inch curb step with Contact Guard Assistance  with LLE brace locked in extension and with left platform rolling walker.  8. Lower extremity exercise program x20 reps per handout, with assistance as needed and gym therex     Outcome: Ongoing (interventions implemented as appropriate)  Goals are appropriate but written for RLE. PTs LLE is the affected extremity.  W/c goal also needs to be updated for propulsion with RUE/RLE only.   3/5/2018 Goals corrected today. LLE is WBAT and w/ hinged knee brace locked in extension.  3/5/2018 Jennifer Oliveira, PT                     PLAN:    Patient to be seen 5 x/week  to address the above listed problems via gait training, therapeutic activities, therapeutic exercises, wheelchair management/training  Plan of Care expires: 03/31/18  Plan of Care reviewed with: patient    Jennifer Oliveira, PT  03/09/2018

## 2018-03-09 NOTE — PLAN OF CARE
Problem: Occupational Therapy Goal  Goal: Occupational Therapy Goal  Goals to be met by: 2 weeks     Patient will increase functional independence with ADLs by performing:    UE Dressing with Set-up Assistance.  Met  LE Dressing with Supervision.  Grooming while standing with Supervision.  Toileting from bedside commode with Supervision for hygiene and clothing management.   Bathing from  edge of bed with Supervision.  Supine to sit with Modified [place].  Stand pivot transfers with Supervision with PRW  Toilet transfer to bedside commode with Supervision.  Pt. To be independent with HEP to improve level of endurance for BUE.   Pt. To participate in dynamic standing activity x 10 minutes with Supervision      Outcome: Ongoing (interventions implemented as appropriate)  .    Comments: .

## 2018-03-09 NOTE — SUBJECTIVE & OBJECTIVE
Hospital Course:  The patient was admitted at [facility] from 2/23 to 2/28/2018.  2/28: Patient admitted to SNF for ongoing PT/Ot following a hospitalization for wound dehiscence with quadriceps tendon repair, I&D, and revision of left knee  3/2: Patient seen at bedside, doing well, reports tolerable pain to both left wrist and left leg, wound vac in place with good suction.   3/7/2018 Pt seen at bedside. Incisional vac removed. Staples intact. No active drainage. Brace in place for quad tendon rupture. (ortho NP)  3/9: Patient seen at bedside, reports tolerable pain to both wrisit and left leg. Knee incision with island dressing with small amount of dried serosanguinous drainage to upper dressing, brace in use.     Interval History: Patient seen at bedside, doing well, no acute events overnight.    Review of Systems   Constitutional: Negative for appetite change, chills, fatigue and fever.   HENT: Negative for trouble swallowing.    Respiratory: Negative for cough, chest tightness, shortness of breath and wheezing.    Cardiovascular: Negative for chest pain, palpitations and leg swelling.   Gastrointestinal: Negative for abdominal pain, constipation, diarrhea and nausea.   Genitourinary: Negative for difficulty urinating, frequency and urgency.   Musculoskeletal: Positive for arthralgias and joint swelling. Negative for myalgias.        +pain 5/10 to both left wrist and left knee   Skin: Negative for rash.   Neurological: Negative for dizziness, weakness, light-headedness and headaches.   Psychiatric/Behavioral: Negative for sleep disturbance.     Scheduled Meds:   aspirin  325 mg Oral BID    cefTRIAXone (ROCEPHIN) IVPB  2 g Intravenous Q24H    famotidine  20 mg Oral BID    hydroCHLOROthiazide  50 mg Oral Daily    miconazole nitrate 2%   Topical (Top) BID    polyethylene glycol  17 g Oral Daily    senna-docusate 8.6-50 mg  1 tablet Oral BID    sodium chloride 0.9%  10 mL Intravenous Q6H    valsartan  80  mg Oral Daily    vancomycin (VANCOCIN) IVPB  1,000 mg Intravenous Q12H     Continuous Infusions:  PRN Meds:.diphenhydrAMINE, magnesium hydroxide 400 mg/5 ml, ondansetron, oxyCODONE-acetaminophen, oxyCODONE-acetaminophen, Flushing PICC Protocol **AND** sodium chloride 0.9% **AND** sodium chloride 0.9%    Objective:     Vital Signs (Most Recent):  Temp: 98.4 °F (36.9 °C) (03/09/18 0707)  Pulse: 81 (03/09/18 0707)  Resp: 18 (03/09/18 0707)  BP: (!) 113/54 (03/09/18 0707)  SpO2: 98 % (03/09/18 0707) Vital Signs (24h Range):  Temp:  [98.4 °F (36.9 °C)-98.5 °F (36.9 °C)] 98.4 °F (36.9 °C)  Pulse:  [] 81  Resp:  [18] 18  SpO2:  [95 %-98 %] 98 %  BP: (113-140)/(54-69) 113/54     Weight: 123.2 kg (271 lb 9.7 oz)  Body mass index is 46.62 kg/m².    Intake/Output Summary (Last 24 hours) at 03/09/18 1142  Last data filed at 03/08/18 1800   Gross per 24 hour   Intake             1000 ml   Output                0 ml   Net             1000 ml      Physical Exam   Constitutional: She is oriented to person, place, and time. She appears well-developed and well-nourished. No distress.   Cardiovascular: Normal rate, regular rhythm and normal heart sounds.  Exam reveals no gallop and no friction rub.    No murmur heard.  Pulmonary/Chest: Effort normal and breath sounds normal. No respiratory distress. She has no wheezes. She has no rales.   Abdominal: Soft. Bowel sounds are normal. She exhibits no distension. There is no tenderness.   Musculoskeletal: She exhibits edema. She exhibits no tenderness.   1+ edema to left leg, left knee incision with island dressing small amount of dried serosanguinous drainage noted to upper dressing, Hinge knee brace in place locked in extension. Left arm cast noted   Neurological: She is alert and oriented to person, place, and time.   Skin: Skin is warm and dry. No rash noted. She is not diaphoretic. No cyanosis. Nails show no clubbing.   Psychiatric: She has a normal mood and affect. Her behavior  is normal.     Significant Labs:    Recent Labs  Lab 03/05/18  0702 03/08/18  0657   WBC 8.24 7.41   HGB 9.6* 9.6*   HCT 29.9* 29.8*   * 325       Recent Labs  Lab 03/05/18  0702 03/08/18  0657    138   K 3.7 3.7   CL 99 100   CO2 30* 32*   BUN 10 9   CREATININE 0.7 0.7   CALCIUM 8.8 8.9   PROT 6.5  --    BILITOT 0.4  --    ALKPHOS 92  --    ALT 9*  --    AST 16  --      Lab Results   Component Value Date    LABPROT 10.5 02/23/2018    ALBUMIN 2.6 (L) 03/05/2018     Lab Results   Component Value Date    CALCIUM 8.9 03/08/2018    PHOS 3.3 03/08/2018     Significant Imaging: n/a

## 2018-03-09 NOTE — ASSESSMENT & PLAN NOTE
Evaluated on 3/9/18  · continue PT/OT to increase ambulation, ADL performance and endurance  · WBAT to LLE  · continue oxycodone for pain control prn  · keep polar ice in place when not in therapy (patient to have brought from home), reports sister is bring today  · continue ASA for DVT prophylaxis; continue famotidine while taking high dose ASA  · continue wound vac to left knee  · Ortho NP to assess patient while at SNF weekly

## 2018-03-09 NOTE — PROGRESS NOTES
[facility] Department of Hospital Medicine  Progress Note    Patient Name: Katelyn Huynh  MRN: [id number]  Code Status: Full Code  Admission Date: 2/28/2018  Length of Stay: 9 days  Attending Physician: Daisy Walker MD  Primary Care Provider: Daniel Garcia MD    Subjective:     Principal Problem:Wound dehiscence    Chief Complaint/Reason for Admission: Wound dehiscence    History of Present Illness:  Patient is a 67 y.o. female with HTN, morbid obesity, recent left TKR who presents to SNF after readmission for dehiscence of her left knee incision.  The patient is known to me from previous SNF admission (See H/P 2/17/2018)  She required quadriceps tendon repair, I&D and revision of the left knee on 2/24/2018 by Dr. Martinez.  Her left wrist splint has been removed, sutures also removed and arm placed in short arm cast.  She complains of left knee pain 7/10 and left wrist pain 3/10. Knee pain exacerbated with ambulation and both pains are relieved with oxycodone at the current dose. She has an incisional wound vac in place and will complete 10 d of IV antibiotics with vanc and rocephin.The patient has been admitted to SNF for ongoing PT/OT due to insufficient progress to go home safely from the hospital.    Hospital Course:  The patient was admitted at [facility] from 2/23 to 2/28/2018.  2/28: Patient admitted to SNF for ongoing PT/Ot following a hospitalization for wound dehiscence with quadriceps tendon repair, I&D, and revision of left knee  3/2: Patient seen at bedside, doing well, reports tolerable pain to both left wrist and left leg, wound vac in place with good suction.   3/7/2018 Pt seen at bedside. Incisional vac removed. Staples intact. No active drainage. Brace in place for quad tendon rupture. (ortho NP)  3/9: Patient seen at bedside, reports tolerable pain to both wrisit and left leg. Knee incision with island dressing with small amount of dried serosanguinous  drainage to upper dressing, brace in use.     Interval History: Patient seen at bedside, doing well, no acute events overnight.    Review of Systems   Constitutional: Negative for appetite change, chills, fatigue and fever.   HENT: Negative for trouble swallowing.    Respiratory: Negative for cough, chest tightness, shortness of breath and wheezing.    Cardiovascular: Negative for chest pain, palpitations and leg swelling.   Gastrointestinal: Negative for abdominal pain, constipation, diarrhea and nausea.   Genitourinary: Negative for difficulty urinating, frequency and urgency.   Musculoskeletal: Positive for arthralgias and joint swelling. Negative for myalgias.        +pain 5/10 to both left wrist and left knee   Skin: Negative for rash.   Neurological: Negative for dizziness, weakness, light-headedness and headaches.   Psychiatric/Behavioral: Negative for sleep disturbance.     Scheduled Meds:   aspirin  325 mg Oral BID    cefTRIAXone (ROCEPHIN) IVPB  2 g Intravenous Q24H    famotidine  20 mg Oral BID    hydroCHLOROthiazide  50 mg Oral Daily    miconazole nitrate 2%   Topical (Top) BID    polyethylene glycol  17 g Oral Daily    senna-docusate 8.6-50 mg  1 tablet Oral BID    sodium chloride 0.9%  10 mL Intravenous Q6H    valsartan  80 mg Oral Daily    vancomycin (VANCOCIN) IVPB  1,000 mg Intravenous Q12H     Continuous Infusions:  PRN Meds:.diphenhydrAMINE, magnesium hydroxide 400 mg/5 ml, ondansetron, oxyCODONE-acetaminophen, oxyCODONE-acetaminophen, Flushing PICC Protocol **AND** sodium chloride 0.9% **AND** sodium chloride 0.9%    Objective:     Vital Signs (Most Recent):  Temp: 98.4 °F (36.9 °C) (03/09/18 0707)  Pulse: 81 (03/09/18 0707)  Resp: 18 (03/09/18 0707)  BP: (!) 113/54 (03/09/18 0707)  SpO2: 98 % (03/09/18 0707) Vital Signs (24h Range):  Temp:  [98.4 °F (36.9 °C)-98.5 °F (36.9 °C)] 98.4 °F (36.9 °C)  Pulse:  [] 81  Resp:  [18] 18  SpO2:  [95 %-98 %] 98 %  BP: (113-140)/(54-69) 113/54      Weight: 123.2 kg (271 lb 9.7 oz)  Body mass index is 46.62 kg/m².    Intake/Output Summary (Last 24 hours) at 03/09/18 1142  Last data filed at 03/08/18 1800   Gross per 24 hour   Intake             1000 ml   Output                0 ml   Net             1000 ml      Physical Exam   Constitutional: She is oriented to person, place, and time. She appears well-developed and well-nourished. No distress.   Cardiovascular: Normal rate, regular rhythm and normal heart sounds.  Exam reveals no gallop and no friction rub.    No murmur heard.  Pulmonary/Chest: Effort normal and breath sounds normal. No respiratory distress. She has no wheezes. She has no rales.   Abdominal: Soft. Bowel sounds are normal. She exhibits no distension. There is no tenderness.   Musculoskeletal: She exhibits edema. She exhibits no tenderness.   1+ edema to left leg, left knee incision with island dressing small amount of dried serosanguinous drainage noted to upper dressing, Hinge knee brace in place locked in extension. Left arm cast noted   Neurological: She is alert and oriented to person, place, and time.   Skin: Skin is warm and dry. No rash noted. She is not diaphoretic. No cyanosis. Nails show no clubbing.   Psychiatric: She has a normal mood and affect. Her behavior is normal.     Significant Labs:    Recent Labs  Lab 03/05/18  0702 03/08/18  0657   WBC 8.24 7.41   HGB 9.6* 9.6*   HCT 29.9* 29.8*   * 325       Recent Labs  Lab 03/05/18  0702 03/08/18  0657    138   K 3.7 3.7   CL 99 100   CO2 30* 32*   BUN 10 9   CREATININE 0.7 0.7   CALCIUM 8.8 8.9   PROT 6.5  --    BILITOT 0.4  --    ALKPHOS 92  --    ALT 9*  --    AST 16  --      Lab Results   Component Value Date    LABPROT 10.5 02/23/2018    ALBUMIN 2.6 (L) 03/05/2018     Lab Results   Component Value Date    CALCIUM 8.9 03/08/2018    PHOS 3.3 03/08/2018     Significant Imaging: n/a    Assessment/Plan:      * Wound dehiscence    Evaluated on 3/9/18  · Continue wound  vac with good suction  · Wound care nurse evaluated and suctioning well  · Ortho NP to follow patient every week   · Continue vanc and rocephin via PICC line with end date of 3/10  · Vanc levels every 4th dose for goal of 15-20  · Monitoring with weekly CRP, ESR and hepatic function tests  · PICC line care  · f/u in ID clinic prior to abx end date--seen in ID clinic yesterday and ABX to end on 3/10        Primary osteoarthritis of left knee    Evaluated on 3/9/18  · S/p TKR on 2/9  · See plan below.        S/P TKR (total knee replacement), left    Evaluated on 3/9/18  · continue PT/OT to increase ambulation, ADL performance and endurance  · WBAT to LLE  · continue oxycodone for pain control prn  · keep polar ice in place when not in therapy (patient to have brought from home), reports sister is bring today  · continue ASA for DVT prophylaxis; continue famotidine while taking high dose ASA  · continue wound vac to left knee  · Ortho NP to assess patient while at SNF weekly        Quadriceps tendon rupture, left, subsequent encounter    Evaluated on 3/9/18  · s/p repair on 2/24/2018  · Continue WBAT in hinged knee brace locked in extension        Yeast dermatitis    New  · Seen by wound care  · continue with recommendations of miconazole cream         Left wrist fracture s/p ORIF    Evaluated on 3/9/18  · Patient had sutures and splint removed by [facility] team when hospitalized  · F/u with Dr. Wong on discharge from SNF  · cast in place        Essential (primary) hypertension    Evaluated on 3/9/18  · Chronic and stable  · Continue therapy with HCTZ and valsartan  · intermittent tachycardic which was present with prior admission at SNF, if remains constant start low dose metoprolol, currently HR controlled  · will continue to monitor and adjust regimen as necessary          Future Appointments  Date Time Provider Department Center   3/21/2018 8:15 AM Jasen Salinas MD [facility] Jerry Santoyo,  NP  Department of Hospital Medicine  [facility]

## 2018-03-10 PROCEDURE — 63600175 PHARM REV CODE 636 W HCPCS: Performed by: INTERNAL MEDICINE

## 2018-03-10 PROCEDURE — 63600175 PHARM REV CODE 636 W HCPCS: Performed by: STUDENT IN AN ORGANIZED HEALTH CARE EDUCATION/TRAINING PROGRAM

## 2018-03-10 PROCEDURE — A4216 STERILE WATER/SALINE, 10 ML: HCPCS | Performed by: STUDENT IN AN ORGANIZED HEALTH CARE EDUCATION/TRAINING PROGRAM

## 2018-03-10 PROCEDURE — 12000000 HC SNF SEMI-PRIVATE ROOM

## 2018-03-10 PROCEDURE — 25000003 PHARM REV CODE 250: Performed by: INTERNAL MEDICINE

## 2018-03-10 PROCEDURE — 25000003 PHARM REV CODE 250: Performed by: STUDENT IN AN ORGANIZED HEALTH CARE EDUCATION/TRAINING PROGRAM

## 2018-03-10 RX ADMIN — FAMOTIDINE 20 MG: 20 TABLET, FILM COATED ORAL at 09:03

## 2018-03-10 RX ADMIN — MICONAZOLE NITRATE: 20 OINTMENT TOPICAL at 09:03

## 2018-03-10 RX ADMIN — OXYCODONE HYDROCHLORIDE AND ACETAMINOPHEN 1 TABLET: 10; 325 TABLET ORAL at 08:03

## 2018-03-10 RX ADMIN — STANDARDIZED SENNA CONCENTRATE AND DOCUSATE SODIUM 1 TABLET: 8.6; 5 TABLET, FILM COATED ORAL at 09:03

## 2018-03-10 RX ADMIN — CEFTRIAXONE SODIUM 2 G: 2 INJECTION, POWDER, FOR SOLUTION INTRAMUSCULAR; INTRAVENOUS at 01:03

## 2018-03-10 RX ADMIN — HYDROCHLOROTHIAZIDE 50 MG: 25 TABLET ORAL at 08:03

## 2018-03-10 RX ADMIN — OXYCODONE HYDROCHLORIDE AND ACETAMINOPHEN 1 TABLET: 10; 325 TABLET ORAL at 10:03

## 2018-03-10 RX ADMIN — VALSARTAN 80 MG: 80 TABLET ORAL at 08:03

## 2018-03-10 RX ADMIN — Medication 1 G: at 11:03

## 2018-03-10 RX ADMIN — ASPIRIN 325 MG: 325 TABLET, DELAYED RELEASE ORAL at 09:03

## 2018-03-10 RX ADMIN — ASPIRIN 325 MG: 325 TABLET, DELAYED RELEASE ORAL at 08:03

## 2018-03-10 RX ADMIN — Medication 10 ML: at 05:03

## 2018-03-10 RX ADMIN — OXYCODONE HYDROCHLORIDE AND ACETAMINOPHEN 1 TABLET: 10; 325 TABLET ORAL at 06:03

## 2018-03-10 RX ADMIN — Medication 10 ML: at 11:03

## 2018-03-10 RX ADMIN — MICONAZOLE NITRATE: 20 OINTMENT TOPICAL at 08:03

## 2018-03-10 RX ADMIN — STANDARDIZED SENNA CONCENTRATE AND DOCUSATE SODIUM 1 TABLET: 8.6; 5 TABLET, FILM COATED ORAL at 08:03

## 2018-03-10 RX ADMIN — FAMOTIDINE 20 MG: 20 TABLET, FILM COATED ORAL at 08:03

## 2018-03-10 NOTE — PLAN OF CARE
Problem: Patient Care Overview  Goal: Plan of Care Review  Outcome: Revised  Repositions independently, no new skin breakdowns noted. abd fold excoriation. Left knee staples intact, small amt serosanguinous drainage noted. Edema present. Afebrile. Rocephin and vancomycin IVABX scheduled. Monitored for pain and safety. Safety maintained. Pain meds effective

## 2018-03-10 NOTE — PLAN OF CARE
Problem: Patient Care Overview  Goal: Plan of Care Review  Outcome: Ongoing (interventions implemented as appropriate)   03/10/18 0310   Coping/Psychosocial   Plan Of Care Reviewed With patient       Problem: Skin Integrity Impairment, Risk/Actual (Adult)  Intervention: Promote/Optimize Nutrition   03/10/18 0310   Nutrition Interventions   Oral Nutrition Promotion medicated;physical activity promoted;rest periods promoted;safe use of adaptive equipment encouraged     Intervention: Prevent/Manage Excess Moisture   03/10/18 0310   Hygiene Care   Perineal Care absorbent pad changed   Bathing/Skin Care linen changed     Intervention: Prevent/Minimize Sheer/Friction Injuries   03/10/18 0310   Positioning   Positioning/Transfer Devices immobilization device;pillows   Skin Interventions   Pressure Reduction Techniques frequent weight shift encouraged;heels elevated off bed;positioned off wounds       Goal: Identify Related Risk Factors and Signs and Symptoms  Related risk factors and signs and symptoms are identified upon initiation of Human Response Clinical Practice Guideline (CPG)   Outcome: Ongoing (interventions implemented as appropriate)   03/10/18 0310   Skin Integrity Impairment, Risk/Actual   Related Risk Factors (Skin Integrity Impairment, Risk/Actual) edema;immobility;moisture;surgery/procedure   Signs and Symptoms (Skin Integrity Impairment) edema;inflammation;rash

## 2018-03-11 PROCEDURE — A4216 STERILE WATER/SALINE, 10 ML: HCPCS | Performed by: STUDENT IN AN ORGANIZED HEALTH CARE EDUCATION/TRAINING PROGRAM

## 2018-03-11 PROCEDURE — 12000000 HC SNF SEMI-PRIVATE ROOM

## 2018-03-11 PROCEDURE — 97530 THERAPEUTIC ACTIVITIES: CPT

## 2018-03-11 PROCEDURE — 63600175 PHARM REV CODE 636 W HCPCS: Performed by: INTERNAL MEDICINE

## 2018-03-11 PROCEDURE — 25000003 PHARM REV CODE 250: Performed by: INTERNAL MEDICINE

## 2018-03-11 PROCEDURE — 97116 GAIT TRAINING THERAPY: CPT

## 2018-03-11 PROCEDURE — 25000003 PHARM REV CODE 250: Performed by: STUDENT IN AN ORGANIZED HEALTH CARE EDUCATION/TRAINING PROGRAM

## 2018-03-11 PROCEDURE — 97110 THERAPEUTIC EXERCISES: CPT

## 2018-03-11 RX ADMIN — MICONAZOLE NITRATE: 20 OINTMENT TOPICAL at 10:03

## 2018-03-11 RX ADMIN — Medication 1 G: at 12:03

## 2018-03-11 RX ADMIN — HYDROCHLOROTHIAZIDE 50 MG: 25 TABLET ORAL at 09:03

## 2018-03-11 RX ADMIN — OXYCODONE HYDROCHLORIDE AND ACETAMINOPHEN 1 TABLET: 10; 325 TABLET ORAL at 01:03

## 2018-03-11 RX ADMIN — ASPIRIN 325 MG: 325 TABLET, DELAYED RELEASE ORAL at 09:03

## 2018-03-11 RX ADMIN — FAMOTIDINE 20 MG: 20 TABLET, FILM COATED ORAL at 09:03

## 2018-03-11 RX ADMIN — STANDARDIZED SENNA CONCENTRATE AND DOCUSATE SODIUM 1 TABLET: 8.6; 5 TABLET, FILM COATED ORAL at 09:03

## 2018-03-11 RX ADMIN — Medication 10 ML: at 01:03

## 2018-03-11 RX ADMIN — STANDARDIZED SENNA CONCENTRATE AND DOCUSATE SODIUM 1 TABLET: 8.6; 5 TABLET, FILM COATED ORAL at 10:03

## 2018-03-11 RX ADMIN — MICONAZOLE NITRATE: 20 OINTMENT TOPICAL at 09:03

## 2018-03-11 RX ADMIN — FAMOTIDINE 20 MG: 20 TABLET, FILM COATED ORAL at 10:03

## 2018-03-11 RX ADMIN — OXYCODONE HYDROCHLORIDE AND ACETAMINOPHEN 1 TABLET: 10; 325 TABLET ORAL at 10:03

## 2018-03-11 RX ADMIN — OXYCODONE HYDROCHLORIDE AND ACETAMINOPHEN 1 TABLET: 10; 325 TABLET ORAL at 09:03

## 2018-03-11 RX ADMIN — Medication 10 ML: at 12:03

## 2018-03-11 RX ADMIN — Medication 10 ML: at 05:03

## 2018-03-11 RX ADMIN — VALSARTAN 80 MG: 80 TABLET ORAL at 09:03

## 2018-03-11 RX ADMIN — Medication 10 ML: at 06:03

## 2018-03-11 RX ADMIN — ASPIRIN 325 MG: 325 TABLET, DELAYED RELEASE ORAL at 10:03

## 2018-03-11 NOTE — PT/OT/SLP PROGRESS
Physical Therapy  Treatment    Katelyn Huynh   MRN: [id number]   Admitting Diagnosis: Wound dehiscence    PT Received On: 03/11/18          Billable Minutes:  Gait Training 23, Therapeutic Activity 15 and Therapeutic Exercise 15=53    Treatment Type: Treatment  PT/PTA: PT     PTA Visit Number: 0       General Precautions: Standard, fall  Orthopedic Precautions: LLE weight bearing as tolerated, LLE non weight bearing (L wrist NWB: okay to use platform on RW)   Braces: Hinged knee brace (locked in extension)  Subjective:  Communicated with patient prior to session.  Patient agreeable    Pain/Comfort  Pain Rating 1: 3/10  Location - Side 1: Left  Location 1: knee  Pain Addressed 1: Distraction  Pain Rating Post-Intervention 1: 0/10    Objective:  Patient found supine in bed w/ HOB elevated with         Functional Status:  MDS G  Bed Mobility Functional Status: S-SBA  Transfer Functional Status: CGA-Min (A)  Walk in Room Functional Status: S-SBA  Walk in Corridor Functional Status: S-SBA  Locomotion on Unit Functional Status: S-SBA  Locomotion Off Unit Functional Status: mod(A)-Max(A)          AM-PAC 6 CLICK MOBILITY  Total Score:16    Bed Mobility:  Sit>Supine:NP  Supine>Sit: SBA w/ HOB elevated    Transfers:  Sit<>Stand: from bed w/ L PRW and supervision; to/from BSC w/ L PRW and SBA; from w/c w/ L PRW and min assist for hip elevation  Stand Pivot Transfer: bed>BSC w/ LPRW and SBA  Increased assist from lower surfaces; advised to add pillow or cushion to w/c to elevate height    Gait:  Amb w/ Left PRW and SBa/supervision and L HKB locked in extension, WBAT LLE, two trial, 130 feet, 35 feet. W/c in tow.     Advanced Gait:  Curb Step: up/down 2 inch curb step w/ L PRW and mod assist (for RW management and to steady for step up)cues for technique and sequence       Therex:  Patient pedaled LBE (lower body ergometer) x15 minutes to increase strength and endurance.      Additional Treatment:  Patient assisted  w/ toileting. Donning clothes. Mod assist for pants, to pull panties up after toileiting.  Patient instructed in adjusting knee brace in sitting w/ LLE propped on stool. (Keep leg streight, loosen straps, unhook straps, reposistion w/ cams lining up w/ knee, fasten straps, tighten straps, tighten straps again). Patient reports her sister will come for training on Tuesday prior to d/c home.    Patient left up in chair with call button in reach.    Assessment:  Katelyn Huynh is a 67 y.o. female with a medical diagnosis of Wound dehiscence.  She is progressing well. Improved amb distance and able to perform 2 inch curb step w/ L PRW and assist. She requires increased assist to stand from lower surfaces such as w/c, and advised to use a cushion or pillows to elevate height. Patient will benefit from continued physical therapy to address deficits and improve safety and functional mobility. Continue with physical therapy plan of care.     Rehab identified problem list/impairments: weakness, impaired endurance, impaired functional mobilty, gait instability, impaired balance, decreased lower extremity function, decreased upper extremity function, pain, impaired skin, edema, orthopedic precautions    Rehab potential is good.    Activity tolerance: Good    Discharge recommendations: home with home health     Barriers to discharge: Decreased caregiver support    Equipment recommendations: wheelchair (platform for RW)     GOALS:    Physical Therapy Goals        Problem: Physical Therapy Goal    Goal Priority Disciplines Outcome Goal Variances Interventions   Physical Therapy Goal     PT/OT, PT Ongoing (interventions implemented as appropriate)     Description:  Goals to be met by: 14 days     Patient will increase functional independence with mobility by performin. Supine to sit with Stand-by Assistance = met  2. Sit to supine with Stand-by Assistance = not met  3. Sit to stand transfer with Stand-by  Assistance with LLE brace locked in extension and with left platform rolling walker = not met  4. Bed to chair transfer with Stand-by Assistance  with LLE brace locked in extension and with left platform rolling walker = not met  5. Gait  x 150 feet with Stand-by Assistance  with LLE brace locked in extension and with left platform rolling walker = not met  6. Wheelchair propulsion x50 feet with Stand-by Assistance using RUE/RLE = not met  7. Ascend/Descend 4 inch curb step with Contact Guard Assistance  with LLE brace locked in extension and with left platform rolling walker.DISCONTINUE 3/11/2018  8. Lower extremity exercise program x20 reps per handout, with assistance as needed and gym therex= met  3/11/2018 NEW GOAL:  9. Patient will ascend/descend 2 inch curb step w/ left PRW and LLE HKB locked in extension w/ min assist = mot met     Outcome: Ongoing (interventions implemented as appropriate)  Goals are appropriate but written for RLE. PTs LLE is the affected extremity.  W/c goal also needs to be updated for propulsion with RUE/RLE only.   3/5/2018 Goals corrected today. LLE is WBAT and w/ hinged knee brace locked in extension.  3/5/2018 Jennifer Oliveira, PT                      PLAN:    Patient to be seen 5 x/week  to address the above listed problems via gait training, therapeutic activities, therapeutic exercises, wheelchair management/training  Plan of Care expires: 03/31/18  Plan of Care reviewed with: patient    Jennifer Oliveira, PT  03/11/2018

## 2018-03-11 NOTE — PLAN OF CARE
Problem: Physical Therapy Goal  Goal: Physical Therapy Goal  Goals to be met by: 14 days     Patient will increase functional independence with mobility by performin. Supine to sit with Stand-by Assistance = met  2. Sit to supine with Stand-by Assistance = not met  3. Sit to stand transfer with Stand-by Assistance with LLE brace locked in extension and with left platform rolling walker = not met  4. Bed to chair transfer with Stand-by Assistance  with LLE brace locked in extension and with left platform rolling walker = not met  5. Gait  x 150 feet with Stand-by Assistance  with LLE brace locked in extension and with left platform rolling walker = not met  6. Wheelchair propulsion x50 feet with Stand-by Assistance using RUE/RLE = not met  7. Ascend/Descend 4 inch curb step with Contact Guard Assistance  with LLE brace locked in extension and with left platform rolling walker.DISCONTINUE 3/11/2018  8. Lower extremity exercise program x20 reps per handout, with assistance as needed and gym therex= met  3/11/2018 NEW GOAL:  9. Patient will ascend/descend 2 inch curb step w/ left PRW and LLE HKB locked in extension w/ min assist = mot met     Outcome: Ongoing (interventions implemented as appropriate)  Goals are appropriate but written for RLE. PTs LLE is the affected extremity.  W/c goal also needs to be updated for propulsion with RUE/RLE only.   3/5/2018 Goals corrected today. LLE is WBAT and w/ hinged knee brace locked in extension.  3/5/2018 Jennifer Oliveira, PT    New goal for curb step Jennifer Oliveira, PT 3/11/2018

## 2018-03-11 NOTE — PLAN OF CARE
Problem: Patient Care Overview  Goal: Plan of Care Review  Outcome: Revised  Repositions minimal staff assist, no new breakdowns noted.left wrist cast dry and intact. Left knee drsg intact with large amt serosanguinous drainiage. Polar ice in use, immobilizer in place. abd folds moist and pink. Afebrile.IVABX discontinued, Monitored fr pain and safety. Safety maintained. Pain meds effective.

## 2018-03-12 LAB
ALBUMIN SERPL BCP-MCNC: 2.7 G/DL
ALP SERPL-CCNC: 94 U/L
ALT SERPL W/O P-5'-P-CCNC: 11 U/L
ANION GAP SERPL CALC-SCNC: 9 MMOL/L
AST SERPL-CCNC: 18 U/L
BASOPHILS # BLD AUTO: 0.06 K/UL
BASOPHILS NFR BLD: 1 %
BILIRUB DIRECT SERPL-MCNC: 0.2 MG/DL
BILIRUB SERPL-MCNC: 0.4 MG/DL
BUN SERPL-MCNC: 10 MG/DL
CALCIUM SERPL-MCNC: 9 MG/DL
CHLORIDE SERPL-SCNC: 98 MMOL/L
CO2 SERPL-SCNC: 31 MMOL/L
CREAT SERPL-MCNC: 0.7 MG/DL
CRP SERPL-MCNC: 9.8 MG/L
DIFFERENTIAL METHOD: ABNORMAL
EOSINOPHIL # BLD AUTO: 0.5 K/UL
EOSINOPHIL NFR BLD: 9 %
ERYTHROCYTE [DISTWIDTH] IN BLOOD BY AUTOMATED COUNT: 14 %
ERYTHROCYTE [SEDIMENTATION RATE] IN BLOOD BY WESTERGREN METHOD: 100 MM/HR
EST. GFR  (AFRICAN AMERICAN): >60 ML/MIN/1.73 M^2
EST. GFR  (NON AFRICAN AMERICAN): >60 ML/MIN/1.73 M^2
GLUCOSE SERPL-MCNC: 82 MG/DL
HCT VFR BLD AUTO: 30.5 %
HGB BLD-MCNC: 10 G/DL
IMM GRANULOCYTES # BLD AUTO: 0.03 K/UL
IMM GRANULOCYTES NFR BLD AUTO: 0.5 %
LYMPHOCYTES # BLD AUTO: 1.2 K/UL
LYMPHOCYTES NFR BLD: 20.1 %
MAGNESIUM SERPL-MCNC: 1.6 MG/DL
MCH RBC QN AUTO: 29.2 PG
MCHC RBC AUTO-ENTMCNC: 32.8 G/DL
MCV RBC AUTO: 89 FL
MONOCYTES # BLD AUTO: 0.8 K/UL
MONOCYTES NFR BLD: 12.7 %
NEUTROPHILS # BLD AUTO: 3.4 K/UL
NEUTROPHILS NFR BLD: 56.7 %
NRBC BLD-RTO: 0 /100 WBC
PHOSPHATE SERPL-MCNC: 3.7 MG/DL
PLATELET # BLD AUTO: 296 K/UL
PMV BLD AUTO: 9.9 FL
POTASSIUM SERPL-SCNC: 4 MMOL/L
PROT SERPL-MCNC: 6.7 G/DL
RBC # BLD AUTO: 3.43 M/UL
SODIUM SERPL-SCNC: 138 MMOL/L
WBC # BLD AUTO: 5.97 K/UL

## 2018-03-12 PROCEDURE — 84100 ASSAY OF PHOSPHORUS: CPT

## 2018-03-12 PROCEDURE — 97116 GAIT TRAINING THERAPY: CPT

## 2018-03-12 PROCEDURE — A4216 STERILE WATER/SALINE, 10 ML: HCPCS | Performed by: STUDENT IN AN ORGANIZED HEALTH CARE EDUCATION/TRAINING PROGRAM

## 2018-03-12 PROCEDURE — 97110 THERAPEUTIC EXERCISES: CPT

## 2018-03-12 PROCEDURE — 36415 COLL VENOUS BLD VENIPUNCTURE: CPT

## 2018-03-12 PROCEDURE — 97530 THERAPEUTIC ACTIVITIES: CPT

## 2018-03-12 PROCEDURE — 25000003 PHARM REV CODE 250: Performed by: INTERNAL MEDICINE

## 2018-03-12 PROCEDURE — 12000000 HC SNF SEMI-PRIVATE ROOM

## 2018-03-12 PROCEDURE — 85651 RBC SED RATE NONAUTOMATED: CPT

## 2018-03-12 PROCEDURE — 86140 C-REACTIVE PROTEIN: CPT

## 2018-03-12 PROCEDURE — 97535 SELF CARE MNGMENT TRAINING: CPT

## 2018-03-12 PROCEDURE — 80048 BASIC METABOLIC PNL TOTAL CA: CPT

## 2018-03-12 PROCEDURE — 83735 ASSAY OF MAGNESIUM: CPT

## 2018-03-12 PROCEDURE — 85025 COMPLETE CBC W/AUTO DIFF WBC: CPT

## 2018-03-12 PROCEDURE — 25000003 PHARM REV CODE 250: Performed by: STUDENT IN AN ORGANIZED HEALTH CARE EDUCATION/TRAINING PROGRAM

## 2018-03-12 PROCEDURE — 80076 HEPATIC FUNCTION PANEL: CPT

## 2018-03-12 RX ADMIN — Medication 10 ML: at 12:03

## 2018-03-12 RX ADMIN — Medication 10 ML: at 06:03

## 2018-03-12 RX ADMIN — ASPIRIN 325 MG: 325 TABLET, DELAYED RELEASE ORAL at 09:03

## 2018-03-12 RX ADMIN — FAMOTIDINE 20 MG: 20 TABLET, FILM COATED ORAL at 08:03

## 2018-03-12 RX ADMIN — MICONAZOLE NITRATE: 20 OINTMENT TOPICAL at 09:03

## 2018-03-12 RX ADMIN — HYDROCHLOROTHIAZIDE 50 MG: 25 TABLET ORAL at 09:03

## 2018-03-12 RX ADMIN — OXYCODONE HYDROCHLORIDE AND ACETAMINOPHEN 1 TABLET: 10; 325 TABLET ORAL at 06:03

## 2018-03-12 RX ADMIN — ASPIRIN 325 MG: 325 TABLET, DELAYED RELEASE ORAL at 08:03

## 2018-03-12 RX ADMIN — OXYCODONE HYDROCHLORIDE AND ACETAMINOPHEN 1 TABLET: 10; 325 TABLET ORAL at 02:03

## 2018-03-12 RX ADMIN — FAMOTIDINE 20 MG: 20 TABLET, FILM COATED ORAL at 09:03

## 2018-03-12 RX ADMIN — STANDARDIZED SENNA CONCENTRATE AND DOCUSATE SODIUM 1 TABLET: 8.6; 5 TABLET, FILM COATED ORAL at 09:03

## 2018-03-12 RX ADMIN — OXYCODONE HYDROCHLORIDE AND ACETAMINOPHEN 1 TABLET: 10; 325 TABLET ORAL at 09:03

## 2018-03-12 RX ADMIN — STANDARDIZED SENNA CONCENTRATE AND DOCUSATE SODIUM 1 TABLET: 8.6; 5 TABLET, FILM COATED ORAL at 08:03

## 2018-03-12 RX ADMIN — VALSARTAN 80 MG: 80 TABLET ORAL at 09:03

## 2018-03-12 NOTE — PT/OT/SLP PROGRESS
"Physical Therapy  Treatment    Katelyn Huynh   MRN: [id number]   Admitting Diagnosis: Wound dehiscence    PT Received On: 03/12/18  Total Time (min): 45       Billable Minutes:  Gait Training 15, Therapeutic Activity 15 and Therapeutic Exercise 15    Treatment Type: Treatment  PT/PTA: PTA     PTA Visit Number: 1       General Precautions: Standard, fall  Orthopedic Precautions: LLE weight bearing as tolerated, LLE non weight bearing (L wrist NWB: okay to use platform on RW)   Braces: Hinged knee brace (locked in extension)         Subjective:  Pt agreeable to therapy.   "I'm hurting a little bit, but I'm good, let's go, we're walking right?"    Pain/Comfort  Pain Rating 1: 6/10  Location - Side 1: Left  Location - Orientation 1: generalized  Location 1: knee  Pain Addressed 1: Pre-medicate for activity, Reposition, Distraction  Pain Rating Post-Intervention 1: 6/10    Objective:  Patient found sitting up in w/c at lunch group.          Functional Status:  MDS G  Bed Mobility Functional Status: S-SBA  Transfer Functional Status: CGA-Min (A)  Walk in Room Functional Status: S-SBA  Walk in Corridor Functional Status: S-SBA  Moving from seated to standing position: Not steady, only able to stabilize with staff assistance  Walking (with assistive device if used): Not steady, only able to stabilize with staff assistance  Turning around and facing the opposite direction while walking: Not steady, only able to stabilize with staff assistance  Surface-to-surface transfer (transfer between bed and chair or wheelchair): Not steady, only able to stabilize with staff assistance          AM-PAC 6 CLICK MOBILITY  Total Score:16    Bed Mobility:  Sit>Supine: SBA on mat with brief CGA for LLE mgmt.   Supine>Sit: SBA on mat     Transfers:  Sit<>Stand: CGA from w/c and mat with L PRW.   Stand Pivot Transfer: SBA with L PRW, w/c <> mat     Gait:  Amb: Pt ambulated 150 ft with L PRW and SBA/Sup. Pt manages AD well and " "demonstrates slowed noe and slightly flexed posture. Pt maintains LUE NWB.   Pt ambulates additional 35 ft to enter pts room from hallway and sits to EOB.     Advanced Gait:  Curb Step: Pt asc/dec 2" curb with L PRW and CGA. Pt requires minimal VC's for sequence and AD management.     Therex:  BLE Supine Therex: AP, GS, SAQ(R only), SLR, Hip ABD/ADD, HS(R only), LAQ( R only) x 30 reps. AAROM as needed.    Balance:  Pt maintains balance with Supervision/SBA with BUE support on L PRW.      Patient left sitting EOB per pt request with call button in reach.    Assessment:  Katelyn Huynh is a 67 y.o. female with a medical diagnosis of Wound dehiscence.  Pt tolerated tx well with focus on gait, transfers, and therex. Pt progressing well and requiring less VC's for all mobility. Pt limited d/t her decreased endurance and weakness of LLE. Pt will continue to benefit from skilled therapy to improve impairments listed below.     Rehab identified problem list/impairments: weakness, impaired endurance, impaired functional mobilty, gait instability, impaired balance, decreased lower extremity function, decreased upper extremity function, pain, impaired skin, edema, orthopedic precautions    Rehab potential is good.    Activity tolerance: Good    Discharge recommendations: home with home health     Barriers to discharge: Decreased caregiver support    Equipment recommendations: wheelchair (platform for RW)     GOALS:    Physical Therapy Goals        Problem: Physical Therapy Goal    Goal Priority Disciplines Outcome Goal Variances Interventions   Physical Therapy Goal     PT/OT, PT Ongoing (interventions implemented as appropriate)     Description:  Goals to be met by: 14 days     Patient will increase functional independence with mobility by performin. Supine to sit with Stand-by Assistance = met  2. Sit to supine with Stand-by Assistance = not met  3. Sit to stand transfer with Stand-by Assistance with " LLE brace locked in extension and with left platform rolling walker = not met  4. Bed to chair transfer with Stand-by Assistance  with LLE brace locked in extension and with left platform rolling walker = not met  5. Gait  x 150 feet with Stand-by Assistance  with LLE brace locked in extension and with left platform rolling walker = not met  6. Wheelchair propulsion x50 feet with Stand-by Assistance using RUE/RLE = not met  7. Ascend/Descend 4 inch curb step with Contact Guard Assistance  with LLE brace locked in extension and with left platform rolling walker.DISCONTINUE 3/11/2018  8. Lower extremity exercise program x20 reps per handout, with assistance as needed and gym therex= met  3/11/2018 NEW GOAL:  9. Patient will ascend/descend 2 inch curb step w/ left PRW and LLE HKB locked in extension w/ min assist = mot met     Outcome: Ongoing (interventions implemented as appropriate)  Goals are appropriate but written for RLE. PTs LLE is the affected extremity.  W/c goal also needs to be updated for propulsion with RUE/RLE only.   3/5/2018 Goals corrected today. LLE is WBAT and w/ hinged knee brace locked in extension.  3/5/2018 Jennifer Oliveira, PT                      PLAN:    Patient to be seen 5 x/week  to address the above listed problems via gait training, therapeutic activities, therapeutic exercises, wheelchair management/training  Plan of Care expires: 03/31/18  Plan of Care reviewed with: patient    Thor Dorsey, PTA  03/12/2018

## 2018-03-12 NOTE — PT/OT/SLP PROGRESS
Occupational Therapy  Treatment    Katelyn Huynh   MRN: [id number]   Admitting Diagnosis: Wound dehiscence    OT Date of Treatment: 03/12/18  Total Time (min): 53 min    Billable Minutes:  Self Care/Home Management 53    General Precautions: Standard, fall  Orthopedic Precautions: LUE non weight bearing  Braces: Hinged knee brace    Do you have any cultural, spiritual, Cheondoism conflicts, given your current situation?: none reported    Subjective:  Communicated with nsg prior to session.  I am not sure when I am going home yet    Pain/Comfort  Pain Rating 1: 6/10  Location - Side 1: Left  Location - Orientation 1: generalized  Location 1: knee  Pain Addressed 1: Distraction, Pre-medicate for activity    Objective:   Pt. Supine on arrival    Functional Status:  MDS G  Bed Mobility Functional Status: S-SBA  Transfer Functional Status: CGA-Min (A) with PFRW EOB<> 3n1 with cues for safety  Dressing Functional Status: 3:mod(A)-for LB dressing with (A) over RLE bace with management and hips instance and Min A with UE dressing and pull over management   Toilet Use Functional Status: CGA-Min (A) from 3n1 and throughness with cleaning and clothing management   Personal Hygiene Functional Status: S-SBA   Bathing Functional Status: CGA-Min (A) for BLE feet and post pj area  Moving from seated to standing position: Not steady, only able to stabilize with staff assistance  Walking (with assistive device if used): Not steady, only able to stabilize with staff assistance  Turning around and facing the opposite direction while walking: Not steady, only able to stabilize with staff assistance  Moving on and off the toilet: Not steady, only able to stabilize with staff assistance  Surface-to-surface transfer (transfer between bed and chair or wheelchair): Not steady, only able to stabilize with staff assistance           AMPA 6 Click:  AMPAC Total Score: 19    Patient left supine with all lines intact and call button  in reach    ASSESSMENT:  Katelyn Huynh is a 67 y.o. female with a medical diagnosis of Wound dehiscence Pt. participated well with session on this day.Pt demos physical deficits with balance  functional mobility, UB strength, endurance  level of functional indep with daily tasks and activities and selfcare skills .Pt. Will continue to benefit from continued OT to progress towards goals  .    Rehab identified problem list/impairments: impaired endurance, impaired self care skills, impaired functional mobilty, decreased lower extremity function, pain, edema, impaired skin    Rehab potential is fair    Activity tolerance: Fair    Discharge recommendations: home health OT (with supervision)     Barriers to discharge: Decreased caregiver support, Inaccessible home environment     Equipment recommendations:  (PRW)     GOALS:    Occupational Therapy Goals        Problem: Occupational Therapy Goal    Goal Priority Disciplines Outcome Interventions   Occupational Therapy Goal     OT, PT/OT Ongoing (interventions implemented as appropriate)    Description:  Goals to be met by: 2 weeks     Patient will increase functional independence with ADLs by performing:    UE Dressing with Set-up Assistance.  Met  LE Dressing with Supervision.  Grooming while standing with Supervision.  Toileting from bedside commode with Supervision for hygiene and clothing management.   Bathing from  edge of bed with Supervision.  Supine to sit with Modified [place].  Stand pivot transfers with Supervision with PRW  Toilet transfer to bedside commode with Supervision.  Pt. To be independent with HEP to improve level of endurance for BUE.   Pt. To participate in dynamic standing activity x 10 minutes with Supervision                   Plan:  Patient to be seen 5 x/week to address the above listed problems via self-care/home management, therapeutic activities, therapeutic exercises  Plan of Care expires: 03/31/18  Plan of Care  reviewed with: patient    JASON Wilson/MARLO  03/12/2018

## 2018-03-12 NOTE — PLAN OF CARE
Problem: Occupational Therapy Goal  Goal: Occupational Therapy Goal  Goals to be met by: 2 weeks     Patient will increase functional independence with ADLs by performing:    UE Dressing with Set-up Assistance.  Met  LE Dressing with Supervision.  Grooming while standing with Supervision.  Toileting from bedside commode with Supervision for hygiene and clothing management.   Bathing from  edge of bed with Supervision.  Supine to sit with Modified [place].  Stand pivot transfers with Supervision with PRW  Toilet transfer to bedside commode with Supervision.  Pt. To be independent with HEP to improve level of endurance for BUE.   Pt. To participate in dynamic standing activity x 10 minutes with Supervision      Outcome: Ongoing (interventions implemented as appropriate)  .

## 2018-03-12 NOTE — PLAN OF CARE
03/12/2018  11:31 AM    SW met with pt in room to discuss d/c planning.  SW notified pt of currently scheduled SNF d/c date of 3/13/18.  Pt expressed understanding regarding date.  SW explained that SNF therapy team would like pt's SNF stay extended an additional week but will have see how many more SNF days [employer] will approve upon update tomorrow.  Pt expressed understanding and would like to stay at SNF for as long as recommended by SNF treatment team.  Pt states plans of returning home upon SNF d/c where she lives with her  and sister who will both be available to assist pt as needed throughout the day.  Pt lives in a [facility] with 1 threshold [address].  Pt has a tub/shower combo and owns a TTB, BSC, RW, and SPC.  Pt agreeable to home health placement upon SNF d/c and requesting to be placed with [facility].  Pt plans to have family accompany and transport her home upon SNF d/c.  Pt confirms that her sister and son plan to visit SNF tomorrow (3/13/18) for family training with PT/OT at 1 pm.  Pt denies having any questions or concerns regarding d/c planning assistance and will f/u as needed.  SW remains available for further d/c planning assistance and will f/u as needed.    Thong Horn, [facility]  [id number]

## 2018-03-13 VITALS
RESPIRATION RATE: 18 BRPM | HEIGHT: 64 IN | BODY MASS INDEX: 48.62 KG/M2 | OXYGEN SATURATION: 98 % | SYSTOLIC BLOOD PRESSURE: 118 MMHG | DIASTOLIC BLOOD PRESSURE: 55 MMHG | HEART RATE: 88 BPM | TEMPERATURE: 98 F | WEIGHT: 284.81 LBS

## 2018-03-13 PROCEDURE — 97530 THERAPEUTIC ACTIVITIES: CPT

## 2018-03-13 PROCEDURE — 99315 NF DSCHRG MGMT 30 MIN/LESS: CPT | Mod: ,,, | Performed by: INTERNAL MEDICINE

## 2018-03-13 PROCEDURE — 97110 THERAPEUTIC EXERCISES: CPT

## 2018-03-13 PROCEDURE — 12000000 HC SNF SEMI-PRIVATE ROOM

## 2018-03-13 PROCEDURE — A4216 STERILE WATER/SALINE, 10 ML: HCPCS | Performed by: STUDENT IN AN ORGANIZED HEALTH CARE EDUCATION/TRAINING PROGRAM

## 2018-03-13 PROCEDURE — 25000003 PHARM REV CODE 250: Performed by: STUDENT IN AN ORGANIZED HEALTH CARE EDUCATION/TRAINING PROGRAM

## 2018-03-13 PROCEDURE — 97116 GAIT TRAINING THERAPY: CPT

## 2018-03-13 PROCEDURE — 25000003 PHARM REV CODE 250: Performed by: INTERNAL MEDICINE

## 2018-03-13 RX ORDER — ASPIRIN 325 MG
325 TABLET, DELAYED RELEASE (ENTERIC COATED) ORAL 2 TIMES DAILY
Qty: 60 TABLET | Refills: 0 | COMMUNITY
Start: 2018-03-13 | End: 2022-05-23

## 2018-03-13 RX ORDER — OXYCODONE AND ACETAMINOPHEN 10; 325 MG/1; MG/1
1 TABLET ORAL EVERY 4 HOURS PRN
Qty: 35 TABLET | Refills: 0 | Status: SHIPPED | OUTPATIENT
Start: 2018-03-13 | End: 2018-06-25 | Stop reason: ALTCHOICE

## 2018-03-13 RX ORDER — AMOXICILLIN 250 MG
1 CAPSULE ORAL 2 TIMES DAILY PRN
COMMUNITY
Start: 2018-03-13 | End: 2018-10-17

## 2018-03-13 RX ORDER — HYDROCHLOROTHIAZIDE 25 MG/1
50 TABLET ORAL DAILY
Qty: 180 TABLET | Refills: 3 | Status: SHIPPED | OUTPATIENT
Start: 2018-03-13 | End: 2018-06-25 | Stop reason: SDUPTHER

## 2018-03-13 RX ORDER — FAMOTIDINE 20 MG/1
20 TABLET, FILM COATED ORAL 2 TIMES DAILY
Qty: 28 TABLET | Refills: 0 | Status: SHIPPED | OUTPATIENT
Start: 2018-03-13 | End: 2022-05-23

## 2018-03-13 RX ADMIN — OXYCODONE HYDROCHLORIDE AND ACETAMINOPHEN 1 TABLET: 10; 325 TABLET ORAL at 08:03

## 2018-03-13 RX ADMIN — OXYCODONE HYDROCHLORIDE AND ACETAMINOPHEN 1 TABLET: 10; 325 TABLET ORAL at 03:03

## 2018-03-13 RX ADMIN — Medication 10 ML: at 12:03

## 2018-03-13 RX ADMIN — ASPIRIN 325 MG: 325 TABLET, DELAYED RELEASE ORAL at 08:03

## 2018-03-13 RX ADMIN — STANDARDIZED SENNA CONCENTRATE AND DOCUSATE SODIUM 1 TABLET: 8.6; 5 TABLET, FILM COATED ORAL at 08:03

## 2018-03-13 RX ADMIN — VALSARTAN 80 MG: 80 TABLET ORAL at 08:03

## 2018-03-13 RX ADMIN — FAMOTIDINE 20 MG: 20 TABLET, FILM COATED ORAL at 08:03

## 2018-03-13 RX ADMIN — Medication 10 ML: at 06:03

## 2018-03-13 RX ADMIN — HYDROCHLOROTHIAZIDE 50 MG: 25 TABLET ORAL at 08:03

## 2018-03-13 NOTE — PLAN OF CARE
03/13/2018  3:17 PM    Following PT/OT family training session this afternoon, pt/family requesting d/c home today.  SW sent home health referral (via [facility]) to [facility] per pt's request.  MATT distributed platform attachment for RW to pt from SNF DME d/c depot.  Pt to have WC delivered to home this evening by [facility] per Bailey with [employer].  Pt to be accompanied and transported home by family upon SNF d/c this afternoon.  Pt denies having any questions or concerns regarding d/c plan and expresses readiness for return home.    Thong Horn, NADIR  [phone]

## 2018-03-13 NOTE — PROGRESS NOTES
Wound care follow-up:  The abdominal pannus/groin area remains moist, resolving yeast infection, no odor.  Continue Miconazole.   Plan of care discussed with patient who verbalized understanding.     03/13/18 0840       Wound 03/06/18 0850 Moisture associated dermatitis;Other Perineum   Date First Assessed/Time First Assessed: 03/06/18 0850   Pre-existing: No  Wound Type: (c) Moisture associated dermatitis;Other  Location: Perineum   Wound WDL ex   Dressing Appearance Open to air;No dressing   Drainage Amount None   Drainage Characteristics/Odor No odor   Appearance Red;Moist   Tissue loss description Not applicable   Periwound Area Intact

## 2018-03-13 NOTE — PT/OT/SLP PROGRESS
Occupational Therapy  Treatment/ Discharge Summary    Katelyn Huynh   MRN: [id number]   Admitting Diagnosis: Wound dehiscence    OT Date of Treatment: 03/13/18  Total Time (min): 45 min    Billable Minutes:  Therapeutic Activity 45    General Precautions: Standard, fall  Orthopedic Precautions: LUE non weight bearing  Braces: Hinged knee brace    Do you have any cultural, spiritual, Hinduism conflicts, given your current situation?: none reported    Subjective:  Communicated with PT  prior to session.  I am leaving today    Pain/Comfort  Pain Rating 1: 5/10  Location - Side 1: Left  Location - Orientation 1: generalized  Location 1: knee  Pain Addressed 1: Pre-medicate for activity  Pain Rating Post-Intervention 1: 5/10    Objective:    Pt. Seated in w/c family present   Functional Status:  MDS G  Bed Mobility Functional Status: CGA-Min (A) from sit supine  Transfer Functional Status: CGA-Min (A) for safety from w/c with cues for safety from PFRW  Walk in Room Functional Status: S-SBA from w/c to EOB  Dressing Functional Status: 3:mod(A)-Max(A) meño/doff pants with brace management   Moving from seated to standing position: Not steady, only able to stabilize with staff assistance  Walking (with assistive device if used): Not steady, only able to stabilize with staff assistance  Turning around and facing the opposite direction while walking: Not steady, only able to stabilize with staff assistance  Surface-to-surface transfer (transfer between bed and chair or wheelchair): Not steady, only able to stabilize with staff assistance           [facility] 6 Click:  AMPA Total Score: 19    Additional Treatment:  Family training held on this day with Pt. Family son and sister whom will be providing care for Pt. Upon d/c Pt. And family edu on BUE ROM rosmery on RUE to increase ROM aspects  Family also instructed on brace management and positioning for dressing purposes to keep knee extended at all time and to mange brace  in supine position   Bed Mobility at height at home and t/f's and dressing task for LB.    Patient left supine with all lines intact and call button in reach    ASSESSMENT:  Katelyn Huynh is a 67 y.o. female with a medical diagnosis of Wound dehiscence Pt. participated well with session on this day. Pt. Is expected to d/c on day home with family (A). Pt demos physical deficits with balance  functional mobility, UB strength, endurance  level of functional indep with daily tasks and activities and selfcare skills .Pt. Will continue to benefit from continued OT to progress towards goals toward next level of care  .    Rehab identified problem list/impairments: impaired endurance, impaired self care skills, impaired functional mobilty, decreased lower extremity function, pain, edema, impaired skin    Rehab potential is fair    Activity tolerance: Fair    Discharge recommendations: home health OT (with supervision)     Barriers to discharge: Decreased caregiver support, Inaccessible home environment     Equipment recommendations:  (PRW)     GOALS:    Occupational Therapy Goals        Problem: Occupational Therapy Goal    Goal Priority Disciplines Outcome Interventions   Occupational Therapy Goal     OT, PT/OT Ongoing (interventions implemented as appropriate)    Description:  Goals to be met by: 2 weeks     Patient will increase functional independence with ADLs by performing:    UE Dressing with Set-up Assistance.  Met  LE Dressing with Supervision.-Not MET  Grooming while standing with Supervision-Not MET.  Toileting from bedside commode with Supervision for hygiene and clothing management. -Not MET  Bathing from  edge of bed with Supervision.Not MET  Supine to sit with Modified [place].-MET  Stand pivot transfers with Supervision with PRW-MET  Toilet transfer to bedside commode with Supervision.-MET  Pt. To be independent with HEP to improve level of endurance for BUE. -MET  Pt. To participate in  dynamic standing activity x 10 minutes with Supervision-MET                       Plan:  Plan of Care expires: 03/31/18  Plan of Care reviewed with: patient  A client care conference was performed between the LOTR and GOME[facility], prior to treatment by GOMEZ, to discuss the patient's status, treatment plan and established goals.   JASON Wilson/MARLO 3/13/2018

## 2018-03-13 NOTE — PLAN OF CARE
Problem: Fall Risk (Adult)  Goal: Identify Related Risk Factors and Signs and Symptoms  Related risk factors and signs and symptoms are identified upon initiation of Human [employer] Clinical Practice Guideline (CPG)   Outcome: Ongoing (interventions implemented as appropriate)   03/13/18 8523   Fall Risk   Related Risk Factors (Fall Risk) fatigue/slow reaction;gait/mobility problems

## 2018-03-13 NOTE — PLAN OF CARE
Problem: Occupational Therapy Goal  Goal: Occupational Therapy Goal  Goals to be met by: 2 weeks     Patient will increase functional independence with ADLs by performing:    UE Dressing with Set-up Assistance.  Met  LE Dressing with Supervision.  Grooming while standing with Supervision.  Toileting from bedside commode with Supervision for hygiene and clothing management.   Bathing from  edge of bed with Supervision.  Supine to sit with Modified [place].  Stand pivot transfers with Supervision with PRW  Toilet transfer to bedside commode with Supervision.  Pt. To be independent with HEP to improve level of endurance for BUE.   Pt. To participate in dynamic standing activity x 10 minutes with Supervision      Outcome: Ongoing (interventions implemented as appropriate)  .

## 2018-03-13 NOTE — PT/OT/SLP PROGRESS
Physical Therapy  Treatment discharge    Katelyn Huynh   MRN: [id number]   Admitting Diagnosis: Wound dehiscence    PT Received On: 03/13/18          Billable Minutes:  Gait Training 25, Therapeutic Activity 20 and Therapeutic Exercise 10    Treatment Type: Treatment  PT/PTA: PT     PTA Visit Number: 0       General Precautions: Standard, fall  Orthopedic Precautions: LLE posterior precautions, LUE non weight bearing   Braces: Hinged knee brace (locked in extension and platform on RW for LUE support)         Subjective:  Communicated with patient/family prior to session.  Patient and family report feel prepared for d/c home today    Pain/Comfort  Pain Rating 1: 2/10  Location - Side 1: Left  Location - Orientation 1: generalized  Location 1: knee  Pain Addressed 1: Reposition, Distraction  Pain Rating Post-Intervention 1: 2/10    Objective:  Patient found sitting EOB w/ brace intact with         Functional Status:  MDS G  Bed Mobility Functional Status: CGA-Min (A)  Transfer Functional Status: mod(A)-Max(A)  Walk in Room Functional Status: S-SBA  Walk in Corridor Functional Status: S-SBA  Moving from seated to standing position: Not steady, only able to stabilize with staff assistance  Walking (with assistive device if used): Not steady, but able to stabilize without staff assistance  Turning around and facing the opposite direction while walking: Not steady, but able to stabilize without staff assistance  Surface-to-surface transfer (transfer between bed and chair or wheelchair): Not steady, only able to stabilize with staff assistance          AM-PAC 6 CLICK MOBILITY  Total Score:17    Bed Mobility:  Sit>Supine:w/ min assist for LLE  Supine>Sit: w/ SBA    Transfers:  Sit<>Stand: w/ Left PRW and supervision from bed, mat w/ slight elevation. From w/c w/ min/mod assist x4 trials w/ son or sister assisting.  Stand Pivot Transfer: w/ L PRW and SBA once standing  HKB locked in extension and LUE supported on  forearm on PRW    Gait:  Amb w/ Left PRW and SBA/supervision 150 feet; amb 20 feet x2 trials w/ family providing CGA and instructed in guarding/assisting if LOB. Family demonstrates ability to assist  Advanced Gait:  Curb Step: up/down 2 inch curb step w/ L PRW and CGA x2 trials w/ son assisting  Up/down once w/ patient in w/c w/ max assist of 2 to tilt w/c.    Wheelchair Mobility:  Patient propels w/c w/ RU/LE 35 feet w/ SBA     Therex:  Patient instructed in LE HEP w / use of written copy. QS, GS, AP, hip abd/add LLE.       Additional Treatment:  Patient and family instructed in managing HKB locked in extension. Readjusting to correct postion in sitting w/ LLE supported and in supine on mat. Son performs.  Platform attached to RW and family/patient instructed in adjustments, tightening  Family demonstrates ability to assist patient w/ functional mobilty    Patient left up in chair with OT and family.    Assessment:  Katelyn Huynh is a 67 y.o. female with a medical diagnosis of Wound dehiscence.  She has progressed well. She is WBAT LLE and has hinged knee brace locked in extension and has cast on left wrist but allowed to weight bear on forearm on platform on RW. Her family trained today and demonstrates ability to assist patient w/ functional mobility to maintain safety. Patient d/c home today w/ family    Rehab identified problem list/impairments: weakness, impaired endurance, impaired functional mobilty, gait instability, impaired balance, decreased lower extremity function, decreased upper extremity function, pain, impaired skin, edema, orthopedic precautions    Rehab potential is good.    Activity tolerance: Good    Discharge recommendations: home with home health     Barriers to discharge: Decreased caregiver support    Equipment recommendations: wheelchair (platform for RW)     GOALS:    Physical Therapy Goals        Problem: Physical Therapy Goal    Goal Priority Disciplines Outcome Goal  Variances Interventions   Physical Therapy Goal     PT/OT, PT Ongoing (interventions implemented as appropriate)     Description:  Goals to be met by: 14 days     Patient will increase functional independence with mobility by performin. Supine to sit with Stand-by Assistance = met  2. Sit to supine with Stand-by Assistance = not met  3. Sit to stand transfer with Stand-by Assistance with LLE brace locked in extension and with left platform rolling walker = not met  4. Bed to chair transfer with Stand-by Assistance  with LLE brace locked in extension and with left platform rolling walker = not met  5. Gait  x 150 feet with Stand-by Assistance  with LLE brace locked in extension and with left platform rolling walker = met 3/13/2018  6. Wheelchair propulsion x50 feet with Stand-by Assistance using RUE/RLE = not met  7. Ascend/Descend 4 inch curb step with Contact Guard Assistance  with LLE brace locked in extension and with left platform rolling walker.DISCONTINUE 3/11/2018  8. Lower extremity exercise program x20 reps per handout, with assistance as needed and gym therex= met  3/11/2018 NEW GOAL:  9. Patient will ascend/descend 2 inch curb step w/ left PRW and LLE HKB locked in extension w/ min assist =  Met 3/13/2018     Outcome: Ongoing (interventions implemented as appropriate)  Goals are appropriate but written for RLE. PTs LLE is the affected extremity.  W/c goal also needs to be updated for propulsion with RUE/RLE only.   3/5/2018 Goals corrected today. LLE is WBAT and w/ hinged knee brace locked in extension.  3/5/2018 Jennifer Oliveira, PT                       PLAN:    Patient to be seen 5 x/week  to address the above listed problems via gait training, therapeutic activities, therapeutic exercises, wheelchair management/training  Plan of Care expires: 18  Plan of Care reviewed with: patient    Jennifer Oliveira, PT  2018

## 2018-03-13 NOTE — PLAN OF CARE
[facility] HOME HEALTH ORDERS  FACE TO FACE ENCOUNTER    Patient Name: Katelyn Huynh  YOB: 1950    PCP: Daniel Garcia MD   PCP Address: [address] [facility] [address] / [facility] [address]  PCP Phone Number: [phone]  PCP Fax: [phone]    Encounter Date: 03/13/2018    Admit to Home Health    Diagnoses:  Active Hospital Problems    Diagnosis  POA    *Wound dehiscence [T81.30XA]  Yes     Priority: 1 - High    Primary osteoarthritis of left knee [M17.12]  Yes     Priority: 2     S/P TKR (total knee replacement), left [Z96.652]  Not Applicable     Priority: 3     Quadriceps tendon rupture, left, subsequent encounter [S76.112D]  Not Applicable     Priority: 4     Yeast dermatitis [B37.2]  Yes    Morbid obesity with BMI of 50.0-59.9, adult [E66.01, Z68.43]  Not Applicable    Chronic idiopathic constipation [K59.04]  Yes    Left wrist fracture s/p ORIF [S62.102A]  Yes    Essential (primary) hypertension [I10]  Yes      Resolved Hospital Problems    Diagnosis Date Resolved POA   No resolved problems to display.       Future Appointments  Date Time Provider Department Center   3/21/2018 8:15 AM Jasen Salinas MD [facility]   3/27/2018 10:00 AM Daniel Garcia MD [facility]   3/29/2018 2:40 PM Cl Wong Jr., MD [facility]     Follow-up Information     Dr LINN Wong. Go on 3/29/2018.    Why:  follow up appt at 2:40pm  Contact information:  [address]  [address]  [facility] [address]  phone [phone]  fax [phone]           Jasen Salinas MD. Go on 3/21/2018.    Specialty:  Orthopedic Surgery  Contact information:  [address]  [facility] [id number] [phone]             Daniel C Area, MD. Go on 3/27/2018.    Specialty:  Family Medicine  Contact information:  [address] [facility]  [address]  [facility] [id number] [phone]               I have seen and examined this patient face to face today.  My clinical findings that support the need for the home health skilled services and home bound status are the following:  Weakness/numbness causing balance and gait disturbance due to Joint Replacement, Weakness/Debility and Surgery making it taxing to leave home.    Allergies:  Review of patient's allergies indicates:   Allergen Reactions    Cortisone Hives and Swelling     Swelling of the tongue       Diet: 2 gram sodium diet    Activities: activity as tolerated and no lifting, Driving, or Strenuous exercise; Weight bearing as tolerated to left lower extremity with hinged knee brace locked in extension    Nursing:   SN to complete comprehensive assessment including routine vital signs. Instruct on disease process and s/s of complications to report to MD. Review/verify medication list sent home with the patient at time of discharge  and instruct patient/caregiver as needed. Frequency may be adjusted depending on start of care date.    Notify MD if SBP > 160 or < 90; DBP > 90 or < 50; HR > 120 or < 50; Temp > 101      CONSULTS:    Physical Therapy to evaluate and treat. Evaluate for home safety and equipment needs; Establish/upgrade home exercise program. Perform / instruct on therapeutic exercises, gait training, transfer training, and Range of Motion.  Occupational Therapy to evaluate and treat. Evaluate home environment for safety and equipment needs. Perform/Instruct on transfers, ADL training, ROM, and therapeutic exercises.  Aide to provide assistance with personal care, ADLs, and vital signs.    MISCELLANEOUS CARE:  N/A    WOUND CARE ORDERS  n/a      Medications: Review discharge medications with patient and family and provide education.      Current Discharge Medication List      START taking these medications    Details   famotidine (PEPCID) 20 MG tablet Take 1 tablet (20 mg total) by mouth 2 (two) times daily.  Qty: 28 tablet, Refills: 0      miconazole nitrate 2% (MICOTIN) 2 % Oint Apply topically 2  (two) times daily.  Refills: 0      senna-docusate 8.6-50 mg (PERICOLACE) 8.6-50 mg per tablet Take 1 tablet by mouth 2 (two) times daily as needed for Constipation.         CONTINUE these medications which have CHANGED    Details   aspirin (ECOTRIN) 325 MG EC tablet Take 1 tablet (325 mg total) by mouth 2 (two) times daily.  Qty: 60 tablet, Refills: 0      hydroCHLOROthiazide (HYDRODIURIL) 25 MG tablet Take 2 tablets (50 mg total) by mouth once daily.  Qty: 180 tablet, Refills: 3      oxyCODONE-acetaminophen (PERCOCET)  mg per tablet Take 1 tablet by mouth every 4 (four) hours as needed for Pain.  Qty: 35 tablet, Refills: 0         CONTINUE these medications which have NOT CHANGED    Details   ASCORBATE CALCIUM (VITAMIN C ORAL) Take by mouth once daily.       valsartan (DIOVAN) 80 MG tablet TAKE 1 TABLET(80 MG) BY MOUTH EVERY DAY  Qty: 90 tablet, Refills: 0      docusate sodium (COLACE) 100 MG capsule Take 1 capsule (100 mg total) by mouth 2 (two) times daily.  Qty: 60 capsule, Refills: 0         STOP taking these medications       triamcinolone acetonide 0.1% (KENALOG) 0.1 % cream Comments:   Reason for Stopping:         ondansetron (ZOFRAN) 8 MG tablet Comments:   Reason for Stopping:               I certify that this patient is confined to her home and needs intermittent skilled nursing care, physical therapy and occupational therapy.

## 2018-03-13 NOTE — PLAN OF CARE
Problem: Physical Therapy Goal  Goal: Physical Therapy Goal  Goals to be met by: 14 days     Patient will increase functional independence with mobility by performin. Supine to sit with Stand-by Assistance = met  2. Sit to supine with Stand-by Assistance = not met  3. Sit to stand transfer with Stand-by Assistance with LLE brace locked in extension and with left platform rolling walker = not met  4. Bed to chair transfer with Stand-by Assistance  with LLE brace locked in extension and with left platform rolling walker = not met  5. Gait  x 150 feet with Stand-by Assistance  with LLE brace locked in extension and with left platform rolling walker = met 3/13/2018  6. Wheelchair propulsion x50 feet with Stand-by Assistance using RUE/RLE = not met  7. Ascend/Descend 4 inch curb step with Contact Guard Assistance  with LLE brace locked in extension and with left platform rolling walker.DISCONTINUE 3/11/2018  8. Lower extremity exercise program x20 reps per handout, with assistance as needed and gym therex= met  3/11/2018 NEW GOAL:  9. Patient will ascend/descend 2 inch curb step w/ left PRW and LLE HKB locked in extension w/ min assist =  Met 3/13/2018     Outcome: Ongoing (interventions implemented as appropriate)  Goals are appropriate but written for RLE. PTs LLE is the affected extremity.  W/c goal also needs to be updated for propulsion with RUE/RLE only.   3/5/2018 Goals corrected today. LLE is WBAT and w/ hinged knee brace locked in extension.  3/5/2018 Jennifer Oliveira, PT     D/c home today w/ family Jennifer Oliveira, PT 3/13/2018

## 2018-03-13 NOTE — PLAN OF CARE
03/13/2018  3:37 PM    Patient to discharge home today with assist of family. Patient set up with [facility] per MATT Benito.     Future Appointments  Date Time Provider Department Center   3/21/2018 8:15 AM Jasen Salinas MD [facility] [address]   3/27/2018 10:00 AM Daniel Garcia MD [facility]   3/29/2018 2:40 PM Cl Wong Jr., MD [facility]   Patient's discharge summary faxed to Dr Wong's office at [phone].  Your fax has been successfully sent to [phone] at [phone].  ------------------------------------------------------------  From: [id number]  ------------------------------------------------------------  Time: 3/14/2018 10:17:52 AM  Sent to [phone] with remote ID "  Result: (0/339;0/0) Successful Send  Page record: 1 - 13  Elapsed time: 05:21 on channel 6       03/13/18 1537   Final Note   Assessment Type Discharge Planning Assessment   Discharge Disposition Home   What phone number can be called within the next 1-3 days to see how you are doing after discharge? [phone]   Hospital Follow Up  Appt(s) scheduled? Yes   Discharge plans and expectations educations in teach back method with documentation complete? Yes     Brianna Herman RN, CM Skilled  [id number]

## 2018-03-14 NOTE — DISCHARGE SUMMARY
[facility]  Department of Hospital Medicine  Discharge Summary      Patient Name: Katelyn Huynh  MRN: [id number]  Admission Date: 2/28/2018  Hospital Length of Stay: 13 days  Discharge Date and Time: 3/13/2018  3:30 PM  Attending Physician: Jackelin Miller MD  Discharging Provider: Vandana Santoyo NP  Primary Care Provider: Daniel Garcia MD    Chief Complaint/Reason for Admission: Wound dehiscence    History of Present Illness:  Patient is a 67 y.o. female with HTN, morbid obesity, recent left TKR who presents to SNF after readmission for dehiscence of her left knee incision.  The patient is known to me from previous SNF admission (See H/P 2/17/2018)  She required quadriceps tendon repair, I&D and revision of the left knee on 2/24/2018 by Dr. Martinez.  Her left wrist splint has been removed, sutures also removed and arm placed in short arm cast.  She complains of left knee pain 7/10 and left wrist pain 3/10. Knee pain exacerbated with ambulation and both pains are relieved with oxycodone at the current dose. She has an incisional wound vac in place and will complete 10 d of IV antibiotics with vanc and rocephin.The patient has been admitted to SNF for ongoing PT/OT due to insufficient progress to go home safely from the hospital.    Hospital Course:   The patient was admitted at [facility] from 2/23 to 2/28/2018.  2/28: Patient admitted to SNF for ongoing PT/Ot following a hospitalization for wound dehiscence with quadriceps tendon repair, I&D, and revision of left knee  3/2: Patient seen at bedside, doing well, reports tolerable pain to both left wrist and left leg, wound vac in place with good suction.   3/7/2018 Pt seen at bedside. Incisional vac removed. Staples intact. No active drainage. Brace in place for quad tendon rupture. (ortho NP)  3/9: Patient seen at bedside, reports tolerable pain to both wrisit and left leg. Knee incision with island dressing with small amount of  dried serosanguinous drainage to upper dressing, brace in use.   3/13: Patient seen at bedside, discussed discharge with patient and family, answered all questions. Patient discharge home with home health services.     Significant Diagnostic Studies:     Recent Labs  Lab 03/08/18  0657 03/12/18  0442   WBC 7.41 5.97   HGB 9.6* 10.0*   HCT 29.8* 30.5*    296       Recent Labs  Lab 03/08/18  0657 03/12/18  0442    138   K 3.7 4.0    98   CO2 32* 31*   BUN 9 10   CREATININE 0.7 0.7   CALCIUM 8.9 9.0   PROT  --  6.7   BILITOT  --  0.4   ALKPHOS  --  94   ALT  --  11   AST  --  18     Lab Results   Component Value Date    LABPROT 10.5 02/23/2018    ALBUMIN 2.7 (L) 03/12/2018     Lab Results   Component Value Date    CALCIUM 9.0 03/12/2018    PHOS 3.7 03/12/2018       Final Active Diagnoses:    Diagnosis Date Noted POA    PRINCIPAL PROBLEM:  Wound dehiscence [T81.30XA] 02/23/2018 Yes    Primary osteoarthritis of left knee [M17.12] 02/09/2018 Yes    S/P TKR (total knee replacement), left [Z96.652] 02/17/2018 Not Applicable    Quadriceps tendon rupture, left, subsequent encounter [S76.112D] 03/01/2018 Not Applicable    Yeast dermatitis [B37.2] 03/06/2018 Yes    Morbid obesity with BMI of 50.0-59.9, adult [E66.01, Z68.43] 02/17/2018 Not Applicable    Chronic idiopathic constipation [K59.04] 02/17/2018 Yes    Left wrist fracture s/p ORIF [S62.102A] 02/16/2018 Yes    Essential (primary) hypertension [I10] 05/20/2013 Yes      Problems Resolved During this Admission:    Diagnosis Date Noted Date Resolved POA      * Wound dehiscence    · Continue wound vac with good suction  · Wound care nurse evaluated and suctioning well  · Ortho NP to follow patient every week   · Continue vanc and rocephin via PICC line with end date of 3/10  · Vanc levels every 4th dose for goal of 15-20  · Monitoring with weekly CRP, ESR and hepatic function tests  · PICC line care  · f/u in ID clinic prior to abx end date--seen  in ID clinic yesterday and ABX to end on 3/10        Primary osteoarthritis of left knee    · S/p TKR on 2/9  · See plan below.        S/P TKR (total knee replacement), left    · continue PT/OT to increase ambulation, ADL performance and endurance  · WBAT to LLE  · continue oxycodone for pain control prn  · keep polar ice in place when not in therapy (patient to have brought from home), reports sister is bring today  · continue ASA for DVT prophylaxis; continue famotidine while taking high dose ASA  · continue wound vac to left knee  · Ortho NP to assess patient while at SNF weekly        Quadriceps tendon rupture, left, subsequent encounter    · s/p repair on 2/24/2018  · Continue WBAT in hinged knee brace locked in extension        Yeast dermatitis    · Seen by wound care  · continue with recommendations of miconazole cream         Chronic idiopathic constipation    · occurs at home and worsened with narcotic usage and immobility  · continue bowel regimen with senokot-s and miralax  · MOM is effective at home, continue prn        Morbid obesity with BMI of 50.0-59.9, adult    · Portion control while at SNF with modeling at home  · Referral to weight loss clinic if patient interested        Left wrist fracture s/p ORIF    · Patient had sutures and splint removed by [facility] team when hospitalized  · F/u with Dr. Wong on discharge from SNF  · cast in place        Essential (primary) hypertension    · Chronic and stable  · Continue therapy with HCTZ and valsartan  · intermittent tachycardic which was present with prior admission at SNF, if remains constant start low dose metoprolol, currently HR controlled  · will continue to monitor and adjust regimen as necessary        PT note, IRMA Oliveira, PT 3/13/18  General Precautions: Standard, fall  Orthopedic Precautions: LLE posterior precautions, LUE non weight bearing   Braces: Hinged knee brace (locked in extension and platform on RW for LUE  support)  Functional Status:  MDS G  Bed Mobility Functional Status: CGA-Min (A)  Transfer Functional Status: mod(A)-Max(A)  Walk in Room Functional Status: S-SBA  Walk in Corridor Functional Status: S-SBA  Moving from seated to standing position: Not steady, only able to stabilize with staff assistance  Walking (with assistive device if used): Not steady, but able to stabilize without staff assistance  Turning around and facing the opposite direction while walking: Not steady, but able to stabilize without staff assistance  Surface-to-surface transfer (transfer between bed and chair or wheelchair): Not steady, only able to stabilize with staff assistance  Bed Mobility:  Sit>Supine:w/ min assist for LLE  Supine>Sit: w/ SBA  Transfers:  Sit<>Stand: w/ Left PRW and supervision from bed, mat w/ slight elevation. From w/c w/ min/mod assist x4 trials w/ son or sister assisting.  Stand Pivot Transfer: w/ L PRW and SBA once standing  HKB locked in extension and LUE supported on forearm on PRW  Gait:  Amb w/ Left PRW and SBA/supervision 150 feet; amb 20 feet x2 trials w/ family providing CGA and instructed in guarding/assisting if LOB. Family demonstrates ability to assist  Advanced Gait:  Curb Step: up/down 2 inch curb step w/ L PRW and CGA x2 trials w/ son assisting  Up/down once w/ patient in w/c w/ max assist of 2 to tilt w/c.  Wheelchair Mobility:  Patient propels w/c w/ RU/LE 35 feet w/ SBA   Discharge recommendations: home with home health         OT IRMA coronado, GOMEZ/L 3/13/18  General Precautions: Standard, fall  Orthopedic Precautions: LUE non weight bearing  Braces: Hinged knee brace  Functional Status:  MDS G  Bed Mobility Functional Status: CGA-Min (A) from sit supine  Transfer Functional Status: CGA-Min (A) for safety from w/c with cues for safety from PFRW  Walk in Room Functional Status: S-SBA from w/c to EOB  Dressing Functional Status: 3:mod(A)-Max(A) meño/doff pants with brace management   Moving from  seated to standing position: Not steady, only able to stabilize with staff assistance  Walking (with assistive device if used): Not steady, only able to stabilize with staff assistance  Turning around and facing the opposite direction while walking: Not steady, only able to stabilize with staff assistance  Surface-to-surface transfer (transfer between bed and chair or wheelchair): Not steady, only able to stabilize with staff assistance  Discharge recommendations: home health OT (with supervision)       Discharged Condition: stable    Disposition: Home-Health Care [facility]    Follow Up:  Follow-up Information     Dr LINN Wong. Go on 3/29/2018.    Why:  follow up appt at 2:40pm  Contact information:  [address]  [address]  [facility] [address]  phone [phone]  fax [phone]           Jasen Salinas MD. Go on 3/21/2018.    Specialty:  Orthopedic Surgery  Contact information:  [address]  [facility] [address]  [phone]             Daniel C Area, MD. Go on 3/27/2018.    Specialty:  Family Medicine  Contact information:  [address]  [address]  [facility] [address]  [phone]             [facility].    Specialty:  DME Provider  Why:  Home Medical Equipment Questions/Concerns  Contact information:  [address]  [facility] [address]  [phone]             [facility].    Specialty:  Home Health Services  Why:  Home Health Questions/Concerns  Contact information:  [address]  [address]  [facility] [address]  [phone]                 Future Appointments  Date Time Provider Department Center   3/21/2018 8:15 AM Jasen Salinas MD [facility] [address]   3/27/2018 10:00 AM Daniel Garcia MD [facility]   3/29/2018 2:40 PM Cl Wong Jr., MD [facility]     Patient Instructions:     WHEELCHAIR FOR HOME USE   Order Specific Question Answer Comments   Hours in W/C per day: 8    Type of Wheelchair: Standard   "  Size(Width): 24"    Leg Support: Elevating leg rests    Leg Support: Swing Away    Arm Height: Full length    Arm Height: Swing away    Desired seat depth: 20    Back height: standard    Lap Belt: Velcro    Cushion: Basic    Justification for cushion: Prevent pressure ulcers    Height: 5' 4" (1.626 m)    Weight: 129.2 kg (284 lb 13.4 oz)    Does patient have medical equipment at home? bedside commode    Does patient have medical equipment at home? bath bench    Does patient have medical equipment at home? walker, rolling    Does patient have medical equipment at home? cane, straight    Length of need (1-99 months): 12    Please check all that apply: Caregiver is capable and willing to operate wheelchair safely.    Please check all that apply: The patient has a cast, brace or muscloskeletal condition which prevents 90 degree flexion of the knee.    Please check all that apply: The patient has significant edema of the lower extremities that requires an elevating leg rest.    Please check all that apply: Patient mobility limitations cannot be sufficiently resolved by the use of other ambulatory therapies.    Vendor: [employer] to pull from SNF closet   Expected Date of Delivery: 3/13/2018      HME - OTHER   Order Specific Question Answer Comments   Type of Equipment: left platform to fit rolling walker    Height: 5' 4" (1.626 m)    Weight: 129.2 kg (284 lb 13.4 oz)    Does patient have medical equipment at home? bedside commode    Does patient have medical equipment at home? bath bench    Does patient have medical equipment at home? walker, rolling    Does patient have medical equipment at home? cane, straight    Vendor: [employer] [facility] [facility] to pull from SNF closet    Expected Date of Delivery: 3/13/2018      Activity as tolerated     Weight bearing restrictions (specify)   Order Comments: Weight bearing as tolerated to left lower extremity with hinged knee brace locked in extension     Notify your health care " provider if you experience any of the following:  temperature >100.4     Notify your health care provider if you experience any of the following:  persistent nausea and vomiting or diarrhea     Notify your health care provider if you experience any of the following:  severe uncontrolled pain     Notify your health care provider if you experience any of the following:  redness, tenderness, or signs of infection (pain, swelling, redness, odor or green/yellow discharge around incision site)     Notify your health care provider if you experience any of the following:  difficulty breathing or increased cough     Notify your health care provider if you experience any of the following:  severe persistent headache     Notify your health care provider if you experience any of the following:  persistent dizziness, light-headedness, or visual disturbances     Notify your health care provider if you experience any of the following:  increased confusion or weakness       Medications:  Reconciled Home Medications:   Discharge Medication List as of 3/13/2018  3:07 PM      START taking these medications    Details   famotidine (PEPCID) 20 MG tablet Take 1 tablet (20 mg total) by mouth 2 (two) times daily., Starting Tue 3/13/2018, Until Tue 3/27/2018, Normal      miconazole nitrate 2% (MICOTIN) 2 % Oint Apply topically 2 (two) times daily., Starting Tue 3/13/2018, OTC      senna-docusate 8.6-50 mg (PERICOLACE) 8.6-50 mg per tablet Take 1 tablet by mouth 2 (two) times daily as needed for Constipation., Starting Tue 3/13/2018, OTC         CONTINUE these medications which have CHANGED    Details   aspirin (ECOTRIN) 325 MG EC tablet Take 1 tablet (325 mg total) by mouth 2 (two) times daily., Starting Tue 3/13/2018, Until Tue 3/27/2018, OTC      hydroCHLOROthiazide (HYDRODIURIL) 25 MG tablet Take 2 tablets (50 mg total) by mouth once daily., Starting Tue 3/13/2018, Normal      oxyCODONE-acetaminophen (PERCOCET)  mg per tablet Take  1 tablet by mouth every 4 (four) hours as needed for Pain., Starting Tue 3/13/2018, Print         CONTINUE these medications which have NOT CHANGED    Details   ASCORBATE CALCIUM (VITAMIN C ORAL) Take by mouth once daily. , Historical Med      docusate sodium (COLACE) 100 MG capsule Take 1 capsule (100 mg total) by mouth 2 (two) times daily., Starting Sat 2/24/2018, Print      valsartan (DIOVAN) 80 MG tablet TAKE 1 TABLET(80 MG) BY MOUTH EVERY DAY, Normal         STOP taking these medications       ondansetron (ZOFRAN) 8 MG tablet Comments:   Reason for Stopping:         ondansetron (ZOFRAN-ODT) 4 MG TbDL Comments:   Reason for Stopping:         triamcinolone acetonide 0.1% (KENALOG) 0.1 % cream Comments:   Reason for Stopping:             Time spent on the discharge of patient: 25 minutes    Vandana Santoyo NP  Department of Hospital Medicine  [facility]

## 2018-03-14 NOTE — ASSESSMENT & PLAN NOTE
· Chronic and stable  · Continue therapy with HCTZ and valsartan  · intermittent tachycardic which was present with prior admission at SNF, if remains constant start low dose metoprolol, currently HR controlled  · will continue to monitor and adjust regimen as necessary

## 2018-03-14 NOTE — ASSESSMENT & PLAN NOTE
· continue PT/OT to increase ambulation, ADL performance and endurance  · WBAT to LLE  · continue oxycodone for pain control prn  · keep polar ice in place when not in therapy (patient to have brought from home), reports sister is bring today  · continue ASA for DVT prophylaxis; continue famotidine while taking high dose ASA  · continue wound vac to left knee  · Ortho NP to assess patient while at SNF weekly

## 2018-03-14 NOTE — ASSESSMENT & PLAN NOTE
· occurs at home and worsened with narcotic usage and immobility  · continue bowel regimen with senokot-s and miralax  · MOM is effective at home, continue prn

## 2018-03-14 NOTE — ASSESSMENT & PLAN NOTE
· Continue wound vac with good suction  · Wound care nurse evaluated and suctioning well  · Ortho NP to follow patient every week   · Continue vanc and rocephin via PICC line with end date of 3/10  · Vanc levels every 4th dose for goal of 15-20  · Monitoring with weekly CRP, ESR and hepatic function tests  · PICC line care  · f/u in ID clinic prior to abx end date--seen in ID clinic yesterday and ABX to end on 3/10

## 2018-03-14 NOTE — ASSESSMENT & PLAN NOTE
· Patient had sutures and splint [facility] when hospitalized  · F/u with Dr. Wong on discharge from SNF  · cast in place

## 2018-03-14 NOTE — ASSESSMENT & PLAN NOTE
· Portion control while at SNF with modeling at home  · Referral to weight loss clinic if patient interested

## 2018-03-21 ENCOUNTER — OFFICE VISIT (OUTPATIENT)
Dept: ORTHOPEDICS | Facility: CLINIC | Age: 68
End: 2018-03-21
Payer: COMMERCIAL

## 2018-03-21 VITALS — WEIGHT: 283 LBS | TEMPERATURE: 98 F | HEIGHT: 64 IN | BODY MASS INDEX: 48.32 KG/M2

## 2018-03-21 DIAGNOSIS — Z98.890 POSTOPERATIVE STATE: Primary | ICD-10-CM

## 2018-03-21 PROCEDURE — 99024 POSTOP FOLLOW-UP VISIT: CPT | Mod: S$GLB,,, | Performed by: ORTHOPAEDIC SURGERY

## 2018-03-21 PROCEDURE — 99999 PR PBB SHADOW E&M-EST. PATIENT-LVL III: CPT | Mod: PBBFAC,,, | Performed by: ORTHOPAEDIC SURGERY

## 2018-03-21 NOTE — PROGRESS NOTES
CHIEF COMPLAINT:  Left knee replacement.    Ms. Huynh returns for evaluation of her left knee.  She is status post left   knee replacement.  In the postoperative period, she fell sustaining a dehiscence   of her wound.  She also had a near-complete rupture of her quadriceps tendon.     ____ irrigated the wound and performed a quadriceps tendon repair in my   absence.  Prior to that, she also fell sustaining a distal radius fracture,   which was treated operatively at an outside institution.    She has no new complaints today and overall she is feeling fine.  She is in a   hinged knee brace, locked in extension at this time.    On physical examination, the incision is healing well.  There is no erythema.    There is no drainage.  The staples were all removed.    IMPRESSION:  Quadriceps tendon rupture, status post knee replacement.    I have discussed with her that we need to keep her in full extension for another   2 to 3 weeks to minimize the chance of revision quad rupture.  She understands   that she may have more stiffness in flexion because of her delayed physical   therapy, but I have discussed that a re-rupture of the tendon would be worse.    She understands this.  We are going to plan on seeing her back in 3 weeks.  At   that time, we will evaluate and repeat x-rays and start her in physical therapy   as indicated.      MSM/ISABEL  dd: 03/21/2018 09:16:30 (CDT)  td: 03/22/2018 06:01:56 (CDT)  Doc ID   #[id number]  Job ID #[id number]    CC:     [id number]

## 2018-03-28 LAB
FUNGUS SPEC CULT: NORMAL
FUNGUS SPEC CULT: NORMAL

## 2018-04-04 DIAGNOSIS — Z96.652 STATUS POST LEFT KNEE REPLACEMENT: Primary | ICD-10-CM

## 2018-04-11 ENCOUNTER — OFFICE VISIT (OUTPATIENT)
Dept: ORTHOPEDICS | Facility: CLINIC | Age: 68
End: 2018-04-11
Payer: COMMERCIAL

## 2018-04-11 VITALS — HEIGHT: 64 IN | WEIGHT: 280 LBS | BODY MASS INDEX: 47.8 KG/M2

## 2018-04-11 DIAGNOSIS — Z98.890 POSTOPERATIVE STATE: Primary | ICD-10-CM

## 2018-04-11 PROCEDURE — 99024 POSTOP FOLLOW-UP VISIT: CPT | Mod: S$GLB,,, | Performed by: ORTHOPAEDIC SURGERY

## 2018-04-11 PROCEDURE — 99999 PR PBB SHADOW E&M-EST. PATIENT-LVL II: CPT | Mod: PBBFAC,,, | Performed by: ORTHOPAEDIC SURGERY

## 2018-04-11 RX ORDER — OXYCODONE AND ACETAMINOPHEN 10; 325 MG/1; MG/1
1 TABLET ORAL
Qty: 50 TABLET | Refills: 0 | Status: SHIPPED | OUTPATIENT
Start: 2018-04-11 | End: 2018-10-17

## 2018-04-11 NOTE — PROGRESS NOTES
CHIEF COMPLAINT:  Left knee replacement.    Ms. Huynh returns for evaluation of her left knee.  She is status post left   knee replacement.  In the postoperative period, she fell sustaining a dehiscence   of her wound.  She also had a near-complete rupture of her quadriceps tendon.    Dr Noble irrigated the wound and performed a quadriceps tendon repair in my   Absence on 2/24/18.  Prior to that, she also fell sustaining a distal radius fracture,   which was treated operatively at an outside institution.    She has no new complaints today and overall she is feeling fine.  She is in a   hinged knee brace, locked in extension at this time. States she has been ambulating well. Was discharged from SNF about a month ago.     On physical examination, the incision is healing well.  There is no erythema.    There is no drainage.  ROM 0-60 with quad weakness    IMPRESSION:  Quadriceps tendon rupture, status post knee replacement.    Plan:  6.5 weeks s/p left I&D for dehiscence and quad tendon rupture repair  - Okay to come out of HKB and start ROM;  Hinged knee brace locked 0-60 when up independently.  - Discussed stiffness in flexion from period of immobilization  _ Begin PT  -F/U 6 weeks.

## 2018-04-28 LAB
ACID FAST MOD KINY STN SPEC: NORMAL
ACID FAST MOD KINY STN SPEC: NORMAL
MYCOBACTERIUM SPEC QL CULT: NORMAL
MYCOBACTERIUM SPEC QL CULT: NORMAL

## 2018-05-16 ENCOUNTER — OFFICE VISIT (OUTPATIENT)
Dept: ORTHOPEDICS | Facility: CLINIC | Age: 68
End: 2018-05-16
Payer: COMMERCIAL

## 2018-05-16 ENCOUNTER — HOSPITAL ENCOUNTER (OUTPATIENT)
Dept: RADIOLOGY | Facility: HOSPITAL | Age: 68
Discharge: HOME | End: 2018-05-16
Attending: NURSE PRACTITIONER
Payer: COMMERCIAL

## 2018-05-16 VITALS — WEIGHT: 280 LBS | BODY MASS INDEX: 47.8 KG/M2 | HEIGHT: 64 IN

## 2018-05-16 DIAGNOSIS — Z96.652 S/P TKR (TOTAL KNEE REPLACEMENT) USING CEMENT, LEFT: Primary | ICD-10-CM

## 2018-05-16 DIAGNOSIS — Z96.652 S/P TKR (TOTAL KNEE REPLACEMENT) USING CEMENT, LEFT: ICD-10-CM

## 2018-05-16 PROCEDURE — 99999 PR PBB SHADOW E&M-EST. PATIENT-LVL III: CPT | Mod: PBBFAC,,, | Performed by: NURSE PRACTITIONER

## 2018-05-16 PROCEDURE — 99024 POSTOP FOLLOW-UP VISIT: CPT | Mod: S$GLB,,, | Performed by: NURSE PRACTITIONER

## 2018-05-16 PROCEDURE — 73560 X-RAY EXAM OF KNEE 1 OR 2: CPT | Mod: TC,RT

## 2018-05-16 PROCEDURE — 73562 X-RAY EXAM OF KNEE 3: CPT | Mod: 26,LT,, | Performed by: RADIOLOGY

## 2018-05-16 PROCEDURE — 73560 X-RAY EXAM OF KNEE 1 OR 2: CPT | Mod: 26,XS,RT, | Performed by: RADIOLOGY

## 2018-05-16 PROCEDURE — 73562 X-RAY EXAM OF KNEE 3: CPT | Mod: TC,LT

## 2018-05-16 NOTE — PROGRESS NOTES
"Katelyn Hyunh presents for 3 month post-operative visit following a left total knee arthroplasty performed by Dr. Salinas on 2/9/2018 with subsequent I&D and quadriceps tendon repair 2/24/18 by Dr. Martinez. Tolerating pain medication well.      Exam:   Height 5' 4" (1.626 m), weight 127 kg (279 lb 15.8 oz).   Ambulating well with assistive device. Still using a walker for balance even at home.  Incision is completely healed. ROM:-5-90    Post-operative radiographs reviewed today revealing a well fixed and aligned prosthesis.    A/P:  3 months s/p left total knee replacement with subsequent I&D and quadriceps tendon repair  - Outpatient PT: ongoing at [facility]  - Follow up in 1 year with Dr. Salinas. Pt will call clinic with problems/concerns.     "

## 2018-05-22 ENCOUNTER — NURSE TRIAGE (OUTPATIENT)
Dept: ADMINISTRATIVE | Facility: CLINIC | Age: 68
End: 2018-05-22

## 2018-05-22 NOTE — TELEPHONE ENCOUNTER
Reason for Disposition   MODERATE difficulty breathing (e.g., speaks in phrases, SOB even at rest, pulse 100-120) of new onset or worse than normal    Protocols used: ST BREATHING DIFFICULTY-A-OH    Pt states she was calling to set up appt with PCP. Pt c/o SOB. States PT told her that wanted her to be seen by PCP before continuing therapy. Pt states when she is just sitting there she feels herself breathing harder than normal. Care advice given, pt would like to speak to Dr Area instead of going to ED.

## 2018-05-22 NOTE — TELEPHONE ENCOUNTER
"Patient reports that she has SOB mostly during PT. Also reports that she sometimes has SOB when walking around the house. Inquiring whether   SOB is related to her increase in exercise. Denies SOB at this time, but sounds "winded". Dr. Sánchez notified and advises that patient go to ED. Patients verbalizes that she can't afford to pay for an ambulance and that she doesn't have a ride at the time. Patient states, "I'll see what I can do". Unsure if patient will follow advice.   "

## 2018-05-28 ENCOUNTER — TELEPHONE (OUTPATIENT)
Dept: FAMILY MEDICINE | Facility: CLINIC | Age: 68
End: 2018-05-28

## 2018-05-28 DIAGNOSIS — Z00.00 ROUTINE GENERAL MEDICAL EXAMINATION AT A HEALTH CARE FACILITY: Primary | ICD-10-CM

## 2018-05-28 NOTE — TELEPHONE ENCOUNTER
----- Message from Vandana Henry sent at 5/28/2018 11:37 AM CDT -----  Contact: self  Doctor appointment and lab have been scheduled.  Please link lab orders to the lab appointment.  Date of doctor appointment:  06/25  Physical or EP:  Physical  Date of lab appointment:  06/19  Comments:

## 2018-06-11 RX ORDER — VALSARTAN 80 MG/1
TABLET ORAL
Qty: 90 TABLET | Refills: 0 | Status: SHIPPED | OUTPATIENT
Start: 2018-06-11 | End: 2018-06-25 | Stop reason: SDUPTHER

## 2018-06-19 ENCOUNTER — TELEPHONE (OUTPATIENT)
Dept: FAMILY MEDICINE | Facility: CLINIC | Age: 68
End: 2018-06-19

## 2018-06-19 DIAGNOSIS — Z00.00 ANNUAL PHYSICAL EXAM: Primary | ICD-10-CM

## 2018-06-19 NOTE — TELEPHONE ENCOUNTER
Patient checked in for lab appointment and then decided that she wanted labs to be drawn at [facility]. Labs reordered to be resulted at [facility].

## 2018-06-20 ENCOUNTER — PATIENT MESSAGE (OUTPATIENT)
Dept: FAMILY MEDICINE | Facility: CLINIC | Age: 68
End: 2018-06-20

## 2018-06-20 ENCOUNTER — TELEPHONE (OUTPATIENT)
Dept: FAMILY MEDICINE | Facility: CLINIC | Age: 68
End: 2018-06-20

## 2018-06-20 DIAGNOSIS — R77.1 HYPERGLOBULINEMIA: Primary | ICD-10-CM

## 2018-06-20 LAB
ALBUMIN SERPL-MCNC: 4.1 G/DL (ref 3.6–5.1)
ALBUMIN/GLOB SERPL: 1 (CALC) (ref 1–2.5)
ALP SERPL-CCNC: 98 U/L (ref 33–130)
ALT SERPL-CCNC: 8 U/L (ref 6–29)
AST SERPL-CCNC: 16 U/L (ref 10–35)
BASOPHILS # BLD AUTO: 53 CELLS/UL (ref 0–200)
BASOPHILS NFR BLD AUTO: 0.8 %
BILIRUB SERPL-MCNC: 0.8 MG/DL (ref 0.2–1.2)
BUN SERPL-MCNC: 11 MG/DL (ref 7–25)
BUN/CREAT SERPL: ABNORMAL (CALC) (ref 6–22)
CALCIUM SERPL-MCNC: 10.1 MG/DL (ref 8.6–10.4)
CHLORIDE SERPL-SCNC: 97 MMOL/L (ref 98–110)
CHOLEST SERPL-MCNC: 177 MG/DL
CHOLEST/HDLC SERPL: 2.1 (CALC)
CO2 SERPL-SCNC: 31 MMOL/L (ref 20–31)
CREAT SERPL-MCNC: 0.88 MG/DL (ref 0.5–0.99)
EOSINOPHIL # BLD AUTO: 139 CELLS/UL (ref 15–500)
EOSINOPHIL NFR BLD AUTO: 2.1 %
ERYTHROCYTE [DISTWIDTH] IN BLOOD BY AUTOMATED COUNT: 14.1 % (ref 11–15)
GFR SERPL CREATININE-BSD FRML MDRD: 69 ML/MIN/1.73M2
GLOBULIN SER CALC-MCNC: 4.2 G/DL (CALC) (ref 1.9–3.7)
GLUCOSE SERPL-MCNC: 101 MG/DL (ref 65–99)
HCT VFR BLD AUTO: 49 % (ref 35–45)
HDLC SERPL-MCNC: 85 MG/DL
HGB BLD-MCNC: 15.8 G/DL (ref 11.7–15.5)
LDLC SERPL CALC-MCNC: 73 MG/DL (CALC)
LYMPHOCYTES # BLD AUTO: 1327 CELLS/UL (ref 850–3900)
LYMPHOCYTES NFR BLD AUTO: 20.1 %
MCH RBC QN AUTO: 27.5 PG (ref 27–33)
MCHC RBC AUTO-ENTMCNC: 32.2 G/DL (ref 32–36)
MCV RBC AUTO: 85.2 FL (ref 80–100)
MONOCYTES # BLD AUTO: 495 CELLS/UL (ref 200–950)
MONOCYTES NFR BLD AUTO: 7.5 %
NEUTROPHILS # BLD AUTO: 4587 CELLS/UL (ref 1500–7800)
NEUTROPHILS NFR BLD AUTO: 69.5 %
NONHDLC SERPL-MCNC: 92 MG/DL (CALC)
PLATELET # BLD AUTO: 301 THOUSAND/UL (ref 140–400)
PMV BLD REES-ECKER: 10.3 FL (ref 7.5–12.5)
POTASSIUM SERPL-SCNC: 3.8 MMOL/L (ref 3.5–5.3)
PROT SERPL-MCNC: 8.3 G/DL (ref 6.1–8.1)
RBC # BLD AUTO: 5.75 MILLION/UL (ref 3.8–5.1)
SODIUM SERPL-SCNC: 137 MMOL/L (ref 135–146)
TRIGL SERPL-MCNC: 108 MG/DL
TSH SERPL-ACNC: 1.85 MIU/L (ref 0.4–4.5)
WBC # BLD AUTO: 6.6 THOUSAND/UL (ref 3.8–10.8)

## 2018-06-20 NOTE — TELEPHONE ENCOUNTER
Patient informed regarding instructions and verbalizes understanding. Patient requesting [facility] lab.

## 2018-06-25 ENCOUNTER — OFFICE VISIT (OUTPATIENT)
Dept: FAMILY MEDICINE | Facility: CLINIC | Age: 68
End: 2018-06-25
Payer: COMMERCIAL

## 2018-06-25 VITALS
RESPIRATION RATE: 20 BRPM | WEIGHT: 275.56 LBS | BODY MASS INDEX: 47.04 KG/M2 | HEIGHT: 64 IN | DIASTOLIC BLOOD PRESSURE: 82 MMHG | TEMPERATURE: 98 F | SYSTOLIC BLOOD PRESSURE: 113 MMHG

## 2018-06-25 DIAGNOSIS — Z86.018 HISTORY OF MENINGIOMA: Chronic | ICD-10-CM

## 2018-06-25 DIAGNOSIS — I10 ESSENTIAL (PRIMARY) HYPERTENSION: ICD-10-CM

## 2018-06-25 DIAGNOSIS — Z00.00 ROUTINE GENERAL MEDICAL EXAMINATION AT A HEALTH CARE FACILITY: Primary | ICD-10-CM

## 2018-06-25 DIAGNOSIS — E66.01 MORBID OBESITY: ICD-10-CM

## 2018-06-25 PROCEDURE — 3074F SYST BP LT 130 MM HG: CPT | Mod: CPTII,S$GLB,, | Performed by: FAMILY MEDICINE

## 2018-06-25 PROCEDURE — 99397 PER PM REEVAL EST PAT 65+ YR: CPT | Mod: S$GLB,,, | Performed by: FAMILY MEDICINE

## 2018-06-25 PROCEDURE — 99999 PR PBB SHADOW E&M-EST. PATIENT-LVL III: CPT | Mod: PBBFAC,,, | Performed by: FAMILY MEDICINE

## 2018-06-25 PROCEDURE — 3079F DIAST BP 80-89 MM HG: CPT | Mod: CPTII,S$GLB,, | Performed by: FAMILY MEDICINE

## 2018-06-25 RX ORDER — VALSARTAN 80 MG/1
TABLET ORAL
Qty: 90 TABLET | Refills: 3 | Status: SHIPPED | OUTPATIENT
Start: 2018-06-25 | End: 2018-07-19

## 2018-06-25 RX ORDER — HYDROCHLOROTHIAZIDE 25 MG/1
50 TABLET ORAL DAILY
Qty: 180 TABLET | Refills: 3 | Status: SHIPPED | OUTPATIENT
Start: 2018-06-25 | End: 2020-10-27 | Stop reason: SDUPTHER

## 2018-06-25 NOTE — PROGRESS NOTES
Subjective:       Patient ID: Katelyn Huynh is a 67 y.o. female.    Chief Complaint: Annual Exam    Disclaimer: This note has been generated using voice-recognition software. There may be typographical errors that have been missed during proof-reading    68 yo presents today for routine exam, last exam one year ago  Immunizations:  UTD  Colonoscopy:  2011  Recently underwent surgery for left wrist fracture, left knee       Review of Systems   Constitutional: Negative.  Negative for activity change, appetite change, chills, diaphoresis, fatigue, fever and unexpected weight change.   HENT: Negative.  Negative for congestion, ear pain, hearing loss, rhinorrhea, sinus pressure, sore throat, tinnitus, trouble swallowing and voice change.    Eyes: Negative.  Negative for photophobia, pain and visual disturbance.   Respiratory: Negative.  Negative for cough, chest tightness and shortness of breath.    Cardiovascular: Negative.  Negative for chest pain, palpitations and leg swelling.   Gastrointestinal: Negative.  Negative for abdominal distention, abdominal pain, blood in stool, constipation, diarrhea, nausea and vomiting.   Genitourinary: Negative.  Negative for difficulty urinating, dysuria, frequency and hematuria.   Musculoskeletal: Positive for arthralgias. Negative for back pain, joint swelling, neck pain and neck stiffness.        Undergoing PT for left wrist, knee   Skin: Negative for color change, pallor and rash.   Neurological: Negative.  Negative for dizziness, tremors, speech difficulty, weakness, light-headedness, numbness and headaches.   Hematological: Negative for adenopathy. Does not bruise/bleed easily.   Psychiatric/Behavioral: Negative for agitation, confusion, dysphoric mood, hallucinations and sleep disturbance. The patient is not nervous/anxious.        Objective:      Physical Exam   Constitutional: She is oriented to person, place, and time. She appears well-developed and  well-nourished.   Morbidly obese   HENT:   Right Ear: Tympanic membrane and external ear normal.   Left Ear: Tympanic membrane and external ear normal.   Nose: Nose normal.   Mouth/Throat: Oropharynx is clear and moist.   Eyes: Conjunctivae and EOM are normal. Pupils are equal, round, and reactive to light.   Neck: Normal range of motion. Neck supple. No tracheal deviation present. No thyromegaly present.   Cardiovascular: Normal rate, regular rhythm, normal heart sounds and intact distal pulses.    Pulmonary/Chest: Effort normal. She has no wheezes. She has no rales. She exhibits no tenderness.   Abdominal: Soft. Bowel sounds are normal. She exhibits no distension and no mass. There is no rebound.   Genitourinary: No vaginal discharge found.   Musculoskeletal: She exhibits no edema or tenderness.   Decreased dorsiflexion of left wrist   Lymphadenopathy:     She has no cervical adenopathy.   Neurological: She is alert and oriented to person, place, and time. She has normal reflexes. No cranial nerve deficit. Coordination normal.   Skin: Skin is warm and dry. No rash noted. No erythema.   Psychiatric: She has a normal mood and affect. Her behavior is normal.       Assessment:       1. Routine general medical examination at a health care facility    2. History of meningioma    3. Essential (primary) hypertension    4. Morbid obesity        Plan:     1.  Labs reviewed with pt, SEP pending  2.  Continue present medications  3. Pt has MRI scheduled for f/u meningioma  4.  F/u 6 months

## 2018-06-26 ENCOUNTER — TELEPHONE (OUTPATIENT)
Dept: FAMILY MEDICINE | Facility: CLINIC | Age: 68
End: 2018-06-26

## 2018-06-26 DIAGNOSIS — D47.2 GAMMOPATHY: Primary | ICD-10-CM

## 2018-06-26 LAB
ALBUMIN SERPL ELPH-MCNC: 3.6 G/DL (ref 3.8–4.8)
ALPHA1 GLOB SERPL ELPH-MCNC: 0.3 G/DL (ref 0.2–0.3)
ALPHA2 GLOB SERPL ELPH-MCNC: 0.9 G/DL (ref 0.5–0.9)
BETA1 GLOB SERPL ELPH-MCNC: 0.5 G/DL (ref 0.4–0.6)
BETA2 GLOB SERPL ELPH-MCNC: 0.6 G/DL (ref 0.2–0.5)
GAMMA GLOB SERPL ELPH-MCNC: 1.7 G/DL (ref 0.8–1.7)
PROT PATTERN SERPL ELPH-IMP: ABNORMAL
PROT SERPL-MCNC: 7.6 G/DL (ref 6.1–8.1)

## 2018-06-29 LAB — INTERPRETATION SERPL IFE-IMP: NORMAL

## 2018-07-03 ENCOUNTER — TELEPHONE (OUTPATIENT)
Dept: ORTHOPEDICS | Facility: CLINIC | Age: 68
End: 2018-07-03

## 2018-07-03 DIAGNOSIS — M25.569 KNEE PAIN, UNSPECIFIED CHRONICITY, UNSPECIFIED LATERALITY: Primary | ICD-10-CM

## 2018-07-03 RX ORDER — HYDROCODONE BITARTRATE AND ACETAMINOPHEN 10; 325 MG/1; MG/1
1 TABLET ORAL EVERY 6 HOURS PRN
Qty: 30 TABLET | Refills: 0 | Status: SHIPPED | OUTPATIENT
Start: 2018-07-03 | End: 2018-07-13

## 2018-07-03 NOTE — TELEPHONE ENCOUNTER
----- Message from Hope Rojas MA sent at 7/3/2018 10:38 AM CDT -----  Contact: self      ----- Message -----  From: Lorena Vargas  Sent: 7/3/2018  10:26 AM  To: Brad PATEL Staff    Patient states still experiencing pain need medication called into [facility] pharmacy.   Please call pt at [phone] if any questions

## 2018-07-03 NOTE — TELEPHONE ENCOUNTER
----- Message from Lorena Vargas sent at 7/3/2018 10:26 AM CDT -----  Contact: self  Patient states still experiencing pain need medication called into [facility] pharmacy.   Please call pt at [phone] if any questions

## 2018-07-16 ENCOUNTER — TELEPHONE (OUTPATIENT)
Dept: FAMILY MEDICINE | Facility: CLINIC | Age: 68
End: 2018-07-16

## 2018-07-16 NOTE — TELEPHONE ENCOUNTER
Patient informed regarding valartan recall. Verbalizes that she will contact her pharmacy regarding recall.

## 2018-07-16 NOTE — TELEPHONE ENCOUNTER
----- Message from Yohan Martin sent at 7/16/2018 12:54 PM CDT -----  Contact: self/[phone]  Pt is calling to speak with someone in the office in regards to the medication valsartan (DIOVAN) 80 MG tablet. Pt states that she had seen that blood pressure medications were on recall and needs to know if hers is and if so should she stop taking them. Please advise.      Thanks

## 2018-07-19 ENCOUNTER — TELEPHONE (OUTPATIENT)
Dept: FAMILY MEDICINE | Facility: CLINIC | Age: 68
End: 2018-07-19

## 2018-07-19 RX ORDER — LOSARTAN POTASSIUM 50 MG/1
50 TABLET ORAL DAILY
Qty: 90 TABLET | Refills: 3 | Status: SHIPPED | OUTPATIENT
Start: 2018-07-19 | End: 2019-09-26 | Stop reason: SDUPTHER

## 2018-07-19 NOTE — TELEPHONE ENCOUNTER
----- Message from Makenzie Gan sent at 7/19/2018  9:24 AM CDT -----  Contact: Patient [phone]  She said she called the pharmacy and they said the valsartan (DIOVAN) 80 MG tablet is one that is being recalled. She wants to know if she should continue taking it or if you are going to prescribe and alternative.    Pharmacy: [facility] [address] [place] [address] [address] AT [facility] [address] [phone] (Phone) [phone] (Fax)

## 2018-07-19 NOTE — TELEPHONE ENCOUNTER
I have called in a prescription for Losartan.  Please ask her to make appt for BP check in one month, thanks

## 2018-08-08 ENCOUNTER — TELEPHONE (OUTPATIENT)
Dept: RADIATION ONCOLOGY | Facility: CLINIC | Age: 68
End: 2018-08-08

## 2018-08-08 DIAGNOSIS — D32.0 MENINGIOMA, CEREBRAL: Primary | ICD-10-CM

## 2018-08-08 NOTE — TELEPHONE ENCOUNTER
----- Message from Denise Cristo sent at 8/8/2018 11:13 AM CDT -----  Contact: Patient  Re: Needs an MRI appointment before f/u with Elsie.    Please call her cell: [phone].    Thanks,  Di  Return call to patient  appt made and confirmed

## 2018-08-15 ENCOUNTER — HOSPITAL ENCOUNTER (OUTPATIENT)
Dept: RADIOLOGY | Facility: HOSPITAL | Age: 68
Discharge: HOME | End: 2018-08-15
Attending: RADIOLOGY
Payer: COMMERCIAL

## 2018-08-15 ENCOUNTER — OFFICE VISIT (OUTPATIENT)
Dept: RADIATION ONCOLOGY | Facility: CLINIC | Age: 68
End: 2018-08-15
Payer: COMMERCIAL

## 2018-08-15 VITALS
TEMPERATURE: 98 F | BODY MASS INDEX: 45.09 KG/M2 | RESPIRATION RATE: 20 BRPM | DIASTOLIC BLOOD PRESSURE: 74 MMHG | WEIGHT: 262.69 LBS | HEART RATE: 95 BPM | SYSTOLIC BLOOD PRESSURE: 151 MMHG

## 2018-08-15 DIAGNOSIS — Z86.018 HISTORY OF MENINGIOMA: Primary | Chronic | ICD-10-CM

## 2018-08-15 DIAGNOSIS — D32.0 MENINGIOMA, CEREBRAL: ICD-10-CM

## 2018-08-15 LAB
CREAT SERPL-MCNC: 0.8 MG/DL (ref 0.5–1.4)
SAMPLE: NORMAL

## 2018-08-15 PROCEDURE — 25500020 PHARM REV CODE 255: Performed by: RADIOLOGY

## 2018-08-15 PROCEDURE — 70553 MRI BRAIN STEM W/O & W/DYE: CPT | Mod: 26,,, | Performed by: RADIOLOGY

## 2018-08-15 PROCEDURE — 70553 MRI BRAIN STEM W/O & W/DYE: CPT | Mod: TC

## 2018-08-15 PROCEDURE — 99999 PR PBB SHADOW E&M-EST. PATIENT-LVL III: CPT | Mod: PBBFAC,,, | Performed by: RADIOLOGY

## 2018-08-15 PROCEDURE — 3077F SYST BP >= 140 MM HG: CPT | Mod: CPTII,S$GLB,, | Performed by: RADIOLOGY

## 2018-08-15 PROCEDURE — 99213 OFFICE O/P EST LOW 20 MIN: CPT | Mod: S$GLB,,, | Performed by: RADIOLOGY

## 2018-08-15 PROCEDURE — 3078F DIAST BP <80 MM HG: CPT | Mod: CPTII,S$GLB,, | Performed by: RADIOLOGY

## 2018-08-15 PROCEDURE — A9585 GADOBUTROL INJECTION: HCPCS | Performed by: RADIOLOGY

## 2018-08-15 RX ORDER — GADOBUTROL 604.72 MG/ML
10 INJECTION INTRAVENOUS
Status: COMPLETED | OUTPATIENT
Start: 2018-08-15 | End: 2018-08-15

## 2018-08-15 RX ADMIN — GADOBUTROL 10 ML: 604.72 INJECTION INTRAVENOUS at 09:08

## 2018-08-15 NOTE — PROGRESS NOTES
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      She comes today after MRI scan. This shows persistent meningioma on the floor the anterior cranial fossa and enveloping the optic nerves.  There is no significant change since prior MRI of 1/13/16 or      Patient relates that she continues to have no sense of smell. She has no significant headaches and generally feels well.      She has no double vision.      PHYSICAL EXAMINATION: The EOM are full.  There are no evident neurologic deficits.     IMPRESSION: Stable residual meningioma of the planum sphenoidale after resection and radiation treatment.     PLAN: Patient to return in 18 months with MRI of the brain. She is followed at yearly intervals in opthalmology clinic.

## 2018-09-07 NOTE — PLAN OF CARE
Problem: Physical Therapy Goal  Goal: Physical Therapy Goal  Goals to be met by: 14 days     Patient will increase functional independence with mobility by performin. Supine to sit with Stand-by Assistance = met  2. Sit to supine with Stand-by Assistance = not met  3. Sit to stand transfer with Stand-by Assistance with LLE brace locked in extension and with left platform rolling walker = not met  4. Bed to chair transfer with Stand-by Assistance  with LLE brace locked in extension and with left platform rolling walker = not met  5. Gait  x 150 feet with Stand-by Assistance  with LLE brace locked in extension and with left platform rolling walker = not met  6. Wheelchair propulsion x50 feet with Stand-by Assistance using RUE/RLE = not met  7. Ascend/Descend 4 inch curb step with Contact Guard Assistance  with LLE brace locked in extension and with left platform rolling walker.DISCONTINUE 3/11/2018  8. Lower extremity exercise program x20 reps per handout, with assistance as needed and gym therex= met  3/11/2018 NEW GOAL:  9. Patient will ascend/descend 2 inch curb step w/ left PRW and LLE HKB locked in extension w/ min assist = mot met     Outcome: Ongoing (interventions implemented as appropriate)  Goals are appropriate but written for RLE. PTs LLE is the affected extremity.  W/c goal also needs to be updated for propulsion with RUE/RLE only.   3/5/2018 Goals corrected today. LLE is WBAT and w/ hinged knee brace locked in extension.  3/5/2018 Jennifer Oliveira, PT     Outcome: Ongoing (interventions implemented as appropriate)  Goals remain appropriate.

## 2018-09-24 ENCOUNTER — TELEPHONE (OUTPATIENT)
Dept: ORTHOPEDICS | Facility: CLINIC | Age: 68
End: 2018-09-24

## 2018-09-24 NOTE — TELEPHONE ENCOUNTER
----- Message from Jasen Salinas MD sent at 9/24/2018  1:02 PM CDT -----  Contact: self  Please let her know cannot prescribe more pain meds.  Surgery was over 6 months ago.    ----- Message -----  From: Vesna Mota LPN  Sent: 9/24/2018  12:36 PM  To: Jasen Salinas MD        ----- Message -----  From: Anastasiya Chavira  Sent: 9/24/2018  12:25 PM  To: Brad PATEL Staff     Pt needs refill on oxyCODONE-acetaminophen (PERCOCET)  mg per    Pt can be reached @home#    Per DR Salinas I told her he can't give pain med longer thn 3 months    It's been 6   She took an appt with Mrs Up Oct 5 th   Mailed appt slip

## 2018-10-16 ENCOUNTER — OFFICE VISIT (OUTPATIENT)
Dept: FAMILY MEDICINE | Facility: CLINIC | Age: 68
End: 2018-10-16
Payer: COMMERCIAL

## 2018-10-16 VITALS
BODY MASS INDEX: 47.39 KG/M2 | DIASTOLIC BLOOD PRESSURE: 78 MMHG | TEMPERATURE: 98 F | WEIGHT: 277.56 LBS | RESPIRATION RATE: 20 BRPM | HEIGHT: 64 IN | SYSTOLIC BLOOD PRESSURE: 130 MMHG

## 2018-10-16 DIAGNOSIS — L03.115 CELLULITIS OF RIGHT LOWER EXTREMITY: Primary | ICD-10-CM

## 2018-10-16 DIAGNOSIS — Z23 NEED FOR PROPHYLACTIC VACCINATION AND INOCULATION AGAINST INFLUENZA: ICD-10-CM

## 2018-10-16 PROCEDURE — 90662 IIV NO PRSV INCREASED AG IM: CPT | Mod: S$GLB,,, | Performed by: FAMILY MEDICINE

## 2018-10-16 PROCEDURE — 90471 IMMUNIZATION ADMIN: CPT | Mod: S$GLB,,, | Performed by: FAMILY MEDICINE

## 2018-10-16 PROCEDURE — 99213 OFFICE O/P EST LOW 20 MIN: CPT | Mod: 25,S$GLB,, | Performed by: FAMILY MEDICINE

## 2018-10-16 PROCEDURE — 3078F DIAST BP <80 MM HG: CPT | Mod: CPTII,S$GLB,, | Performed by: FAMILY MEDICINE

## 2018-10-16 PROCEDURE — 1100F PTFALLS ASSESS-DOCD GE2>/YR: CPT | Mod: CPTII,S$GLB,, | Performed by: FAMILY MEDICINE

## 2018-10-16 PROCEDURE — 3075F SYST BP GE 130 - 139MM HG: CPT | Mod: CPTII,S$GLB,, | Performed by: FAMILY MEDICINE

## 2018-10-16 PROCEDURE — 3288F FALL RISK ASSESSMENT DOCD: CPT | Mod: CPTII,S$GLB,, | Performed by: FAMILY MEDICINE

## 2018-10-16 PROCEDURE — 99999 PR PBB SHADOW E&M-EST. PATIENT-LVL III: CPT | Mod: PBBFAC,,, | Performed by: FAMILY MEDICINE

## 2018-10-16 RX ORDER — SULFAMETHOXAZOLE AND TRIMETHOPRIM 800; 160 MG/1; MG/1
1 TABLET ORAL 2 TIMES DAILY
Qty: 20 TABLET | Refills: 0 | Status: SHIPPED | OUTPATIENT
Start: 2018-10-16 | End: 2018-10-23

## 2018-10-16 NOTE — PROGRESS NOTES
Two patient identifiers verified.  Allergies reviewed.  HD flu vaccine IM administered to right deltoid per MD order.  Patient tolerated injection well; no redness, bleeding, or bruising noted to injection site.  Patient instructed to remain in clinic setting for 15 minutes.  Verbalizes understanding.

## 2018-10-16 NOTE — PROGRESS NOTES
Subjective:       Patient ID: Katelyn Huynh is a 68 y.o. female.    Chief Complaint: Rash (right leg)    Disclaimer: This note has been generated using voice-recognition software. There may be typographical errors that have been missed during proof-reading    69 yo presents for evaluation of skin lesion involving right lower leg.  Noted onset of rash about one week ago, has gradually increased in size and has noted some discomfort.  Tried treatment with TAC 0.1% cream      Review of Systems   Skin: Positive for color change and rash.       Objective:      Physical Exam   Skin:        3cm diameter circular lesion with induration/erythema, some maceration       Assessment:       1. Cellulitis of right lower extremity    2. Need for prophylactic vaccination and inoculation against influenza        Plan:     1.  Bactrim DS bid  2.  Continue TAC 0.1% cream prn  3.  F/u 2-3 days if not improved

## 2018-10-17 ENCOUNTER — OFFICE VISIT (OUTPATIENT)
Dept: OPHTHALMOLOGY | Facility: CLINIC | Age: 68
End: 2018-10-17
Payer: COMMERCIAL

## 2018-10-17 DIAGNOSIS — H04.123 DRY EYE SYNDROME, BILATERAL: ICD-10-CM

## 2018-10-17 DIAGNOSIS — Z96.1 PSEUDOPHAKIA: ICD-10-CM

## 2018-10-17 DIAGNOSIS — I10 ESSENTIAL HYPERTENSION: ICD-10-CM

## 2018-10-17 DIAGNOSIS — H26.492 PCO (POSTERIOR CAPSULAR OPACIFICATION), LEFT: Primary | ICD-10-CM

## 2018-10-17 PROCEDURE — 99999 PR PBB SHADOW E&M-EST. PATIENT-LVL II: CPT | Mod: PBBFAC,,, | Performed by: OPHTHALMOLOGY

## 2018-10-17 PROCEDURE — 92014 COMPRE OPH EXAM EST PT 1/>: CPT | Mod: S$GLB,,, | Performed by: OPHTHALMOLOGY

## 2018-10-17 NOTE — PROGRESS NOTES
Subjective:       Patient ID: Katelyn Huynh is a 68 y.o. female.    Chief Complaint: Eye Exam    HPI     DSL- 6/7/17     68 y.o female is here for routine eye exam. Pt states no va change. Pt   occas has eye allergies. Pt denies floaters and flashes.     Eyemeds  No gtts    Last edited by Nadiya Mittal on 10/17/2018  1:07 PM. (History)             Assessment:       1. PCO (posterior capsular opacification), left    2. Dry eye syndrome, bilateral    3. Essential hypertension    4. Pseudophakia - Both Eyes        Plan:       Early PCO OS- Not Visually Significant.     KIRILL-Doing well.  HTN-No retinopathy OU.        AT's.  Control HTN.  RTC 1 yr.

## 2019-03-11 ENCOUNTER — TELEPHONE (OUTPATIENT)
Dept: FAMILY MEDICINE | Facility: CLINIC | Age: 69
End: 2019-03-11

## 2019-03-11 DIAGNOSIS — Z00.00 ANNUAL PHYSICAL EXAM: Primary | ICD-10-CM

## 2019-03-11 DIAGNOSIS — Z12.39 BREAST CANCER SCREENING: Primary | ICD-10-CM

## 2019-03-11 NOTE — TELEPHONE ENCOUNTER
----- Message from Lynn Chau sent at 3/11/2019  3:17 PM CDT -----  Contact: self/ [phone]  Labs to be done  At [facility]. Phys is scheduled for 71/2019

## 2019-03-11 NOTE — TELEPHONE ENCOUNTER
----- Message from Lynn Chau sent at 3/11/2019  3:15 PM CDT -----  Contact: self/  [phone]  Caller is requesting to schedule their annual screening mammogram appointment. Order is not listed in Epic.  Please enter order and contact patient to schedule.  Where would they [facility] the [facility]  Would the patient rather a call back or a response via [facility]?:    Additional information:

## 2019-03-19 ENCOUNTER — HOSPITAL ENCOUNTER (OUTPATIENT)
Dept: RADIOLOGY | Facility: HOSPITAL | Age: 69
Discharge: HOME | End: 2019-03-19
Attending: FAMILY MEDICINE
Payer: COMMERCIAL

## 2019-03-19 VITALS — BODY MASS INDEX: 47.29 KG/M2 | WEIGHT: 277 LBS | HEIGHT: 64 IN

## 2019-03-19 DIAGNOSIS — Z12.39 BREAST CANCER SCREENING: ICD-10-CM

## 2019-03-19 DIAGNOSIS — Z12.31 VISIT FOR SCREENING MAMMOGRAM: ICD-10-CM

## 2019-03-19 PROCEDURE — 77067 SCR MAMMO BI INCL CAD: CPT | Mod: TC

## 2019-03-19 PROCEDURE — 77067 SCR MAMMO BI INCL CAD: CPT | Mod: 26,,, | Performed by: RADIOLOGY

## 2019-03-19 PROCEDURE — 77063 BREAST TOMOSYNTHESIS BI: CPT | Mod: TC

## 2019-03-19 PROCEDURE — 77067 MAMMO DIGITAL SCREENING BILAT WITH TOMOSYNTHESIS_CAD: ICD-10-PCS | Mod: 26,,, | Performed by: RADIOLOGY

## 2019-03-19 PROCEDURE — 77063 MAMMO DIGITAL SCREENING BILAT WITH TOMOSYNTHESIS_CAD: ICD-10-PCS | Mod: 26,,, | Performed by: RADIOLOGY

## 2019-03-19 PROCEDURE — 77063 BREAST TOMOSYNTHESIS BI: CPT | Mod: 26,,, | Performed by: RADIOLOGY

## 2019-06-26 ENCOUNTER — TELEPHONE (OUTPATIENT)
Dept: PRIMARY CARE CLINIC | Facility: CLINIC | Age: 69
End: 2019-06-26

## 2019-06-26 DIAGNOSIS — Z00.00 ROUTINE GENERAL MEDICAL EXAMINATION AT A HEALTH CARE FACILITY: Primary | ICD-10-CM

## 2019-06-26 NOTE — TELEPHONE ENCOUNTER
----- Message from Nadja Mckoy sent at 6/26/2019  9:24 AM CDT -----  Contact: self/[phone]  Patient called in regards needing to find out if lab order has been sent to [facility] Please call and notify patient. Patient scheduled to see the pcp on 07/01/19. Thank you

## 2019-06-28 LAB
ALBUMIN SERPL-MCNC: 3.8 G/DL (ref 3.6–5.1)
ALBUMIN/GLOB SERPL: 1.1 (CALC) (ref 1–2.5)
ALP SERPL-CCNC: 96 U/L (ref 33–130)
ALT SERPL-CCNC: 8 U/L (ref 6–29)
AST SERPL-CCNC: 12 U/L (ref 10–35)
BASOPHILS # BLD AUTO: 31 CELLS/UL (ref 0–200)
BASOPHILS NFR BLD AUTO: 0.5 %
BILIRUB SERPL-MCNC: 0.7 MG/DL (ref 0.2–1.2)
BUN SERPL-MCNC: 13 MG/DL (ref 7–25)
BUN/CREAT SERPL: NORMAL (CALC) (ref 6–22)
CALCIUM SERPL-MCNC: 9.1 MG/DL (ref 8.6–10.4)
CHLORIDE SERPL-SCNC: 104 MMOL/L (ref 98–110)
CHOLEST SERPL-MCNC: 159 MG/DL
CHOLEST/HDLC SERPL: 2.2 (CALC)
CO2 SERPL-SCNC: 31 MMOL/L (ref 20–32)
CREAT SERPL-MCNC: 0.82 MG/DL (ref 0.5–0.99)
EOSINOPHIL # BLD AUTO: 180 CELLS/UL (ref 15–500)
EOSINOPHIL NFR BLD AUTO: 2.9 %
ERYTHROCYTE [DISTWIDTH] IN BLOOD BY AUTOMATED COUNT: 12.8 % (ref 11–15)
GFRSERPLBLD MDRD-ARVRAT: 75 ML/MIN/1.73M2
GLOBULIN SER CALC-MCNC: 3.6 G/DL (CALC) (ref 1.9–3.7)
GLUCOSE SERPL-MCNC: 98 MG/DL (ref 65–99)
HCT VFR BLD AUTO: 43.8 % (ref 35–45)
HDLC SERPL-MCNC: 72 MG/DL
HGB BLD-MCNC: 14.6 G/DL (ref 11.7–15.5)
LDLC SERPL CALC-MCNC: 67 MG/DL (CALC)
LYMPHOCYTES # BLD AUTO: 1668 CELLS/UL (ref 850–3900)
LYMPHOCYTES NFR BLD AUTO: 26.9 %
MCH RBC QN AUTO: 29.6 PG (ref 27–33)
MCHC RBC AUTO-ENTMCNC: 33.3 G/DL (ref 32–36)
MCV RBC AUTO: 88.7 FL (ref 80–100)
MONOCYTES # BLD AUTO: 440 CELLS/UL (ref 200–950)
MONOCYTES NFR BLD AUTO: 7.1 %
NEUTROPHILS # BLD AUTO: 3881 CELLS/UL (ref 1500–7800)
NEUTROPHILS NFR BLD AUTO: 62.6 %
NONHDLC SERPL-MCNC: 87 MG/DL (CALC)
PLATELET # BLD AUTO: 291 THOUSAND/UL (ref 140–400)
PMV BLD REES-ECKER: 10.1 FL (ref 7.5–12.5)
POTASSIUM SERPL-SCNC: 4.7 MMOL/L (ref 3.5–5.3)
PROT SERPL-MCNC: 7.4 G/DL (ref 6.1–8.1)
RBC # BLD AUTO: 4.94 MILLION/UL (ref 3.8–5.1)
SODIUM SERPL-SCNC: 140 MMOL/L (ref 135–146)
TRIGL SERPL-MCNC: 117 MG/DL
TSH SERPL-ACNC: 1.31 MIU/L (ref 0.4–4.5)
WBC # BLD AUTO: 6.2 THOUSAND/UL (ref 3.8–10.8)

## 2019-07-01 ENCOUNTER — OFFICE VISIT (OUTPATIENT)
Dept: PRIMARY CARE CLINIC | Facility: CLINIC | Age: 69
End: 2019-07-01
Payer: COMMERCIAL

## 2019-07-01 VITALS
BODY MASS INDEX: 49.38 KG/M2 | HEART RATE: 96 BPM | SYSTOLIC BLOOD PRESSURE: 144 MMHG | WEIGHT: 289.25 LBS | HEIGHT: 64 IN | DIASTOLIC BLOOD PRESSURE: 82 MMHG | TEMPERATURE: 98 F

## 2019-07-01 DIAGNOSIS — M25.561 CHRONIC PAIN OF BOTH KNEES: Primary | ICD-10-CM

## 2019-07-01 DIAGNOSIS — Z86.018 HISTORY OF MENINGIOMA: Chronic | ICD-10-CM

## 2019-07-01 DIAGNOSIS — Z00.00 ROUTINE GENERAL MEDICAL EXAMINATION AT A HEALTH CARE FACILITY: ICD-10-CM

## 2019-07-01 DIAGNOSIS — I10 ESSENTIAL (PRIMARY) HYPERTENSION: ICD-10-CM

## 2019-07-01 DIAGNOSIS — M25.562 CHRONIC PAIN OF BOTH KNEES: Primary | ICD-10-CM

## 2019-07-01 DIAGNOSIS — E66.01 MORBID OBESITY: ICD-10-CM

## 2019-07-01 DIAGNOSIS — G89.29 CHRONIC PAIN OF BOTH KNEES: Primary | ICD-10-CM

## 2019-07-01 PROCEDURE — 3079F DIAST BP 80-89 MM HG: CPT | Mod: CPTII,S$GLB,, | Performed by: FAMILY MEDICINE

## 2019-07-01 PROCEDURE — 99999 PR PBB SHADOW E&M-EST. PATIENT-LVL III: CPT | Mod: PBBFAC,,, | Performed by: FAMILY MEDICINE

## 2019-07-01 PROCEDURE — 3077F PR MOST RECENT SYSTOLIC BLOOD PRESSURE >= 140 MM HG: ICD-10-PCS | Mod: CPTII,S$GLB,, | Performed by: FAMILY MEDICINE

## 2019-07-01 PROCEDURE — 99397 PER PM REEVAL EST PAT 65+ YR: CPT | Mod: S$GLB,,, | Performed by: FAMILY MEDICINE

## 2019-07-01 PROCEDURE — 3077F SYST BP >= 140 MM HG: CPT | Mod: CPTII,S$GLB,, | Performed by: FAMILY MEDICINE

## 2019-07-01 PROCEDURE — 99397 PR PREVENTIVE VISIT,EST,65 & OVER: ICD-10-PCS | Mod: S$GLB,,, | Performed by: FAMILY MEDICINE

## 2019-07-01 PROCEDURE — 3079F PR MOST RECENT DIASTOLIC BLOOD PRESSURE 80-89 MM HG: ICD-10-PCS | Mod: CPTII,S$GLB,, | Performed by: FAMILY MEDICINE

## 2019-07-01 PROCEDURE — 99999 PR PBB SHADOW E&M-EST. PATIENT-LVL III: ICD-10-PCS | Mod: PBBFAC,,, | Performed by: FAMILY MEDICINE

## 2019-07-01 NOTE — PROGRESS NOTES
Subjective:       Patient ID: Katelyn Huynh is a 68 y.o. female.    Chief Complaint: Annual Exam    69 yo presents for routine exam, last exam one year ago  Immunizations:  Needs Shingrix  Scheduled for BMD    Review of Systems   Constitutional: Negative.  Negative for activity change, appetite change, chills, diaphoresis, fatigue, fever and unexpected weight change.   HENT: Negative.  Negative for congestion, ear discharge, ear pain, hearing loss, rhinorrhea, sinus pressure, sore throat, tinnitus, trouble swallowing and voice change.    Eyes: Negative.  Negative for photophobia, pain and visual disturbance.   Respiratory: Negative.  Negative for cough, chest tightness and shortness of breath.    Cardiovascular: Negative.  Negative for chest pain, palpitations and leg swelling.   Gastrointestinal: Negative.  Negative for abdominal distention, abdominal pain, blood in stool, constipation, diarrhea, nausea and vomiting.   Genitourinary: Negative.  Negative for difficulty urinating, dysuria, frequency and hematuria.   Musculoskeletal: Positive for arthralgias. Negative for back pain, gait problem, joint swelling, myalgias, neck pain and neck stiffness.        Recurrent pain both knees following surgery, needs f/u appt with Orthopedics   Skin: Negative for color change, pallor and rash.   Neurological: Negative.  Negative for dizziness, tremors, speech difficulty, weakness, light-headedness, numbness and headaches.   Hematological: Negative for adenopathy. Does not bruise/bleed easily.   Psychiatric/Behavioral: Negative for agitation, behavioral problems, confusion, dysphoric mood, hallucinations and sleep disturbance. The patient is not nervous/anxious.        Objective:      Physical Exam   Constitutional: She is oriented to person, place, and time. She appears well-developed and well-nourished.   Obese, NAD   HENT:   Right Ear: Tympanic membrane, external ear and ear canal normal.   Left Ear: Tympanic  membrane, external ear and ear canal normal.   Nose: Nose normal.   Mouth/Throat: Oropharynx is clear and moist. No posterior oropharyngeal erythema.   Eyes: Pupils are equal, round, and reactive to light. Conjunctivae and EOM are normal.   Neck: Normal range of motion. Neck supple. No tracheal deviation present. No thyromegaly present.   Cardiovascular: Normal rate, regular rhythm, normal heart sounds and intact distal pulses.   Pulmonary/Chest: Effort normal. She has no wheezes. She has no rales. She exhibits no tenderness.   Abdominal: Soft. Bowel sounds are normal. She exhibits no distension and no mass. There is no rebound.   Musculoskeletal: Normal range of motion. She exhibits tenderness. She exhibits no edema.   Both knees with pain along medial aspect of knees, anterior   Lymphadenopathy:     She has no cervical adenopathy.   Neurological: She is alert and oriented to person, place, and time. She has normal reflexes. No cranial nerve deficit. Coordination normal.   Skin: Skin is warm and dry. No rash noted. No erythema.   Psychiatric: She has a normal mood and affect. Her behavior is normal.       Assessment:         1.  Physical exam  2.  Hypertension  3.  Morbid obesity  4.  Meningioma  5.  Chronic pain, both knees, following surgery  Plan:       1.  Labs reviewed with pt  2.  BMD  3.  Continue present medications  4.  Consult Orthopedics

## 2019-08-13 DIAGNOSIS — M25.561 PAIN IN BOTH KNEES, UNSPECIFIED CHRONICITY: Primary | ICD-10-CM

## 2019-08-13 DIAGNOSIS — M25.562 PAIN IN BOTH KNEES, UNSPECIFIED CHRONICITY: Primary | ICD-10-CM

## 2019-08-19 ENCOUNTER — HOSPITAL ENCOUNTER (OUTPATIENT)
Dept: RADIOLOGY | Facility: HOSPITAL | Age: 69
Discharge: HOME | End: 2019-08-19
Attending: ORTHOPAEDIC SURGERY
Payer: COMMERCIAL

## 2019-08-19 ENCOUNTER — OFFICE VISIT (OUTPATIENT)
Dept: ORTHOPEDICS | Facility: CLINIC | Age: 69
End: 2019-08-19
Payer: COMMERCIAL

## 2019-08-19 VITALS — HEIGHT: 64 IN | BODY MASS INDEX: 50.02 KG/M2 | WEIGHT: 293 LBS

## 2019-08-19 DIAGNOSIS — M25.561 PAIN IN BOTH KNEES, UNSPECIFIED CHRONICITY: ICD-10-CM

## 2019-08-19 DIAGNOSIS — M25.562 PAIN IN BOTH KNEES, UNSPECIFIED CHRONICITY: ICD-10-CM

## 2019-08-19 DIAGNOSIS — S76.112A RUPTURE OF LEFT QUADRICEPS MUSCLE, INITIAL ENCOUNTER: ICD-10-CM

## 2019-08-19 DIAGNOSIS — E66.01 MORBID OBESITY WITH BMI OF 50.0-59.9, ADULT: ICD-10-CM

## 2019-08-19 DIAGNOSIS — Z96.652 S/P TKR (TOTAL KNEE REPLACEMENT) USING CEMENT, LEFT: Primary | ICD-10-CM

## 2019-08-19 PROCEDURE — 3288F PR FALLS RISK ASSESSMENT DOCUMENTED: ICD-10-PCS | Mod: CPTII,S$GLB,, | Performed by: ORTHOPAEDIC SURGERY

## 2019-08-19 PROCEDURE — 99214 PR OFFICE/OUTPT VISIT, EST, LEVL IV, 30-39 MIN: ICD-10-PCS | Mod: S$GLB,,, | Performed by: ORTHOPAEDIC SURGERY

## 2019-08-19 PROCEDURE — 73562 XR KNEE ORTHO BILAT: ICD-10-PCS | Mod: 26,,, | Performed by: RADIOLOGY

## 2019-08-19 PROCEDURE — 99999 PR PBB SHADOW E&M-EST. PATIENT-LVL III: CPT | Mod: PBBFAC,,, | Performed by: ORTHOPAEDIC SURGERY

## 2019-08-19 PROCEDURE — 1100F PTFALLS ASSESS-DOCD GE2>/YR: CPT | Mod: CPTII,S$GLB,, | Performed by: ORTHOPAEDIC SURGERY

## 2019-08-19 PROCEDURE — 73562 X-RAY EXAM OF KNEE 3: CPT | Mod: TC,50

## 2019-08-19 PROCEDURE — 3288F FALL RISK ASSESSMENT DOCD: CPT | Mod: CPTII,S$GLB,, | Performed by: ORTHOPAEDIC SURGERY

## 2019-08-19 PROCEDURE — 99214 OFFICE O/P EST MOD 30 MIN: CPT | Mod: S$GLB,,, | Performed by: ORTHOPAEDIC SURGERY

## 2019-08-19 PROCEDURE — 99999 PR PBB SHADOW E&M-EST. PATIENT-LVL III: ICD-10-PCS | Mod: PBBFAC,,, | Performed by: ORTHOPAEDIC SURGERY

## 2019-08-19 PROCEDURE — 1100F PR PT FALLS ASSESS DOC 2+ FALLS/FALL W/INJURY/YR: ICD-10-PCS | Mod: CPTII,S$GLB,, | Performed by: ORTHOPAEDIC SURGERY

## 2019-08-19 PROCEDURE — 73562 X-RAY EXAM OF KNEE 3: CPT | Mod: 26,,, | Performed by: RADIOLOGY

## 2019-08-19 NOTE — PROGRESS NOTES
Subjective:      Patient ID: Katelyn Hyunh is a 69 y.o. female.    Chief Complaint: Pain of the Left Knee and Pain of the Right Knee    HPI  Katelyn Huynh is a 69 year old female here with a 18 months history of bilateral knee pain. Had right TKA 2/2015 and left TKA 2/2018 by Dr Salinas.  3 weeks post op from left TKA she fell and had wound dehiscence with partial quad rupture.  She underwent I&D and quad repair by Dr Martinez.  Has been unable to extend left knee since.  Right had been doing well, just some pain anteriorly.  The left has had issues since TKA 18  Months ago.  She has pain to anterior aspect as well as buckling and instability causing her to use a avalos.  She had a severe buckling episode this morning causing her to fall back into her car.  The patient is a  retiree. There was not a history of trauma.  The pain is moderate The pain is located in the anterior aspect of the knee. There is is radiation.  The pain radaites to the left thigh.  There is not catching or locking.   The pain is described as achy. The patient has had prior surgery. It is aggravated by walking and with moderate activity.  It is not alleviated by rest. There is numbness or tingling of the lower extremity.  There is not back pain.  She  has tried medications or injections. They have not helped - otc NSAIDs.  She does have difficulty getting in or out of a car, getting dressed, or going up or down stairs.  The patient does use an assistive device - avalos.    Past Medical History:   Diagnosis Date    Arthritis     Cataract     Glaucoma     History of iritis     OD    Hypertension     Meningioma     Uveitis        Current Outpatient Medications on File Prior to Visit   Medication Sig Dispense Refill    ASCORBATE CALCIUM (VITAMIN C ORAL) Take by mouth once daily.       aspirin (ECOTRIN) 325 MG EC tablet Take 1 tablet (325 mg total) by mouth 2 (two) times daily. (Patient taking differently: Take 325 mg  by mouth once. ) 60 tablet 0    famotidine (PEPCID) 20 MG tablet Take 1 tablet (20 mg total) by mouth 2 (two) times daily. 28 tablet 0    hydroCHLOROthiazide (HYDRODIURIL) 25 MG tablet Take 2 tablets (50 mg total) by mouth once daily. 180 tablet 3    losartan (COZAAR) 50 MG tablet Take 1 tablet (50 mg total) by mouth once daily. 90 tablet 3    miconazole nitrate 2% (MICOTIN) 2 % Oint Apply topically 2 (two) times daily.  0     No current facility-administered medications on file prior to visit.        Past Surgical History:   Procedure Laterality Date    Brain tumor surgery      had a brain tumor removed    CATARACT EXTRACTION      OU    CATARACT EXTRACTION BILATERAL W/ ANTERIOR VITRECTOMY      CHOLECYSTECTOMY      CRANIOTOMY      INCISION AND DRAINAGE-KNEE - left - shady - set up like total knee Left 2/24/2018    [facility] 2ND FLR    JOINT REPLACEMENT      KNEE SURGERY  2-24-15    right TKR    KNEE SURGERY Left 02/09/2018    TKR    KNEE SURGERY Left 02/24/2018    REVISION    REPAIR-RUPTURE-QUADRICEP Left 2/24/2018    [facility] at [facility] OR 2ND FLR    REPLACEMENT-KNEE-TOTAL Left 2/9/2018    [facility] at [facility] 2ND FLR    REPLACEMENT-KNEE-TOTAL Right 2/24/2015    [facility] 2ND FLR    REVISION-ARTHROPLASTY-KNEE Left 2/24/2018    [facility] at [facility] OR 2ND FLR    WRIST FRACTURE SURGERY      YAG Right 1/20/2016    Dr. Kaiser       Family History   Problem Relation Age of Onset    Glaucoma Mother     Hypertension Mother     Diabetes Mother     Cataracts Mother     Hypertension Father     Diabetes Father     Diabetes Sister     Cataracts Sister     Stroke Sister         minor stroke    Glaucoma Brother     Glaucoma Maternal Aunt     Diabetes Maternal Aunt     Cataracts Maternal Aunt     Glaucoma Maternal Uncle     Amblyopia Neg Hx     Blindness Neg  Hx     Macular degeneration Neg Hx     Retinal detachment Neg Hx     Strabismus Neg Hx        Social History     Socioeconomic History    Marital status:      Spouse name: Not on file    Number of children: Not on file    Years of education: Not on file    Highest education level: Not on file   Occupational History    Not on file   Social Needs    Financial resource strain: Not on file    Food insecurity:     Worry: Not on file     Inability: Not on file    Transportation needs:     Medical: Not on file     Non-medical: Not on file   Tobacco Use    Smoking status: Former Smoker     Packs/day: 0.10     Years: 3.00     Pack years: 0.30     Types: Cigarettes     Last attempt to quit: 1973     Years since quittin.3    Smokeless tobacco: Never Used   Substance and Sexual Activity    Alcohol use: Yes     Alcohol/week: 1.2 oz     Types: 2 Glasses of wine per week     Comment: social- glass on wine on occasions    Drug use: No    Sexual activity: Not on file   Lifestyle    Physical activity:     Days per week: Not on file     Minutes per session: Not on file    Stress: Not on file   Relationships    Social connections:     Talks on phone: Not on file     Gets together: Not on file     Attends Bahai service: Not on file     Active member of club or organization: Not on file     Attends meetings of clubs or organizations: Not on file     Relationship status: Not on file   Other Topics Concern    Not on file   Social History Narrative    Not on file       Review of Systems   Constitution: Negative for chills, fever and night sweats.   HENT: Negative for hearing loss.    Eyes: Negative for blurred vision and double vision.   Cardiovascular: Negative for chest pain, claudication and leg swelling.   Respiratory: Negative for shortness of breath.    Endocrine: Negative for polydipsia, polyphagia and polyuria.   Hematologic/Lymphatic: Negative for adenopathy and bleeding problem. Does not  bruise/bleed easily.   Skin: Negative for poor wound healing.   Musculoskeletal: Positive for joint pain.   Gastrointestinal: Negative for diarrhea and heartburn.   Genitourinary: Negative for bladder incontinence.   Neurological: Negative for focal weakness, headaches, numbness, paresthesias and sensory change.   Psychiatric/Behavioral: The patient is not nervous/anxious.    Allergic/Immunologic: Negative for persistent infections.         Objective:      Body mass index is 50.75 kg/m².        General    Constitutional: She is oriented to person, place, and time. She appears well-developed and well-nourished. No distress.   HENT:   Head: Normocephalic and atraumatic.   Nose: Nose normal.   Eyes: Conjunctivae and EOM are normal. Pupils are equal, round, and reactive to light.   Neck: Normal range of motion.   Cardiovascular: Normal rate, regular rhythm and intact distal pulses.    Pulmonary/Chest: Effort normal. No respiratory distress.   Abdominal: Soft. There is no tenderness.   Neurological: She is alert and oriented to person, place, and time. She has normal reflexes.   Psychiatric: She has a normal mood and affect. Her behavior is normal.     General Musculoskeletal Exam   Gait: abnormal and antalgic       Right Knee Exam     Inspection   Erythema: absent  Scars: present (midline TKA well healed)  Swelling: absent  Effusion: absent  Deformity: absent  Bruising: absent    Tenderness   The patient is tender to palpation of the medial joint line, lateral joint line and patella.    Range of Motion   Extension: -5   Flexion: 120     Tests   Ligament Examination Lachman: normal (-1 to 2mm) PCL-Posterior Drawer: normal (0 to 2mm)     MCL - Valgus: normal (0 to 2mm)  LCL - Varus: normal  Patella   Passive Patellar Tilt: neutral  Patellar Tracking: normal    Other   Sensation: normal    Left Knee Exam     Inspection   Erythema: absent  Scars: present (midline TKA well healed)  Swelling: absent  Effusion:  absent  Deformity: absent  Bruising: absent    Tenderness   The patient tender to palpation of the medial joint line, patella and lateral joint line.    Range of Motion   Extension: -10 (NO active extension)   Flexion:  120 abnormal     Tests   Stability   Lachman: abnormal  - grade IIIPCL-Posterior Drawer: normal (0 to 2mm)  MCL - Valgus: normal (0 to 2mm)  LCL - Varus: normal (0 to 2mm)  Patella   Passive Patellar Tilt: neutral  Patellar Tracking: normal    Other   Sensation: normal    Muscle Strength   Right Lower Extremity   Hip Abduction: 5/5   Quadriceps:  5/5   Hamstrin/5   Left Lower Extremity   Hip Abduction: 5/5   Quadriceps:  0/5   Hamstrin/5     Reflexes     Left Side  Quadriceps:  2+  Achilles:  2+    Right Side   Quadriceps:  2+  Achilles:  2+    Vascular Exam     Right Pulses  Dorsalis Pedis:      2+  Posterior Tibial:      2+        Left Pulses  Dorsalis Pedis:      2+  Posterior Tibial:      2+        Edema  Right Lower Leg: absent  Left Lower Leg: absent    Radiographs taken today were reviewed by me.  There is a prosthetic replacement of the bilateral knee(s).  The prostheses arre well positioned.  There is not evidence of bone loss, osteolysis, or loosening. There is not a fracture.  Patella [place] on the left              Assessment:       Encounter Diagnoses   Name Primary?    S/P TKR (total knee replacement) using cement, left Yes    Rupture of left quadriceps muscle, initial encounter     Morbid obesity with BMI of 50.0-59.9, adult           Plan:       Katelyn was seen today for pain and pain.    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

## 2019-08-19 NOTE — LETTER
August 19, 2019      Daniel Garcia MD  [address] [facility] [address]           [facility] - Orthopedics  [address]  [facility] [address]  Phone: [phone]          Patient: Katelyn Huynh   MR Number: [id number]   YOB: 1950   Date of Visit: 8/19/2019       Dear Dr. Daniel Garcia:    Thank you for referring Katelyn Huynh to me for evaluation. Attached you will find relevant portions of my assessment and plan of care.    If you have questions, please do not hesitate to call me. I look forward to following Katelyn Huynh along with you.    Sincerely,    Drew Mcguire MD    Enclosure  CC:  No Recipients    If you would like to receive this communication electronically, please contact [email or web address] or [phone] to request more information on [facility] access.    For providers and/or their staff who would like to refer a patient to [facility], please contact us through our one-stop-shop provider referral line, [facility], at [phone].    If you feel you have received this communication in error or would no longer like to receive these types of communications, please e-mail [email or web address]

## 2019-08-27 ENCOUNTER — TELEPHONE (OUTPATIENT)
Dept: ORTHOPEDICS | Facility: CLINIC | Age: 69
End: 2019-08-27

## 2019-08-27 ENCOUNTER — HOSPITAL ENCOUNTER (OUTPATIENT)
Dept: RADIOLOGY | Facility: HOSPITAL | Age: 69
Discharge: HOME | End: 2019-08-27
Attending: ORTHOPAEDIC SURGERY
Payer: COMMERCIAL

## 2019-08-27 DIAGNOSIS — Z96.652 S/P TKR (TOTAL KNEE REPLACEMENT) USING CEMENT, LEFT: ICD-10-CM

## 2019-08-27 DIAGNOSIS — S76.112A RUPTURE OF LEFT QUADRICEPS MUSCLE, INITIAL ENCOUNTER: ICD-10-CM

## 2019-08-27 PROCEDURE — 73721 MRI JNT OF LWR EXTRE W/O DYE: CPT | Mod: TC,LT

## 2019-08-27 PROCEDURE — 73721 MRI KNEE WITHOUT CONTRAST LEFT: ICD-10-PCS | Mod: 26,LT,, | Performed by: RADIOLOGY

## 2019-08-27 PROCEDURE — 73721 MRI JNT OF LWR EXTRE W/O DYE: CPT | Mod: 26,LT,, | Performed by: RADIOLOGY

## 2019-08-27 NOTE — TELEPHONE ENCOUNTER
Spoke with pt, notified her of her results and scheduled her a f/u appt. She verbalized understanding and had no further questions.    ----- Message from Drew Mcguire MD sent at 8/27/2019 12:48 PM CDT -----  Please call pt.  MRI confirmed torn tendon.  Schedule f/u.

## 2019-08-29 DIAGNOSIS — M25.561 CHRONIC PAIN OF BOTH KNEES: Primary | ICD-10-CM

## 2019-08-29 DIAGNOSIS — M25.562 CHRONIC PAIN OF BOTH KNEES: Primary | ICD-10-CM

## 2019-08-29 DIAGNOSIS — G89.29 CHRONIC PAIN OF BOTH KNEES: Primary | ICD-10-CM

## 2019-08-29 NOTE — PROGRESS NOTES
Pt is requesting pain medication which has been a chronic problem per Dr. Mcguire. He wants her to go to [facility]. Referral placed as requested.

## 2019-09-11 ENCOUNTER — OFFICE VISIT (OUTPATIENT)
Dept: ORTHOPEDICS | Facility: CLINIC | Age: 69
End: 2019-09-11
Payer: COMMERCIAL

## 2019-09-11 VITALS — BODY MASS INDEX: 49.95 KG/M2 | HEIGHT: 64 IN | WEIGHT: 292.56 LBS

## 2019-09-11 DIAGNOSIS — S76.112A RUPTURE OF LEFT QUADRICEPS MUSCLE, INITIAL ENCOUNTER: Primary | ICD-10-CM

## 2019-09-11 DIAGNOSIS — E66.01 MORBID OBESITY WITH BMI OF 50.0-59.9, ADULT: ICD-10-CM

## 2019-09-11 DIAGNOSIS — Z96.652 S/P TKR (TOTAL KNEE REPLACEMENT) USING CEMENT, LEFT: ICD-10-CM

## 2019-09-11 DIAGNOSIS — M25.561 CHRONIC PAIN OF BOTH KNEES: ICD-10-CM

## 2019-09-11 DIAGNOSIS — G89.29 CHRONIC PAIN OF BOTH KNEES: ICD-10-CM

## 2019-09-11 DIAGNOSIS — M25.562 CHRONIC PAIN OF BOTH KNEES: ICD-10-CM

## 2019-09-11 PROCEDURE — 99213 OFFICE O/P EST LOW 20 MIN: CPT | Mod: S$GLB,,, | Performed by: ORTHOPAEDIC SURGERY

## 2019-09-11 PROCEDURE — 1101F PR PT FALLS ASSESS DOC 0-1 FALLS W/OUT INJ PAST YR: ICD-10-PCS | Mod: CPTII,S$GLB,, | Performed by: ORTHOPAEDIC SURGERY

## 2019-09-11 PROCEDURE — 99213 PR OFFICE/OUTPT VISIT, EST, LEVL III, 20-29 MIN: ICD-10-PCS | Mod: S$GLB,,, | Performed by: ORTHOPAEDIC SURGERY

## 2019-09-11 PROCEDURE — 99999 PR PBB SHADOW E&M-EST. PATIENT-LVL III: CPT | Mod: PBBFAC,,, | Performed by: ORTHOPAEDIC SURGERY

## 2019-09-11 PROCEDURE — 99999 PR PBB SHADOW E&M-EST. PATIENT-LVL III: ICD-10-PCS | Mod: PBBFAC,,, | Performed by: ORTHOPAEDIC SURGERY

## 2019-09-11 PROCEDURE — 1101F PT FALLS ASSESS-DOCD LE1/YR: CPT | Mod: CPTII,S$GLB,, | Performed by: ORTHOPAEDIC SURGERY

## 2019-09-11 RX ORDER — TRAMADOL HYDROCHLORIDE 50 MG/1
50 TABLET ORAL EVERY 12 HOURS PRN
Qty: 28 TABLET | Refills: 0 | Status: SHIPPED | OUTPATIENT
Start: 2019-09-11 | End: 2019-09-25

## 2019-09-11 NOTE — PROGRESS NOTES
Ms. Huynh is in today to discuss her MRI results.  She is accompanied by her .  There are no new complaints.  She has chronic pain about the knee. She is unable to actively extend her left leg. Examination today demonstrated palpable defect in the quadriceps tendon with no active leg extension.  The extremity is neurovascularly intact distally.  MRI demonstrated a chronic quadriceps rupture.  I discussed with the nature of the problem and the prognosis.  The only real option is surgical reconstruction.  Given the chronic nature of the tear believe an extensor mechanism allograft is the best option for her.  I discussed the pros and cons of this as well as the risks and benefits.  Given the shape of her leg and her body mass index we have decided to have her see Dr. Dong in order to lower her body mass index.  Believe at this point she would not do well with surgery and the risk of infection or failure of the reconstruction is very high.  She is going to consider whether she wants surgery at all.  She is going to discuss this again with her  and she will call us back.  I have also prescribed her tramadol.  This is a 1 time prescription and she is aware of this.  We are getting her enrolled in chronic pain management. All-in-all 15 min was spent face-to-face with the patient in the majority of this time was spent counseling and answering questions.

## 2019-09-16 ENCOUNTER — TELEPHONE (OUTPATIENT)
Dept: BARIATRICS | Facility: CLINIC | Age: 69
End: 2019-09-16

## 2019-09-16 NOTE — TELEPHONE ENCOUNTER
----- Message from Richardson Meza sent at 9/16/2019  9:11 AM CDT -----  Contact: Patient @ [phone]  Patient received a call regarding a sooner appt, pls advise ( system did not allow me to schedule )

## 2019-09-26 RX ORDER — LOSARTAN POTASSIUM 50 MG/1
50 TABLET ORAL DAILY
Qty: 90 TABLET | Refills: 3 | Status: SHIPPED | OUTPATIENT
Start: 2019-09-26 | End: 2020-09-25

## 2019-10-16 ENCOUNTER — PATIENT OUTREACH (OUTPATIENT)
Dept: ADMINISTRATIVE | Facility: OTHER | Age: 69
End: 2019-10-16

## 2020-01-01 NOTE — PROGRESS NOTES
Physical therapy orders entered for [facility]  
SBAR OUT Report: Mother Verbal report given to Aide Acevedo RN (full name & credentials) on this patient, who is now being transferred to MIU (unit) for routine post - op. The patient is wearing a green \"Anesthesia-Duramorph\" band. Report consisted of patient's Situation, Background, Assessment and Recommendations (SBAR). [place] ID bands were compared with the identification form, and verified with the patient and receiving nurse. Information from the SBAR, Intake/Output and MAR and the [place] Report was reviewed with the receiving nurse; opportunity for questions and clarification provided.
(2) more than 100 beats/min

## 2020-01-06 DIAGNOSIS — Z86.018 HISTORY OF MENINGIOMA: Primary | Chronic | ICD-10-CM

## 2020-01-19 ENCOUNTER — PATIENT OUTREACH (OUTPATIENT)
Dept: ADMINISTRATIVE | Facility: OTHER | Age: 70
End: 2020-01-19

## 2020-01-20 ENCOUNTER — OFFICE VISIT (OUTPATIENT)
Dept: OPHTHALMOLOGY | Facility: CLINIC | Age: 70
End: 2020-01-20
Payer: COMMERCIAL

## 2020-01-20 DIAGNOSIS — H26.492 PCO (POSTERIOR CAPSULAR OPACIFICATION), LEFT: Primary | ICD-10-CM

## 2020-01-20 DIAGNOSIS — Z96.1 PSEUDOPHAKIA: ICD-10-CM

## 2020-01-20 DIAGNOSIS — I10 ESSENTIAL HYPERTENSION: ICD-10-CM

## 2020-01-20 DIAGNOSIS — H04.123 DRY EYE SYNDROME, BILATERAL: ICD-10-CM

## 2020-01-20 DIAGNOSIS — H52.7 REFRACTIVE ERROR: ICD-10-CM

## 2020-01-20 PROCEDURE — 92014 COMPRE OPH EXAM EST PT 1/>: CPT | Mod: S$GLB,,, | Performed by: OPHTHALMOLOGY

## 2020-01-20 PROCEDURE — 92014 PR EYE EXAM, EST PATIENT,COMPREHESV: ICD-10-PCS | Mod: S$GLB,,, | Performed by: OPHTHALMOLOGY

## 2020-01-20 PROCEDURE — 99999 PR PBB SHADOW E&M-EST. PATIENT-LVL II: ICD-10-PCS | Mod: PBBFAC,,, | Performed by: OPHTHALMOLOGY

## 2020-01-20 PROCEDURE — 99999 PR PBB SHADOW E&M-EST. PATIENT-LVL II: CPT | Mod: PBBFAC,,, | Performed by: OPHTHALMOLOGY

## 2020-01-20 NOTE — PROGRESS NOTES
Subjective:       Patient ID: Katelyn Huynh is a 69 y.o. female.    Chief Complaint: Eye Exam    HPI     DSL- 10/17/18     68 y/o female is here for routine eye exam. Pt states no Va change.   Floaters bilateral. Denies glare and eye allergies.    Eyemeds  No gtts    Last edited by Nadiya Mittal on 1/20/2020 11:33 AM. (History)             Assessment:       1. PCO (posterior capsular opacification), left    2. Dry eye syndrome, bilateral    3. Essential hypertension    4. Refractive error    5. Pseudophakia - Both Eyes        Plan:       Early PCO OS- Not Visually Significant.     KIRILL-Doing well.  HTN-No retinopathy OU.  RE-Pt wants MRx.        AT's.  Control HTN.  Give MRx.  RTC 1 yr.

## 2020-01-27 ENCOUNTER — PATIENT OUTREACH (OUTPATIENT)
Dept: ADMINISTRATIVE | Facility: HOSPITAL | Age: 70
End: 2020-01-27

## 2020-02-07 ENCOUNTER — OFFICE VISIT (OUTPATIENT)
Dept: RADIATION ONCOLOGY | Facility: CLINIC | Age: 70
End: 2020-02-07
Payer: COMMERCIAL

## 2020-02-07 ENCOUNTER — HOSPITAL ENCOUNTER (OUTPATIENT)
Dept: RADIOLOGY | Facility: HOSPITAL | Age: 70
Discharge: HOME | End: 2020-02-07
Attending: RADIOLOGY
Payer: COMMERCIAL

## 2020-02-07 VITALS
HEART RATE: 85 BPM | RESPIRATION RATE: 18 BRPM | BODY MASS INDEX: 48.82 KG/M2 | TEMPERATURE: 98 F | HEIGHT: 65 IN | SYSTOLIC BLOOD PRESSURE: 118 MMHG | WEIGHT: 293 LBS | DIASTOLIC BLOOD PRESSURE: 82 MMHG

## 2020-02-07 DIAGNOSIS — Z86.018 HISTORY OF MENINGIOMA: Primary | Chronic | ICD-10-CM

## 2020-02-07 DIAGNOSIS — Z86.018 HISTORY OF MENINGIOMA: ICD-10-CM

## 2020-02-07 PROCEDURE — 1159F MED LIST DOCD IN RCRD: CPT | Mod: S$GLB,,, | Performed by: RADIOLOGY

## 2020-02-07 PROCEDURE — 1101F PT FALLS ASSESS-DOCD LE1/YR: CPT | Mod: CPTII,S$GLB,, | Performed by: RADIOLOGY

## 2020-02-07 PROCEDURE — 3079F PR MOST RECENT DIASTOLIC BLOOD PRESSURE 80-89 MM HG: ICD-10-PCS | Mod: CPTII,S$GLB,, | Performed by: RADIOLOGY

## 2020-02-07 PROCEDURE — 70553 MRI BRAIN STEM W/O & W/DYE: CPT | Mod: 26,,, | Performed by: RADIOLOGY

## 2020-02-07 PROCEDURE — 1159F PR MEDICATION LIST DOCUMENTED IN MEDICAL RECORD: ICD-10-PCS | Mod: S$GLB,,, | Performed by: RADIOLOGY

## 2020-02-07 PROCEDURE — A9585 GADOBUTROL INJECTION: HCPCS | Performed by: RADIOLOGY

## 2020-02-07 PROCEDURE — 99213 PR OFFICE/OUTPT VISIT, EST, LEVL III, 20-29 MIN: ICD-10-PCS | Mod: S$GLB,,, | Performed by: RADIOLOGY

## 2020-02-07 PROCEDURE — 70553 MRI BRAIN STEM W/O & W/DYE: CPT | Mod: TC

## 2020-02-07 PROCEDURE — 1101F PR PT FALLS ASSESS DOC 0-1 FALLS W/OUT INJ PAST YR: ICD-10-PCS | Mod: CPTII,S$GLB,, | Performed by: RADIOLOGY

## 2020-02-07 PROCEDURE — 3074F PR MOST RECENT SYSTOLIC BLOOD PRESSURE < 130 MM HG: ICD-10-PCS | Mod: CPTII,S$GLB,, | Performed by: RADIOLOGY

## 2020-02-07 PROCEDURE — 3079F DIAST BP 80-89 MM HG: CPT | Mod: CPTII,S$GLB,, | Performed by: RADIOLOGY

## 2020-02-07 PROCEDURE — 70553 MRI BRAIN W WO CONTRAST: ICD-10-PCS | Mod: 26,,, | Performed by: RADIOLOGY

## 2020-02-07 PROCEDURE — 3074F SYST BP LT 130 MM HG: CPT | Mod: CPTII,S$GLB,, | Performed by: RADIOLOGY

## 2020-02-07 PROCEDURE — 25500020 PHARM REV CODE 255: Performed by: RADIOLOGY

## 2020-02-07 PROCEDURE — 1126F AMNT PAIN NOTED NONE PRSNT: CPT | Mod: S$GLB,,, | Performed by: RADIOLOGY

## 2020-02-07 PROCEDURE — 99999 PR PBB SHADOW E&M-EST. PATIENT-LVL III: ICD-10-PCS | Mod: PBBFAC,,, | Performed by: RADIOLOGY

## 2020-02-07 PROCEDURE — 99213 OFFICE O/P EST LOW 20 MIN: CPT | Mod: S$GLB,,, | Performed by: RADIOLOGY

## 2020-02-07 PROCEDURE — 1126F PR PAIN SEVERITY QUANTIFIED, NO PAIN PRESENT: ICD-10-PCS | Mod: S$GLB,,, | Performed by: RADIOLOGY

## 2020-02-07 PROCEDURE — 99999 PR PBB SHADOW E&M-EST. PATIENT-LVL III: CPT | Mod: PBBFAC,,, | Performed by: RADIOLOGY

## 2020-02-07 RX ORDER — GADOBUTROL 604.72 MG/ML
10 INJECTION INTRAVENOUS
Status: COMPLETED | OUTPATIENT
Start: 2020-02-07 | End: 2020-02-07

## 2020-02-07 RX ADMIN — GADOBUTROL 10 ML: 604.72 INJECTION INTRAVENOUS at 12:02

## 2020-02-07 NOTE — PROGRESS NOTES
"2020    Radiation Oncology Follow-Up Visit    Prior Radiation History: 50.4 Gy to planum sphenoid meningioma in late 2009    Assessment   This is a 69 y.o. 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 Zimmerman. She returns today for routine follow-up.     MRI Brain today demonstrates stability of the treated meningioma. We discussed that this is behaving like residual scar tissue rather than active disease. She is now 10 years out from treatment, so I agree with continuing to do surveillance imaging every 2 years. I advised that she call our office sooner if she starts to develop headaches that don't resolve, pressure behind the eyes, visual changes, or any "stroke-like" symptoms.         Plan   1) MRI Brain and f/u in 2 years.        Chief Complaint   Patient presents with    meningioma       HPI: HPI    Past Medical History:   Diagnosis Date    Arthritis     Cataract     Glaucoma     History of iritis     OD    Hypertension     Meningioma     Uveitis        Past Surgical History:   Procedure Laterality Date    Brain tumor surgery      had a brain tumor removed    CATARACT EXTRACTION      OU    CATARACT EXTRACTION BILATERAL W/ ANTERIOR VITRECTOMY      CHOLECYSTECTOMY      CRANIOTOMY      JOINT REPLACEMENT      KNEE SURGERY  2-24-15    right TKR    KNEE SURGERY Left 2018    TKR    KNEE SURGERY Left 2018    REVISION    WRIST FRACTURE SURGERY      YAG Right 2016    Dr. Kaiser       Social History     Tobacco Use    Smoking status: Former Smoker     Packs/day: 0.10     Years: 3.00     Pack years: 0.30     Types: Cigarettes     Last attempt to quit: 1973     Years since quittin.8    Smokeless tobacco: Never Used   Substance Use Topics    Alcohol use: Yes     Alcohol/week: 2.0 standard drinks     Types: 2 Glasses of wine per week     Comment: social- glass on wine " "on occasions    Drug use: No       Cancer-related family history is not on file.    Current Outpatient Medications on File Prior to Visit   Medication Sig Dispense Refill    ASCORBATE CALCIUM (VITAMIN C ORAL) Take by mouth once daily.       hydroCHLOROthiazide (HYDRODIURIL) 25 MG tablet Take 2 tablets (50 mg total) by mouth once daily. 180 tablet 3    losartan (COZAAR) 50 MG tablet Take 1 tablet (50 mg total) by mouth once daily. 90 tablet 3    miconazole nitrate 2% (MICOTIN) 2 % Oint Apply topically 2 (two) times daily.  0    aspirin (ECOTRIN) 325 MG EC tablet Take 1 tablet (325 mg total) by mouth 2 (two) times daily. (Patient taking differently: Take 325 mg by mouth once. ) 60 tablet 0    famotidine (PEPCID) 20 MG tablet Take 1 tablet (20 mg total) by mouth 2 (two) times daily. 28 tablet 0     No current facility-administered medications on file prior to visit.        Review of patient's allergies indicates:   Allergen Reactions    Cortisone Hives and Swelling     Swelling of the tongue       Review of Systems   Constitutional: Negative for fever and weight loss.   HENT: Negative for ear pain and sore throat.    Eyes: Negative for blurred vision and double vision.   Respiratory: Positive for cough. Negative for hemoptysis and shortness of breath.    Cardiovascular: Negative for chest pain and leg swelling.   Gastrointestinal: Negative for abdominal pain, constipation, diarrhea, heartburn and nausea.   Genitourinary: Negative for dysuria and hematuria.   Musculoskeletal: Positive for joint pain. Negative for falls.   Neurological: Positive for sensory change (+anosmia). Negative for tingling, speech change, focal weakness, seizures and headaches.   Psychiatric/Behavioral: Negative for depression. The patient is not nervous/anxious.         Vital Signs: /82   Pulse 85   Temp 97.8 °F (36.6 °C) (Oral)   Resp 18   Ht 5' 5" (1.651 m)   Wt (!) 138.2 kg (304 lb 11.2 oz)   LMP  (LMP Unknown)   BMI " 50.70 kg/m²     ECOG Performance Status: 2 - Ambulates, capable of self care only    Physical Exam   Constitutional: She is oriented to person, place, and time and well-developed, well-nourished, and in no distress.   HENT:   Head: Normocephalic and atraumatic.   Mouth/Throat: Oropharynx is clear and moist.   Eyes: EOM are normal. No scleral icterus.   Neck: Normal range of motion. Neck supple.   Pulmonary/Chest: No accessory muscle usage. No respiratory distress.   Abdominal: Soft. Normal appearance. She exhibits no distension.   Musculoskeletal: Normal range of motion. She exhibits no edema.   Lymphadenopathy:     She has no cervical adenopathy.        Right: No supraclavicular adenopathy present.        Left: No supraclavicular adenopathy present.   Neurological: She is alert and oriented to person, place, and time. No cranial nerve deficit.   Ambulates with walker due to knee/joint pain   Skin: Skin is warm and dry.   Psychiatric: Mood, affect and judgment normal.   Vitals reviewed.       Labs:    Imaging: I have personally reviewed the patient's available images and reports and summarized pertinent findings above in HPI.     Pathology: N/A

## 2020-02-10 ENCOUNTER — PATIENT OUTREACH (OUTPATIENT)
Dept: ADMINISTRATIVE | Facility: OTHER | Age: 70
End: 2020-02-10

## 2020-02-13 LAB
CREAT SERPL-MCNC: 0.9 MG/DL (ref 0.5–1.4)
SAMPLE: NORMAL

## 2020-03-09 ENCOUNTER — OFFICE VISIT (OUTPATIENT)
Dept: PRIMARY CARE CLINIC | Facility: CLINIC | Age: 70
End: 2020-03-09
Payer: COMMERCIAL

## 2020-03-09 VITALS
BODY MASS INDEX: 48.82 KG/M2 | HEIGHT: 65 IN | DIASTOLIC BLOOD PRESSURE: 86 MMHG | TEMPERATURE: 98 F | WEIGHT: 293 LBS | HEART RATE: 80 BPM | SYSTOLIC BLOOD PRESSURE: 132 MMHG

## 2020-03-09 DIAGNOSIS — M54.50 ACUTE LEFT-SIDED LOW BACK PAIN WITHOUT SCIATICA: Primary | ICD-10-CM

## 2020-03-09 PROCEDURE — 1101F PT FALLS ASSESS-DOCD LE1/YR: CPT | Mod: CPTII,S$GLB,, | Performed by: FAMILY MEDICINE

## 2020-03-09 PROCEDURE — 99214 PR OFFICE/OUTPT VISIT, EST, LEVL IV, 30-39 MIN: ICD-10-PCS | Mod: S$GLB,,, | Performed by: FAMILY MEDICINE

## 2020-03-09 PROCEDURE — 1159F MED LIST DOCD IN RCRD: CPT | Mod: S$GLB,,, | Performed by: FAMILY MEDICINE

## 2020-03-09 PROCEDURE — 1101F PR PT FALLS ASSESS DOC 0-1 FALLS W/OUT INJ PAST YR: ICD-10-PCS | Mod: CPTII,S$GLB,, | Performed by: FAMILY MEDICINE

## 2020-03-09 PROCEDURE — 3079F PR MOST RECENT DIASTOLIC BLOOD PRESSURE 80-89 MM HG: ICD-10-PCS | Mod: CPTII,S$GLB,, | Performed by: FAMILY MEDICINE

## 2020-03-09 PROCEDURE — 3075F SYST BP GE 130 - 139MM HG: CPT | Mod: CPTII,S$GLB,, | Performed by: FAMILY MEDICINE

## 2020-03-09 PROCEDURE — 99214 OFFICE O/P EST MOD 30 MIN: CPT | Mod: S$GLB,,, | Performed by: FAMILY MEDICINE

## 2020-03-09 PROCEDURE — 3075F PR MOST RECENT SYSTOLIC BLOOD PRESS GE 130-139MM HG: ICD-10-PCS | Mod: CPTII,S$GLB,, | Performed by: FAMILY MEDICINE

## 2020-03-09 PROCEDURE — 99999 PR PBB SHADOW E&M-EST. PATIENT-LVL III: CPT | Mod: PBBFAC,,, | Performed by: FAMILY MEDICINE

## 2020-03-09 PROCEDURE — 3079F DIAST BP 80-89 MM HG: CPT | Mod: CPTII,S$GLB,, | Performed by: FAMILY MEDICINE

## 2020-03-09 PROCEDURE — 99999 PR PBB SHADOW E&M-EST. PATIENT-LVL III: ICD-10-PCS | Mod: PBBFAC,,, | Performed by: FAMILY MEDICINE

## 2020-03-09 PROCEDURE — 1159F PR MEDICATION LIST DOCUMENTED IN MEDICAL RECORD: ICD-10-PCS | Mod: S$GLB,,, | Performed by: FAMILY MEDICINE

## 2020-03-09 RX ORDER — TIZANIDINE HYDROCHLORIDE 2 MG/1
1 CAPSULE, GELATIN COATED ORAL 3 TIMES DAILY
Qty: 40 CAPSULE | Refills: 2 | Status: SHIPPED | OUTPATIENT
Start: 2020-03-09 | End: 2020-03-19

## 2020-03-09 RX ORDER — NABUMETONE 750 MG/1
750 TABLET, FILM COATED ORAL 2 TIMES DAILY
Qty: 60 TABLET | Refills: 2 | Status: SHIPPED | OUTPATIENT
Start: 2020-03-09 | End: 2022-05-23

## 2020-03-09 NOTE — PROGRESS NOTES
Subjective:       Patient ID: Katelyn Huynh is a 69 y.o. female.    Chief Complaint: Pain (left hip region) and Immunizations (FLU//SHINGLES)    70 yo presents today for evaluation of left sided low back pain, about 3-4/10, present for over one month.  Symptoms may have started after reaching for an object.  No radiation of pain into extremities.  Denies dyspnea or abdominal pain.  She has tried OTC Ibuprofen without improvement of symptoms  She is being followed by Orthopedics for history of tear, left Quadriceps tendon.  She needs to lose weight prior to considering surgery but could not afford referral to Bariatric Surgery.      Pain   Associated symptoms include weakness. Pertinent negatives include no abdominal pain.     Review of Systems   Gastrointestinal: Negative for abdominal distention and abdominal pain.   Genitourinary: Negative for difficulty urinating, dysuria, flank pain, frequency and hematuria.   Musculoskeletal: Positive for back pain.   Neurological: Positive for weakness.       Objective:      Physical Exam   Constitutional:   Morbidly obese, NAD   Musculoskeletal:   Back exam shows tenderness to palpation and muscle spasm over left mid lumbar spine at about L3-4  No tenderness to palpation/percussion over spine  Negative SLR       Assessment:       1. Acute left-sided low back pain without sciatica        Plan:       1.  Relafen bid, Tizanidine tid  2.  F/u one week if not improved  3.  Discussed possibly using Qsymia for weight loss

## 2020-03-26 ENCOUNTER — NURSE TRIAGE (OUTPATIENT)
Dept: ADMINISTRATIVE | Facility: CLINIC | Age: 70
End: 2020-03-26

## 2020-03-26 NOTE — TELEPHONE ENCOUNTER
69 year old woman calling with regards to cough. First noticed non productive cough on Friday, and feels that it has not been improving since that time. No alleviating or exacerbating factors. Denies SOB. No known fever  but has had an episode of chills.  had exposure to known COVID patient on 3/18. No recent travel. Referred to [facility] for evaluation. Counseled patient on maintaining proper infectious precautions and to seek care more urgently if symptoms develop.    Reason for Disposition   [1] Cough occurs AND [2] within 14 days of COVID-19 EXPOSURE    Additional Information   Negative: Severe difficulty breathing (e.g., struggling for each breath, speak in single words, bluish lips)   Negative: Sounds like a life-threatening emergency to the triager   Negative: [1] Difficulty breathing (shortness of breath) occurs AND [2] onset > 14 days after COVID-19 EXPOSURE (Close Contact)   Negative: [1] Dry cough occurs AND [2] onset > 14 days after COVID-19 EXPOSURE   Negative: [1] Wet cough (i.e., white-yellow, yellow, green, or drew colored sputum) AND [2] onset > 14 days after COVID-19 EXPOSURE   Negative: [1] Common cold symptoms AND [2] onset > 14 days after COVID-19 EXPOSURE   Negative: [1] Difficulty breathing occurs AND [2] within 14 days of COVID-19 EXPOSURE (Close Contact)   Negative: Patient sounds very sick or weak to the triager    Protocols used: CORONAVIRUS (COVID-19) EXPOSURE-A-

## 2020-03-30 ENCOUNTER — TELEPHONE (OUTPATIENT)
Dept: PRIMARY CARE CLINIC | Facility: CLINIC | Age: 70
End: 2020-03-30

## 2020-03-30 RX ORDER — PROMETHAZINE HYDROCHLORIDE AND DEXTROMETHORPHAN HYDROBROMIDE 6.25; 15 MG/5ML; MG/5ML
5 SYRUP ORAL EVERY 4 HOURS PRN
Qty: 240 ML | Refills: 1 | Status: SHIPPED | OUTPATIENT
Start: 2020-03-30 | End: 2020-04-09

## 2020-03-30 NOTE — TELEPHONE ENCOUNTER
Discussion with pt, presently with cough and diarrhea for about a week.  Has noted some dyspnea with cough.  No fever or chills.  Does not feel that symptoms are worsening.  No known exposure to Covid-19.  She wanted some cough suppressant.  I will call this to her pharmacy, but strongly urged to be evaluated in UC if symptoms worsen

## 2020-03-30 NOTE — TELEPHONE ENCOUNTER
I have tried several times to reach pt, but message goes to phone recording.  Please check on her and let me know when I can talk with her, thanks

## 2020-03-31 ENCOUNTER — HOSPITAL ENCOUNTER (EMERGENCY)
Facility: OTHER | Age: 70
Discharge: HOME | End: 2020-03-31
Attending: EMERGENCY MEDICINE
Payer: COMMERCIAL

## 2020-03-31 VITALS
SYSTOLIC BLOOD PRESSURE: 136 MMHG | RESPIRATION RATE: 24 BRPM | BODY MASS INDEX: 48.15 KG/M2 | HEART RATE: 72 BPM | WEIGHT: 289 LBS | TEMPERATURE: 98 F | OXYGEN SATURATION: 99 % | DIASTOLIC BLOOD PRESSURE: 61 MMHG | HEIGHT: 65 IN

## 2020-03-31 DIAGNOSIS — R06.02 SOB (SHORTNESS OF BREATH): ICD-10-CM

## 2020-03-31 DIAGNOSIS — J18.9 PNEUMONIA OF LEFT LOWER LOBE DUE TO INFECTIOUS ORGANISM: ICD-10-CM

## 2020-03-31 DIAGNOSIS — Z20.822 SUSPECTED COVID-19 VIRUS INFECTION: Primary | ICD-10-CM

## 2020-03-31 LAB
ALBUMIN SERPL BCP-MCNC: 2.9 G/DL (ref 3.5–5.2)
ALP SERPL-CCNC: 70 U/L (ref 55–135)
ALT SERPL W/O P-5'-P-CCNC: 25 U/L (ref 10–44)
ANION GAP SERPL CALC-SCNC: 13 MMOL/L (ref 8–16)
AST SERPL-CCNC: 31 U/L (ref 10–40)
BASOPHILS # BLD AUTO: 0.01 K/UL (ref 0–0.2)
BASOPHILS NFR BLD: 0.2 % (ref 0–1.9)
BILIRUB SERPL-MCNC: 0.7 MG/DL (ref 0.1–1)
BNP SERPL-MCNC: 66 PG/ML (ref 0–99)
BUN SERPL-MCNC: 9 MG/DL (ref 8–23)
CALCIUM SERPL-MCNC: 8.9 MG/DL (ref 8.7–10.5)
CHLORIDE SERPL-SCNC: 101 MMOL/L (ref 95–110)
CO2 SERPL-SCNC: 25 MMOL/L (ref 23–29)
CREAT SERPL-MCNC: 0.9 MG/DL (ref 0.5–1.4)
CRP SERPL-MCNC: 46.9 MG/L (ref 0–8.2)
DIFFERENTIAL METHOD: ABNORMAL
EOSINOPHIL # BLD AUTO: 0 K/UL (ref 0–0.5)
EOSINOPHIL NFR BLD: 0.9 % (ref 0–8)
ERYTHROCYTE [DISTWIDTH] IN BLOOD BY AUTOMATED COUNT: 13.2 % (ref 11.5–14.5)
EST. GFR  (AFRICAN AMERICAN): >60 ML/MIN/1.73 M^2
EST. GFR  (NON AFRICAN AMERICAN): >60 ML/MIN/1.73 M^2
GLUCOSE SERPL-MCNC: 110 MG/DL (ref 70–110)
HCT VFR BLD AUTO: 49.1 % (ref 37–48.5)
HGB BLD-MCNC: 15.7 G/DL (ref 12–16)
IMM GRANULOCYTES # BLD AUTO: 0.03 K/UL (ref 0–0.04)
IMM GRANULOCYTES NFR BLD AUTO: 0.6 % (ref 0–0.5)
LYMPHOCYTES # BLD AUTO: 1.2 K/UL (ref 1–4.8)
LYMPHOCYTES NFR BLD: 25.3 % (ref 18–48)
MCH RBC QN AUTO: 28.9 PG (ref 27–31)
MCHC RBC AUTO-ENTMCNC: 32 G/DL (ref 32–36)
MCV RBC AUTO: 90 FL (ref 82–98)
MONOCYTES # BLD AUTO: 0.6 K/UL (ref 0.3–1)
MONOCYTES NFR BLD: 12.1 % (ref 4–15)
NEUTROPHILS # BLD AUTO: 2.8 K/UL (ref 1.8–7.7)
NEUTROPHILS NFR BLD: 60.9 % (ref 38–73)
NRBC BLD-RTO: 0 /100 WBC
PLATELET # BLD AUTO: 261 K/UL (ref 150–350)
PLATELET BLD QL SMEAR: ABNORMAL
PMV BLD AUTO: 10.5 FL (ref 9.2–12.9)
POTASSIUM SERPL-SCNC: 4.1 MMOL/L (ref 3.5–5.1)
PROCALCITONIN SERPL IA-MCNC: 0.02 NG/ML
PROT SERPL-MCNC: 8.4 G/DL (ref 6–8.4)
RBC # BLD AUTO: 5.44 M/UL (ref 4–5.4)
SODIUM SERPL-SCNC: 139 MMOL/L (ref 136–145)
TROPONIN I SERPL DL<=0.01 NG/ML-MCNC: <0.006 NG/ML (ref 0–0.03)
WBC # BLD AUTO: 4.62 K/UL (ref 3.9–12.7)

## 2020-03-31 PROCEDURE — 99285 EMERGENCY DEPT VISIT HI MDM: CPT | Mod: 25

## 2020-03-31 PROCEDURE — 36000 PLACE NEEDLE IN VEIN: CPT

## 2020-03-31 PROCEDURE — 93010 ELECTROCARDIOGRAM REPORT: CPT | Mod: ,,, | Performed by: INTERNAL MEDICINE

## 2020-03-31 PROCEDURE — 93005 ELECTROCARDIOGRAM TRACING: CPT

## 2020-03-31 PROCEDURE — 85025 COMPLETE CBC W/AUTO DIFF WBC: CPT

## 2020-03-31 PROCEDURE — 99900035 HC TECH TIME PER 15 MIN (STAT)

## 2020-03-31 PROCEDURE — 93010 EKG 12-LEAD: ICD-10-PCS | Mod: ,,, | Performed by: INTERNAL MEDICINE

## 2020-03-31 PROCEDURE — 80053 COMPREHEN METABOLIC PANEL: CPT

## 2020-03-31 PROCEDURE — 86140 C-REACTIVE PROTEIN: CPT

## 2020-03-31 PROCEDURE — 83880 ASSAY OF NATRIURETIC PEPTIDE: CPT

## 2020-03-31 PROCEDURE — 84145 PROCALCITONIN (PCT): CPT

## 2020-03-31 PROCEDURE — 36415 COLL VENOUS BLD VENIPUNCTURE: CPT

## 2020-03-31 PROCEDURE — 84484 ASSAY OF TROPONIN QUANT: CPT

## 2020-03-31 PROCEDURE — U0002 COVID-19 LAB TEST NON-CDC: HCPCS

## 2020-03-31 RX ORDER — AZITHROMYCIN 250 MG/1
250 TABLET, FILM COATED ORAL DAILY
Qty: 6 TABLET | Refills: 0 | Status: SHIPPED | OUTPATIENT
Start: 2020-03-31 | End: 2022-02-28

## 2020-03-31 NOTE — PROGRESS NOTES
home oxygen instruct completed. Patient understands verbal instuct and demonstration. Printed material given along with DME phone number.

## 2020-03-31 NOTE — ED PROVIDER NOTES
Encounter Date: 3/31/2020    SCRIBE #1 NOTE: IFrannie, am scribing for, and in the presence of, Dr. Bowling.       History     Chief Complaint   Patient presents with    Shortness of Breath     Pt presented to the ED via  EMS. Pt c/o sob. Pt alert. Pt states she started feeling bad a week ago. Pt afebrile. Pt states she lives in the house with her  and her sister and her sister was recently admitted into the hospital with COVID-19     Time seen by provider: 2:36 PM    This is a 69 y.o. female who presents via EMS from home with complaint of shortness of breath over a week ago. She reports cough, but has no fever. Her sister has tested positive for COVID-19. She states her SOB is a little worse today, but she came in due to her sister testing positive for COVID and her family was concerned because she had a coughing.    The history is provided by the patient and the EMS personnel.     Review of patient's allergies indicates:   Allergen Reactions    Cortisone Hives and Swelling     Swelling of the tongue     Past Medical History:   Diagnosis Date    Arthritis     Cataract     Glaucoma     History of iritis     OD    Hypertension     Meningioma     Uveitis      Past Surgical History:   Procedure Laterality Date    Brain tumor surgery      had a brain tumor removed    CATARACT EXTRACTION      OU    CATARACT EXTRACTION BILATERAL W/ ANTERIOR VITRECTOMY      CHOLECYSTECTOMY      CRANIOTOMY      JOINT REPLACEMENT      KNEE SURGERY  2-24-15    right TKR    KNEE SURGERY Left 02/09/2018    TKR    KNEE SURGERY Left 02/24/2018    REVISION    WRIST FRACTURE SURGERY      YAG Right 1/20/2016    Dr. Kaiser     Family History   Problem Relation Age of Onset    Glaucoma Mother     Hypertension Mother     Diabetes Mother     Cataracts Mother     Hypertension Father     Diabetes Father     Diabetes Sister     Cataracts Sister     Stroke Sister         minor stroke    Glaucoma Brother      Glaucoma Maternal Aunt     Diabetes Maternal Aunt     Cataracts Maternal Aunt     Glaucoma Maternal Uncle     Amblyopia Neg Hx     Blindness Neg Hx     Macular degeneration Neg Hx     Retinal detachment Neg Hx     Strabismus Neg Hx      Social History     Tobacco Use    Smoking status: Former Smoker     Packs/day: 0.10     Years: 3.00     Pack years: 0.30     Types: Cigarettes     Last attempt to quit: 1973     Years since quittin.9    Smokeless tobacco: Never Used   Substance Use Topics    Alcohol use: Yes     Alcohol/week: 2.0 standard drinks     Types: 2 Glasses of wine per week     Comment: social- glass on wine on occasions    Drug use: No     Review of Systems   Constitutional: Negative for chills and fever.   HENT: Negative for congestion, sore throat and trouble swallowing.    Eyes: Negative for photophobia and visual disturbance.   Respiratory: Positive for cough and shortness of breath.    Cardiovascular: Negative for chest pain.   Gastrointestinal: Negative for abdominal pain, nausea and vomiting.   Genitourinary: Negative for dysuria and hematuria.   Musculoskeletal: Negative for back pain and neck pain.   Skin: Negative for rash and wound.   Neurological: Negative for weakness and headaches.   Psychiatric/Behavioral: Negative.        Physical Exam     Initial Vitals   BP Pulse Resp Temp SpO2   20 1437 20 1437 20 1437 20 1438 20 1437   133/64 78 13 97.8 °F (36.6 °C) 99 %      MAP       --                Physical Exam    Nursing note and vitals reviewed.  Constitutional: She appears well-developed and well-nourished. No distress.   Overweight. Anxious but in no distress.   HENT:   Head: Normocephalic and atraumatic.   Eyes: Conjunctivae and EOM are normal. Pupils are equal, round, and reactive to light.   Neck: Normal range of motion. Neck supple.   Cardiovascular: Normal rate, regular rhythm and normal heart sounds. Exam reveals no gallop and no  friction rub.    No murmur heard.  Pulmonary/Chest: Breath sounds normal. No respiratory distress. She has no wheezes. She has no rhonchi. She has no rales.   Good air exchange. No coughing during exam.   Abdominal: Soft. There is no tenderness. There is no rebound and no guarding.   Musculoskeletal: Normal range of motion. She exhibits no edema or tenderness.   Neurological: She is alert and oriented to person, place, and time. She has normal strength.   Skin: Skin is warm and dry. No rash noted.   Psychiatric: Her behavior is normal. Judgment and thought content normal. Her mood appears anxious.         ED Course   Procedures  Labs Reviewed   CBC W/ AUTO DIFFERENTIAL - Abnormal; Notable for the following components:       Result Value    RBC 5.44 (*)     Hematocrit 49.1 (*)     Immature Granulocytes 0.6 (*)     All other components within normal limits   COMPREHENSIVE METABOLIC PANEL - Abnormal; Notable for the following components:    Albumin 2.9 (*)     All other components within normal limits   C-REACTIVE PROTEIN - Abnormal; Notable for the following components:    CRP 46.9 (*)     All other components within normal limits   PROCALCITONIN   TROPONIN I   B-TYPE NATRIURETIC PEPTIDE   B-TYPE NATRIURETIC PEPTIDE   TROPONIN I   SARS-COV-2 (COVID-19) QUALITATIVE PCR     EKG Readings: (Independently Interpreted)   Initial Reading: No STEMI.   Normal sinus rhythm at a rate of 79 bpm. Motion artifact present. No apparent ischemia or arrhythmia. Similar compared to EKG from February 2018.     ECG Results          EKG 12-lead (In process)  Result time 03/31/20 16:07:41    In process by Interface, Lab In [facility] (03/31/20 16:07:41)                 Narrative:    Test Reason : R06.02,    Vent. Rate : 079 BPM     Atrial Rate : 079 BPM     P-R Int : 108 ms          QRS Dur : 058 ms      QT Int : 394 ms       P-R-T Axes : 042 037 010 degrees     QTc Int : 451 ms    Sinus rhythm with short CT  Otherwise normal ECG      Referred  By: [email or web address]   SELF           Confirmed By:                             Imaging Results          X-Ray Chest AP Portable (Final result)  Result time 03/31/20 15:54:17    Final result by Michael Rajan MD (03/31/20 15:54:17)                 Impression:      Ill-defined airspace opacity in left lung base.  The findings may represent evolving pneumonia.  Suggest clinical correlation and short-term follow-up.    Borderline cardiomegaly with pulmonary vascular congestion and increased attenuation of the pulmonary interstitium.  The findings most likely represent early pulmonary edema secondary to CHF.      Electronically signed by: Michael Rajan MD  Date:    03/31/2020  Time:    15:54             Narrative:    EXAMINATION:  XR CHEST AP PORTABLE    CLINICAL HISTORY:  Chest Pain;    TECHNIQUE:  Single frontal view of the chest was performed.    COMPARISON:  02/26/2018.    FINDINGS:  The right-sided PICC line has been removed.  Monitoring EKG leads are present.  The trachea is unremarkable.  The cardiomediastinal silhouette is at the upper limits of normal.  The hemidiaphragms are within normal limits.  There is no evidence of free air beneath the hemidiaphragms.  There are no pleural effusions.  There is no evidence of a pneumothorax.  There is no evidence of pneumomediastinum.  There is pulmonary vascular congestion.  There is increased attenuation of the pulmonary interstitium.  There is an ill-defined airspace opacity in left lung base.  There are degenerative changes in the osseous structures.                              X-Rays:   Independently Interpreted Readings:   Chest X-Ray: Limited by body habitus and technique, but suggest mild infiltrates, left greater than right.     Medical Decision Making:   History:   Old Medical Records: I decided to obtain old medical records.  Independently Interpreted Test(s):   I have ordered and independently interpreted X-rays - see prior notes.  I have ordered and independently  "interpreted EKG Reading(s) - see prior notes  Clinical Tests:   Lab Tests: Ordered and Reviewed  Radiological Study: Ordered and Reviewed  Medical Tests: Ordered and Reviewed            Scribe Attestation:   Scribe #1: I performed the above scribed service and the documentation accurately describes the services I performed. I attest to the accuracy of the note.    Attending Attestation:           Physician Attestation for Scribe:  Physician Attestation Statement for Scribe #1: I, Dr. Bowling, reviewed documentation, as scribed by Frannie Chino in my presence, and it is both accurate and complete.                 ED Course as of Mar 31 1826   Tue Mar 31, 2020   [id number] Discuss case with Dr. Finch of hospitalist service, who agrees patient is appropriate for discharge home with home oxygen. He contacted  who brought portable oxygen tank to patient. Respiratory will teach her how to use it. She has to call [facility] for concentrator.     Patient reports she "feels good." Feels comfortable with discharge and returning if symptoms worsened.    [LT]      ED Course User Index  [LT] Frannie Chino          Patient with history of hypertension, but no pulmonary disease presents with shortness of breath mild cough for the past week.  No fevers.  She reports she generally has been feeling about the same, maybe a little worse in terms of shortness of breath however became concerned when informed of her sisters COVID positive diagnosis.  The patient is comfortable at rest.  When she ambulated between stretcher she did desaturate to 91%, on room air.  She is comfortable maintaining saturations on 2 L of oxygen when ambulating around her exam room.  Chest x-ray shows mild bilateral infiltrates as well as possible left lower lobe infiltrate for which she will be treated with Zithromax.  Concern is for possible COVID infection but laboratory studies are reassuring with a normal white count, low CRP BNP and troponin.  " Procalcitonin.  Electrolytes unremarkable.  Discussed with patient discharge home and patient is feeling well, is eager to go home.  We have arranged for home oxygen.  I discussed return precautions with her.  Encouraged follow-up with her primary care.  I will also have our mid-level who was performing COVID call back wellness checks give her a call tomorrow.  Patient understands that she should resume she is COVID positive until our test returns otherwise and she should self quarantine until negative test or symptom free times 72 hr      Clinical Impression:     1. Suspected Covid-19 Virus Infection    2. SOB (shortness of breath)    3. Pneumonia of left lower lobe due to infectious organism              ED Disposition Condition    Discharge Stable        ED Prescriptions     Medication Sig Dispense Start Date End Date Auth. Provider    azithromycin (Z-FER) 250 MG tablet Take 1 tablet (250 mg total) by mouth once daily. Take first 2 tablets together, then 1 every day until finished. 6 tablet 3/31/2020  John Bowling II, MD        Follow-up Information     Follow up With Specialties Details Why Contact Info    [facility] Provider  contact once home for concentrator delivery [address]  [address]  [place] [address]  [phone]      Daniel Garcia MD Family Medicine In 3 days  [address]  [address]  [place] [address]  [phone]                                       John Bowling II, MD  03/31/20 1825       John Bowling II, MD  03/31/20 1837

## 2020-03-31 NOTE — PLAN OF CARE
Spoke with Tay from [facility] -[phone] - he gave approval to pull portable oxygen tank from our supply & will deliver concentrator to home address once patient is home - tank delivered to ED - contact info for [facility] placed on AVS - ARAMIS michael

## 2020-03-31 NOTE — ED NOTES
Pt to ED with c/o SOB x1 week, worse today. PMH of HTN. Reports sister living at home, recently diagnosed with covid-19 and currently admitted in hospital. Pt comes to ED due to worsening SOB, worse with exertion. Pt also reporting cough, chills, intermittent diarrhea. Denies fever, N/V.     Two patient identifiers have been checked and are correct.      Appearance: Pt awake, alert & oriented to person, place & time. Pt in no acute distress at present time. Pt is clean and well groomed with clothes appropriately fastened.   Skin: Skin warm, dry & intact. Color consistent with ethnicity. Mucous membranes moist. No breakdown or brusing noted.   Musculoskeletal: Patient moving all extremities well, no obvious swelling or deformities noted.   Respiratory: Respirations spontaneous, even, and non-labored. Visible chest rise noted. Airway is open and patent. No accessory muscle use noted. O2 sats 92% on RA with ambulation to ED stretcher, >95% while at rest. Cough reported.   Neurologic: Sensation is intact. Speech is clear and appropriate. Eyes open spontaneously, behavior appropriate to situation, follows commands, facial expression symmetrical, bilateral hand grasp equal and even, purposeful motor response noted.  Cardiac: All peripheral pulses present. No Bilateral lower extremity edema. Cap refill is <3 seconds.  Abdomen: Abdomen soft, non-tender to palpation.   : Pt reports no dysuria or hematuria.

## 2020-04-01 ENCOUNTER — TELEPHONE (OUTPATIENT)
Dept: EMERGENCY MEDICINE | Facility: OTHER | Age: 70
End: 2020-04-01

## 2020-04-01 LAB — SARS-COV-2 RNA RESP QL NAA+PROBE: DETECTED

## 2020-04-01 NOTE — TELEPHONE ENCOUNTER
Pt called regarding recent ER discharge. Pt states feeling improved and started Zpak today. Discussed for any change in status to return to ER, Pt voiced understanding.

## 2020-04-01 NOTE — ED NOTES
Received report, pt lying in bed, respirations even, unlabored, eyes open spontaneously, awating transport company to arrive for discharge. Pt updated on POC

## 2020-04-03 NOTE — PHYSICIAN QUERY
PT Name: Katelyn Hunyh  MR #: [id number]    Physician Query Form - Cause and Effect Relationship Clarification      CDS/: Jacqui Cristobal               Contact information:    This form is a permanent document in the medical record.     Query Date: April 3, 2020    By submitting this query, we are merely seeking further clarification of documentation. Please utilize your independent clinical judgment when addressing the question(s) below.    The Medical record contains the following:  Supporting Clinical Findings   Location in record                                                                               pneumonia                                                                                                              Final impression ed notes                                                                                     covid 19 positive                                                                                                           Lab notes         Provider, please clarify if there is any correlation between ________loabar pneumonia_________________ and _____covid 19 positive_____________.           Are the conditions: please select below and/or add documentation to record if applicable      [  ] Due to or associated with each other   [  ] Unrelated to each other   [  ] Other (Please Specify): _________________________   [ x ] Clinically Undetermined

## 2020-04-06 ENCOUNTER — TELEPHONE (OUTPATIENT)
Dept: PRIMARY CARE CLINIC | Facility: CLINIC | Age: 70
End: 2020-04-06

## 2020-04-06 NOTE — TELEPHONE ENCOUNTER
----- Message from Lluvia Petersen sent at 4/6/2020 10:30 AM CDT -----  Contact: Patient [phone]  Good Morning,  A week ago left ER . Patient tested positive for COVID-19 & as well for a mild case of pneumonia. Patient want  to know      Thank you

## 2020-04-06 NOTE — TELEPHONE ENCOUNTER
Patient is feeling better she was sent home on oxygen and given antibiotics. Feels way better now than kenyon she went to the [facility]

## 2020-04-13 DIAGNOSIS — U07.1 COVID-19 VIRUS DETECTED: ICD-10-CM

## 2020-05-05 ENCOUNTER — TELEPHONE (OUTPATIENT)
Dept: PRIMARY CARE CLINIC | Facility: CLINIC | Age: 70
End: 2020-05-05

## 2020-05-05 NOTE — TELEPHONE ENCOUNTER
----- Message from Treasure Hurst sent at 5/5/2020 10:11 AM CDT -----  Contact: Pt self Mobile [phone]  Patient would like a call back in regards to her wanting to know when she can stop taking her oxygen? She said that she's been on oxygen for a month now due to her being diagnosed with pneumonia and the COVID-19 virus but she feel much better now.

## 2020-05-06 ENCOUNTER — TELEPHONE (OUTPATIENT)
Dept: PRIMARY CARE CLINIC | Facility: CLINIC | Age: 70
End: 2020-05-06

## 2020-05-06 NOTE — TELEPHONE ENCOUNTER
----- Message from Vida Adams sent at 5/6/2020  2:44 PM CDT -----  Contact: Self   Patient is returning a phone call.  Who left a message for the patient: Melissa Power LPN    Does patient know what this is regarding:  appt  Comments:

## 2020-05-06 NOTE — TELEPHONE ENCOUNTER
Spoke with patient, she will be able to come into clinic for pulse ox tomorrow at 11:00 AM, appointment scheduled.

## 2020-05-07 ENCOUNTER — CLINICAL SUPPORT (OUTPATIENT)
Dept: PRIMARY CARE CLINIC | Facility: CLINIC | Age: 70
End: 2020-05-07
Payer: COMMERCIAL

## 2020-05-07 ENCOUNTER — TELEPHONE (OUTPATIENT)
Dept: PRIMARY CARE CLINIC | Facility: CLINIC | Age: 70
End: 2020-05-07

## 2020-05-07 VITALS — OXYGEN SATURATION: 97 %

## 2020-05-07 DIAGNOSIS — Z99.81 DEPENDENCE ON SUPPLEMENTAL OXYGEN: Primary | ICD-10-CM

## 2020-05-07 PROCEDURE — 99999 PR PBB SHADOW E&M-EST. PATIENT-LVL I: ICD-10-PCS | Mod: PBBFAC,,,

## 2020-05-07 PROCEDURE — 99999 PR PBB SHADOW E&M-EST. PATIENT-LVL I: CPT | Mod: PBBFAC,,,

## 2020-05-07 NOTE — PROGRESS NOTES
Patient present to clinic today to have oxygen level checked per Dr. Garcia at this time she 97% on room air. Instructed patient to check pulse ox daily for a week and call office with readings verbalized understanding. Patient given pulse ox machine and instructions on how to use patient verbalized understanding.

## 2020-06-02 DIAGNOSIS — Z12.31 SCREENING MAMMOGRAM FOR HIGH-RISK PATIENT: Primary | ICD-10-CM

## 2020-06-02 NOTE — TELEPHONE ENCOUNTER
----- Message from Thuy Elliott sent at 6/2/2020  9:24 AM CDT -----  Contact: [phone] / self  Caller is requesting to schedule their annual screening mammogram appointment. Order is not listed in Epic.  Please enter order and contact patient to schedule. Mammo  Where would they like the mammogram performed?:  [facility]  Would the patient rather receive a phone call back or a response via [facility]?:   Phone   Additional information:

## 2020-06-02 NOTE — TELEPHONE ENCOUNTER
[facility] and contact patient with appointment date and time.

## 2020-06-05 ENCOUNTER — HOSPITAL ENCOUNTER (OUTPATIENT)
Dept: RADIOLOGY | Facility: HOSPITAL | Age: 70
Discharge: HOME | End: 2020-06-05
Attending: FAMILY MEDICINE
Payer: COMMERCIAL

## 2020-06-05 DIAGNOSIS — Z12.31 SCREENING MAMMOGRAM FOR HIGH-RISK PATIENT: ICD-10-CM

## 2020-06-05 PROCEDURE — 77063 BREAST TOMOSYNTHESIS BI: CPT | Mod: 26,,, | Performed by: RADIOLOGY

## 2020-06-05 PROCEDURE — 77067 MAMMO DIGITAL SCREENING BILAT WITH TOMOSYNTHESIS_CAD: ICD-10-PCS | Mod: 26,,, | Performed by: RADIOLOGY

## 2020-06-05 PROCEDURE — 77063 MAMMO DIGITAL SCREENING BILAT WITH TOMOSYNTHESIS_CAD: ICD-10-PCS | Mod: 26,,, | Performed by: RADIOLOGY

## 2020-06-05 PROCEDURE — 77067 SCR MAMMO BI INCL CAD: CPT | Mod: TC,PN

## 2020-06-05 PROCEDURE — 77067 SCR MAMMO BI INCL CAD: CPT | Mod: 26,,, | Performed by: RADIOLOGY

## 2020-06-25 ENCOUNTER — PATIENT MESSAGE (OUTPATIENT)
Dept: PRIMARY CARE CLINIC | Facility: CLINIC | Age: 70
End: 2020-06-25

## 2020-09-25 RX ORDER — LOSARTAN POTASSIUM 50 MG/1
TABLET ORAL
Qty: 90 TABLET | Refills: 3 | Status: SHIPPED | OUTPATIENT
Start: 2020-09-25 | End: 2022-03-23 | Stop reason: SDUPTHER

## 2020-10-27 ENCOUNTER — PATIENT MESSAGE (OUTPATIENT)
Dept: PRIMARY CARE CLINIC | Facility: CLINIC | Age: 70
End: 2020-10-27

## 2020-10-27 RX ORDER — HYDROCHLOROTHIAZIDE 25 MG/1
50 TABLET ORAL DAILY
Qty: 180 TABLET | Refills: 3 | Status: SHIPPED | OUTPATIENT
Start: 2020-10-27 | End: 2022-03-23 | Stop reason: SDUPTHER

## 2021-01-25 ENCOUNTER — OFFICE VISIT (OUTPATIENT)
Dept: OPHTHALMOLOGY | Facility: CLINIC | Age: 71
End: 2021-01-25
Payer: COMMERCIAL

## 2021-01-25 ENCOUNTER — PATIENT OUTREACH (OUTPATIENT)
Dept: ADMINISTRATIVE | Facility: OTHER | Age: 71
End: 2021-01-25

## 2021-01-25 DIAGNOSIS — I10 ESSENTIAL HYPERTENSION: ICD-10-CM

## 2021-01-25 DIAGNOSIS — H26.492 PCO (POSTERIOR CAPSULAR OPACIFICATION), LEFT: Primary | ICD-10-CM

## 2021-01-25 DIAGNOSIS — Z96.1 PSEUDOPHAKIA: ICD-10-CM

## 2021-01-25 DIAGNOSIS — H52.7 REFRACTIVE ERROR: ICD-10-CM

## 2021-01-25 DIAGNOSIS — H04.123 DRY EYE SYNDROME, BILATERAL: ICD-10-CM

## 2021-01-25 PROCEDURE — 3288F FALL RISK ASSESSMENT DOCD: CPT | Mod: CPTII,S$GLB,, | Performed by: OPHTHALMOLOGY

## 2021-01-25 PROCEDURE — 99999 PR PBB SHADOW E&M-EST. PATIENT-LVL III: CPT | Mod: PBBFAC,,, | Performed by: OPHTHALMOLOGY

## 2021-01-25 PROCEDURE — 1101F PT FALLS ASSESS-DOCD LE1/YR: CPT | Mod: CPTII,S$GLB,, | Performed by: OPHTHALMOLOGY

## 2021-01-25 PROCEDURE — 1101F PR PT FALLS ASSESS DOC 0-1 FALLS W/OUT INJ PAST YR: ICD-10-PCS | Mod: CPTII,S$GLB,, | Performed by: OPHTHALMOLOGY

## 2021-01-25 PROCEDURE — 99999 PR PBB SHADOW E&M-EST. PATIENT-LVL III: ICD-10-PCS | Mod: PBBFAC,,, | Performed by: OPHTHALMOLOGY

## 2021-01-25 PROCEDURE — 1126F PR PAIN SEVERITY QUANTIFIED, NO PAIN PRESENT: ICD-10-PCS | Mod: S$GLB,,, | Performed by: OPHTHALMOLOGY

## 2021-01-25 PROCEDURE — 92014 COMPRE OPH EXAM EST PT 1/>: CPT | Mod: S$GLB,,, | Performed by: OPHTHALMOLOGY

## 2021-01-25 PROCEDURE — 1126F AMNT PAIN NOTED NONE PRSNT: CPT | Mod: S$GLB,,, | Performed by: OPHTHALMOLOGY

## 2021-01-25 PROCEDURE — 3288F PR FALLS RISK ASSESSMENT DOCUMENTED: ICD-10-PCS | Mod: CPTII,S$GLB,, | Performed by: OPHTHALMOLOGY

## 2021-01-25 PROCEDURE — 92014 PR EYE EXAM, EST PATIENT,COMPREHESV: ICD-10-PCS | Mod: S$GLB,,, | Performed by: OPHTHALMOLOGY

## 2021-07-12 ENCOUNTER — TELEPHONE (OUTPATIENT)
Dept: PRIMARY CARE CLINIC | Facility: CLINIC | Age: 71
End: 2021-07-12

## 2021-07-12 DIAGNOSIS — Z12.31 ENCOUNTER FOR SCREENING MAMMOGRAM FOR BREAST CANCER: Primary | ICD-10-CM

## 2021-07-22 ENCOUNTER — HOSPITAL ENCOUNTER (OUTPATIENT)
Dept: RADIOLOGY | Facility: HOSPITAL | Age: 71
Discharge: HOME | End: 2021-07-22
Attending: NURSE PRACTITIONER
Payer: COMMERCIAL

## 2021-07-22 VITALS — BODY MASS INDEX: 48.82 KG/M2 | WEIGHT: 293 LBS | HEIGHT: 65 IN

## 2021-07-22 DIAGNOSIS — Z12.31 ENCOUNTER FOR SCREENING MAMMOGRAM FOR BREAST CANCER: ICD-10-CM

## 2021-07-22 PROCEDURE — 77063 BREAST TOMOSYNTHESIS BI: CPT | Mod: 26,,, | Performed by: RADIOLOGY

## 2021-07-22 PROCEDURE — 77067 MAMMO DIGITAL SCREENING BILAT WITH TOMO: ICD-10-PCS | Mod: 26,,, | Performed by: RADIOLOGY

## 2021-07-22 PROCEDURE — 77067 SCR MAMMO BI INCL CAD: CPT | Mod: 26,,, | Performed by: RADIOLOGY

## 2021-07-22 PROCEDURE — 77063 MAMMO DIGITAL SCREENING BILAT WITH TOMO: ICD-10-PCS | Mod: 26,,, | Performed by: RADIOLOGY

## 2021-07-22 PROCEDURE — 77067 SCR MAMMO BI INCL CAD: CPT | Mod: TC,PN

## 2021-11-04 RX ORDER — LOSARTAN POTASSIUM 50 MG/1
50 TABLET ORAL DAILY
Qty: 90 TABLET | Refills: 3 | OUTPATIENT
Start: 2021-11-04

## 2021-11-10 ENCOUNTER — OFFICE VISIT (OUTPATIENT)
Dept: PODIATRY | Facility: CLINIC | Age: 71
End: 2021-11-10
Payer: COMMERCIAL

## 2021-11-10 ENCOUNTER — HOSPITAL ENCOUNTER (OUTPATIENT)
Dept: RADIOLOGY | Facility: HOSPITAL | Age: 71
Discharge: HOME | End: 2021-11-10
Attending: PODIATRIST
Payer: COMMERCIAL

## 2021-11-10 VITALS
HEIGHT: 65 IN | BODY MASS INDEX: 48.82 KG/M2 | SYSTOLIC BLOOD PRESSURE: 126 MMHG | HEART RATE: 100 BPM | RESPIRATION RATE: 18 BRPM | WEIGHT: 293 LBS | DIASTOLIC BLOOD PRESSURE: 78 MMHG

## 2021-11-10 DIAGNOSIS — M20.42 HAMMER TOES OF BOTH FEET: Primary | ICD-10-CM

## 2021-11-10 DIAGNOSIS — L84 CORN OR CALLUS: ICD-10-CM

## 2021-11-10 DIAGNOSIS — M20.41 HAMMER TOES OF BOTH FEET: Primary | ICD-10-CM

## 2021-11-10 DIAGNOSIS — R26.9 ABNORMAL GAIT: ICD-10-CM

## 2021-11-10 DIAGNOSIS — M20.41 HAMMER TOES OF BOTH FEET: ICD-10-CM

## 2021-11-10 DIAGNOSIS — M20.42 HAMMER TOES OF BOTH FEET: ICD-10-CM

## 2021-11-10 DIAGNOSIS — M79.671 RIGHT FOOT PAIN: ICD-10-CM

## 2021-11-10 PROCEDURE — 4010F ACE/ARB THERAPY RXD/TAKEN: CPT | Mod: CPTII,S$GLB,, | Performed by: PODIATRIST

## 2021-11-10 PROCEDURE — 99203 PR OFFICE/OUTPT VISIT, NEW, LEVL III, 30-44 MIN: ICD-10-PCS | Mod: S$GLB,,, | Performed by: PODIATRIST

## 2021-11-10 PROCEDURE — 99999 PR PBB SHADOW E&M-EST. PATIENT-LVL III: ICD-10-PCS | Mod: PBBFAC,,, | Performed by: PODIATRIST

## 2021-11-10 PROCEDURE — 1159F PR MEDICATION LIST DOCUMENTED IN MEDICAL RECORD: ICD-10-PCS | Mod: CPTII,S$GLB,, | Performed by: PODIATRIST

## 2021-11-10 PROCEDURE — 3008F BODY MASS INDEX DOCD: CPT | Mod: CPTII,S$GLB,, | Performed by: PODIATRIST

## 2021-11-10 PROCEDURE — 3008F PR BODY MASS INDEX (BMI) DOCUMENTED: ICD-10-PCS | Mod: CPTII,S$GLB,, | Performed by: PODIATRIST

## 2021-11-10 PROCEDURE — 73630 X-RAY EXAM OF FOOT: CPT | Mod: TC,PN,RT

## 2021-11-10 PROCEDURE — 1125F PR PAIN SEVERITY QUANTIFIED, PAIN PRESENT: ICD-10-PCS | Mod: CPTII,S$GLB,, | Performed by: PODIATRIST

## 2021-11-10 PROCEDURE — 4010F PR ACE/ARB THEARPY RXD/TAKEN: ICD-10-PCS | Mod: CPTII,S$GLB,, | Performed by: PODIATRIST

## 2021-11-10 PROCEDURE — 99999 PR PBB SHADOW E&M-EST. PATIENT-LVL III: CPT | Mod: PBBFAC,,, | Performed by: PODIATRIST

## 2021-11-10 PROCEDURE — 3074F SYST BP LT 130 MM HG: CPT | Mod: CPTII,S$GLB,, | Performed by: PODIATRIST

## 2021-11-10 PROCEDURE — 3078F DIAST BP <80 MM HG: CPT | Mod: CPTII,S$GLB,, | Performed by: PODIATRIST

## 2021-11-10 PROCEDURE — 3078F PR MOST RECENT DIASTOLIC BLOOD PRESSURE < 80 MM HG: ICD-10-PCS | Mod: CPTII,S$GLB,, | Performed by: PODIATRIST

## 2021-11-10 PROCEDURE — 73630 X-RAY EXAM OF FOOT: CPT | Mod: 26,RT,, | Performed by: RADIOLOGY

## 2021-11-10 PROCEDURE — 99203 OFFICE O/P NEW LOW 30 MIN: CPT | Mod: S$GLB,,, | Performed by: PODIATRIST

## 2021-11-10 PROCEDURE — 73630 XR FOOT COMPLETE 3 VIEW RIGHT: ICD-10-PCS | Mod: 26,RT,, | Performed by: RADIOLOGY

## 2021-11-10 PROCEDURE — 1125F AMNT PAIN NOTED PAIN PRSNT: CPT | Mod: CPTII,S$GLB,, | Performed by: PODIATRIST

## 2021-11-10 PROCEDURE — 3074F PR MOST RECENT SYSTOLIC BLOOD PRESSURE < 130 MM HG: ICD-10-PCS | Mod: CPTII,S$GLB,, | Performed by: PODIATRIST

## 2021-11-10 PROCEDURE — 1159F MED LIST DOCD IN RCRD: CPT | Mod: CPTII,S$GLB,, | Performed by: PODIATRIST

## 2021-11-29 ENCOUNTER — IMMUNIZATION (OUTPATIENT)
Dept: PHARMACY | Facility: CLINIC | Age: 71
End: 2021-11-29
Payer: COMMERCIAL

## 2021-11-29 DIAGNOSIS — Z23 NEED FOR VACCINATION: Primary | ICD-10-CM

## 2021-12-22 DIAGNOSIS — I10 ESSENTIAL (PRIMARY) HYPERTENSION: Primary | ICD-10-CM

## 2021-12-22 RX ORDER — LOSARTAN POTASSIUM 50 MG/1
50 TABLET ORAL DAILY
Qty: 90 TABLET | Refills: 0 | OUTPATIENT
Start: 2021-12-22 | End: 2022-03-22

## 2022-01-26 ENCOUNTER — OFFICE VISIT (OUTPATIENT)
Dept: OPHTHALMOLOGY | Facility: CLINIC | Age: 72
End: 2022-01-26
Payer: COMMERCIAL

## 2022-01-26 DIAGNOSIS — H04.123 DRY EYE SYNDROME, BILATERAL: ICD-10-CM

## 2022-01-26 DIAGNOSIS — H52.7 REFRACTIVE ERROR: ICD-10-CM

## 2022-01-26 DIAGNOSIS — H26.492 PCO (POSTERIOR CAPSULAR OPACIFICATION), LEFT: Primary | ICD-10-CM

## 2022-01-26 DIAGNOSIS — I10 ESSENTIAL HYPERTENSION: ICD-10-CM

## 2022-01-26 DIAGNOSIS — Z96.1 PSEUDOPHAKIA: ICD-10-CM

## 2022-01-26 PROCEDURE — 1160F PR REVIEW ALL MEDS BY PRESCRIBER/CLIN PHARMACIST DOCUMENTED: ICD-10-PCS | Mod: CPTII,S$GLB,, | Performed by: OPHTHALMOLOGY

## 2022-01-26 PROCEDURE — 3288F FALL RISK ASSESSMENT DOCD: CPT | Mod: CPTII,S$GLB,, | Performed by: OPHTHALMOLOGY

## 2022-01-26 PROCEDURE — 1101F PT FALLS ASSESS-DOCD LE1/YR: CPT | Mod: CPTII,S$GLB,, | Performed by: OPHTHALMOLOGY

## 2022-01-26 PROCEDURE — 99999 PR PBB SHADOW E&M-EST. PATIENT-LVL III: CPT | Mod: PBBFAC,,, | Performed by: OPHTHALMOLOGY

## 2022-01-26 PROCEDURE — 1160F RVW MEDS BY RX/DR IN RCRD: CPT | Mod: CPTII,S$GLB,, | Performed by: OPHTHALMOLOGY

## 2022-01-26 PROCEDURE — 1101F PR PT FALLS ASSESS DOC 0-1 FALLS W/OUT INJ PAST YR: ICD-10-PCS | Mod: CPTII,S$GLB,, | Performed by: OPHTHALMOLOGY

## 2022-01-26 PROCEDURE — 3288F PR FALLS RISK ASSESSMENT DOCUMENTED: ICD-10-PCS | Mod: CPTII,S$GLB,, | Performed by: OPHTHALMOLOGY

## 2022-01-26 PROCEDURE — 1126F AMNT PAIN NOTED NONE PRSNT: CPT | Mod: CPTII,S$GLB,, | Performed by: OPHTHALMOLOGY

## 2022-01-26 PROCEDURE — 1159F MED LIST DOCD IN RCRD: CPT | Mod: CPTII,S$GLB,, | Performed by: OPHTHALMOLOGY

## 2022-01-26 PROCEDURE — 99999 PR PBB SHADOW E&M-EST. PATIENT-LVL III: ICD-10-PCS | Mod: PBBFAC,,, | Performed by: OPHTHALMOLOGY

## 2022-01-26 PROCEDURE — 1126F PR PAIN SEVERITY QUANTIFIED, NO PAIN PRESENT: ICD-10-PCS | Mod: CPTII,S$GLB,, | Performed by: OPHTHALMOLOGY

## 2022-01-26 PROCEDURE — 92014 COMPRE OPH EXAM EST PT 1/>: CPT | Mod: S$GLB,,, | Performed by: OPHTHALMOLOGY

## 2022-01-26 PROCEDURE — 92014 PR EYE EXAM, EST PATIENT,COMPREHESV: ICD-10-PCS | Mod: S$GLB,,, | Performed by: OPHTHALMOLOGY

## 2022-01-26 PROCEDURE — 1159F PR MEDICATION LIST DOCUMENTED IN MEDICAL RECORD: ICD-10-PCS | Mod: CPTII,S$GLB,, | Performed by: OPHTHALMOLOGY

## 2022-01-26 NOTE — PROGRESS NOTES
Subjective:       Patient ID: Katelyn Huynh is a 71 y.o. female.    Chief Complaint: Concerns About Ocular Health    HPI     DLS 01/25/2021    Patient her today with c/o decreasing VA OS > OD. No pain or f/f ou.    No gtts    Last edited by Nunu Smallwood on 1/26/2022  8:12 AM. (History)             Assessment:       1. PCO (posterior capsular opacification), left    2. Dry eye syndrome, bilateral    3. Essential hypertension    4. Refractive error    5. Pseudophakia - Both Eyes        Plan:       Visually significant  PCO OS- Pt. Wants Laser.  KIRILL-Doing well.  HTN-No retinopathy OU.  RE    AT's.  Control HTN.  RTC 2/28/2022 for YAG CAP OS.

## 2022-02-02 DIAGNOSIS — Z86.018 HISTORY OF MENINGIOMA: Primary | ICD-10-CM

## 2022-02-03 ENCOUNTER — TELEPHONE (OUTPATIENT)
Dept: RADIATION ONCOLOGY | Facility: CLINIC | Age: 72
End: 2022-02-03
Payer: COMMERCIAL

## 2022-02-22 ENCOUNTER — HOSPITAL ENCOUNTER (OUTPATIENT)
Dept: RADIOLOGY | Facility: HOSPITAL | Age: 72
Discharge: HOME | End: 2022-02-22
Attending: RADIOLOGY
Payer: COMMERCIAL

## 2022-02-22 ENCOUNTER — OFFICE VISIT (OUTPATIENT)
Dept: RADIATION ONCOLOGY | Facility: CLINIC | Age: 72
End: 2022-02-22
Payer: COMMERCIAL

## 2022-02-22 VITALS
DIASTOLIC BLOOD PRESSURE: 67 MMHG | RESPIRATION RATE: 16 BRPM | SYSTOLIC BLOOD PRESSURE: 144 MMHG | OXYGEN SATURATION: 98 % | HEART RATE: 85 BPM | WEIGHT: 293 LBS | BODY MASS INDEX: 48.82 KG/M2 | HEIGHT: 65 IN

## 2022-02-22 DIAGNOSIS — Z86.011 PERSONAL HISTORY OF MENINGIOMA OF THE BRAIN: ICD-10-CM

## 2022-02-22 DIAGNOSIS — Z86.018 HISTORY OF MENINGIOMA: ICD-10-CM

## 2022-02-22 PROCEDURE — 1159F MED LIST DOCD IN RCRD: CPT | Mod: CPTII,S$GLB,, | Performed by: RADIOLOGY

## 2022-02-22 PROCEDURE — 99999 PR PBB SHADOW E&M-EST. PATIENT-LVL III: ICD-10-PCS | Mod: PBBFAC,,, | Performed by: RADIOLOGY

## 2022-02-22 PROCEDURE — 3008F BODY MASS INDEX DOCD: CPT | Mod: CPTII,S$GLB,, | Performed by: RADIOLOGY

## 2022-02-22 PROCEDURE — 99213 PR OFFICE/OUTPT VISIT, EST, LEVL III, 20-29 MIN: ICD-10-PCS | Mod: S$GLB,,, | Performed by: RADIOLOGY

## 2022-02-22 PROCEDURE — 25500020 PHARM REV CODE 255: Performed by: RADIOLOGY

## 2022-02-22 PROCEDURE — 99213 OFFICE O/P EST LOW 20 MIN: CPT | Mod: S$GLB,,, | Performed by: RADIOLOGY

## 2022-02-22 PROCEDURE — 1125F AMNT PAIN NOTED PAIN PRSNT: CPT | Mod: CPTII,S$GLB,, | Performed by: RADIOLOGY

## 2022-02-22 PROCEDURE — 1101F PT FALLS ASSESS-DOCD LE1/YR: CPT | Mod: CPTII,S$GLB,, | Performed by: RADIOLOGY

## 2022-02-22 PROCEDURE — 3077F PR MOST RECENT SYSTOLIC BLOOD PRESSURE >= 140 MM HG: ICD-10-PCS | Mod: CPTII,S$GLB,, | Performed by: RADIOLOGY

## 2022-02-22 PROCEDURE — 3008F PR BODY MASS INDEX (BMI) DOCUMENTED: ICD-10-PCS | Mod: CPTII,S$GLB,, | Performed by: RADIOLOGY

## 2022-02-22 PROCEDURE — 99999 PR PBB SHADOW E&M-EST. PATIENT-LVL III: CPT | Mod: PBBFAC,,, | Performed by: RADIOLOGY

## 2022-02-22 PROCEDURE — 3288F PR FALLS RISK ASSESSMENT DOCUMENTED: ICD-10-PCS | Mod: CPTII,S$GLB,, | Performed by: RADIOLOGY

## 2022-02-22 PROCEDURE — 1159F PR MEDICATION LIST DOCUMENTED IN MEDICAL RECORD: ICD-10-PCS | Mod: CPTII,S$GLB,, | Performed by: RADIOLOGY

## 2022-02-22 PROCEDURE — 1125F PR PAIN SEVERITY QUANTIFIED, PAIN PRESENT: ICD-10-PCS | Mod: CPTII,S$GLB,, | Performed by: RADIOLOGY

## 2022-02-22 PROCEDURE — 70553 MRI BRAIN STEM W/O & W/DYE: CPT | Mod: 26,,, | Performed by: RADIOLOGY

## 2022-02-22 PROCEDURE — 3078F DIAST BP <80 MM HG: CPT | Mod: CPTII,S$GLB,, | Performed by: RADIOLOGY

## 2022-02-22 PROCEDURE — 3077F SYST BP >= 140 MM HG: CPT | Mod: CPTII,S$GLB,, | Performed by: RADIOLOGY

## 2022-02-22 PROCEDURE — 3078F PR MOST RECENT DIASTOLIC BLOOD PRESSURE < 80 MM HG: ICD-10-PCS | Mod: CPTII,S$GLB,, | Performed by: RADIOLOGY

## 2022-02-22 PROCEDURE — 1101F PR PT FALLS ASSESS DOC 0-1 FALLS W/OUT INJ PAST YR: ICD-10-PCS | Mod: CPTII,S$GLB,, | Performed by: RADIOLOGY

## 2022-02-22 PROCEDURE — 3288F FALL RISK ASSESSMENT DOCD: CPT | Mod: CPTII,S$GLB,, | Performed by: RADIOLOGY

## 2022-02-22 PROCEDURE — 70553 MRI BRAIN STEM W/O & W/DYE: CPT | Mod: TC

## 2022-02-22 PROCEDURE — A9585 GADOBUTROL INJECTION: HCPCS | Performed by: RADIOLOGY

## 2022-02-22 PROCEDURE — 70553 MRI BRAIN W WO CONTRAST: ICD-10-PCS | Mod: 26,,, | Performed by: RADIOLOGY

## 2022-02-22 RX ORDER — GADOBUTROL 604.72 MG/ML
10 INJECTION INTRAVENOUS
Status: COMPLETED | OUTPATIENT
Start: 2022-02-22 | End: 2022-02-22

## 2022-02-22 RX ADMIN — GADOBUTROL 10 ML: 604.72 INJECTION INTRAVENOUS at 09:02

## 2022-02-22 NOTE — PROGRESS NOTES
"2022    Radiation Oncology Follow-Up Visit    Prior Radiation History: 50.4 Gy to planum sphenoid meningioma in late 2009    Assessment   This is a 71 y.o. 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 Zimmerman. She returns today for routine follow-up.     MRI Brain today demonstrates stability of the treated meningioma. She is now 12 years out from treatment; I offered to continued with q2 year surveillance or discontinue routine imaging in the absence of new symptoms. She wished to continue surveillance. I advised that she call our office sooner if she starts to develop headaches that don't resolve, pressure behind the eyes, visual changes, or any "stroke-like" symptoms.         Plan   1) MRI Brain and f/u in 2 years.        Chief Complaint   Patient presents with    Follow-up       HPI: Doing well since last visit. Denies any major health issues since I last saw her. Stable anosmia. Denies visual field deficit. No HA, N/V, focal weakness, or speech change.       Past Medical History:   Diagnosis Date    Arthritis     Cataract     Glaucoma     History of iritis     OD    Hypertension     Meningioma     Uveitis        Past Surgical History:   Procedure Laterality Date    Brain tumor surgery      had a brain tumor removed    CATARACT EXTRACTION      OU    CATARACT EXTRACTION BILATERAL W/ ANTERIOR VITRECTOMY      CHOLECYSTECTOMY      CRANIOTOMY      JOINT REPLACEMENT      KNEE SURGERY  2-24-15    right TKR    KNEE SURGERY Left 2018    TKR    KNEE SURGERY Left 2018    REVISION    WRIST FRACTURE SURGERY      YAG Right 2016    Dr. Kaiser       Social History     Tobacco Use    Smoking status: Former Smoker     Packs/day: 0.10     Years: 3.00     Pack years: 0.30     Types: Cigarettes     Quit date: 1973     Years since quittin.8    Smokeless tobacco: Never Used "   Substance Use Topics    Alcohol use: Yes     Alcohol/week: 2.0 standard drinks     Types: 2 Glasses of wine per week     Comment: social- glass on wine on occasions    Drug use: No       Cancer-related family history is not on file.    Current Outpatient Medications on File Prior to Visit   Medication Sig Dispense Refill    ASCORBATE CALCIUM (VITAMIN C ORAL) Take by mouth once daily.       aspirin (ECOTRIN) 325 MG EC tablet Take 1 tablet (325 mg total) by mouth 2 (two) times daily. (Patient taking differently: Take 325 mg by mouth once. ) 60 tablet 0    azithromycin (Z-FER) 250 MG tablet Take 1 tablet (250 mg total) by mouth once daily. Take first 2 tablets together, then 1 every day until finished. 6 tablet 0    famotidine (PEPCID) 20 MG tablet Take 1 tablet (20 mg total) by mouth 2 (two) times daily. 28 tablet 0    hydroCHLOROthiazide (HYDRODIURIL) 25 MG tablet Take 2 tablets (50 mg total) by mouth once daily. 180 tablet 3    losartan (COZAAR) 50 MG tablet TAKE 1 TABLET BY MOUTH ONCE DAILY 90 tablet 3    miconazole nitrate 2% (MICOTIN) 2 % Oint Apply topically 2 (two) times daily.  0    nabumetone (RELAFEN) 750 MG tablet Take 1 tablet (750 mg total) by mouth 2 (two) times daily. (Patient not taking: No sig reported) 60 tablet 2     Current Facility-Administered Medications on File Prior to Visit   Medication Dose Route Frequency Provider Last Rate Last Admin    [COMPLETED] gadobutroL (GADAVIST) injection 10 mL  10 mL Intravenous ONCE PRN Shola Larios MD   10 mL at 02/22/22 0904       Review of patient's allergies indicates:   Allergen Reactions    Cortisone Hives and Swelling     Swelling of the tongue       Review of Systems   Constitutional: Negative for fever and weight loss.   HENT: Negative for ear pain and sore throat.    Eyes: Negative for blurred vision and double vision.   Respiratory: Positive for cough. Negative for hemoptysis and shortness of breath.    Cardiovascular: Negative for  "chest pain and leg swelling.   Gastrointestinal: Negative for abdominal pain, constipation, diarrhea, heartburn and nausea.   Genitourinary: Negative for dysuria and hematuria.   Musculoskeletal: Positive for joint pain. Negative for falls.   Neurological: Positive for tingling and sensory change (+anosmia). Negative for speech change, focal weakness, seizures and headaches.   Psychiatric/Behavioral: Negative for depression. The patient is not nervous/anxious.         Vital Signs: BP (!) 144/67 (BP Location: Right arm, Patient Position: Sitting)   Pulse 85   Resp 16   Ht 5' 5" (1.651 m)   Wt (!) 147.4 kg (325 lb)   LMP  (LMP Unknown)   SpO2 98%   BMI 54.08 kg/m²     ECOG Performance Status: 2 - Ambulates, capable of self care only    Physical Exam  Vitals reviewed.   Constitutional:       Appearance: Normal appearance.   HENT:      Head: Normocephalic and atraumatic.   Eyes:      General: No scleral icterus.     Extraocular Movements: Extraocular movements intact.   Pulmonary:      Effort: No accessory muscle usage or respiratory distress.   Abdominal:      General: There is no distension.   Musculoskeletal:         General: Normal range of motion.      Cervical back: Normal range of motion and neck supple.   Lymphadenopathy:      Cervical: No cervical adenopathy.   Skin:     General: Skin is warm and dry.   Neurological:      Mental Status: She is alert and oriented to person, place, and time.      Cranial Nerves: No cranial nerve deficit.      Comments: In clinic-supplied wheelchair. Normally ambulates with walker at home   Psychiatric:         Mood and Affect: Mood and affect normal.         Judgment: Judgment normal.          Labs:    Imaging: I have personally reviewed the patient's available images and reports and summarized pertinent findings above in HPI.     Pathology: N/A      "

## 2022-02-23 LAB
CREAT SERPL-MCNC: 0.9 MG/DL (ref 0.5–1.4)
SAMPLE: NORMAL

## 2022-02-28 ENCOUNTER — OFFICE VISIT (OUTPATIENT)
Dept: OPHTHALMOLOGY | Facility: CLINIC | Age: 72
End: 2022-02-28
Payer: COMMERCIAL

## 2022-02-28 DIAGNOSIS — H04.123 DRY EYE SYNDROME, BILATERAL: ICD-10-CM

## 2022-02-28 DIAGNOSIS — H26.492 PCO (POSTERIOR CAPSULAR OPACIFICATION), LEFT: Primary | ICD-10-CM

## 2022-02-28 PROCEDURE — 1159F PR MEDICATION LIST DOCUMENTED IN MEDICAL RECORD: ICD-10-PCS | Mod: CPTII,S$GLB,, | Performed by: OPHTHALMOLOGY

## 2022-02-28 PROCEDURE — 1126F AMNT PAIN NOTED NONE PRSNT: CPT | Mod: CPTII,S$GLB,, | Performed by: OPHTHALMOLOGY

## 2022-02-28 PROCEDURE — 99499 NO LOS: ICD-10-PCS | Mod: S$GLB,,, | Performed by: OPHTHALMOLOGY

## 2022-02-28 PROCEDURE — 66821 AFTER CATARACT LASER SURGERY: CPT | Mod: S$GLB,,, | Performed by: OPHTHALMOLOGY

## 2022-02-28 PROCEDURE — 3288F PR FALLS RISK ASSESSMENT DOCUMENTED: ICD-10-PCS | Mod: CPTII,S$GLB,, | Performed by: OPHTHALMOLOGY

## 2022-02-28 PROCEDURE — 99999 PR PBB SHADOW E&M-EST. PATIENT-LVL III: CPT | Mod: PBBFAC,,, | Performed by: OPHTHALMOLOGY

## 2022-02-28 PROCEDURE — 99499 UNLISTED E&M SERVICE: CPT | Mod: S$GLB,,, | Performed by: OPHTHALMOLOGY

## 2022-02-28 PROCEDURE — 1101F PT FALLS ASSESS-DOCD LE1/YR: CPT | Mod: CPTII,S$GLB,, | Performed by: OPHTHALMOLOGY

## 2022-02-28 PROCEDURE — 3288F FALL RISK ASSESSMENT DOCD: CPT | Mod: CPTII,S$GLB,, | Performed by: OPHTHALMOLOGY

## 2022-02-28 PROCEDURE — 1126F PR PAIN SEVERITY QUANTIFIED, NO PAIN PRESENT: ICD-10-PCS | Mod: CPTII,S$GLB,, | Performed by: OPHTHALMOLOGY

## 2022-02-28 PROCEDURE — 1160F PR REVIEW ALL MEDS BY PRESCRIBER/CLIN PHARMACIST DOCUMENTED: ICD-10-PCS | Mod: CPTII,S$GLB,, | Performed by: OPHTHALMOLOGY

## 2022-02-28 PROCEDURE — 1160F RVW MEDS BY RX/DR IN RCRD: CPT | Mod: CPTII,S$GLB,, | Performed by: OPHTHALMOLOGY

## 2022-02-28 PROCEDURE — 1101F PR PT FALLS ASSESS DOC 0-1 FALLS W/OUT INJ PAST YR: ICD-10-PCS | Mod: CPTII,S$GLB,, | Performed by: OPHTHALMOLOGY

## 2022-02-28 PROCEDURE — 1159F MED LIST DOCD IN RCRD: CPT | Mod: CPTII,S$GLB,, | Performed by: OPHTHALMOLOGY

## 2022-02-28 PROCEDURE — 66821 PR DISCISSION,2ND CATARACT,LASER: ICD-10-PCS | Mod: S$GLB,,, | Performed by: OPHTHALMOLOGY

## 2022-02-28 PROCEDURE — 99999 PR PBB SHADOW E&M-EST. PATIENT-LVL III: ICD-10-PCS | Mod: PBBFAC,,, | Performed by: OPHTHALMOLOGY

## 2022-02-28 NOTE — PROGRESS NOTES
Subjective:       Patient ID: Katelyn Huynh is a 71 y.o. female.    Chief Complaint: No chief complaint on file.    HPI     Dls: 1/26/22 Dr. Kaiser    70 y/o female presents today for yag laser os.   Pt states no changes since last visit no pain     Eye meds  None        Last edited by Eleanor Avery MA on 2/28/2022  3:24 PM. (History)             Assessment:       1. PCO (posterior capsular opacification), left    2. Dry eye syndrome, bilateral        Plan:       Visually significant  PCO OS- Pt. Wants Laser.     KIRILL-Stable.      YAG CAP left eye (OS) today. TE=   363 mj, Avg. 3.0 mj, 121 pulses.  PF taper OS.  AT's.  RTC 2-3 wks.    YAG laser Capsulotomy Procedure Note:  Informed consent was obtained and correct eye was verified with patient.   One drop of topical Proparacaine 1% was instilled.  YAG laser applied to posterior capsule in cruciate pattern.  One drop of Apraclonidine 0.5% and 1 drop of Pred Acetate 1% applied to eye after laser.  Pt tolerated procedure well. No complications.  Follow up in 2-3 weeks.

## 2022-03-14 ENCOUNTER — TELEPHONE (OUTPATIENT)
Dept: OPHTHALMOLOGY | Facility: CLINIC | Age: 72
End: 2022-03-14
Payer: COMMERCIAL

## 2022-03-14 NOTE — TELEPHONE ENCOUNTER
----- Message from Edwina Barraza sent at 3/14/2022 10:29 AM CDT -----  Contact: Katelyn Huynh  Patient is returning your call. Patient call back number is [phone].

## 2022-03-14 NOTE — TELEPHONE ENCOUNTER
----- Message from Edwina Barraza sent at 3/14/2022  8:42 AM CDT -----  Contact: Katelyn Lino  Patient stated she's having eye pain in her surgery eye and it's red. Patient is needing some advice on what she should do. Patient call back number is [phone].

## 2022-03-21 ENCOUNTER — TELEPHONE (OUTPATIENT)
Dept: OPHTHALMOLOGY | Facility: CLINIC | Age: 72
End: 2022-03-21
Payer: COMMERCIAL

## 2022-03-23 ENCOUNTER — OFFICE VISIT (OUTPATIENT)
Dept: OPHTHALMOLOGY | Facility: CLINIC | Age: 72
End: 2022-03-23
Payer: COMMERCIAL

## 2022-03-23 ENCOUNTER — OFFICE VISIT (OUTPATIENT)
Dept: PRIMARY CARE CLINIC | Facility: CLINIC | Age: 72
End: 2022-03-23
Payer: COMMERCIAL

## 2022-03-23 ENCOUNTER — LAB VISIT (OUTPATIENT)
Dept: LAB | Facility: HOSPITAL | Age: 72
End: 2022-03-23
Attending: RADIOLOGY
Payer: COMMERCIAL

## 2022-03-23 ENCOUNTER — HOSPITAL ENCOUNTER (OUTPATIENT)
Dept: RADIOLOGY | Facility: HOSPITAL | Age: 72
Discharge: HOME | End: 2022-03-23
Attending: FAMILY MEDICINE
Payer: COMMERCIAL

## 2022-03-23 VITALS
OXYGEN SATURATION: 99 % | HEART RATE: 80 BPM | TEMPERATURE: 98 F | HEIGHT: 64 IN | DIASTOLIC BLOOD PRESSURE: 82 MMHG | BODY MASS INDEX: 50.02 KG/M2 | WEIGHT: 293 LBS | SYSTOLIC BLOOD PRESSURE: 140 MMHG

## 2022-03-23 DIAGNOSIS — M25.612 DECREASED RANGE OF MOTION OF LEFT SHOULDER: ICD-10-CM

## 2022-03-23 DIAGNOSIS — Z76.89 ENCOUNTER TO ESTABLISH CARE: ICD-10-CM

## 2022-03-23 DIAGNOSIS — Z96.653 HISTORY OF BILATERAL KNEE REPLACEMENT: ICD-10-CM

## 2022-03-23 DIAGNOSIS — H52.7 REFRACTIVE ERROR: ICD-10-CM

## 2022-03-23 DIAGNOSIS — I10 ESSENTIAL HYPERTENSION: ICD-10-CM

## 2022-03-23 DIAGNOSIS — S76.112D QUADRICEPS TENDON RUPTURE, LEFT, SUBSEQUENT ENCOUNTER: ICD-10-CM

## 2022-03-23 DIAGNOSIS — H04.123 DRY EYE SYNDROME, BILATERAL: ICD-10-CM

## 2022-03-23 DIAGNOSIS — Z86.39 PERSONAL HISTORY OF OTHER ENDOCRINE, NUTRITIONAL AND METABOLIC DISEASE: ICD-10-CM

## 2022-03-23 DIAGNOSIS — M79.602 LEFT ARM PAIN: ICD-10-CM

## 2022-03-23 DIAGNOSIS — Z99.89 USES WALKER: ICD-10-CM

## 2022-03-23 DIAGNOSIS — E66.01 CLASS 3 SEVERE OBESITY DUE TO EXCESS CALORIES WITH SERIOUS COMORBIDITY AND BODY MASS INDEX (BMI) OF 50.0 TO 59.9 IN ADULT: ICD-10-CM

## 2022-03-23 DIAGNOSIS — Z13.820 ENCOUNTER FOR OSTEOPOROSIS SCREENING IN ASYMPTOMATIC POSTMENOPAUSAL PATIENT: ICD-10-CM

## 2022-03-23 DIAGNOSIS — Z00.00 ANNUAL PHYSICAL EXAM: Primary | ICD-10-CM

## 2022-03-23 DIAGNOSIS — R26.89 IMPAIRED GAIT AND MOBILITY: ICD-10-CM

## 2022-03-23 DIAGNOSIS — Z78.0 ENCOUNTER FOR OSTEOPOROSIS SCREENING IN ASYMPTOMATIC POSTMENOPAUSAL PATIENT: ICD-10-CM

## 2022-03-23 DIAGNOSIS — H26.492 PCO (POSTERIOR CAPSULAR OPACIFICATION), LEFT: ICD-10-CM

## 2022-03-23 DIAGNOSIS — Z12.31 SCREENING MAMMOGRAM FOR BREAST CANCER: ICD-10-CM

## 2022-03-23 DIAGNOSIS — Z86.018 HISTORY OF MENINGIOMA: ICD-10-CM

## 2022-03-23 DIAGNOSIS — Z13.220 ENCOUNTER FOR LIPID SCREENING FOR CARDIOVASCULAR DISEASE: ICD-10-CM

## 2022-03-23 DIAGNOSIS — Z12.11 SCREENING FOR MALIGNANT NEOPLASM OF COLON: ICD-10-CM

## 2022-03-23 DIAGNOSIS — Z96.1 PSEUDOPHAKIA: ICD-10-CM

## 2022-03-23 DIAGNOSIS — Z98.890 POST-OPERATIVE STATE: Primary | ICD-10-CM

## 2022-03-23 DIAGNOSIS — Z86.011 PERSONAL HISTORY OF MENINGIOMA OF THE BRAIN: ICD-10-CM

## 2022-03-23 DIAGNOSIS — Z13.6 ENCOUNTER FOR LIPID SCREENING FOR CARDIOVASCULAR DISEASE: ICD-10-CM

## 2022-03-23 DIAGNOSIS — S62.102D CLOSED FRACTURE OF LEFT WRIST WITH ROUTINE HEALING, SUBSEQUENT ENCOUNTER: ICD-10-CM

## 2022-03-23 DIAGNOSIS — Z76.89 ENCOUNTER FOR WEIGHT MANAGEMENT: ICD-10-CM

## 2022-03-23 PROBLEM — E66.813 CLASS 3 SEVERE OBESITY DUE TO EXCESS CALORIES WITH SERIOUS COMORBIDITY AND BODY MASS INDEX (BMI) OF 50.0 TO 59.9 IN ADULT: Status: ACTIVE | Noted: 2018-02-17

## 2022-03-23 PROBLEM — Z91.89 10 YEAR RISK OF MI OR STROKE 7.5% OR GREATER: Status: ACTIVE | Noted: 2022-03-23

## 2022-03-23 LAB
ALBUMIN SERPL BCP-MCNC: 3.3 G/DL (ref 3.5–5.2)
ALBUMIN/CREAT UR: 9.3 UG/MG (ref 0–30)
ALP SERPL-CCNC: 90 U/L (ref 55–135)
ALT SERPL W/O P-5'-P-CCNC: 10 U/L (ref 10–44)
ANION GAP SERPL CALC-SCNC: 8 MMOL/L (ref 8–16)
AST SERPL-CCNC: 14 U/L (ref 10–40)
BASOPHILS # BLD AUTO: 0.04 K/UL (ref 0–0.2)
BASOPHILS NFR BLD: 0.6 % (ref 0–1.9)
BILIRUB SERPL-MCNC: 0.8 MG/DL (ref 0.1–1)
BUN SERPL-MCNC: 11 MG/DL (ref 8–23)
CALCIUM SERPL-MCNC: 9.4 MG/DL (ref 8.7–10.5)
CHLORIDE SERPL-SCNC: 104 MMOL/L (ref 95–110)
CHOLEST SERPL-MCNC: 140 MG/DL (ref 120–199)
CHOLEST/HDLC SERPL: 2.2 {RATIO} (ref 2–5)
CO2 SERPL-SCNC: 29 MMOL/L (ref 23–29)
CREAT SERPL-MCNC: 0.8 MG/DL (ref 0.5–1.4)
CREAT SERPL-MCNC: 0.8 MG/DL (ref 0.5–1.4)
CREAT UR-MCNC: 140 MG/DL (ref 15–325)
DIFFERENTIAL METHOD: ABNORMAL
EOSINOPHIL # BLD AUTO: 0.1 K/UL (ref 0–0.5)
EOSINOPHIL NFR BLD: 1.4 % (ref 0–8)
ERYTHROCYTE [DISTWIDTH] IN BLOOD BY AUTOMATED COUNT: 13.9 % (ref 11.5–14.5)
EST. GFR  (AFRICAN AMERICAN): >60 ML/MIN/1.73 M^2
EST. GFR  (AFRICAN AMERICAN): >60 ML/MIN/1.73 M^2
EST. GFR  (NON AFRICAN AMERICAN): >60 ML/MIN/1.73 M^2
EST. GFR  (NON AFRICAN AMERICAN): >60 ML/MIN/1.73 M^2
ESTIMATED AVG GLUCOSE: 123 MG/DL (ref 68–131)
GLUCOSE SERPL-MCNC: 104 MG/DL (ref 70–110)
HBA1C MFR BLD: 5.9 % (ref 4–5.6)
HCT VFR BLD AUTO: 42.6 % (ref 37–48.5)
HDLC SERPL-MCNC: 64 MG/DL (ref 40–75)
HDLC SERPL: 45.7 % (ref 20–50)
HGB BLD-MCNC: 13.5 G/DL (ref 12–16)
IMM GRANULOCYTES # BLD AUTO: 0.02 K/UL (ref 0–0.04)
IMM GRANULOCYTES NFR BLD AUTO: 0.3 % (ref 0–0.5)
LDLC SERPL CALC-MCNC: 60.6 MG/DL (ref 63–159)
LYMPHOCYTES # BLD AUTO: 1.5 K/UL (ref 1–4.8)
LYMPHOCYTES NFR BLD: 23.2 % (ref 18–48)
MCH RBC QN AUTO: 29.7 PG (ref 27–31)
MCHC RBC AUTO-ENTMCNC: 31.7 G/DL (ref 32–36)
MCV RBC AUTO: 94 FL (ref 82–98)
MICROALBUMIN UR DL<=1MG/L-MCNC: 13 UG/ML
MONOCYTES # BLD AUTO: 0.6 K/UL (ref 0.3–1)
MONOCYTES NFR BLD: 8.6 % (ref 4–15)
NEUTROPHILS # BLD AUTO: 4.2 K/UL (ref 1.8–7.7)
NEUTROPHILS NFR BLD: 65.9 % (ref 38–73)
NONHDLC SERPL-MCNC: 76 MG/DL
NRBC BLD-RTO: 0 /100 WBC
PLATELET # BLD AUTO: 273 K/UL (ref 150–450)
PMV BLD AUTO: 10.8 FL (ref 9.2–12.9)
POTASSIUM SERPL-SCNC: 4.5 MMOL/L (ref 3.5–5.1)
PROT SERPL-MCNC: 7.7 G/DL (ref 6–8.4)
RBC # BLD AUTO: 4.55 M/UL (ref 4–5.4)
SODIUM SERPL-SCNC: 141 MMOL/L (ref 136–145)
TRIGL SERPL-MCNC: 77 MG/DL (ref 30–150)
TSH SERPL DL<=0.005 MIU/L-ACNC: 1.25 UIU/ML (ref 0.4–4)
WBC # BLD AUTO: 6.43 K/UL (ref 3.9–12.7)

## 2022-03-23 PROCEDURE — 99999 PR PBB SHADOW E&M-EST. PATIENT-LVL V: CPT | Mod: PBBFAC,,, | Performed by: FAMILY MEDICINE

## 2022-03-23 PROCEDURE — 99999 PR PBB SHADOW E&M-EST. PATIENT-LVL II: ICD-10-PCS | Mod: PBBFAC,,, | Performed by: OPHTHALMOLOGY

## 2022-03-23 PROCEDURE — 4010F ACE/ARB THERAPY RXD/TAKEN: CPT | Mod: CPTII,S$GLB,, | Performed by: FAMILY MEDICINE

## 2022-03-23 PROCEDURE — 1160F RVW MEDS BY RX/DR IN RCRD: CPT | Mod: CPTII,S$GLB,, | Performed by: OPHTHALMOLOGY

## 2022-03-23 PROCEDURE — 3008F PR BODY MASS INDEX (BMI) DOCUMENTED: ICD-10-PCS | Mod: CPTII,S$GLB,, | Performed by: FAMILY MEDICINE

## 2022-03-23 PROCEDURE — 82570 ASSAY OF URINE CREATININE: CPT | Performed by: FAMILY MEDICINE

## 2022-03-23 PROCEDURE — 3066F NEPHROPATHY DOC TX: CPT | Mod: CPTII,S$GLB,, | Performed by: OPHTHALMOLOGY

## 2022-03-23 PROCEDURE — 3044F HG A1C LEVEL LT 7.0%: CPT | Mod: CPTII,S$GLB,, | Performed by: OPHTHALMOLOGY

## 2022-03-23 PROCEDURE — 99999 PR PBB SHADOW E&M-EST. PATIENT-LVL V: ICD-10-PCS | Mod: PBBFAC,,, | Performed by: FAMILY MEDICINE

## 2022-03-23 PROCEDURE — 1125F PR PAIN SEVERITY QUANTIFIED, PAIN PRESENT: ICD-10-PCS | Mod: CPTII,S$GLB,, | Performed by: FAMILY MEDICINE

## 2022-03-23 PROCEDURE — 99999 PR PBB SHADOW E&M-EST. PATIENT-LVL II: CPT | Mod: PBBFAC,,, | Performed by: OPHTHALMOLOGY

## 2022-03-23 PROCEDURE — 4010F PR ACE/ARB THEARPY RXD/TAKEN: ICD-10-PCS | Mod: CPTII,S$GLB,, | Performed by: FAMILY MEDICINE

## 2022-03-23 PROCEDURE — 82043 UR ALBUMIN QUANTITATIVE: CPT | Performed by: FAMILY MEDICINE

## 2022-03-23 PROCEDURE — 73030 X-RAY EXAM OF SHOULDER: CPT | Mod: 26,LT,, | Performed by: RADIOLOGY

## 2022-03-23 PROCEDURE — 1159F MED LIST DOCD IN RCRD: CPT | Mod: CPTII,S$GLB,, | Performed by: OPHTHALMOLOGY

## 2022-03-23 PROCEDURE — 3077F SYST BP >= 140 MM HG: CPT | Mod: CPTII,S$GLB,, | Performed by: FAMILY MEDICINE

## 2022-03-23 PROCEDURE — 1160F RVW MEDS BY RX/DR IN RCRD: CPT | Mod: CPTII,S$GLB,, | Performed by: FAMILY MEDICINE

## 2022-03-23 PROCEDURE — 3061F NEG MICROALBUMINURIA REV: CPT | Mod: CPTII,S$GLB,, | Performed by: OPHTHALMOLOGY

## 2022-03-23 PROCEDURE — 3079F PR MOST RECENT DIASTOLIC BLOOD PRESSURE 80-89 MM HG: ICD-10-PCS | Mod: CPTII,S$GLB,, | Performed by: FAMILY MEDICINE

## 2022-03-23 PROCEDURE — 80061 LIPID PANEL: CPT | Performed by: FAMILY MEDICINE

## 2022-03-23 PROCEDURE — 3066F PR DOCUMENTATION OF TREATMENT FOR NEPHROPATHY: ICD-10-PCS | Mod: CPTII,S$GLB,, | Performed by: OPHTHALMOLOGY

## 2022-03-23 PROCEDURE — 1160F PR REVIEW ALL MEDS BY PRESCRIBER/CLIN PHARMACIST DOCUMENTED: ICD-10-PCS | Mod: CPTII,S$GLB,, | Performed by: OPHTHALMOLOGY

## 2022-03-23 PROCEDURE — 3008F BODY MASS INDEX DOCD: CPT | Mod: CPTII,S$GLB,, | Performed by: FAMILY MEDICINE

## 2022-03-23 PROCEDURE — 99213 PR OFFICE/OUTPT VISIT, EST, LEVL III, 20-29 MIN: ICD-10-PCS | Mod: 25,S$GLB,, | Performed by: FAMILY MEDICINE

## 2022-03-23 PROCEDURE — 84443 ASSAY THYROID STIM HORMONE: CPT | Performed by: FAMILY MEDICINE

## 2022-03-23 PROCEDURE — 99024 POSTOP FOLLOW-UP VISIT: CPT | Mod: S$GLB,,, | Performed by: OPHTHALMOLOGY

## 2022-03-23 PROCEDURE — 80053 COMPREHEN METABOLIC PANEL: CPT | Performed by: FAMILY MEDICINE

## 2022-03-23 PROCEDURE — 99397 PER PM REEVAL EST PAT 65+ YR: CPT | Mod: 25,S$GLB,, | Performed by: FAMILY MEDICINE

## 2022-03-23 PROCEDURE — 1125F AMNT PAIN NOTED PAIN PRSNT: CPT | Mod: CPTII,S$GLB,, | Performed by: FAMILY MEDICINE

## 2022-03-23 PROCEDURE — 3288F FALL RISK ASSESSMENT DOCD: CPT | Mod: CPTII,S$GLB,, | Performed by: FAMILY MEDICINE

## 2022-03-23 PROCEDURE — 4010F ACE/ARB THERAPY RXD/TAKEN: CPT | Mod: CPTII,S$GLB,, | Performed by: OPHTHALMOLOGY

## 2022-03-23 PROCEDURE — 1160F PR REVIEW ALL MEDS BY PRESCRIBER/CLIN PHARMACIST DOCUMENTED: ICD-10-PCS | Mod: CPTII,S$GLB,, | Performed by: FAMILY MEDICINE

## 2022-03-23 PROCEDURE — 99213 OFFICE O/P EST LOW 20 MIN: CPT | Mod: 25,S$GLB,, | Performed by: FAMILY MEDICINE

## 2022-03-23 PROCEDURE — 4010F PR ACE/ARB THEARPY RXD/TAKEN: ICD-10-PCS | Mod: CPTII,S$GLB,, | Performed by: OPHTHALMOLOGY

## 2022-03-23 PROCEDURE — 3077F PR MOST RECENT SYSTOLIC BLOOD PRESSURE >= 140 MM HG: ICD-10-PCS | Mod: CPTII,S$GLB,, | Performed by: FAMILY MEDICINE

## 2022-03-23 PROCEDURE — 3288F PR FALLS RISK ASSESSMENT DOCUMENTED: ICD-10-PCS | Mod: CPTII,S$GLB,, | Performed by: FAMILY MEDICINE

## 2022-03-23 PROCEDURE — 99397 PR PREVENTIVE VISIT,EST,65 & OVER: ICD-10-PCS | Mod: 25,S$GLB,, | Performed by: FAMILY MEDICINE

## 2022-03-23 PROCEDURE — 36415 COLL VENOUS BLD VENIPUNCTURE: CPT | Mod: PN | Performed by: FAMILY MEDICINE

## 2022-03-23 PROCEDURE — 85025 COMPLETE CBC W/AUTO DIFF WBC: CPT | Performed by: FAMILY MEDICINE

## 2022-03-23 PROCEDURE — 3079F DIAST BP 80-89 MM HG: CPT | Mod: CPTII,S$GLB,, | Performed by: FAMILY MEDICINE

## 2022-03-23 PROCEDURE — 1101F PT FALLS ASSESS-DOCD LE1/YR: CPT | Mod: CPTII,S$GLB,, | Performed by: FAMILY MEDICINE

## 2022-03-23 PROCEDURE — 73030 X-RAY EXAM OF SHOULDER: CPT | Mod: TC,PN,LT

## 2022-03-23 PROCEDURE — 1159F PR MEDICATION LIST DOCUMENTED IN MEDICAL RECORD: ICD-10-PCS | Mod: CPTII,S$GLB,, | Performed by: FAMILY MEDICINE

## 2022-03-23 PROCEDURE — 1159F PR MEDICATION LIST DOCUMENTED IN MEDICAL RECORD: ICD-10-PCS | Mod: CPTII,S$GLB,, | Performed by: OPHTHALMOLOGY

## 2022-03-23 PROCEDURE — 3044F PR MOST RECENT HEMOGLOBIN A1C LEVEL <7.0%: ICD-10-PCS | Mod: CPTII,S$GLB,, | Performed by: OPHTHALMOLOGY

## 2022-03-23 PROCEDURE — 99024 PR POST-OP FOLLOW-UP VISIT: ICD-10-PCS | Mod: S$GLB,,, | Performed by: OPHTHALMOLOGY

## 2022-03-23 PROCEDURE — 73030 XR SHOULDER COMPLETE 2 OR MORE VIEWS LEFT: ICD-10-PCS | Mod: 26,LT,, | Performed by: RADIOLOGY

## 2022-03-23 PROCEDURE — 1101F PR PT FALLS ASSESS DOC 0-1 FALLS W/OUT INJ PAST YR: ICD-10-PCS | Mod: CPTII,S$GLB,, | Performed by: FAMILY MEDICINE

## 2022-03-23 PROCEDURE — 3061F PR NEG MICROALBUMINURIA RESULT DOCUMENTED/REVIEW: ICD-10-PCS | Mod: CPTII,S$GLB,, | Performed by: OPHTHALMOLOGY

## 2022-03-23 PROCEDURE — 83036 HEMOGLOBIN GLYCOSYLATED A1C: CPT | Performed by: FAMILY MEDICINE

## 2022-03-23 PROCEDURE — 1159F MED LIST DOCD IN RCRD: CPT | Mod: CPTII,S$GLB,, | Performed by: FAMILY MEDICINE

## 2022-03-23 RX ORDER — HYDROCHLOROTHIAZIDE 50 MG/1
50 TABLET ORAL DAILY
Qty: 90 TABLET | Refills: 3 | Status: SHIPPED | OUTPATIENT
Start: 2022-03-23 | End: 2023-03-15 | Stop reason: SDUPTHER

## 2022-03-23 RX ORDER — LOSARTAN POTASSIUM 50 MG/1
50 TABLET ORAL DAILY
Qty: 90 TABLET | Refills: 3 | Status: SHIPPED | OUTPATIENT
Start: 2022-03-23 | End: 2022-04-25 | Stop reason: SDUPTHER

## 2022-03-23 NOTE — PROGRESS NOTES
Subjective:       Patient ID: Katelyn Huynh is a 71 y.o. female.    Chief Complaint: Post-op Evaluation    HPI     3 wk PO YAG OS    Doing well and feels like VA is clearer since laser   Started to experience slight pain and redness OS after the laser procedure   and has continued using PF BID because of this  No longer having pain    Last edited by Magui Barrios on 3/23/2022  1:15 PM. (History)             Assessment:       1. Post-operative state    2. Dry eye syndrome, bilateral    3. Refractive error    4. Pseudophakia - Both Eyes        Plan:       S/p YAG CAP OS- Doing well.  KIRILL-Doing well.  RE-Pt wants MRx.      AT's.  Give MRx.  RTC 6 mos.

## 2022-03-23 NOTE — PROGRESS NOTES
Subjective:       Patient ID: Katelyn Huynh is a 71 y.o. female.    Chief Complaint: Annual Exam and Establish Care      72 yo female presents for annual physical exam.    She has a past medical history of meningioma- Stable residual grade 1 planum sphenoid meningioma with recurrence post-surgery; last treatment was EBRT 50.4 Gy in 28 fx in late 2009 by Dr. Zimmerman; left PCO (posterior capsular opacification; bilateral Dry eye syndrome; HTN, Obesity, s/p left total knee replacement 2018; chronic quadriceps rupture 2019; hammer toes of both feet; corn/callus; impaired gait ambulates with walker.    Lipid disorders/ASCVD risk (ages >/= 45 or >/= 20 if increased risk ): Ordered  DM (>45y yearly or if obese, HTN): Ordered  Eye exam: Ophathalmology 2/28/22  Breast Cancer (40-50y discretion of pt, 50-74y every 1-2 years): Up to date; future order placed    Vaccines: needs tetanus booster and second dose of Shingrix    DEXA- ordered    She reports no medication side effects. She needs refill on medication. States blood pressure readings at home have never approached 144/84 documented today on initial BP reading. She is asymptomatic with the exception of pain. She would be interested in weight management clinic as she has been unsuccessful in losing weight on her own.    The following portions of the patient's history were reviewed and updated as appropriate: allergies, current medications, past family history, past medical history, past social history, past surgical history and problem list.    Review of Systems   Constitutional: Positive for unexpected weight change. Negative for activity change, appetite change, chills, diaphoresis, fatigue and fever.   HENT: Negative for nasal congestion, dental problem, facial swelling, nosebleeds, postnasal drip, rhinorrhea, sore throat, tinnitus and trouble swallowing.    Eyes: Negative for pain, discharge, itching and visual disturbance.   Respiratory: Negative for  "apnea, chest tightness, shortness of breath, wheezing and stridor.    Cardiovascular: Negative for chest pain, palpitations and leg swelling.   Gastrointestinal: Negative for abdominal distention, abdominal pain, constipation, diarrhea, nausea, rectal pain and vomiting.   Endocrine: Negative for cold intolerance, heat intolerance and polyuria.   Genitourinary: Negative for difficulty urinating, dysuria, frequency, hematuria and urgency.   Musculoskeletal: Positive for arthralgias, gait problem, myalgias and joint deformity. Negative for neck pain and neck stiffness.   Integumentary:  Negative for color change and rash.   Neurological: Negative for dizziness, tremors, seizures, syncope, facial asymmetry, weakness and headaches.   Hematological: Negative for adenopathy. Does not bruise/bleed easily.   Psychiatric/Behavioral: Negative for agitation, confusion, hallucinations, self-injury and suicidal ideas. The patient is not hyperactive.           Objective:       Vitals:    03/23/22 0829 03/23/22 0908   BP: (!) 144/84 (!) 140/82   Pulse: 90 80   Temp: 98.2 °F (36.8 °C)    TempSrc: Oral    SpO2: 96% 99%   Weight: (!) 149.3 kg (329 lb 2.4 oz)    Height: 5' 4" (1.626 m)      Physical Exam  Constitutional:       General: She is not in acute distress.     Appearance: She is well-developed. She is obese. She is not diaphoretic.   HENT:      Head: Normocephalic and atraumatic.      Right Ear: External ear normal.      Left Ear: External ear normal.   Eyes:      General: No scleral icterus.        Right eye: No discharge.         Left eye: No discharge.      Conjunctiva/sclera: Conjunctivae normal.   Neck:      Thyroid: No thyromegaly.   Cardiovascular:      Rate and Rhythm: Normal rate and regular rhythm.      Heart sounds: Normal heart sounds. No murmur heard.    No friction rub. No gallop.   Pulmonary:      Effort: Pulmonary effort is normal. No respiratory distress.      Breath sounds: Normal breath sounds.   Chest:      " Chest wall: No tenderness.   Abdominal:      General: Bowel sounds are normal. There is distension (obese).      Palpations: Abdomen is soft. There is no mass.      Tenderness: There is no abdominal tenderness. There is no guarding or rebound.   Musculoskeletal:      Cervical back: Normal range of motion and neck supple.      Comments: Decreased range of motion of left shoulder   Lymphadenopathy:      Cervical: No cervical adenopathy.   Skin:     General: Skin is warm.   Neurological:      General: No focal deficit present.      Mental Status: She is alert and oriented to person, place, and time.      Motor: No abnormal muscle tone.      Gait: Gait abnormal (uses walker for stability).   Psychiatric:         Thought Content: Thought content normal.         Judgment: Judgment normal.         Assessment:       1. Annual physical exam    2. Encounter to establish care    3. Personal history of meningioma of the brain    4. Essential hypertension    5. Class 3 severe obesity due to excess calories with serious comorbidity and body mass index (BMI) of 50.0 to 59.9 in adult    6. Encounter for weight management    7. Personal history of other endocrine, nutritional and metabolic disease    8. Decreased range of motion of left shoulder    9. Left arm pain    10. History of bilateral knee replacement    11. Impaired gait and mobility    12. Quadriceps tendon rupture, left, subsequent encounter    13. Uses walker    14. Dry eye syndrome, bilateral    15. PCO (posterior capsular opacification), left    16. Closed fracture of left wrist with routine healing, subsequent encounter    17. Encounter for osteoporosis screening in asymptomatic postmenopausal patient    18. Screening mammogram for breast cancer    19. Screening for malignant neoplasm of colon    20. Encounter for lipid screening for cardiovascular disease        Plan:       1. Annual physical exam  2. Encounter to establish care  Age appropriate prevention guidelines  implemented, unless patient refused  Encourage Advanced directives  Labs ordered   Immunizations reviewed. Encourage yearly influenza vaccine.  Patient Counseling:  --Nutrition: Encourage healthy food choices, adequate water intake, moderate sodium/caffeine intake, diet low in saturated fat and cholesterol. Importance of caloric balance and sufficient intake of fresh fruits, vegetables, fiber, calcium, iron, and 1 mg of folate supplement per day (for females capable of pregnancy).  --Exercise: Stressed the importance of regular exercise.   --Substance Abuse: Abstinence from tobacco products. No more than 2 alcoholic drinks per day in men or 1 alcoholic drink per day in women. Avoid illicit drugs, driving or other dangerous activities under the influence. In the event of abuse, treatment offered.   --Sexuality: Safe sex practices to avoid sexually transmitted diseases; careful partner selection, use of condoms and contraceptive alternatives, avoidance of unintended pregnancy in fertile women of child-bearing age  --Injury prevention: encourage safety belts, safety helmets, smoke detector.  --Dental health: Discussed importance of regular tooth brushing X2 daily, flossing X1 daily, and routine dental visits.  --Encourage use of sun screen, wear sun protective clothing  --Encourage routine eye exams    3. Personal history of meningioma of the brain  Surveillance MRI every 2 years  Last visit with radiation oncology 2/22/2022    4. Essential hypertension  -     CBC Auto Differential; Future  -     Comprehensive Metabolic Panel; Future  -     TSH; Future  -     Microalbumin/Creatinine Ratio, Urine  -     losartan (COZAAR) 50 MG tablet; Take 1 tablet (50 mg total) by mouth once daily.  Dispense: 90 tablet; Refill: 3  -     hydroCHLOROthiazide (HYDRODIURIL) 50 MG tablet; Take 1 tablet (50 mg total) by mouth once daily.  Dispense: 90 tablet; Refill: 3  The patient continues with the antihypertensive medication(s) as  prescribed. The blood pressure readings appear to be above goal. There does not appear to be any symptoms such as chest pain, shortness of breath, palpitations, fatigue, syncope, seizures or headaches. There are no new visual problems. We have discussed the importance of getting more BP data - nurse only appointments can help get data into the chart and/or continuous outpatient blood pressure monitoring. Blood pressure above goal can lead to end organ damage. Further titration of blood pressure medication(s) is necessary per JNC guidelines.    Weight loss, low salt diet. See me again in 1 month: if BP still elevated then consider increasing losartan.    5. Class 3 severe obesity due to excess calories with serious comorbidity and body mass index (BMI) of 50.0 to 59.9 in adult  -     Ambulatory referral/consult to Weight Management Program; Future; Expected date: 03/30/2022    6. Encounter for weight management  -     Ambulatory referral/consult to Weight Management Program; Future; Expected date: 03/30/2022  Surgery -- Candidates for bariatric surgery include adults with a BMI ?40 kg/m2, or a BMI of 35 to 39.9 kg/m2 with at least one serious comorbidity, who have not met weight loss goals with diet, exercise, and drug therapy. There is some evidence that the BMI criteria may differ among races.  Candidates for pharmacologic therapy include adults with a body mass index (BMI) greater than 30 kg/m2, or a BMI of 27 to 29.9 kg/m2 with comorbidities, who have not met weight loss goals (loss of at least 5 percent of total body weight at three to six months) with a comprehensive lifestyle intervention.      The importance of optimum diet & exercise discussed. The goal for weight management is 5-10 % of total body weight reduction in 3 months.     The consumption of a reduced calorie diet, frequent self-weighing, and participation in a lifestyle intervention program are strategies to help maintain weight loss. Bariatric  surgery, which may alter the body's adipose tissue set point, and extended use of anti-obesity pharmacologic therapy may help address these underlying physiologic changes.     7. Personal history of other endocrine, nutritional and metabolic disease  -     Hemoglobin A1C; Future    8. Decreased range of motion of left shoulder  -     X-Ray Shoulder 2 or More Views Left; Future; Expected date: 03/23/2022  -     Ambulatory referral/consult to Orthopedics; Future; Expected date: 03/30/2022  -     Ambulatory referral/consult to Physical/Occupational Therapy; Future; Expected date: 03/30/2022    9. Left arm pain  -     US Upper Extremity Veins Left; Future; Expected date: 03/23/2022    10. History of bilateral knee replacement  11. Impaired gait and mobility  12. Quadriceps tendon rupture, left, subsequent encounter  13. Uses walker  Fall precautions. Follows with Ortho: advised to lose weight prior to considering surgical intervention for quadriceps tendon repair.    14. Dry eye syndrome, bilateral  15. PCO (posterior capsular opacification), left  Ophathalmology 2/28/22    16. Closed fracture of left wrist with routine healing, subsequent encounter  17. Encounter for osteoporosis screening in asymptomatic postmenopausal patient  -     DXA Bone Density Spine And Hip; Future    18. Screening mammogram for breast cancer  -     Mammo Digital Screening Bilat w/ Jayme; Future    19. Screening for malignant neoplasm of colon  -     Case Request Endoscopy: COLONOSCOPY    20. Encounter for lipid screening for cardiovascular disease  -     Lipid Panel; Future    Disclaimer: This note has been generated using voice-recognition software. There may be typographical errors that have been missed during proof-reading

## 2022-03-23 NOTE — PROGRESS NOTES
Subjective:       Patient ID: Katelyn Huynh is a 71 y.o. female.    Chief Complaint: Left shoulder with decreased range of motion/ Left arm pain      70 yo female complains of pain in left shoulder and left arm with decreased range of motion of left shoulder to overhead movement.    She has a past medical history of meningioma- Stable residual grade 1 planum sphenoid meningioma with recurrence post-surgery; last treatment was EBRT 50.4 Gy in 28 fx in late 2009 by Dr. Zimmerman; left PCO (posterior capsular opacification; bilateral Dry eye syndrome; HTN, Obesity, s/p left total knee replacement 2018; chronic quadriceps rupture 2019; hammer toes of both feet; corn/callus; impaired gait ambulates with walker.     On 2/22/22 she received contrast for MRI brain as part of surveillance of meningioma. Since getting contrast in her vein she now has pain in shoulder. States pain moves down to the lateral aspect of the left arm. No pain in forearm. No swelling of left upper extremity. She has hx of left wrist fracture. She denies any known history of blood clots.   Denies chest pain or SOB.    The following portions of the patient's history were reviewed and updated as appropriate: allergies, current medications, past family history, past medical history, past social history, past surgical history and problem list.    Review of Systems   Constitutional: Positive for unexpected weight change. Negative for activity change, appetite change, chills, diaphoresis, fatigue and fever.   HENT: Negative for nasal congestion, dental problem, facial swelling, nosebleeds, postnasal drip, rhinorrhea, sore throat, tinnitus and trouble swallowing.    Eyes: Negative for pain, discharge, itching and visual disturbance.   Respiratory: Negative for apnea, chest tightness, shortness of breath, wheezing and stridor.    Cardiovascular: Negative for chest pain, palpitations and leg swelling.   Gastrointestinal: Negative for abdominal  "distention, abdominal pain, constipation, diarrhea, nausea, rectal pain and vomiting.   Endocrine: Negative for cold intolerance, heat intolerance and polyuria.   Genitourinary: Negative for difficulty urinating, dysuria, frequency, hematuria and urgency.   Musculoskeletal: Positive for arthralgias, gait problem and myalgias. Negative for neck pain and neck stiffness.   Integumentary:  Negative for color change and rash.   Neurological: Negative for dizziness, tremors, seizures, syncope, facial asymmetry, weakness and headaches.   Hematological: Negative for adenopathy. Does not bruise/bleed easily.   Psychiatric/Behavioral: Negative for agitation, confusion, hallucinations, self-injury and suicidal ideas. The patient is not hyperactive.           Objective:       Vitals:    03/23/22 0829 03/23/22 0908   BP: (!) 144/84 (!) 140/82   Pulse: 90 80   Temp: 98.2 °F (36.8 °C)    TempSrc: Oral    SpO2: 96% 99%   Weight: (!) 149.3 kg (329 lb 2.4 oz)    Height: 5' 4" (1.626 m)      Physical Exam  Constitutional:       Appearance: She is obese.   Cardiovascular:      Rate and Rhythm: Normal rate and regular rhythm.      Pulses: Normal pulses.      Heart sounds: Normal heart sounds.   Pulmonary:      Effort: Pulmonary effort is normal.      Breath sounds: Normal breath sounds.   Musculoskeletal:      Comments: Left upper extremity:  No amputations, no ulceration, no cyanosis, no edema or varicosities, peripheral pulses intact    Left shoulder: No misalignment, no asymmetry, no crepitation, mild tenderness to palpation. Decreased range of motion to overhead movement at left shoulder. No instability, no atrophy or abnormal strength or tone in the neck or shoulder.    Sensation to pain, touch, and proprioception normal.        Neurological:      General: No focal deficit present.      Mental Status: She is alert and oriented to person, place, and time.   Psychiatric:         Thought Content: Thought content normal.         " Judgment: Judgment normal.         Assessment:        1. Decreased range of motion of left shoulder  2. Left arm pain  Rule out vascular pathology including DVT: risk factors include obesity and sedentary to to gait impairment. She might have a rotator cuff pathology or other musculoskeletal pathology unrelated to administration of contrast causing pain in shoulder.  Plan:       1. Decreased range of motion of left shoulder  -     X-Ray Shoulder 2 or More Views Left; Future; Expected date: 03/23/2022  -     Ambulatory referral/consult to Orthopedics; Future; Expected date: 03/30/2022  -     Ambulatory referral/consult to Physical/Occupational Therapy; Future; Expected date: 03/30/2022    2. Left arm pain  -     US Upper Extremity Veins Left; Future; Expected date: 03/23/2022      Disclaimer: This note has been generated using voice-recognition software. There may be typographical errors that have been missed during proof-reading

## 2022-03-23 NOTE — PATIENT INSTRUCTIONS
Colonoscopy  Please call to schedule the appointment for colonoscopy:  [facility]  or   [place]   [place]      Weight management    Please call to schedule  [phone]

## 2022-04-12 ENCOUNTER — TELEPHONE (OUTPATIENT)
Dept: ENDOSCOPY | Facility: HOSPITAL | Age: 72
End: 2022-04-12
Payer: COMMERCIAL

## 2022-04-12 DIAGNOSIS — Z12.11 SCREENING FOR COLON CANCER: Primary | ICD-10-CM

## 2022-04-12 RX ORDER — POLYETHYLENE GLYCOL 3350, SODIUM SULFATE ANHYDROUS, SODIUM BICARBONATE, SODIUM CHLORIDE, POTASSIUM CHLORIDE 236; 22.74; 6.74; 5.86; 2.97 G/4L; G/4L; G/4L; G/4L; G/4L
4 POWDER, FOR SOLUTION ORAL ONCE
Qty: 4000 ML | Refills: 0 | Status: SHIPPED | OUTPATIENT
Start: 2022-04-12 | End: 2022-04-12

## 2022-04-18 ENCOUNTER — HOSPITAL ENCOUNTER (OUTPATIENT)
Dept: RADIOLOGY | Facility: CLINIC | Age: 72
Discharge: HOME | End: 2022-04-18
Attending: FAMILY MEDICINE
Payer: COMMERCIAL

## 2022-04-18 DIAGNOSIS — Z13.820 ENCOUNTER FOR OSTEOPOROSIS SCREENING IN ASYMPTOMATIC POSTMENOPAUSAL PATIENT: ICD-10-CM

## 2022-04-18 DIAGNOSIS — Z78.0 ENCOUNTER FOR OSTEOPOROSIS SCREENING IN ASYMPTOMATIC POSTMENOPAUSAL PATIENT: ICD-10-CM

## 2022-04-18 PROCEDURE — 77080 DEXA BONE DENSITY SPINE HIP: ICD-10-PCS | Mod: 26,,, | Performed by: INTERNAL MEDICINE

## 2022-04-18 PROCEDURE — 77080 DXA BONE DENSITY AXIAL: CPT | Mod: 26,,, | Performed by: INTERNAL MEDICINE

## 2022-04-18 PROCEDURE — 77080 DXA BONE DENSITY AXIAL: CPT | Mod: TC

## 2022-04-20 ENCOUNTER — TELEPHONE (OUTPATIENT)
Dept: PRIMARY CARE CLINIC | Facility: CLINIC | Age: 72
End: 2022-04-20
Payer: COMMERCIAL

## 2022-04-20 NOTE — TELEPHONE ENCOUNTER
----- Message from Treasure Hurst sent at 4/20/2022 12:26 PM CDT -----  Contact: Pt Mobile  [phone]  Patient is returning a phone call.  Who left a message for the patient: Jannet Cervantes MA   Does patient know what this is regarding:  A message from Jannet Cervantes MA   Would you like a call back, or a response through your [facility] portal?:  Phone

## 2022-04-20 NOTE — TELEPHONE ENCOUNTER
----- Message from Skye Espinoza MD sent at 4/20/2022 10:36 AM CDT -----  Please let patient know result    Your bone density test was normal. Excellent!    Message sent to portal.

## 2022-04-22 ENCOUNTER — HOSPITAL ENCOUNTER (OUTPATIENT)
Dept: RADIOLOGY | Facility: HOSPITAL | Age: 72
Discharge: HOME | End: 2022-04-22
Attending: FAMILY MEDICINE
Payer: COMMERCIAL

## 2022-04-22 DIAGNOSIS — M79.602 LEFT ARM PAIN: ICD-10-CM

## 2022-04-22 PROCEDURE — 93971 US UPPER EXTREMITY VEINS LEFT: ICD-10-PCS | Mod: 26,LT,, | Performed by: RADIOLOGY

## 2022-04-22 PROCEDURE — 93971 EXTREMITY STUDY: CPT | Mod: 26,LT,, | Performed by: RADIOLOGY

## 2022-04-22 PROCEDURE — 93971 EXTREMITY STUDY: CPT | Mod: TC,LT

## 2022-04-25 ENCOUNTER — OFFICE VISIT (OUTPATIENT)
Dept: PRIMARY CARE CLINIC | Facility: CLINIC | Age: 72
End: 2022-04-25
Payer: COMMERCIAL

## 2022-04-25 VITALS
RESPIRATION RATE: 18 BRPM | HEART RATE: 92 BPM | HEIGHT: 64 IN | TEMPERATURE: 98 F | WEIGHT: 293 LBS | DIASTOLIC BLOOD PRESSURE: 80 MMHG | SYSTOLIC BLOOD PRESSURE: 150 MMHG | BODY MASS INDEX: 50.02 KG/M2 | OXYGEN SATURATION: 99 %

## 2022-04-25 DIAGNOSIS — E66.01 CLASS 3 SEVERE OBESITY DUE TO EXCESS CALORIES WITH SERIOUS COMORBIDITY AND BODY MASS INDEX (BMI) OF 50.0 TO 59.9 IN ADULT: ICD-10-CM

## 2022-04-25 DIAGNOSIS — I10 ESSENTIAL HYPERTENSION: Primary | ICD-10-CM

## 2022-04-25 DIAGNOSIS — Z51.81 THERAPEUTIC DRUG MONITORING: ICD-10-CM

## 2022-04-25 DIAGNOSIS — R26.89 IMPAIRED GAIT AND MOBILITY: ICD-10-CM

## 2022-04-25 DIAGNOSIS — M79.671 RIGHT FOOT PAIN: ICD-10-CM

## 2022-04-25 PROCEDURE — 3288F PR FALLS RISK ASSESSMENT DOCUMENTED: ICD-10-PCS | Mod: CPTII,S$GLB,, | Performed by: FAMILY MEDICINE

## 2022-04-25 PROCEDURE — 4010F ACE/ARB THERAPY RXD/TAKEN: CPT | Mod: CPTII,S$GLB,, | Performed by: FAMILY MEDICINE

## 2022-04-25 PROCEDURE — 3079F DIAST BP 80-89 MM HG: CPT | Mod: CPTII,S$GLB,, | Performed by: FAMILY MEDICINE

## 2022-04-25 PROCEDURE — 3044F HG A1C LEVEL LT 7.0%: CPT | Mod: CPTII,S$GLB,, | Performed by: FAMILY MEDICINE

## 2022-04-25 PROCEDURE — 3066F PR DOCUMENTATION OF TREATMENT FOR NEPHROPATHY: ICD-10-PCS | Mod: CPTII,S$GLB,, | Performed by: FAMILY MEDICINE

## 2022-04-25 PROCEDURE — 3061F NEG MICROALBUMINURIA REV: CPT | Mod: CPTII,S$GLB,, | Performed by: FAMILY MEDICINE

## 2022-04-25 PROCEDURE — 3008F PR BODY MASS INDEX (BMI) DOCUMENTED: ICD-10-PCS | Mod: CPTII,S$GLB,, | Performed by: FAMILY MEDICINE

## 2022-04-25 PROCEDURE — 1160F RVW MEDS BY RX/DR IN RCRD: CPT | Mod: CPTII,S$GLB,, | Performed by: FAMILY MEDICINE

## 2022-04-25 PROCEDURE — 3077F SYST BP >= 140 MM HG: CPT | Mod: CPTII,S$GLB,, | Performed by: FAMILY MEDICINE

## 2022-04-25 PROCEDURE — 3061F PR NEG MICROALBUMINURIA RESULT DOCUMENTED/REVIEW: ICD-10-PCS | Mod: CPTII,S$GLB,, | Performed by: FAMILY MEDICINE

## 2022-04-25 PROCEDURE — 1101F PR PT FALLS ASSESS DOC 0-1 FALLS W/OUT INJ PAST YR: ICD-10-PCS | Mod: CPTII,S$GLB,, | Performed by: FAMILY MEDICINE

## 2022-04-25 PROCEDURE — 3008F BODY MASS INDEX DOCD: CPT | Mod: CPTII,S$GLB,, | Performed by: FAMILY MEDICINE

## 2022-04-25 PROCEDURE — 1101F PT FALLS ASSESS-DOCD LE1/YR: CPT | Mod: CPTII,S$GLB,, | Performed by: FAMILY MEDICINE

## 2022-04-25 PROCEDURE — 3288F FALL RISK ASSESSMENT DOCD: CPT | Mod: CPTII,S$GLB,, | Performed by: FAMILY MEDICINE

## 2022-04-25 PROCEDURE — 1159F MED LIST DOCD IN RCRD: CPT | Mod: CPTII,S$GLB,, | Performed by: FAMILY MEDICINE

## 2022-04-25 PROCEDURE — 99999 PR PBB SHADOW E&M-EST. PATIENT-LVL IV: ICD-10-PCS | Mod: PBBFAC,,, | Performed by: FAMILY MEDICINE

## 2022-04-25 PROCEDURE — 99999 PR PBB SHADOW E&M-EST. PATIENT-LVL IV: CPT | Mod: PBBFAC,,, | Performed by: FAMILY MEDICINE

## 2022-04-25 PROCEDURE — 99214 PR OFFICE/OUTPT VISIT, EST, LEVL IV, 30-39 MIN: ICD-10-PCS | Mod: S$GLB,,, | Performed by: FAMILY MEDICINE

## 2022-04-25 PROCEDURE — 3066F NEPHROPATHY DOC TX: CPT | Mod: CPTII,S$GLB,, | Performed by: FAMILY MEDICINE

## 2022-04-25 PROCEDURE — 99214 OFFICE O/P EST MOD 30 MIN: CPT | Mod: S$GLB,,, | Performed by: FAMILY MEDICINE

## 2022-04-25 PROCEDURE — 3077F PR MOST RECENT SYSTOLIC BLOOD PRESSURE >= 140 MM HG: ICD-10-PCS | Mod: CPTII,S$GLB,, | Performed by: FAMILY MEDICINE

## 2022-04-25 PROCEDURE — 1159F PR MEDICATION LIST DOCUMENTED IN MEDICAL RECORD: ICD-10-PCS | Mod: CPTII,S$GLB,, | Performed by: FAMILY MEDICINE

## 2022-04-25 PROCEDURE — 3079F PR MOST RECENT DIASTOLIC BLOOD PRESSURE 80-89 MM HG: ICD-10-PCS | Mod: CPTII,S$GLB,, | Performed by: FAMILY MEDICINE

## 2022-04-25 PROCEDURE — 1160F PR REVIEW ALL MEDS BY PRESCRIBER/CLIN PHARMACIST DOCUMENTED: ICD-10-PCS | Mod: CPTII,S$GLB,, | Performed by: FAMILY MEDICINE

## 2022-04-25 PROCEDURE — 3044F PR MOST RECENT HEMOGLOBIN A1C LEVEL <7.0%: ICD-10-PCS | Mod: CPTII,S$GLB,, | Performed by: FAMILY MEDICINE

## 2022-04-25 PROCEDURE — 4010F PR ACE/ARB THEARPY RXD/TAKEN: ICD-10-PCS | Mod: CPTII,S$GLB,, | Performed by: FAMILY MEDICINE

## 2022-04-25 RX ORDER — DICLOFENAC SODIUM 10 MG/G
2 GEL TOPICAL DAILY
Qty: 150 G | Refills: 0 | Status: SHIPPED | OUTPATIENT
Start: 2022-04-25 | End: 2024-01-03

## 2022-04-25 RX ORDER — LOSARTAN POTASSIUM 100 MG/1
100 TABLET ORAL DAILY
Qty: 90 TABLET | Refills: 3 | Status: SHIPPED | OUTPATIENT
Start: 2022-04-25 | End: 2023-03-15 | Stop reason: SDUPTHER

## 2022-04-25 NOTE — PROGRESS NOTES
Subjective:       Patient ID: Katelyn Huynh is a 71 y.o. female.    Chief Complaint:Hypertension    70 yo female presents for follow up and continues to have elevated blood pressure readings.    She has a past medical history of meningioma- Stable residual grade 1 planum sphenoid meningioma with recurrence post-surgery; last treatment was EBRT 50.4 Gy in 28 fx in late 2009 by Dr. Zimmerman; left PCO (posterior capsular opacification; bilateral Dry eye syndrome; HTN, Obesity, s/p left total knee replacement 2018; chronic quadriceps rupture 2019; hammer toes of both feet; corn/callus; impaired gait ambulates with walker.     On 2/22/22 she received contrast for MRI brain as part of surveillance of meningioma. She reported pain in shoulder which moved down to the lateral aspect of the left arm. No pain in forearm. No swelling of left upper extremity. She has hx of left wrist fracture. She denied chest pain or SOB. X-ray shoulder on 3/23/2022: There is baseline DJD.  There is DJD of the AC joint.  No fracture dislocation bone destruction seen. US was negative for DVT.    She is currently asymptomatic but continues to have high blood pressure.  She is on hydrochlorothiazide and losartan.  She has impaired mobility and uses walker.  She is working towards her weight loss goals.  She plans to see weight loss specialist.    The following portions of the patient's history were reviewed and updated as appropriate: allergies, current medications, past family history, past medical history, past social history, past surgical history and problem list.    Review of Systems   Constitutional: Positive for unexpected weight change. Negative for activity change, appetite change, chills, diaphoresis, fatigue and fever.   HENT: Negative for nasal congestion, dental problem, facial swelling, nosebleeds, postnasal drip, rhinorrhea, sore throat, tinnitus and trouble swallowing.    Eyes: Negative for pain, discharge, itching  "and visual disturbance.   Respiratory: Negative for apnea, chest tightness, shortness of breath, wheezing and stridor.    Cardiovascular: Negative for chest pain, palpitations and leg swelling.   Gastrointestinal: Negative for abdominal distention, abdominal pain, constipation, diarrhea, nausea, rectal pain and vomiting.   Endocrine: Negative for cold intolerance, heat intolerance and polyuria.   Genitourinary: Negative for difficulty urinating, dysuria, frequency, hematuria and urgency.   Musculoskeletal: Positive for arthralgias, gait problem and myalgias. Negative for neck pain and neck stiffness.   Integumentary:  Negative for color change and rash.   Neurological: Negative for dizziness, tremors, seizures, syncope, facial asymmetry, weakness and headaches.   Hematological: Negative for adenopathy. Does not bruise/bleed easily.   Psychiatric/Behavioral: Negative for agitation, confusion, hallucinations, self-injury and suicidal ideas. The patient is not hyperactive.           Objective:       Vitals:    04/25/22 0807   BP: (!) 150/80   BP Location: Right arm   Patient Position: Sitting   Pulse: 92   Resp: 18   Temp: 98.3 °F (36.8 °C)   SpO2: 99%   Weight: (!) 143.8 kg (317 lb 0.3 oz)   Height: 5' 4" (1.626 m)     Physical Exam  Constitutional:       Appearance: She is obese.   HENT:      Head: Atraumatic.   Eyes:      Conjunctiva/sclera: Conjunctivae normal.   Cardiovascular:      Rate and Rhythm: Normal rate and regular rhythm.      Pulses: Normal pulses.      Heart sounds: Normal heart sounds.   Pulmonary:      Effort: Pulmonary effort is normal.      Breath sounds: Normal breath sounds.   Abdominal:      General: There is distension (obese).      Palpations: Abdomen is soft.      Tenderness: There is no rebound.   Musculoskeletal:      Comments:  Pain on palpation of the 4th web space of right foot, with excoriated region on lateral aspect of right 5th toe, no fissure, no discharge, normal range of motion, " pulses intact   Neurological:      General: No focal deficit present.      Mental Status: She is alert and oriented to person, place, and time.      Gait: Gait abnormal (ambulates with walker).   Psychiatric:         Thought Content: Thought content normal.         Judgment: Judgment normal.         Assessment:        1. Essential hypertension  2. Therapeutic drug monitoring  3. Right foot pain  4. Obesity class 3 due to excess calories with serious comorbidity and body mass index  5. Impaired gait and mobility        Plan:       1. Essential hypertension  -     losartan (COZAAR) 100 MG tablet; Take 1 tablet (100 mg total) by mouth once daily.  Dispense: 90 tablet; Refill: 3  Increase losartan from 50 mg daily to 100 mg daily  On HCTZ- admits to missing doses because she does not desire the diuretic effect when she has to leave the house  Denies sleep apnea.     2. Therapeutic drug monitoring  -     Basic Metabolic Panel; Future    3. Right foot pain  -     diclofenac sodium (VOLTAREN) 1 % Gel; Apply 2 g topically once daily.  Dispense: 150 g; Refill: 0  Proper shoe supports.  Follow-up with Podiatry.    4. Class 3 severe obesity due to excess calories with serious comorbidity and body mass index (BMI) of 50.0 to 59.9 in adult  Weight loss will help with offloading of joints including right foot.  Weight loss is important for good blood pressure control.    5. Impaired gait and mobility  Fall precautions advised.  Encouraged ambulation; uses walker.    Disclaimer: This note has been generated using voice-recognition software. There may be typographical errors that have been missed during proof-reading

## 2022-05-03 ENCOUNTER — CLINICAL SUPPORT (OUTPATIENT)
Dept: REHABILITATION | Facility: OTHER | Age: 72
End: 2022-05-03
Payer: COMMERCIAL

## 2022-05-03 DIAGNOSIS — M25.612 DECREASED RANGE OF MOTION OF LEFT SHOULDER: ICD-10-CM

## 2022-05-03 DIAGNOSIS — M25.619 DECREASED RANGE OF MOTION OF SHOULDER, UNSPECIFIED LATERALITY: ICD-10-CM

## 2022-05-03 PROCEDURE — 97110 THERAPEUTIC EXERCISES: CPT | Mod: PN

## 2022-05-03 PROCEDURE — 97162 PT EVAL MOD COMPLEX 30 MIN: CPT | Mod: PN

## 2022-05-06 PROBLEM — M25.619 DECREASED RANGE OF MOTION (ROM) OF SHOULDER: Status: ACTIVE | Noted: 2022-05-06

## 2022-05-06 NOTE — PLAN OF CARE
"  [facility]   Physical Therapy Initial Evaluation     Date: 5/3/2022   Name: Katelyn Martines [facility] Number: [id number]    Therapy Diagnosis:   Encounter Diagnoses   Name Primary?    Decreased range of motion of left shoulder     Decreased range of motion of shoulder, unspecified laterality      Physician: Skye Espinoza,*    Physician Orders: PT Eval and Treat   Medical Diagnosis from Referral: M25.612 (ICD-10-CM) - Decreased range of motion of left shoulder  Evaluation Date: 5/3/2022  Authorization Period Expiration: 3/23/2023  Plan of Care Expiration: 8/3/2022  Progress Note Due: 6/3/2022  Visit # / Visits authorized: 1/ 1   FOTO: 5/3/22    Precautions: Fall     Time In: 2:15  Time Out: 3  Total Appointment Time (timed & untimed codes): 45 minutes      SUBJECTIVE     Date of onset: multiple years    History of current condition - Katelyn reports: Pain in the L arm, started gradually and has gotten worse. Cannot raise it, hard for flexion, and abduction. Limits her ability to complete dressing, bathing, driving, household chores.       Imaging, xray: EXAMINATION:  XR SHOULDER COMPLETE 2 OR MORE VIEWS LEFT     CLINICAL HISTORY:  Stiffness of left shoulder, not elsewhere classified     FINDINGS:  Shoulder three views left: There is baseline DJD.  There is DJD of the AC joint.  No fracture dislocation bone destruction seen.       Prior Therapy: not for shoulder  Social History: 1 story home lives with their family  Occupation: retired  Prior Level of Function: Uses a RW for ambulation from prior TKR  Current Level of Function: Increased difficulty completing all ADLs    Pain:  Current 6/10, worst 9/10, best 5/10   Location: left shoulder   Description: Aching and Tight  Aggravating Factors: Bending, Flexing, Lifting and Getting out of bed/chair  Easing Factors: ice, heating pad and rest    Patients goals: "to be able to reach without pain, get back to somewhat of a " "normal life"     Medical History:   Past Medical History:   Diagnosis Date    Arthritis     Cataract     Glaucoma     History of iritis     OD    Hypertension     Meningioma     Uveitis        Surgical History:   Katelyn Huynh  has a past surgical history that includes Brain tumor surgery; Craniotomy; Cataract extraction bilateral w/ anterior vitrectomy; YAG (Right, 1/20/2016); Cholecystectomy; Knee surgery (2-24-15); Knee surgery (Left, 02/09/2018); Knee surgery (Left, 02/24/2018); Wrist fracture surgery; Joint replacement; and Cataract extraction.    Medications:   Katelyn has a current medication list which includes the following prescription(s): ascorbic acid, aspirin, diclofenac sodium, boostrix tdap, famotidine, hydrochlorothiazide, losartan, miconazole nitrate 2%, and nabumetone.    Allergies:   Review of patient's allergies indicates:   Allergen Reactions    Cortisone Hives and Swelling     Swelling of the tongue          OBJECTIVE   Observation: rounded shoulders, forward head posture    Posture:    Shoulder rotation at rest: internally rotated    Cervical Range of Motion: WFL    Passive Range of Motion (degrees):   Shoulder Right Left   Flexion 90 90   Abduction 90 90      Active Range of Motion (degrees):   Shoulder Right Left   Flexion 90 50   Abduction 45 20       Upper Extremity Strength  (R) UE  (L) UE    Elbow flexion: 4-/5 Elbow flexion: 4-/5   Elbow extension: 4-/5 Elbow extension: 4-/5   Shoulder flexion: 3-/5 Shoulder flexion: 3-/5   Shoulder Abduction: 3-/5 Shoulder abduction: 3-/5     Scapular Control/Dyskinesis:    Normal / Subtle / Obvious  Comments    Left  Obvious    Right  obvious          Limitation/Restriction for FOTO [place] Survey    Therapist reviewed FOTO scores for Katelyn Huynh on 5/3/2022.   FOTO documents entered into EPIC - see Media section.    Limitation Score: 64%  Goal: 38%       TREATMENT     Total Treatment time (time-based codes) " separate from Evaluation: 10 minutes      Katelyn received the treatments listed below:      therapeutic exercises to develop ROM for 10 minutes including:  Scap retraction  Table slides flexion and scaption  AAROM supine flexion with cane    PATIENT EDUCATION AND HOME EXERCISES     Education provided:   - Pt educated on PT, its role, the POC, HEP, skilled intervention, and proper progression for goal achievement.       Written Home Exercises Provided: yes. Exercises were reviewed and Katelyn was able to demonstrate them prior to the end of the session.  Katelyn demonstrated good  understanding of the education provided. See EMR under Patient Instructions for exercises provided during therapy sessions.    ASSESSMENT     Katelyn is a 71 y.o. female referred to outpatient Physical Therapy with a medical diagnosis of Decreased range of motion of left shoulder. Patient presents with increased shoulder pain, decreased UE ROM, decreased UE Strength, soft tissue dysfunction, postural imbalance,impaired joint mobility, and decreased tolerance to functional activities.    Pt with good motivation to perform physical activity and responds well to cueing.      Patient prognosis is Fair.   Patient will benefit from skilled outpatient Physical Therapy to address the deficits stated above and in the chart below, provide patient /family education, and to maximize patientt's level of independence.     Plan of care discussed with patient: Yes  Patient's spiritual, cultural and educational needs considered and patient is agreeable to the plan of care and goals as stated below:     Anticipated Barriers for therapy: chronicity of condition    Medical Necessity is demonstrated by the following  History  Co-morbidities and personal factors that may impact the plan of care Co-morbidities:   advanced age, diabetes, difficulty sleeping, high BMI, HTN and poor medication/medical compliance    Personal Factors:   age     moderate    Examination  Body Structures and Functions, activity limitations and participation restrictions that may impact the plan of care Body Regions:   upper extremities    Body Systems:    gross symmetry  ROM  strength  gross coordinated movement  transfers  transitions  motor control  motor learning    Participation Restrictions:   n/a    Activity limitations:   Learning and applying knowledge  no deficits    General Tasks and Commands  no deficits    Communication  no deficits    Mobility  lifting and carrying objects  driving (bike, car, motorcycle)    Self care  dressing    Domestic Life  shopping  cooking  doing house work (cleaning house, washing dishes, laundry)    Interactions/Relationships  no deficits    Life Areas  employment    Community and Social Life  Evangelical and spirituality         moderate   Clinical Presentation evolving clinical presentation with changing clinical characteristics moderate   Decision Making/ Complexity Score: moderate     GOALS:   Short Term Goals: 4 weeks  1.Report decreased in pain at worse less than  <   / =  5  /10  to increase tolerance for functional mobility. On going  2. Pt to improve range of motion by 25% to allow for improved functional mobility to allow for improvement in IADLs. On going  3. Increased LE MMT 1/2 grade to increase tolerance for ADL and work activities. On going  4. Pt to report be conscious of impaired sitting and standing posture daily to decrease pain. On going  5. Pt to tolerate HEP to improve ROM and independence with ADL's. On going    Long Term Goals: 8 weeks  1.Report decreased in pain at worse less than  <   / =  3  /10  to increase tolerance for functional mobility. On going  2.  Patient will demonstrate improved overall function per FOTO Survey to CJ = at least 20% but < 40% impaired, limited or restricted score or less.On going  3.Increased UE MMT 1 grade to increase tolerance for ADL and work activities.On going  4. Pt to report and  demonstrate proper posture in standing and sitting to decrease pain. On going  5. Pt to be Independent with HEP to improve ROM and independence with ADL's.On going  6. Pt to improve range of motion by 75% to allow for improved functional mobility to allow for improvement in IADLs. On going      PLAN   Plan of care Certification: 5/3/2022 to 5/31/2022.    Outpatient Physical Therapy 2 times weekly for 10 weeks to include the following interventions: Manual Therapy, Moist Heat/ Ice, Neuromuscular Re-ed, Patient Education, Therapeutic Activities and Therapeutic Exercise.     Scotty Lane, PT      I CERTIFY THE NEED FOR THESE SERVICES FURNISHED UNDER THIS PLAN OF TREATMENT AND WHILE UNDER MY CARE   Physician's comments:     Physician's Signature: ___________________________________________________

## 2022-05-09 ENCOUNTER — LAB VISIT (OUTPATIENT)
Dept: LAB | Facility: HOSPITAL | Age: 72
End: 2022-05-09
Attending: FAMILY MEDICINE
Payer: COMMERCIAL

## 2022-05-09 DIAGNOSIS — Z51.81 THERAPEUTIC DRUG MONITORING: ICD-10-CM

## 2022-05-09 LAB
ANION GAP SERPL CALC-SCNC: 9 MMOL/L (ref 8–16)
BUN SERPL-MCNC: 13 MG/DL (ref 8–23)
CALCIUM SERPL-MCNC: 9.2 MG/DL (ref 8.7–10.5)
CHLORIDE SERPL-SCNC: 103 MMOL/L (ref 95–110)
CO2 SERPL-SCNC: 27 MMOL/L (ref 23–29)
CREAT SERPL-MCNC: 0.9 MG/DL (ref 0.5–1.4)
EST. GFR  (AFRICAN AMERICAN): >60 ML/MIN/1.73 M^2
EST. GFR  (NON AFRICAN AMERICAN): >60 ML/MIN/1.73 M^2
GLUCOSE SERPL-MCNC: 102 MG/DL (ref 70–110)
POTASSIUM SERPL-SCNC: 4.3 MMOL/L (ref 3.5–5.1)
SODIUM SERPL-SCNC: 139 MMOL/L (ref 136–145)

## 2022-05-09 PROCEDURE — 80048 BASIC METABOLIC PNL TOTAL CA: CPT | Performed by: FAMILY MEDICINE

## 2022-05-09 PROCEDURE — 36415 COLL VENOUS BLD VENIPUNCTURE: CPT | Mod: PN | Performed by: FAMILY MEDICINE

## 2022-05-10 ENCOUNTER — TELEPHONE (OUTPATIENT)
Dept: PRIMARY CARE CLINIC | Facility: CLINIC | Age: 72
End: 2022-05-10
Payer: COMMERCIAL

## 2022-05-10 NOTE — TELEPHONE ENCOUNTER
----- Message from Skye Espinoza MD sent at 5/9/2022  3:16 PM CDT -----  Hello,    Normal kidney function and electrolytes.

## 2022-05-11 ENCOUNTER — DOCUMENTATION ONLY (OUTPATIENT)
Dept: REHABILITATION | Facility: OTHER | Age: 72
End: 2022-05-11
Payer: COMMERCIAL

## 2022-05-20 ENCOUNTER — DOCUMENTATION ONLY (OUTPATIENT)
Dept: REHABILITATION | Facility: OTHER | Age: 72
End: 2022-05-20
Payer: COMMERCIAL

## 2022-05-20 NOTE — PROGRESS NOTES
Patient failed to appear for scheduled PT appointment today at 3:00 pm without prior notification or cancellation.    Fara Fernández, PT, DPT  5/20/2022

## 2022-05-23 ENCOUNTER — OFFICE VISIT (OUTPATIENT)
Dept: PRIMARY CARE CLINIC | Facility: CLINIC | Age: 72
End: 2022-05-23
Payer: COMMERCIAL

## 2022-05-23 VITALS
BODY MASS INDEX: 50.02 KG/M2 | OXYGEN SATURATION: 99 % | HEIGHT: 64 IN | RESPIRATION RATE: 18 BRPM | DIASTOLIC BLOOD PRESSURE: 70 MMHG | SYSTOLIC BLOOD PRESSURE: 130 MMHG | TEMPERATURE: 98 F | WEIGHT: 293 LBS | HEART RATE: 86 BPM

## 2022-05-23 DIAGNOSIS — R73.03 PREDIABETES: ICD-10-CM

## 2022-05-23 DIAGNOSIS — R26.89 IMPAIRED GAIT AND MOBILITY: ICD-10-CM

## 2022-05-23 DIAGNOSIS — Z91.89 10 YEAR RISK OF MI OR STROKE 7.5% OR GREATER: ICD-10-CM

## 2022-05-23 DIAGNOSIS — Z53.20 STATIN DECLINED: ICD-10-CM

## 2022-05-23 DIAGNOSIS — E66.01 CLASS 3 SEVERE OBESITY DUE TO EXCESS CALORIES WITH SERIOUS COMORBIDITY AND BODY MASS INDEX (BMI) OF 50.0 TO 59.9 IN ADULT: ICD-10-CM

## 2022-05-23 DIAGNOSIS — M17.12 PRIMARY OSTEOARTHRITIS OF LEFT KNEE: ICD-10-CM

## 2022-05-23 DIAGNOSIS — I10 ESSENTIAL HYPERTENSION: Primary | ICD-10-CM

## 2022-05-23 PROCEDURE — 99999 PR PBB SHADOW E&M-EST. PATIENT-LVL IV: ICD-10-PCS | Mod: PBBFAC,,, | Performed by: FAMILY MEDICINE

## 2022-05-23 PROCEDURE — 99214 PR OFFICE/OUTPT VISIT, EST, LEVL IV, 30-39 MIN: ICD-10-PCS | Mod: S$GLB,,, | Performed by: FAMILY MEDICINE

## 2022-05-23 PROCEDURE — 4010F PR ACE/ARB THEARPY RXD/TAKEN: ICD-10-PCS | Mod: CPTII,S$GLB,, | Performed by: FAMILY MEDICINE

## 2022-05-23 PROCEDURE — 99999 PR PBB SHADOW E&M-EST. PATIENT-LVL IV: CPT | Mod: PBBFAC,,, | Performed by: FAMILY MEDICINE

## 2022-05-23 PROCEDURE — 3044F PR MOST RECENT HEMOGLOBIN A1C LEVEL <7.0%: ICD-10-PCS | Mod: CPTII,S$GLB,, | Performed by: FAMILY MEDICINE

## 2022-05-23 PROCEDURE — 1159F PR MEDICATION LIST DOCUMENTED IN MEDICAL RECORD: ICD-10-PCS | Mod: CPTII,S$GLB,, | Performed by: FAMILY MEDICINE

## 2022-05-23 PROCEDURE — 3008F BODY MASS INDEX DOCD: CPT | Mod: CPTII,S$GLB,, | Performed by: FAMILY MEDICINE

## 2022-05-23 PROCEDURE — 1160F RVW MEDS BY RX/DR IN RCRD: CPT | Mod: CPTII,S$GLB,, | Performed by: FAMILY MEDICINE

## 2022-05-23 PROCEDURE — 1159F MED LIST DOCD IN RCRD: CPT | Mod: CPTII,S$GLB,, | Performed by: FAMILY MEDICINE

## 2022-05-23 PROCEDURE — 3008F PR BODY MASS INDEX (BMI) DOCUMENTED: ICD-10-PCS | Mod: CPTII,S$GLB,, | Performed by: FAMILY MEDICINE

## 2022-05-23 PROCEDURE — 1101F PT FALLS ASSESS-DOCD LE1/YR: CPT | Mod: CPTII,S$GLB,, | Performed by: FAMILY MEDICINE

## 2022-05-23 PROCEDURE — 3075F PR MOST RECENT SYSTOLIC BLOOD PRESS GE 130-139MM HG: ICD-10-PCS | Mod: CPTII,S$GLB,, | Performed by: FAMILY MEDICINE

## 2022-05-23 PROCEDURE — 99214 OFFICE O/P EST MOD 30 MIN: CPT | Mod: S$GLB,,, | Performed by: FAMILY MEDICINE

## 2022-05-23 PROCEDURE — 3288F FALL RISK ASSESSMENT DOCD: CPT | Mod: CPTII,S$GLB,, | Performed by: FAMILY MEDICINE

## 2022-05-23 PROCEDURE — 3075F SYST BP GE 130 - 139MM HG: CPT | Mod: CPTII,S$GLB,, | Performed by: FAMILY MEDICINE

## 2022-05-23 PROCEDURE — 3066F NEPHROPATHY DOC TX: CPT | Mod: CPTII,S$GLB,, | Performed by: FAMILY MEDICINE

## 2022-05-23 PROCEDURE — 3288F PR FALLS RISK ASSESSMENT DOCUMENTED: ICD-10-PCS | Mod: CPTII,S$GLB,, | Performed by: FAMILY MEDICINE

## 2022-05-23 PROCEDURE — 3066F PR DOCUMENTATION OF TREATMENT FOR NEPHROPATHY: ICD-10-PCS | Mod: CPTII,S$GLB,, | Performed by: FAMILY MEDICINE

## 2022-05-23 PROCEDURE — 4010F ACE/ARB THERAPY RXD/TAKEN: CPT | Mod: CPTII,S$GLB,, | Performed by: FAMILY MEDICINE

## 2022-05-23 PROCEDURE — 3078F DIAST BP <80 MM HG: CPT | Mod: CPTII,S$GLB,, | Performed by: FAMILY MEDICINE

## 2022-05-23 PROCEDURE — 1125F PR PAIN SEVERITY QUANTIFIED, PAIN PRESENT: ICD-10-PCS | Mod: CPTII,S$GLB,, | Performed by: FAMILY MEDICINE

## 2022-05-23 PROCEDURE — 1160F PR REVIEW ALL MEDS BY PRESCRIBER/CLIN PHARMACIST DOCUMENTED: ICD-10-PCS | Mod: CPTII,S$GLB,, | Performed by: FAMILY MEDICINE

## 2022-05-23 PROCEDURE — 1125F AMNT PAIN NOTED PAIN PRSNT: CPT | Mod: CPTII,S$GLB,, | Performed by: FAMILY MEDICINE

## 2022-05-23 PROCEDURE — 1101F PR PT FALLS ASSESS DOC 0-1 FALLS W/OUT INJ PAST YR: ICD-10-PCS | Mod: CPTII,S$GLB,, | Performed by: FAMILY MEDICINE

## 2022-05-23 PROCEDURE — 3078F PR MOST RECENT DIASTOLIC BLOOD PRESSURE < 80 MM HG: ICD-10-PCS | Mod: CPTII,S$GLB,, | Performed by: FAMILY MEDICINE

## 2022-05-23 PROCEDURE — 3061F PR NEG MICROALBUMINURIA RESULT DOCUMENTED/REVIEW: ICD-10-PCS | Mod: CPTII,S$GLB,, | Performed by: FAMILY MEDICINE

## 2022-05-23 PROCEDURE — 3061F NEG MICROALBUMINURIA REV: CPT | Mod: CPTII,S$GLB,, | Performed by: FAMILY MEDICINE

## 2022-05-23 PROCEDURE — 3044F HG A1C LEVEL LT 7.0%: CPT | Mod: CPTII,S$GLB,, | Performed by: FAMILY MEDICINE

## 2022-05-23 RX ORDER — LANCETS 28 GAUGE
EACH MISCELLANEOUS
Qty: 200 EACH | Refills: 3 | Status: SHIPPED | OUTPATIENT
Start: 2022-05-23 | End: 2023-04-26 | Stop reason: SDUPTHER

## 2022-05-23 RX ORDER — POLYETHYLENE GLYCOL 3350, SODIUM SULFATE ANHYDROUS, SODIUM BICARBONATE, SODIUM CHLORIDE, POTASSIUM CHLORIDE 236; 22.74; 6.74; 5.86; 2.97 G/4L; G/4L; G/4L; G/4L; G/4L
4000 POWDER, FOR SOLUTION ORAL ONCE
COMMUNITY
Start: 2022-04-12 | End: 2024-03-15

## 2022-05-23 RX ORDER — ISOPROPYL ALCOHOL 70 ML/100ML
SWAB TOPICAL
Qty: 200 EACH | Refills: 3 | Status: SHIPPED | OUTPATIENT
Start: 2022-05-23

## 2022-05-23 RX ORDER — SEMAGLUTIDE 1.34 MG/ML
0.25 INJECTION, SOLUTION SUBCUTANEOUS
Qty: 1 PEN | Refills: 5 | Status: SHIPPED | OUTPATIENT
Start: 2022-05-23 | End: 2023-06-03

## 2022-05-23 RX ORDER — INSULIN PUMP SYRINGE, 3 ML
EACH MISCELLANEOUS
Qty: 1 EACH | Refills: 0 | Status: SHIPPED | OUTPATIENT
Start: 2022-05-23

## 2022-05-23 NOTE — PROGRESS NOTES
Subjective:       Patient ID: Katelyn Huynh is a 71 y.o. female.    Chief Complaint:Hypertension    70 yo female presents for follow up on hypertension.    She has a past medical history of meningioma- Stable residual grade 1 planum sphenoid meningioma with recurrence post-surgery; last treatment was EBRT 50.4 Gy in 28 fx in late 2009 by Dr. Zimmerman; left PCO (posterior capsular opacification; bilateral Dry eye syndrome; HTN, Obesity, prediabetes, s/p left total knee replacement 2018; chronic quadriceps rupture 2019; hammer toes of both feet; corn/callus; impaired gait ambulates with walker.     Blood pressure is on target today.  She is asymptomatic at present.   She is on hydrochlorothiazide and losartan.  She has impaired mobility and uses walker.  She is working towards her weight loss goals.  She plans to see weight loss specialist.    She would like to discuss lab results in detail.    The following portions of the patient's history were reviewed and updated as appropriate: allergies, current medications, past family history, past medical history, past social history, past surgical history and problem list.    Review of Systems   Constitutional: Positive for unexpected weight change. Negative for activity change, appetite change, chills, diaphoresis, fatigue and fever.   HENT: Negative for nasal congestion, dental problem, facial swelling, nosebleeds, postnasal drip, rhinorrhea, sore throat, tinnitus and trouble swallowing.    Eyes: Negative for pain, discharge, itching and visual disturbance.   Respiratory: Negative for apnea, chest tightness, shortness of breath, wheezing and stridor.    Cardiovascular: Negative for chest pain, palpitations and leg swelling.   Gastrointestinal: Negative for abdominal distention, abdominal pain, constipation, diarrhea, nausea, rectal pain and vomiting.   Endocrine: Negative for cold intolerance, heat intolerance and polyuria.   Genitourinary: Negative for  "difficulty urinating, dysuria, frequency, hematuria and urgency.   Musculoskeletal: Positive for arthralgias, gait problem and myalgias. Negative for neck pain and neck stiffness.   Integumentary:  Negative for color change and rash.   Neurological: Negative for dizziness, tremors, seizures, syncope, facial asymmetry, weakness and headaches.   Hematological: Negative for adenopathy. Does not bruise/bleed easily.   Psychiatric/Behavioral: Negative for agitation, confusion, hallucinations, self-injury and suicidal ideas. The patient is not hyperactive.           Objective:       Vitals:    05/23/22 0756   BP: 130/70   BP Location: Right arm   Patient Position: Sitting   BP Method: Medium (Manual)   Pulse: 86   Resp: 18   Temp: 97.8 °F (36.6 °C)   SpO2: 99%   Weight: (!) 144.1 kg (317 lb 10.9 oz)   Height: 5' 4" (1.626 m)     Physical Exam  Constitutional:       Appearance: She is obese.   HENT:      Head: Atraumatic.   Eyes:      Conjunctiva/sclera: Conjunctivae normal.   Cardiovascular:      Rate and Rhythm: Normal rate and regular rhythm.      Pulses: Normal pulses.      Heart sounds: Normal heart sounds.   Pulmonary:      Effort: Pulmonary effort is normal.      Breath sounds: Normal breath sounds.   Abdominal:      General: There is distension (obese).      Palpations: Abdomen is soft.      Tenderness: There is no rebound.   Musculoskeletal:      Comments:  Antalgic and unsteady gait. Needs walker to transfer to the table. No swelling of spine.  Neurological:      General: No focal deficit present.      Mental Status: She is alert and oriented to person, place, and time.      Gait: Gait abnormal (ambulates with walker).   Psychiatric:         Thought Content: Thought content normal.         Judgment: Judgment normal.         Assessment:        1. Essential hypertension  2. Prediabetes  3. Ten 10 year risk of MI or stroke 7.5 % or greater  4. Declined statin  5. Obesity class 3 due to excess calories with serious " comorbidity and body mass index  6. Primary osteoarthritis of left knee  7. Impaired gait and mobility        Plan:       1. Essential hypertension  The patient continues with the antihypertensive medication (s) as prescribed. There seems to be good control of blood pressure. There is no manifestation of end organ damage. Medication generally well tolerated within current guideline for age. Patient is encouraged to monitor blood pressure at home and if the readings suddenly increase then patient will make an appointment to see me. Should there be any alarm symptoms/ signs patient would report to the nearest emergency room.    2. Prediabetes  -     semaglutide (OZEMPIC) 0.25 mg or 0.5 mg(2 mg/1.5 mL) pen injector; Inject 0.25 mg into the skin every 7 days.  Dispense: 1 pen; Refill: 5  -     blood sugar diagnostic Strp; Test blood sugar twice daily. Per insurance coverage.  Dispense: 200 each; Refill: 3  -     blood-glucose meter kit; Monitor X2 daily as directed. Per insurance coverage.  Dispense: 1 each; Refill: 0  -     alcohol swabs (ALCOHOL WIPES) PadM; Monitor X2 daily as directed. Per insurance coverage.  Dispense: 200 each; Refill: 3  -     lancets 28 gauge Misc; Test blood sugar twice daily  Dispense: 200 each; Refill: 3  Impaired fasting glucose is essentially early type 2 diabetes and needs to be treated as such with main emphasis on diet and exercise. Avoiding excessive sugars and starch in the diet are important.    3. 10 year risk of MI or stroke 7.5% or greater  4. Statin declined  The 10-year ASCVD risk score ([place] SOTO Jr., et al., 2013) is: 8.6%    Values used to calculate the score:      Age: 71 years      Sex: Female      Is Non- : Yes      Diabetic: No      Tobacco smoker: No      Systolic Blood Pressure: 130 mmHg      Is BP treated: Yes      HDL Cholesterol: 64 mg/dL      Total Cholesterol: 140 mg/dL   Continue to optimize diet and exercise regimen    5. Class 3 severe  obesity due to excess calories with serious comorbidity and body mass index (BMI) of 50.0 to 59.9 in adult  The importance of optimum diet & exercise discussed. The goal for weight management is 5-10 % of total body weight reduction in 3 months.     The consumption of a reduced calorie diet, frequent self-weighing, and participation in a lifestyle intervention program are strategies to help maintain weight loss. Bariatric surgery, which may alter the body's adipose tissue set point, and extended use of anti-obesity pharmacologic therapy may help address these underlying physiologic changes.     6. Primary osteoarthritis of left knee  Managed conservatively. Weight loss. Exercises as tolerated.    7. Impaired gait and mobility  Does well with walker. Fall precautions encouraged.    Disclaimer: This note has been generated using voice-recognition software. There may be typographical errors that have been missed during proof-reading

## 2022-06-06 ENCOUNTER — ANESTHESIA (OUTPATIENT)
Dept: ENDOSCOPY | Facility: HOSPITAL | Age: 72
End: 2022-06-06
Payer: COMMERCIAL

## 2022-06-06 ENCOUNTER — ANESTHESIA EVENT (OUTPATIENT)
Dept: ENDOSCOPY | Facility: HOSPITAL | Age: 72
End: 2022-06-06
Payer: COMMERCIAL

## 2022-06-06 ENCOUNTER — HOSPITAL ENCOUNTER (OUTPATIENT)
Facility: HOSPITAL | Age: 72
Discharge: HOME | End: 2022-06-06
Attending: INTERNAL MEDICINE | Admitting: INTERNAL MEDICINE
Payer: COMMERCIAL

## 2022-06-06 VITALS
OXYGEN SATURATION: 96 % | RESPIRATION RATE: 18 BRPM | HEART RATE: 79 BPM | HEIGHT: 64 IN | DIASTOLIC BLOOD PRESSURE: 85 MMHG | WEIGHT: 293 LBS | SYSTOLIC BLOOD PRESSURE: 153 MMHG | BODY MASS INDEX: 50.02 KG/M2 | TEMPERATURE: 98 F

## 2022-06-06 DIAGNOSIS — Z91.89 10 YEAR RISK OF MI OR STROKE 7.5% OR GREATER: ICD-10-CM

## 2022-06-06 DIAGNOSIS — M25.619 DECREASED RANGE OF MOTION OF SHOULDER, UNSPECIFIED LATERALITY: Primary | ICD-10-CM

## 2022-06-06 DIAGNOSIS — Z12.11 SCREEN FOR COLON CANCER: ICD-10-CM

## 2022-06-06 PROCEDURE — 37000008 HC ANESTHESIA 1ST 15 MINUTES: Performed by: INTERNAL MEDICINE

## 2022-06-06 PROCEDURE — G0121 COLON CA SCRN NOT HI RSK IND: HCPCS | Mod: ,,, | Performed by: INTERNAL MEDICINE

## 2022-06-06 PROCEDURE — 63600175 PHARM REV CODE 636 W HCPCS: Performed by: STUDENT IN AN ORGANIZED HEALTH CARE EDUCATION/TRAINING PROGRAM

## 2022-06-06 PROCEDURE — D9220A PRA ANESTHESIA: Mod: ANES,,, | Performed by: ANESTHESIOLOGY

## 2022-06-06 PROCEDURE — G0121 COLON CA SCRN NOT HI RSK IND: HCPCS | Performed by: INTERNAL MEDICINE

## 2022-06-06 PROCEDURE — D9220A PRA ANESTHESIA: ICD-10-PCS | Mod: ANES,,, | Performed by: ANESTHESIOLOGY

## 2022-06-06 PROCEDURE — 25000003 PHARM REV CODE 250: Performed by: STUDENT IN AN ORGANIZED HEALTH CARE EDUCATION/TRAINING PROGRAM

## 2022-06-06 PROCEDURE — G0121 COLON CA SCRN NOT HI RSK IND: ICD-10-PCS | Mod: ,,, | Performed by: INTERNAL MEDICINE

## 2022-06-06 PROCEDURE — D9220A PRA ANESTHESIA: ICD-10-PCS | Mod: CRNA,,, | Performed by: STUDENT IN AN ORGANIZED HEALTH CARE EDUCATION/TRAINING PROGRAM

## 2022-06-06 PROCEDURE — 37000009 HC ANESTHESIA EA ADD 15 MINS: Performed by: INTERNAL MEDICINE

## 2022-06-06 PROCEDURE — D9220A PRA ANESTHESIA: Mod: CRNA,,, | Performed by: STUDENT IN AN ORGANIZED HEALTH CARE EDUCATION/TRAINING PROGRAM

## 2022-06-06 RX ORDER — KETAMINE HCL IN 0.9 % NACL 50 MG/5 ML
SYRINGE (ML) INTRAVENOUS
Status: DISCONTINUED | OUTPATIENT
Start: 2022-06-06 | End: 2022-06-06

## 2022-06-06 RX ORDER — MIDAZOLAM HYDROCHLORIDE 1 MG/ML
INJECTION, SOLUTION INTRAMUSCULAR; INTRAVENOUS
Status: DISCONTINUED | OUTPATIENT
Start: 2022-06-06 | End: 2022-06-06

## 2022-06-06 RX ORDER — ONDANSETRON 2 MG/ML
4 INJECTION INTRAMUSCULAR; INTRAVENOUS DAILY PRN
Status: DISCONTINUED | OUTPATIENT
Start: 2022-06-06 | End: 2022-06-06 | Stop reason: HOSPADM

## 2022-06-06 RX ORDER — PROPOFOL 10 MG/ML
VIAL (ML) INTRAVENOUS
Status: DISCONTINUED | OUTPATIENT
Start: 2022-06-06 | End: 2022-06-06

## 2022-06-06 RX ORDER — SODIUM CHLORIDE 9 MG/ML
INJECTION, SOLUTION INTRAVENOUS CONTINUOUS
Status: DISCONTINUED | OUTPATIENT
Start: 2022-06-06 | End: 2022-06-06 | Stop reason: HOSPADM

## 2022-06-06 RX ORDER — LIDOCAINE HCL/PF 100 MG/5ML
SYRINGE (ML) INTRAVENOUS
Status: DISCONTINUED | OUTPATIENT
Start: 2022-06-06 | End: 2022-06-06

## 2022-06-06 RX ORDER — PROPOFOL 10 MG/ML
VIAL (ML) INTRAVENOUS CONTINUOUS PRN
Status: DISCONTINUED | OUTPATIENT
Start: 2022-06-06 | End: 2022-06-06

## 2022-06-06 RX ADMIN — MIDAZOLAM 2 MG: 1 INJECTION INTRAMUSCULAR; INTRAVENOUS at 08:06

## 2022-06-06 RX ADMIN — SODIUM CHLORIDE: 0.9 INJECTION, SOLUTION INTRAVENOUS at 07:06

## 2022-06-06 RX ADMIN — Medication 100 MG: at 08:06

## 2022-06-06 RX ADMIN — Medication 25 MG: at 08:06

## 2022-06-06 RX ADMIN — PROPOFOL 150 MCG/KG/MIN: 10 INJECTION, EMULSION INTRAVENOUS at 08:06

## 2022-06-06 RX ADMIN — PROPOFOL 30 MG: 10 INJECTION, EMULSION INTRAVENOUS at 08:06

## 2022-06-06 RX ADMIN — GLYCOPYRROLATE 0.2 MG: 0.2 INJECTION INTRAMUSCULAR; INTRAVENOUS at 08:06

## 2022-06-06 NOTE — H&P
Short Stay Endoscopy   Pre-Procedure Note    PCP - Skye Espinoza MD  Referring Physician - Skye Espinoza MD  [address] [place] [address]    Procedure - Endoscopy    ASA - per anesthesia  Mallampati - per anesthesia    HPI  71 y.o. female scheduled for endoscopy for evaluation of    1. Decreased range of motion of shoulder, unspecified laterality    2. Screen for colon cancer         Medical History:  has a past medical history of Arthritis, Cataract, Glaucoma, History of iritis, Hypertension, Meningioma, and Uveitis.    Surgical History:  has a past surgical history that includes Brain tumor surgery; Craniotomy; Cataract extraction bilateral w/ anterior vitrectomy; YAG (Right, 1/20/2016); Cholecystectomy; Knee surgery (2-24-15); Knee surgery (Left, 02/09/2018); Knee surgery (Left, 02/24/2018); Wrist fracture surgery; Joint replacement; and Cataract extraction.    Family History: family history includes Cataracts in her maternal aunt, mother, and sister; Diabetes in her father, maternal aunt, mother, and sister; Glaucoma in her brother, maternal aunt, maternal uncle, and mother; Hypertension in her father and mother; No Known Problems in her maternal grandfather, maternal grandmother, paternal aunt, paternal grandfather, paternal grandmother, and paternal uncle; Stroke in her sister..    Social History:  reports that she quit smoking about 49 years ago. Her smoking use included cigarettes. She has a 0.30 pack-year smoking history. She has never used smokeless tobacco. She reports previous alcohol use of about 2.0 standard drinks of alcohol per week. She reports that she does not use drugs.    Review of patient's allergies indicates:   Allergen Reactions    Cortisone Hives and Swelling     Swelling of the tongue       Medications:   Medications Prior to Admission   Medication Sig Dispense Refill Last Dose    ASCORBATE CALCIUM (VITAMIN C ORAL) Take by mouth once daily.    Past  Month at Unknown time    hydroCHLOROthiazide (HYDRODIURIL) 50 MG tablet Take 1 tablet (50 mg total) by mouth once daily. 90 tablet 3 Past Week at Unknown time    losartan (COZAAR) 100 MG tablet Take 1 tablet (100 mg total) by mouth once daily. 90 tablet 3 6/6/2022 at Unknown time    polyethylene glycol (GOLYTELY) 236-22.74-6.74 -5.86 gram suspension Take 4,000 mLs by mouth once.   6/6/2022 at Unknown time    alcohol swabs (ALCOHOL WIPES) PadM Monitor X2 daily as directed. Per insurance coverage. 200 each 3     blood sugar diagnostic Strp Test blood sugar twice daily. Per insurance coverage. 200 each 3     blood-glucose meter kit Monitor X2 daily as directed. Per insurance coverage. 1 each 0     diclofenac sodium (VOLTAREN) 1 % Gel Apply 2 g topically once daily. 150 g 0 More than a month at Unknown time    diphth,pertus,acell,,tetanus (BOOSTRIX TDAP) 2.5-8-5 Lf-mcg-Lf/0.5mL Syrg injection Inject into the muscle. 0.5 mL 0     diphth,pertus,acell,,tetanus (BOOSTRIX TDAP) 2.5-8-5 Lf-mcg-Lf/0.5mL Syrg injection Inject into the muscle. 0.5 mL 0     lancets 28 gauge Misc Test blood sugar twice daily 200 each 3     miconazole nitrate 2% (MICOTIN) 2 % Oint Apply topically 2 (two) times daily.  0     semaglutide (OZEMPIC) 0.25 mg or 0.5 mg(2 mg/1.5 mL) pen injector Inject 0.25 mg into the skin every 7 days. 1 pen 5 More than a month at Unknown time         Labs:  Lab Results   Component Value Date    WBC 6.43 03/23/2022    HGB 13.5 03/23/2022    HCT 42.6 03/23/2022     03/23/2022    CHOL 140 03/23/2022    TRIG 77 03/23/2022    HDL 64 03/23/2022    ALT 10 03/23/2022    AST 14 03/23/2022     05/09/2022    K 4.3 05/09/2022     05/09/2022    CREATININE 0.9 05/09/2022    BUN 13 05/09/2022    CO2 27 05/09/2022    TSH 1.252 03/23/2022    INR 1.0 02/23/2018    HGBA1C 5.9 (H) 03/23/2022       Risks and benefits of this endoscopic procedure, including but not limited to bleeding, inflammation, infection,  perforation, and death, explained to the patient prior to procedure      Efren Kiran MD

## 2022-06-06 NOTE — TRANSFER OF CARE
"Anesthesia Transfer of Care Note    Patient: Katelyn Huynh    Procedure(s) Performed: Procedure(s) (LRB):  COLONOSCOPY (N/A)    Patient location: [facility]    Anesthesia Type: general    Transport from OR: Transported from OR on 6-10 L/min O2 by face mask with adequate spontaneous ventilation    Post pain: adequate analgesia    Post assessment: no apparent anesthetic complications and tolerated procedure well    Post vital signs: stable    Level of consciousness: responds to stimulation    Nausea/Vomiting: no nausea/vomiting    Complications: none    Transfer of care protocol was followed      Last vitals:   Visit Vitals  BP (!) 136/74 (Patient Position: Lying)   Pulse 97   Temp 36.9 °C (98.4 °F) (Temporal)   Resp 16   Ht 5' 4" (1.626 m)   Wt (!) 143.8 kg (317 lb)   LMP  (LMP Unknown)   SpO2 98%   Breastfeeding No   BMI 54.41 kg/m²     "

## 2022-06-06 NOTE — ANESTHESIA PREPROCEDURE EVALUATION
06/06/2022  Katelyn Huynh is a 71 y.o., female here for screening colonoscopy.    Pre-operative evaluation for Procedure(s) (LRB):  COLONOSCOPY (N/A)        Patient Active Problem List   Diagnosis    History of meningioma    Iritis - Both Eyes    Essential (primary) hypertension    Pseudophakia - Both Eyes    After-cataract, obscuring vision - Both Eyes    Ocular hypertension - Both Eyes    CME (cystoid macular edema) - Right Eye    Posterior tibial tendon dysfunction    Foot pain    Post-operative state - Both Eyes    Refractive error - Both Eyes    Steroid responders - Both Eyes    After-cataract, unspecified    Right knee DJD    Osteoarthritis of right knee    Knee joint replacement by other means    Left knee pain    Impaired gait and mobility    Bilateral posterior capsular opacification    Essential hypertension    PCO (posterior capsular opacification), left    Insufficiency of tear film of both eyes    Morbid obesity    Prediabetes    Varicose veins of both lower extremities    Edema    Primary osteoarthritis of left knee    Left wrist fracture s/p ORIF    S/P TKR (total knee replacement), left    Class 3 severe obesity due to excess calories with serious comorbidity and body mass index (BMI) of 50.0 to 59.9 in adult    Chronic idiopathic constipation    Tachycardia    Hyperkalemia    Wound dehiscence    Quadriceps tendon rupture, left, subsequent encounter    Yeast dermatitis    Dry eye syndrome, bilateral    Personal history of meningioma of the brain    Uses walker    10 year risk of MI or stroke 7.5% or greater    Decreased range of motion (ROM) of shoulder       Review of patient's allergies indicates:   Allergen Reactions    Cortisone Hives and Swelling     Swelling of the tongue       No current facility-administered medications on file  prior to encounter.     Current Outpatient Medications on File Prior to Encounter   Medication Sig Dispense Refill    ASCORBATE CALCIUM (VITAMIN C ORAL) Take by mouth once daily.       hydroCHLOROthiazide (HYDRODIURIL) 50 MG tablet Take 1 tablet (50 mg total) by mouth once daily. 90 tablet 3    miconazole nitrate 2% (MICOTIN) 2 % Oint Apply topically 2 (two) times daily.  0       Past Surgical History:   Procedure Laterality Date    Brain tumor surgery      had a brain tumor removed    CATARACT EXTRACTION      OU    CATARACT EXTRACTION BILATERAL W/ ANTERIOR VITRECTOMY      CHOLECYSTECTOMY      CRANIOTOMY      JOINT REPLACEMENT      KNEE SURGERY  2-24-15    right TKR    KNEE SURGERY Left 2018    TKR    KNEE SURGERY Left 2018    REVISION    WRIST FRACTURE SURGERY      YAG Right 2016    Dr. Kaiser       Social History     Socioeconomic History    Marital status:    Tobacco Use    Smoking status: Former Smoker     Packs/day: 0.10     Years: 3.00     Pack years: 0.30     Types: Cigarettes     Quit date: 1973     Years since quittin.1    Smokeless tobacco: Never Used   Substance and Sexual Activity    Alcohol use: Yes     Alcohol/week: 2.0 standard drinks     Types: 2 Glasses of wine per week     Comment: social- glass on wine on occasions    Drug use: No         CBC: No results for input(s): WBC, RBC, HGB, HCT, PLT, MCV, MCH, MCHC in the last 72 hours.    CMP: No results for input(s): NA, K, CL, CO2, BUN, CREATININE, GLU, MG, PHOS, CALCIUM, ALBUMIN, PROT, ALKPHOS, ALT, AST, BILITOT in the last 72 hours.    INR  No results for input(s): PT, INR, PROTIME, APTT in the last 72 hours.          2D Echo:  No results found for this or any previous visit.        Pre-op Assessment    I have reviewed the Patient Summary Reports.     I have reviewed the Nursing Notes.    I have reviewed the Medications.     Review of Systems  Anesthesia Hx:  No problems with previous  Anesthesia    Hematology/Oncology:  Hematology Normal   Oncology Normal     EENT/Dental:EENT/Dental Normal   Cardiovascular:   Hypertension    Pulmonary:  Pulmonary Normal    Renal/:  Renal/ Normal     Hepatic/GI:  Hepatic/GI Normal    Musculoskeletal:   Arthritis     Neurological:  Neurology Normal    Endocrine:  Endocrine Normal    Dermatological:  Skin Normal    Psych:  Psychiatric Normal           Physical Exam  General: Well nourished    Airway:  Mallampati: II   Mouth Opening: Normal  TM Distance: Normal  Tongue: Normal  Neck ROM: Normal ROM    Dental:  Intact    Chest/Lungs:  Clear to auscultation, Normal Respiratory Rate    Heart:  Rate: Normal  Rhythm: Regular Rhythm  Sounds: Normal        Anesthesia Plan  Type of Anesthesia, risks & benefits discussed:    Anesthesia Type: Gen Natural Airway  Intra-op Monitoring Plan: Standard ASA Monitors  Post Op Pain Control Plan: multimodal analgesia  Induction:  IV  Informed Consent: Informed consent signed with the Patient and all parties understand the risks and agree with anesthesia plan.  All questions answered.   ASA Score: 3    Ready For Surgery From Anesthesia Perspective.     .

## 2022-06-06 NOTE — PROVATION PATIENT INSTRUCTIONS
Discharge Summary/Instructions after an Endoscopic Procedure  Patient Name: Katelyn Huynh  Patient MRN: [id number]  Patient YOB: 1950 Monday, June 6, 2022  Efren Kiran MD  Dear patient,  As a result of recent federal legislation (The Federal Cures Act), you may   receive lab or pathology results from your procedure in your [facility]   account before your physician is able to contact you. Your physician or   their representative will relay the results to you with their   recommendations at their soonest availability.  Thank you,  RESTRICTIONS:  During your procedure today, you received medications for sedation.  These   medications may affect your judgment, balance and coordination.  Therefore,   for 24 hours, you have the following restrictions:   - DO NOT drive a car, operate machinery, make legal/financial decisions,   sign important papers or drink alcohol.    ACTIVITY:  Today: no heavy lifting, straining or running due to procedural   sedation/anesthesia.  The following day: return to full activity including work.  DIET:  Eat and drink normally unless instructed otherwise.     TREATMENT FOR COMMON SIDE EFFECTS:  - Mild abdominal pain, nausea, belching, bloating or excessive gas:  rest,   eat lightly and use a heating pad.  - Sore Throat: treat with throat lozenges and/or gargle with warm salt   water.  - Because air was used during the procedure, expelling large amounts of air   from your rectum or belching is normal.  - If a bowel prep was taken, you may not have a bowel movement for 1-3 days.    This is normal.  SYMPTOMS TO WATCH FOR AND REPORT TO YOUR PHYSICIAN:  1. Abdominal pain or bloating, other than gas cramps.  2. Chest pain.  3. Back pain.  4. Signs of infection such as: chills or fever occurring within 24 hours   after the procedure.  5. Rectal bleeding, which would show as bright red, maroon, or black stools.   (A tablespoon of blood from the rectum is not serious, especially if    hemorrhoids are present.)  6. Vomiting.  7. Weakness or dizziness.  GO DIRECTLY TO THE NEAREST EMERGENCY ROOM IF YOU HAVE ANY OF THE FOLLOWING:      Difficulty breathing              Chills and/or fever over 101 F   Persistent vomiting and/or vomiting blood   Severe abdominal pain   Severe chest pain   Black, tarry stools   Bleeding- more than one tablespoon   Any other symptom or condition that you feel may need urgent attention  Your doctor recommends these additional instructions:  If any biopsies were taken, your doctors clinic will contact you in 1 to 2   weeks with any results.  - Patient has a contact number available for emergencies.  The signs and   symptoms of potential delayed complications were discussed with the   patient.  Return to normal activities tomorrow.  Written discharge   instructions were provided to the patient.   - Resume previous diet.   - Continue present medications.   - Repeat colonoscopy in 10 years for screening purposes.   - Discharge patient to home.  For questions, problems or results please call your physician - Efren Kiran MD at Work:  [phone].  [facility], EMERGENCY ROOM PHONE NUMBER: [phone]  IF A COMPLICATION OR EMERGENCY SITUATION ARISES AND YOU ARE UNABLE TO REACH   YOUR PHYSICIAN - GO DIRECTLY TO THE EMERGENCY ROOM.  Efren Kiran MD  6/6/2022 8:24:26 AM  This report has been verified and signed electronically.  Dear patient,  As a result of recent federal legislation (The Federal Cures Act), you may   receive lab or pathology results from your procedure in your [facility]   account before your physician is able to contact you. Your physician or   their representative will relay the results to you with their   recommendations at their soonest availability.  Thank you,  PROVATION

## 2022-06-07 NOTE — ANESTHESIA POSTPROCEDURE EVALUATION
Anesthesia Post Evaluation    Patient: Katelyn Huynh    Procedure(s) Performed: Procedure(s) (LRB):  COLONOSCOPY (N/A)    Final Anesthesia Type: general      Patient location during evaluation: PACU  Patient participation: Yes- Able to Participate  Level of consciousness: awake and alert  Post-procedure vital signs: reviewed and stable  Pain management: adequate  Airway patency: patent    PONV status at discharge: No PONV  Anesthetic complications: no      Cardiovascular status: blood pressure returned to baseline  Respiratory status: unassisted  Hydration status: euvolemic  Follow-up not needed.          Vitals Value Taken Time   /85 06/06/22 0902   Temp 36.7 °C (98 °F) 06/06/22 0900   Pulse 78 06/06/22 0905   Resp 18 06/06/22 0900   SpO2 93 % 06/06/22 0905   Vitals shown include unvalidated device data.      No case tracking events are documented in the log.      Pain/Cheli Score: Cheli Score: 10 (6/6/2022  9:00 AM)

## 2022-06-21 ENCOUNTER — IMMUNIZATION (OUTPATIENT)
Dept: PHARMACY | Facility: CLINIC | Age: 72
End: 2022-06-21
Payer: COMMERCIAL

## 2022-06-21 DIAGNOSIS — Z23 NEED FOR VACCINATION: Primary | ICD-10-CM

## 2022-07-25 ENCOUNTER — HOSPITAL ENCOUNTER (OUTPATIENT)
Dept: RADIOLOGY | Facility: HOSPITAL | Age: 72
Discharge: HOME | End: 2022-07-25
Attending: FAMILY MEDICINE
Payer: COMMERCIAL

## 2022-07-25 VITALS — WEIGHT: 293 LBS | BODY MASS INDEX: 50.02 KG/M2 | HEIGHT: 64 IN

## 2022-07-25 DIAGNOSIS — Z12.31 SCREENING MAMMOGRAM FOR BREAST CANCER: ICD-10-CM

## 2022-07-25 PROCEDURE — 77063 BREAST TOMOSYNTHESIS BI: CPT | Mod: 26,,, | Performed by: RADIOLOGY

## 2022-07-25 PROCEDURE — 77067 SCR MAMMO BI INCL CAD: CPT | Mod: 26,,, | Performed by: RADIOLOGY

## 2022-07-25 PROCEDURE — 77067 SCR MAMMO BI INCL CAD: CPT | Mod: TC

## 2022-07-25 PROCEDURE — 77067 MAMMO DIGITAL SCREENING BILAT WITH TOMO: ICD-10-PCS | Mod: 26,,, | Performed by: RADIOLOGY

## 2022-07-25 PROCEDURE — 77063 MAMMO DIGITAL SCREENING BILAT WITH TOMO: ICD-10-PCS | Mod: 26,,, | Performed by: RADIOLOGY

## 2022-09-04 ENCOUNTER — OFFICE VISIT (OUTPATIENT)
Dept: URGENT CARE | Facility: CLINIC | Age: 72
End: 2022-09-04
Payer: COMMERCIAL

## 2022-09-04 VITALS
OXYGEN SATURATION: 99 % | HEIGHT: 64 IN | RESPIRATION RATE: 16 BRPM | SYSTOLIC BLOOD PRESSURE: 148 MMHG | TEMPERATURE: 98 F | DIASTOLIC BLOOD PRESSURE: 75 MMHG | BODY MASS INDEX: 50.02 KG/M2 | WEIGHT: 293 LBS | HEART RATE: 86 BPM

## 2022-09-04 DIAGNOSIS — L01.00 IMPETIGO: Primary | ICD-10-CM

## 2022-09-04 PROCEDURE — 3077F PR MOST RECENT SYSTOLIC BLOOD PRESSURE >= 140 MM HG: ICD-10-PCS | Mod: CPTII,S$GLB,, | Performed by: PHYSICIAN ASSISTANT

## 2022-09-04 PROCEDURE — 3078F PR MOST RECENT DIASTOLIC BLOOD PRESSURE < 80 MM HG: ICD-10-PCS | Mod: CPTII,S$GLB,, | Performed by: PHYSICIAN ASSISTANT

## 2022-09-04 PROCEDURE — 3078F DIAST BP <80 MM HG: CPT | Mod: CPTII,S$GLB,, | Performed by: PHYSICIAN ASSISTANT

## 2022-09-04 PROCEDURE — 1159F MED LIST DOCD IN RCRD: CPT | Mod: CPTII,S$GLB,, | Performed by: PHYSICIAN ASSISTANT

## 2022-09-04 PROCEDURE — 4010F ACE/ARB THERAPY RXD/TAKEN: CPT | Mod: CPTII,S$GLB,, | Performed by: PHYSICIAN ASSISTANT

## 2022-09-04 PROCEDURE — 3044F HG A1C LEVEL LT 7.0%: CPT | Mod: CPTII,S$GLB,, | Performed by: PHYSICIAN ASSISTANT

## 2022-09-04 PROCEDURE — 1160F PR REVIEW ALL MEDS BY PRESCRIBER/CLIN PHARMACIST DOCUMENTED: ICD-10-PCS | Mod: CPTII,S$GLB,, | Performed by: PHYSICIAN ASSISTANT

## 2022-09-04 PROCEDURE — 1126F AMNT PAIN NOTED NONE PRSNT: CPT | Mod: CPTII,S$GLB,, | Performed by: PHYSICIAN ASSISTANT

## 2022-09-04 PROCEDURE — 1126F PR PAIN SEVERITY QUANTIFIED, NO PAIN PRESENT: ICD-10-PCS | Mod: CPTII,S$GLB,, | Performed by: PHYSICIAN ASSISTANT

## 2022-09-04 PROCEDURE — 99213 PR OFFICE/OUTPT VISIT, EST, LEVL III, 20-29 MIN: ICD-10-PCS | Mod: S$GLB,,, | Performed by: PHYSICIAN ASSISTANT

## 2022-09-04 PROCEDURE — 4010F PR ACE/ARB THEARPY RXD/TAKEN: ICD-10-PCS | Mod: CPTII,S$GLB,, | Performed by: PHYSICIAN ASSISTANT

## 2022-09-04 PROCEDURE — 3008F PR BODY MASS INDEX (BMI) DOCUMENTED: ICD-10-PCS | Mod: CPTII,S$GLB,, | Performed by: PHYSICIAN ASSISTANT

## 2022-09-04 PROCEDURE — 3044F PR MOST RECENT HEMOGLOBIN A1C LEVEL <7.0%: ICD-10-PCS | Mod: CPTII,S$GLB,, | Performed by: PHYSICIAN ASSISTANT

## 2022-09-04 PROCEDURE — 1159F PR MEDICATION LIST DOCUMENTED IN MEDICAL RECORD: ICD-10-PCS | Mod: CPTII,S$GLB,, | Performed by: PHYSICIAN ASSISTANT

## 2022-09-04 PROCEDURE — 3061F PR NEG MICROALBUMINURIA RESULT DOCUMENTED/REVIEW: ICD-10-PCS | Mod: CPTII,S$GLB,, | Performed by: PHYSICIAN ASSISTANT

## 2022-09-04 PROCEDURE — 3008F BODY MASS INDEX DOCD: CPT | Mod: CPTII,S$GLB,, | Performed by: PHYSICIAN ASSISTANT

## 2022-09-04 PROCEDURE — 3066F NEPHROPATHY DOC TX: CPT | Mod: CPTII,S$GLB,, | Performed by: PHYSICIAN ASSISTANT

## 2022-09-04 PROCEDURE — 3066F PR DOCUMENTATION OF TREATMENT FOR NEPHROPATHY: ICD-10-PCS | Mod: CPTII,S$GLB,, | Performed by: PHYSICIAN ASSISTANT

## 2022-09-04 PROCEDURE — 1160F RVW MEDS BY RX/DR IN RCRD: CPT | Mod: CPTII,S$GLB,, | Performed by: PHYSICIAN ASSISTANT

## 2022-09-04 PROCEDURE — 99213 OFFICE O/P EST LOW 20 MIN: CPT | Mod: S$GLB,,, | Performed by: PHYSICIAN ASSISTANT

## 2022-09-04 PROCEDURE — 3061F NEG MICROALBUMINURIA REV: CPT | Mod: CPTII,S$GLB,, | Performed by: PHYSICIAN ASSISTANT

## 2022-09-04 PROCEDURE — 3077F SYST BP >= 140 MM HG: CPT | Mod: CPTII,S$GLB,, | Performed by: PHYSICIAN ASSISTANT

## 2022-09-04 RX ORDER — MUPIROCIN 20 MG/G
OINTMENT TOPICAL 2 TIMES DAILY
Qty: 15 G | Refills: 0 | Status: SHIPPED | OUTPATIENT
Start: 2022-09-04 | End: 2024-01-03

## 2022-09-04 RX ORDER — SULFAMETHOXAZOLE AND TRIMETHOPRIM 800; 160 MG/1; MG/1
1 TABLET ORAL 2 TIMES DAILY
Qty: 14 TABLET | Refills: 0 | Status: SHIPPED | OUTPATIENT
Start: 2022-09-04 | End: 2022-09-11

## 2022-09-04 NOTE — PATIENT INSTRUCTIONS
PLEASE READ YOUR DISCHARGE INSTRUCTIONS ENTIRELY AS IT CONTAINS IMPORTANT INFORMATION.  - Rest.    - Drink plenty of fluids.    - Tylenol or Ibuprofen as directed as needed for fever/pain.    - If you were prescribed antibiotics, please take them to completion.  - If you are female and on birth control pills - please use additional methods of contraception to prevent pregnancy while on antibiotics and for one cycle after.   - If you were prescribed a narcotic medication, a cough syrup, or a muscle relaxer, do not drive or operate heavy equipment or machinery while taking these medications, as they can cause drowsiness.   - If a referral to a specialty was made today, you should receive a phone call in the next few days to schedule an appt.  Please call [phone] to schedule an appt if have not gotten a phone call in the next few days.  - If you smoke, please stop smoking.  -You must understand that you've received an Urgent Care treatment only and that you may be released before all your medical problems are known or treated. You, the patient, will arrange for follow up care as instructed. Please arrange follow up with your primary medical clinic as soon as possible.   - Follow up with your PCP or specialty clinic as directed in the next 1-2 weeks if not improved or as needed.  You can call [phone] to schedule an appointment with the appropriate provider.    - Please return to Urgent Care or to the Emergency Department if your symptoms worsen.    Patient aware and verbalized understanding.

## 2022-09-04 NOTE — PROGRESS NOTES
"Subjective:       Patient ID: Katelyn Huynh is a 72 y.o. female.    Vitals:  height is 5' 4" (1.626 m) and weight is 142.9 kg (315 lb) (abnormal). Her temperature is 97.7 °F (36.5 °C). Her blood pressure is 148/75 (abnormal) and her pulse is 86. Her respiration is 16 and oxygen saturation is 99%.     Chief Complaint: Rash    Ms. Huynh presents for evaluation of rash to [facility] x 1 week.  She reports the rash is itchy.  The rash crusts with yellow crusts & drains clear fluid.  It is on both shins & thighs.  It seems to be spreading.  She denies any pain, fevers, chills.  She has tried an antibiotic cream with no relief in her symptoms.     Rash  This is a new problem. The current episode started 1 to 4 weeks ago (1.5w). The affected locations include the left upper leg, left lower leg, right upper leg and right lower leg. The rash is characterized by draining, dryness, redness and itchiness. She was exposed to nothing. Pertinent negatives include no anorexia, congestion, cough, diarrhea, eye pain, facial edema, fatigue, fever, joint pain, nail changes, rhinorrhea, shortness of breath, sore throat or vomiting. Past treatments include antibiotic cream. The treatment provided no relief.     Constitution: Negative for appetite change, chills, sweating, fatigue and fever.   HENT:  Negative for ear pain, ear discharge, hearing loss, drooling, congestion, postnasal drip, sinus pain, sinus pressure and sore throat.    Neck: Negative for neck stiffness and painful lymph nodes.   Cardiovascular:  Negative for chest trauma, chest pain, leg swelling, palpitations, sob on exertion and passing out.   Eyes:  Negative for eye pain and blurred vision.   Respiratory:  Negative for chest tightness, cough, sputum production, shortness of breath and wheezing.    Gastrointestinal:  Negative for abdominal pain, nausea, vomiting and diarrhea.   Genitourinary:  Negative for dysuria, frequency and urgency.   Musculoskeletal:  " Negative for joint pain, joint swelling, muscle cramps and muscle ache.   Skin:  Positive for rash and erythema.   Allergic/Immunologic: Negative for itching and sneezing.   Neurological:  Negative for dizziness, history of vertigo, light-headedness, passing out, facial drooping, speech difficulty, coordination disturbances, loss of balance, headaches and altered mental status.   Hematologic/Lymphatic: Negative for swollen lymph nodes and easy bruising/bleeding. Does not bruise/bleed easily.   Psychiatric/Behavioral:  Negative for altered mental status.      Objective:      Physical Exam   Constitutional: She is oriented to person, place, and time. She appears well-developed.   HENT:   Head: Normocephalic and atraumatic. Head is without abrasion, without contusion and without laceration.   Ears:   Right Ear: External ear normal.   Left Ear: External ear normal.   Nose: Nose normal.   Mouth/Throat: Oropharynx is clear and moist and mucous membranes are normal.   Eyes: Conjunctivae, EOM and lids are normal. Pupils are equal, round, and reactive to light.   Neck: Trachea normal and phonation normal. Neck supple.   Cardiovascular: Normal rate, regular rhythm and normal heart sounds.   Pulmonary/Chest: Effort normal and breath sounds normal. No stridor. No respiratory distress.   Musculoskeletal: Normal range of motion.         General: Normal range of motion.   Neurological: She is alert and oriented to person, place, and time.   Skin: Skin is warm, dry, intact and no rash. Capillary refill takes less than 2 seconds. erythema No abrasion, No burn, No bruising and No ecchymosis        Psychiatric: Her speech is normal and behavior is normal. Judgment and thought content normal.   Nursing note and vitals reviewed.      Assessment:       1. Impetigo          Plan:         Impetigo    Other orders  -     mupirocin (BACTROBAN) 2 % ointment; Apply topically 2 (two) times daily.  Dispense: 15 g; Refill: 0  -      sulfamethoxazole-trimethoprim 800-160mg (BACTRIM DS) 800-160 mg Tab; Take 1 tablet by mouth 2 (two) times daily. for 7 days  Dispense: 14 tablet; Refill: 0    Diagnoses and plan discussed with the patient, as well as the expected course and duration of her symptoms. All questions and concerns were addressed prior to discharge.  She was advised to follow up with her PCP within 1 week if symptoms do not improve. Emergency department precautions were given. Patient verbalized understanding and was happy with the plan of care.   Note dictated with voice recognition software, please excuse any grammatical errors.    Patient Instructions   PLEASE READ YOUR DISCHARGE INSTRUCTIONS ENTIRELY AS IT CONTAINS IMPORTANT INFORMATION.  - Rest.    - Drink plenty of fluids.    - Tylenol or Ibuprofen as directed as needed for fever/pain.    - If you were prescribed antibiotics, please take them to completion.  - If you are female and on birth control pills - please use additional methods of contraception to prevent pregnancy while on antibiotics and for one cycle after.   - If you were prescribed a narcotic medication, a cough syrup, or a muscle relaxer, do not drive or operate heavy equipment or machinery while taking these medications, as they can cause drowsiness.   - If a referral to a specialty was made today, you should receive a phone call in the next few days to schedule an appt.  Please call [phone] to schedule an appt if have not gotten a phone call in the next few days.  - If you smoke, please stop smoking.  -You must understand that you've received an Urgent Care treatment only and that you may be released before all your medical problems are known or treated. You, the patient, will arrange for follow up care as instructed. Please arrange follow up with your primary medical clinic as soon as possible.   - Follow up with your PCP or specialty clinic as directed in the next 1-2 weeks if not improved or as needed.  You  can call [phone] to schedule an appointment with the appropriate provider.    - Please return to Urgent Care or to the Emergency Department if your symptoms worsen.    Patient aware and verbalized understanding.

## 2022-09-08 ENCOUNTER — TELEPHONE (OUTPATIENT)
Dept: PRIMARY CARE CLINIC | Facility: CLINIC | Age: 72
End: 2022-09-08
Payer: COMMERCIAL

## 2022-09-08 NOTE — TELEPHONE ENCOUNTER
----- Message from Nani Last sent at 9/8/2022  1:21 PM CDT -----  Contact: [phone] Patient  Pt was seen in the  on Sunday and was dx with impetigo. Pt would like to know if the doctor can prescribe some cream please. Pt has appt to [facility] care w/ Dr Khalil on 10/6.     [id number] - [address] AT [place] [id number] [facility] [address]  Phone: [phone] Fax: [phone]    Please call and advise

## 2022-09-11 ENCOUNTER — HOSPITAL ENCOUNTER (EMERGENCY)
Facility: HOSPITAL | Age: 72
Discharge: HOME | End: 2022-09-11
Attending: EMERGENCY MEDICINE
Payer: COMMERCIAL

## 2022-09-11 VITALS
RESPIRATION RATE: 20 BRPM | WEIGHT: 293 LBS | BODY MASS INDEX: 48.82 KG/M2 | TEMPERATURE: 98 F | HEIGHT: 65 IN | HEART RATE: 84 BPM | SYSTOLIC BLOOD PRESSURE: 128 MMHG | DIASTOLIC BLOOD PRESSURE: 78 MMHG | OXYGEN SATURATION: 99 %

## 2022-09-11 DIAGNOSIS — R21 RASH AND NONSPECIFIC SKIN ERUPTION: ICD-10-CM

## 2022-09-11 DIAGNOSIS — L30.0 NUMMULAR ECZEMATOUS DERMATITIS: Primary | ICD-10-CM

## 2022-09-11 LAB
ALBUMIN SERPL BCP-MCNC: 3.6 G/DL (ref 3.5–5.2)
ALP SERPL-CCNC: 95 U/L (ref 55–135)
ALT SERPL W/O P-5'-P-CCNC: 15 U/L (ref 10–44)
ANION GAP SERPL CALC-SCNC: 12 MMOL/L (ref 8–16)
AST SERPL-CCNC: 23 U/L (ref 10–40)
BASOPHILS # BLD AUTO: 0.09 K/UL (ref 0–0.2)
BASOPHILS NFR BLD: 1.8 % (ref 0–1.9)
BILIRUB SERPL-MCNC: 0.6 MG/DL (ref 0.1–1)
BUN SERPL-MCNC: 11 MG/DL (ref 8–23)
CALCIUM SERPL-MCNC: 9.2 MG/DL (ref 8.7–10.5)
CHLORIDE SERPL-SCNC: 105 MMOL/L (ref 95–110)
CO2 SERPL-SCNC: 22 MMOL/L (ref 23–29)
CREAT SERPL-MCNC: 1 MG/DL (ref 0.5–1.4)
CRP SERPL-MCNC: 6.1 MG/L (ref 0–8.2)
DIFFERENTIAL METHOD: NORMAL
EOSINOPHIL # BLD AUTO: 0.3 K/UL (ref 0–0.5)
EOSINOPHIL NFR BLD: 5.7 % (ref 0–8)
ERYTHROCYTE [DISTWIDTH] IN BLOOD BY AUTOMATED COUNT: 13.6 % (ref 11.5–14.5)
ERYTHROCYTE [SEDIMENTATION RATE] IN BLOOD BY PHOTOMETRIC METHOD: 93 MM/HR (ref 0–36)
EST. GFR  (NO RACE VARIABLE): >60 ML/MIN/1.73 M^2
GLUCOSE SERPL-MCNC: 104 MG/DL (ref 70–110)
HCT VFR BLD AUTO: 43.9 % (ref 37–48.5)
HCV AB SERPL QL IA: NORMAL
HGB BLD-MCNC: 14.4 G/DL (ref 12–16)
HIV 1+2 AB+HIV1 P24 AG SERPL QL IA: NORMAL
IMM GRANULOCYTES # BLD AUTO: 0.01 K/UL (ref 0–0.04)
IMM GRANULOCYTES NFR BLD AUTO: 0.2 % (ref 0–0.5)
LYMPHOCYTES # BLD AUTO: 1.1 K/UL (ref 1–4.8)
LYMPHOCYTES NFR BLD: 21.3 % (ref 18–48)
MCH RBC QN AUTO: 29.6 PG (ref 27–31)
MCHC RBC AUTO-ENTMCNC: 32.8 G/DL (ref 32–36)
MCV RBC AUTO: 90 FL (ref 82–98)
MONOCYTES # BLD AUTO: 0.6 K/UL (ref 0.3–1)
MONOCYTES NFR BLD: 12.2 % (ref 4–15)
NEUTROPHILS # BLD AUTO: 2.9 K/UL (ref 1.8–7.7)
NEUTROPHILS NFR BLD: 58.8 % (ref 38–73)
NRBC BLD-RTO: 0 /100 WBC
PLATELET # BLD AUTO: 251 K/UL (ref 150–450)
PMV BLD AUTO: 9.7 FL (ref 9.2–12.9)
POTASSIUM SERPL-SCNC: 4.5 MMOL/L (ref 3.5–5.1)
PROT SERPL-MCNC: 8.5 G/DL (ref 6–8.4)
RBC # BLD AUTO: 4.86 M/UL (ref 4–5.4)
SODIUM SERPL-SCNC: 139 MMOL/L (ref 136–145)
WBC # BLD AUTO: 4.93 K/UL (ref 3.9–12.7)

## 2022-09-11 PROCEDURE — 86803 HEPATITIS C AB TEST: CPT | Performed by: PHYSICIAN ASSISTANT

## 2022-09-11 PROCEDURE — 25000003 PHARM REV CODE 250: Performed by: EMERGENCY MEDICINE

## 2022-09-11 PROCEDURE — 85652 RBC SED RATE AUTOMATED: CPT | Performed by: EMERGENCY MEDICINE

## 2022-09-11 PROCEDURE — 86140 C-REACTIVE PROTEIN: CPT | Performed by: EMERGENCY MEDICINE

## 2022-09-11 PROCEDURE — 85025 COMPLETE CBC W/AUTO DIFF WBC: CPT | Performed by: EMERGENCY MEDICINE

## 2022-09-11 PROCEDURE — 87389 HIV-1 AG W/HIV-1&-2 AB AG IA: CPT | Performed by: PHYSICIAN ASSISTANT

## 2022-09-11 PROCEDURE — 99283 EMERGENCY DEPT VISIT LOW MDM: CPT

## 2022-09-11 PROCEDURE — 99284 PR EMERGENCY DEPT VISIT,LEVEL IV: ICD-10-PCS | Mod: ,,, | Performed by: EMERGENCY MEDICINE

## 2022-09-11 PROCEDURE — 80053 COMPREHEN METABOLIC PANEL: CPT | Performed by: EMERGENCY MEDICINE

## 2022-09-11 PROCEDURE — 99284 EMERGENCY DEPT VISIT MOD MDM: CPT | Mod: ,,, | Performed by: EMERGENCY MEDICINE

## 2022-09-11 RX ORDER — CETIRIZINE HYDROCHLORIDE 5 MG/1
10 TABLET ORAL
Status: COMPLETED | OUTPATIENT
Start: 2022-09-11 | End: 2022-09-11

## 2022-09-11 RX ORDER — CETIRIZINE HYDROCHLORIDE 10 MG/1
10 TABLET ORAL DAILY
Qty: 30 TABLET | Refills: 0 | Status: SHIPPED | OUTPATIENT
Start: 2022-09-11 | End: 2024-03-15

## 2022-09-11 RX ADMIN — CETIRIZINE HYDROCHLORIDE 10 MG: 5 TABLET, FILM COATED ORAL at 11:09

## 2022-09-11 NOTE — DISCHARGE INSTRUCTIONS
I recommend that you use thick lotions and creams like Aquafor or Eucerin and keep your skin very hydrated. You can also use Sarna brand-  Sensitive Steroid-Free Anti-Itch Lotion Unscented. This is available over the counter.  I have placed an urgent referral to dermatology. Please call and schedule the first available appointment and inquire about other locations if you have the transportation.   Take Zyrtec once daily to help with itching. You can also try benadryl lotion.

## 2022-09-11 NOTE — ED PROVIDER NOTES
Encounter Date: 9/11/2022    SCRIBE #1 NOTE: I, Rea Ford, am scribing for, and in the presence of,  Yael Shah MD. I have scribed the following portions of the note - Other sections scribed: HPI,ROS, PE.     History     Chief Complaint   Patient presents with    Rash     Diagnosed with Impetigo last week at .      Time patient was seen by the provider: 9:42 AM      The patient is a 72 y.o. female with a past medical history of Hypertension and Meningioma  who presents to the ED with a complaint of rash. The patient states that she has been experiencing a rash for the past 1 week, which has been progressively worsening. The patient describes her pain as  an itching and burning sensation. She is having trouble sleeping due to the itching.  She was seen 9/4/22 at  and diagnosed with impetigo and started on Bactrim and Mupirocin. She reports at that time the rash was only to her R lower leg and now it is on her thigh, face, left chest, and right lower extremity. She thas been taking the meds prescribed to her as well as ice and heat before arrival with no improvement. No other exacerbating or alleviating factors. Patient denies a change in medication, change in hygiene products, or other associated symptoms. Patient is vaccinated against shingles.  She denies new lotions, soaps, detergents, hotel stays, pets or animal exposure  She states she has never had this before and denies history of eczema  She has an allergy to cortizone which she reports as throat and tongue swelling    The history is provided by the patient and medical records. No  was used.   Review of patient's allergies indicates:   Allergen Reactions    Cortisone Hives and Swelling     Swelling of the tongue     Past Medical History:   Diagnosis Date    Arthritis     Cataract     Glaucoma     History of iritis     OD    Hypertension     Meningioma     Uveitis      Past Surgical History:   Procedure Laterality Date     Brain tumor surgery      had a brain tumor removed    CATARACT EXTRACTION      OU    CATARACT EXTRACTION BILATERAL W/ ANTERIOR VITRECTOMY      CHOLECYSTECTOMY      COLONOSCOPY N/A 2022    Procedure: COLONOSCOPY;  Surgeon: Efren Kiran MD;  Location: [facility] ([address] [place] [address]);  Service: Endoscopy;  Laterality: N/A;  BMI-56  Wt: 329#       vaccinated-GT    CRANIOTOMY      JOINT REPLACEMENT      KNEE SURGERY  2-24-15    right TKR    KNEE SURGERY Left 2018    TKR    KNEE SURGERY Left 2018    REVISION    WRIST FRACTURE SURGERY      YAG Right 2016    Dr. Kaiser     Family History   Problem Relation Age of Onset    Glaucoma Mother     Hypertension Mother     Diabetes Mother     Cataracts Mother     Hypertension Father     Diabetes Father     Diabetes Sister     Cataracts Sister     Stroke Sister         minor stroke    Glaucoma Brother     Glaucoma Maternal Aunt     Diabetes Maternal Aunt     Cataracts Maternal Aunt     Glaucoma Maternal Uncle     No Known Problems Paternal Aunt     No Known Problems Paternal Uncle     No Known Problems Maternal Grandmother     No Known Problems Maternal Grandfather     No Known Problems Paternal Grandmother     No Known Problems Paternal Grandfather     Amblyopia Neg Hx     Blindness Neg Hx     Macular degeneration Neg Hx     Retinal detachment Neg Hx     Strabismus Neg Hx      Social History     Tobacco Use    Smoking status: Former     Packs/day: 0.10     Years: 3.00     Pack years: 0.30     Types: Cigarettes     Quit date: 1973     Years since quittin.4    Smokeless tobacco: Never   Substance Use Topics    Alcohol use: Not Currently     Alcohol/week: 2.0 standard drinks     Types: 2 Glasses of wine per week     Comment: social- glass on wine on occasions    Drug use: No     Review of Systems   Constitutional:  Negative for fever.   HENT:  Negative for sore throat.    Respiratory:  Negative for shortness of breath.    Cardiovascular:  Negative for  chest pain.   Gastrointestinal:  Negative for nausea.   Genitourinary:  Negative for dysuria.   Musculoskeletal:  Negative for back pain.   Skin:  Positive for rash.   Neurological:  Negative for weakness.   Hematological:  Does not bruise/bleed easily.     Physical Exam     Initial Vitals [09/11/22 0907]   BP Pulse Resp Temp SpO2   (!) 143/61 101 16 98.2 °F (36.8 °C) 99 %      MAP       --         Physical Exam    Nursing note and vitals reviewed.  Constitutional: Vital signs are normal. She appears well-developed and well-nourished. She is not diaphoretic.  Non-toxic appearance. She does not appear ill. No distress.   HENT:   Head: Normocephalic and atraumatic.   Mouth/Throat: Mucous membranes are normal. Mucous membranes are not dry.   Eyes: EOM and lids are normal. Pupils are equal, round, and reactive to light.   Neck: Neck supple.   Normal range of motion.  Cardiovascular:  Normal rate and regular rhythm.           Pulmonary/Chest: No respiratory distress.   Abdominal: She exhibits no distension.   Musculoskeletal:         General: Normal range of motion.      Cervical back: Normal range of motion and neck supple.     Neurological: She is alert and oriented to person, place, and time. GCS score is 15. GCS eye subscore is 4. GCS verbal subscore is 5. GCS motor subscore is 6.   Skin: Skin is dry and intact. Lesion and rash noted. Rash is pustular. No pallor.   Nodular rash to right side of face, vesicular and maculopapular rash to left breast.  Circular lesion with clustered vesicle to right thigh region, redness and excoriation to skin of right lower leg   Psychiatric: She has a normal mood and affect. Her speech is normal and behavior is normal. Judgment and thought content normal.                         ED Course   Procedures  Labs Reviewed   COMPREHENSIVE METABOLIC PANEL - Abnormal; Notable for the following components:       Result Value    CO2 22 (*)     Total Protein 8.5 (*)     All other components  within normal limits   SEDIMENTATION RATE - Abnormal; Notable for the following components:    Sed Rate 93 (*)     All other components within normal limits   CBC W/ AUTO DIFFERENTIAL   C-REACTIVE PROTEIN   HIV 1 / 2 ANTIBODY    Narrative:     Release to patient->Immediate   HEPATITIS C ANTIBODY    Narrative:     Release to patient->Immediate          Imaging Results    None          Medications   cetirizine tablet 10 mg (10 mg Oral Given 9/11/22 1120)     Medical Decision Making:   History:   Old Medical Records: I decided to obtain old medical records.  Initial Assessment:   The patient is a 72 y.o. female with a past medical history of Hypertension and Meningioma presents to the ED with a complaint of rash. Onset 1 week.  Differential Diagnosis:   Cellulitis, vasculitis, eczema, ring worm  Clinical Tests:   Lab Tests: Ordered and Reviewed  ED Management:  Rash appears different in different locations on her body  Possibly nummular eczema to her leg and face  It does not look like disseminated shingles  Given anaphylaxis to cortizone I am limited on what I can treat her with  Will recommend zyrtec, sarna lotion, thick lotion  Close follow up with dermatology        Scribe Attestation:   Scribe #1: I performed the above scribed service and the documentation accurately describes the services I performed. I attest to the accuracy of the note.               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 Provider    cetirizine (ZYRTEC) 10 MG tablet Take 1 tablet (10 mg total) by mouth once daily. 30 tablet 9/11/2022 9/11/2023 Yael Shah MD          Follow-up Information       Follow up With Specialties Details Why Contact Info Additional Information    [facility] - Emergency Dept Emergency  Medicine  As needed, If symptoms worsen [address] [facility] [id number] [phone]     [facility] - Dermatology 11th Fl Dermatology   [address] [id number] [phone] Dermatology - Main Building, Clinic 11th Floor Please park in [facility]. Use Clinic elevators 12 & 13 to get to the 11th floor             Yael Shah MD  09/12/22 8899

## 2022-09-11 NOTE — ED TRIAGE NOTES
Pt states she was dx with impetigo on last Sunday and was put on an antibiotic. Pt states she does not see any improvement in her skin and has itching and burning in her legs, cheeks, and left breast.

## 2022-09-12 ENCOUNTER — TELEPHONE (OUTPATIENT)
Dept: PRIMARY CARE CLINIC | Facility: CLINIC | Age: 72
End: 2022-09-12
Payer: COMMERCIAL

## 2022-09-12 NOTE — TELEPHONE ENCOUNTER
----- Message from Carrie Chau sent at 9/12/2022  3:21 PM CDT -----  Contact: Self [phone]  Patient is returning a phone call.    Who left a message for the patient: nurse    Does patient know what this is regarding:  about a cream    Would you like a call back, or a response through your [facility] portal?:   call back    Comments:

## 2022-09-15 ENCOUNTER — LAB VISIT (OUTPATIENT)
Dept: LAB | Facility: HOSPITAL | Age: 72
End: 2022-09-15
Attending: STUDENT IN AN ORGANIZED HEALTH CARE EDUCATION/TRAINING PROGRAM
Payer: COMMERCIAL

## 2022-09-15 ENCOUNTER — OFFICE VISIT (OUTPATIENT)
Dept: DERMATOLOGY | Facility: CLINIC | Age: 72
End: 2022-09-15
Payer: COMMERCIAL

## 2022-09-15 DIAGNOSIS — R21 RASH AND NONSPECIFIC SKIN ERUPTION: Primary | ICD-10-CM

## 2022-09-15 DIAGNOSIS — R21 RASH AND NONSPECIFIC SKIN ERUPTION: ICD-10-CM

## 2022-09-15 LAB — RHEUMATOID FACT SERPL-ACNC: <13 IU/ML (ref 0–15)

## 2022-09-15 PROCEDURE — 3061F NEG MICROALBUMINURIA REV: CPT | Mod: CPTII,S$GLB,, | Performed by: STUDENT IN AN ORGANIZED HEALTH CARE EDUCATION/TRAINING PROGRAM

## 2022-09-15 PROCEDURE — 1160F PR REVIEW ALL MEDS BY PRESCRIBER/CLIN PHARMACIST DOCUMENTED: ICD-10-PCS | Mod: CPTII,S$GLB,, | Performed by: STUDENT IN AN ORGANIZED HEALTH CARE EDUCATION/TRAINING PROGRAM

## 2022-09-15 PROCEDURE — 99999 PR PBB SHADOW E&M-EST. PATIENT-LVL IV: CPT | Mod: PBBFAC,,, | Performed by: STUDENT IN AN ORGANIZED HEALTH CARE EDUCATION/TRAINING PROGRAM

## 2022-09-15 PROCEDURE — 88305 TISSUE EXAM BY PATHOLOGIST: CPT | Mod: 26,,, | Performed by: DERMATOLOGY

## 2022-09-15 PROCEDURE — 86039 ANTINUCLEAR ANTIBODIES (ANA): CPT | Performed by: STUDENT IN AN ORGANIZED HEALTH CARE EDUCATION/TRAINING PROGRAM

## 2022-09-15 PROCEDURE — 1159F PR MEDICATION LIST DOCUMENTED IN MEDICAL RECORD: ICD-10-PCS | Mod: CPTII,S$GLB,, | Performed by: STUDENT IN AN ORGANIZED HEALTH CARE EDUCATION/TRAINING PROGRAM

## 2022-09-15 PROCEDURE — 1126F PR PAIN SEVERITY QUANTIFIED, NO PAIN PRESENT: ICD-10-PCS | Mod: CPTII,S$GLB,, | Performed by: STUDENT IN AN ORGANIZED HEALTH CARE EDUCATION/TRAINING PROGRAM

## 2022-09-15 PROCEDURE — 86038 ANTINUCLEAR ANTIBODIES: CPT | Performed by: STUDENT IN AN ORGANIZED HEALTH CARE EDUCATION/TRAINING PROGRAM

## 2022-09-15 PROCEDURE — 3044F HG A1C LEVEL LT 7.0%: CPT | Mod: CPTII,S$GLB,, | Performed by: STUDENT IN AN ORGANIZED HEALTH CARE EDUCATION/TRAINING PROGRAM

## 2022-09-15 PROCEDURE — 4010F PR ACE/ARB THEARPY RXD/TAKEN: ICD-10-PCS | Mod: CPTII,S$GLB,, | Performed by: STUDENT IN AN ORGANIZED HEALTH CARE EDUCATION/TRAINING PROGRAM

## 2022-09-15 PROCEDURE — 88305 TISSUE EXAM BY PATHOLOGIST: ICD-10-PCS | Mod: 26,,, | Performed by: DERMATOLOGY

## 2022-09-15 PROCEDURE — 1160F RVW MEDS BY RX/DR IN RCRD: CPT | Mod: CPTII,S$GLB,, | Performed by: STUDENT IN AN ORGANIZED HEALTH CARE EDUCATION/TRAINING PROGRAM

## 2022-09-15 PROCEDURE — 1159F MED LIST DOCD IN RCRD: CPT | Mod: CPTII,S$GLB,, | Performed by: STUDENT IN AN ORGANIZED HEALTH CARE EDUCATION/TRAINING PROGRAM

## 2022-09-15 PROCEDURE — 86235 NUCLEAR ANTIGEN ANTIBODY: CPT | Mod: 59 | Performed by: STUDENT IN AN ORGANIZED HEALTH CARE EDUCATION/TRAINING PROGRAM

## 2022-09-15 PROCEDURE — 1126F AMNT PAIN NOTED NONE PRSNT: CPT | Mod: CPTII,S$GLB,, | Performed by: STUDENT IN AN ORGANIZED HEALTH CARE EDUCATION/TRAINING PROGRAM

## 2022-09-15 PROCEDURE — 88312 PR  SPECIAL STAINS,GROUP I: ICD-10-PCS | Mod: 26,,, | Performed by: DERMATOLOGY

## 2022-09-15 PROCEDURE — 87070 CULTURE OTHR SPECIMN AEROBIC: CPT | Performed by: STUDENT IN AN ORGANIZED HEALTH CARE EDUCATION/TRAINING PROGRAM

## 2022-09-15 PROCEDURE — 88312 SPECIAL STAINS GROUP 1: CPT | Mod: 26,,, | Performed by: DERMATOLOGY

## 2022-09-15 PROCEDURE — 99204 PR OFFICE/OUTPT VISIT, NEW, LEVL IV, 45-59 MIN: ICD-10-PCS | Mod: 25,S$GLB,, | Performed by: STUDENT IN AN ORGANIZED HEALTH CARE EDUCATION/TRAINING PROGRAM

## 2022-09-15 PROCEDURE — 3044F PR MOST RECENT HEMOGLOBIN A1C LEVEL <7.0%: ICD-10-PCS | Mod: CPTII,S$GLB,, | Performed by: STUDENT IN AN ORGANIZED HEALTH CARE EDUCATION/TRAINING PROGRAM

## 2022-09-15 PROCEDURE — 36415 COLL VENOUS BLD VENIPUNCTURE: CPT | Performed by: STUDENT IN AN ORGANIZED HEALTH CARE EDUCATION/TRAINING PROGRAM

## 2022-09-15 PROCEDURE — 99999 PR PBB SHADOW E&M-EST. PATIENT-LVL IV: ICD-10-PCS | Mod: PBBFAC,,, | Performed by: STUDENT IN AN ORGANIZED HEALTH CARE EDUCATION/TRAINING PROGRAM

## 2022-09-15 PROCEDURE — 3061F PR NEG MICROALBUMINURIA RESULT DOCUMENTED/REVIEW: ICD-10-PCS | Mod: CPTII,S$GLB,, | Performed by: STUDENT IN AN ORGANIZED HEALTH CARE EDUCATION/TRAINING PROGRAM

## 2022-09-15 PROCEDURE — 3066F PR DOCUMENTATION OF TREATMENT FOR NEPHROPATHY: ICD-10-PCS | Mod: CPTII,S$GLB,, | Performed by: STUDENT IN AN ORGANIZED HEALTH CARE EDUCATION/TRAINING PROGRAM

## 2022-09-15 PROCEDURE — 88305 TISSUE EXAM BY PATHOLOGIST: CPT | Performed by: DERMATOLOGY

## 2022-09-15 PROCEDURE — 88312 SPECIAL STAINS GROUP 1: CPT | Performed by: DERMATOLOGY

## 2022-09-15 PROCEDURE — 86431 RHEUMATOID FACTOR QUANT: CPT | Performed by: STUDENT IN AN ORGANIZED HEALTH CARE EDUCATION/TRAINING PROGRAM

## 2022-09-15 PROCEDURE — 11104 PR PUNCH BIOPSY, SKIN, SINGLE LESION: ICD-10-PCS | Mod: S$GLB,,, | Performed by: STUDENT IN AN ORGANIZED HEALTH CARE EDUCATION/TRAINING PROGRAM

## 2022-09-15 PROCEDURE — 11104 PUNCH BX SKIN SINGLE LESION: CPT | Mod: S$GLB,,, | Performed by: STUDENT IN AN ORGANIZED HEALTH CARE EDUCATION/TRAINING PROGRAM

## 2022-09-15 PROCEDURE — 4010F ACE/ARB THERAPY RXD/TAKEN: CPT | Mod: CPTII,S$GLB,, | Performed by: STUDENT IN AN ORGANIZED HEALTH CARE EDUCATION/TRAINING PROGRAM

## 2022-09-15 PROCEDURE — 86235 NUCLEAR ANTIGEN ANTIBODY: CPT | Performed by: STUDENT IN AN ORGANIZED HEALTH CARE EDUCATION/TRAINING PROGRAM

## 2022-09-15 PROCEDURE — 3066F NEPHROPATHY DOC TX: CPT | Mod: CPTII,S$GLB,, | Performed by: STUDENT IN AN ORGANIZED HEALTH CARE EDUCATION/TRAINING PROGRAM

## 2022-09-15 PROCEDURE — 99204 OFFICE O/P NEW MOD 45 MIN: CPT | Mod: 25,S$GLB,, | Performed by: STUDENT IN AN ORGANIZED HEALTH CARE EDUCATION/TRAINING PROGRAM

## 2022-09-15 RX ORDER — TACROLIMUS 1 MG/G
OINTMENT TOPICAL 2 TIMES DAILY
Qty: 100 G | Refills: 2 | Status: SHIPPED | OUTPATIENT
Start: 2022-09-15 | End: 2024-01-03

## 2022-09-15 RX ORDER — DOXYCYCLINE 100 MG/1
100 CAPSULE ORAL EVERY 12 HOURS
Qty: 28 CAPSULE | Refills: 0 | Status: SHIPPED | OUTPATIENT
Start: 2022-09-15 | End: 2022-09-29

## 2022-09-15 RX ORDER — CLOBETASOL PROPIONATE 0.5 MG/G
OINTMENT TOPICAL 2 TIMES DAILY
Qty: 60 G | Refills: 1 | Status: SHIPPED | OUTPATIENT
Start: 2022-09-15

## 2022-09-15 NOTE — PROGRESS NOTES
Subjective:       Patient ID:  Katelyn Huynh is a 72 y.o. female who presents for   Chief Complaint   Patient presents with    Rash     All over     Rash - Initial  Affected locations: diffuse  Duration: 4 weeks  Signs / symptoms: dryness, burning, itching, scaling and redness  Aggravated by: heat  Relieving factors/Treatments tried: OTC moisturizer and Rx topical steroids  Improvement on treatment: mild    - rash start 4 weeks ago (mid 8/2022) on the right lower leg. On 9/4, She was given antibiotic ointment and bactrim, which she took for 7 days. It did not improve. On 9/11, she went to emergency room and was told to use moisturizer. A/w itching and burning. No new meds in last 6 months and no new skin care products. Dove soap without fragrance. Purex pods for laundry detergent    Review of Systems   Skin:  Positive for itching, rash and dry skin.      Objective:    Physical Exam   Constitutional: She appears well-developed and well-nourished. No distress.   Neurological: She is alert and oriented to person, place, and time. She is not disoriented.   Psychiatric: She has a normal mood and affect.   Skin:   Areas Examined (abnormalities noted in diagram):   Head / Face Inspection Performed  Neck Inspection Performed  Chest / Axilla Inspection Performed  Abdomen Inspection Performed  Back Inspection Performed  RUE Inspected  LUE Inspection Performed  RLE Inspected  LLE Inspection Performed            Diagram Legend     Erythematous scaling macule/papule c/w actinic keratosis       Vascular papule c/w angioma      Pigmented verrucoid papule/plaque c/w seborrheic keratosis      Yellow umbilicated papule c/w sebaceous hyperplasia      Irregularly shaped tan macule c/w lentigo     1-2 mm smooth white papules consistent with Milia      Movable subcutaneous cyst with punctum c/w epidermal inclusion cyst      Subcutaneous movable cyst c/w pilar cyst      Firm pink to brown papule c/w dermatofibroma       Pedunculated fleshy papule(s) c/w skin tag(s)      Evenly pigmented macule c/w junctional nevus     Mildly variegated pigmented, slightly irregular-bordered macule c/w mildly atypical nevus      Flesh colored to evenly pigmented papule c/w intradermal nevus       Pink pearly papule/plaque c/w basal cell carcinoma      Erythematous hyperkeratotic cursted plaque c/w SCC      Surgical scar with no sign of skin cancer recurrence      Open and closed comedones      Inflammatory papules and pustules      Verrucoid papule consistent consistent with wart     Erythematous eczematous patches and plaques     Dystrophic onycholytic nail with subungual debris c/w onychomycosis     Umbilicated papule    Erythematous-base heme-crusted tan verrucoid plaque consistent with inflamed seborrheic keratosis     Erythematous Silvery Scaling Plaque c/w Psoriasis     See annotation                    Assessment / Plan:      Pathology Orders:       Normal Orders This Visit    Specimen to Pathology, Dermatology     Questions:    Procedure Type: Dermatology and skin neoplasms    Number of Specimens: 1    ------------------------: -------------------------    Spec 1 Procedure: Biopsy    Spec 1 Clinical Impression: connective tissue diease, ACD, photoallergic    Spec 1 Source: left dorsal hand    Release to patient: Immediate          Rash and nonspecific skin eruption  -     Ambulatory referral/consult to Dermatology  -     Aerobic culture  -     BOBBI; Future; Expected date: 09/15/2022  -     Rheumatoid factor; Future; Expected date: 09/15/2022  -     Sjogrens syndrome-A extractable nuclear antibody; Future; Expected date: 09/15/2022  -     Sjogrens syndrome-B extractable nuclear antibody; Future; Expected date: 09/15/2022  -     tacrolimus (PROTOPIC) 0.1 % ointment; Apply topically 2 (two) times daily. For use on face and body. Not a steroid  Dispense: 100 g; Refill: 2  -     clobetasol 0.05% (TEMOVATE) 0.05 % Oint; Apply topically 2 (two)  times daily. For use on body. STEROID. Use for up to 2 weeks then decrease to 3 times weekly  Dispense: 60 g; Refill: 1  -     Myositis Specific 11 AB Panel; Future; Expected date: 09/15/2022  -     Specimen to Pathology, Dermatology  -     doxycycline (VIBRAMYCIN) 100 MG Cap; Take 1 capsule (100 mg total) by mouth every 12 (twelve) hours. for 14 days  Dispense: 28 capsule; Refill: 0  - 1 month history of new rash. Several different morphologies. B/l lower legs and thighs most consistent with venous stasis or nummular dermatitis, possibly impetiginization. Hands and face with photodistributed eruption with fine scale, possible epidermal necrosis. Ddx includes nummular or venous stasis dermatitis with id but would be unusual to have facial involvement. Hands and face most c/w connective tissue, photoallergic or ACD  - punch biopsy of dorsal hand  - doxy for possible superinfection. Take doxycyline 100mg  - 1 whole tablet every day with food and not within 1 hour prior to lying down until face clear then decrease to 1/2 tablet every day  - tacrolimus ointment bid for face  - clobetasol for legs and body bid x 2 weeks then wean tiw prn. Of note patient had rash after cortisone shot for carpel tunnel. I explained to start using on very small area and increase as tolerated. Stop at first sign of allergy, hives, swelling, etc. Use of topical medications in limited outside of corticosteroids  - labs to eval for CTD--BOBBI, SSA, SSB, RF, and myositis panel  - culture of nummular plaque on thigh      Punch biopsy procedure note:  Punch biopsy performed after verbal consent obtained. Area marked and prepped with alcohol. Approximately 1cc of 1% lidocaine without epinephrine injected. 3 mm disposable punch used to remove lesion. Hemostasis obtained and biopsy site closed with 1 - 2 Prolene sutures. Wound care instructions reviewed with patient and handout given.           Follow up in about 2 weeks (around 9/29/2022).        LOS  NUMBER AND COMPLEXITY OF PROBLEMS    COMPLEXITY OF DATA RISK TOTAL TIME (m)   92950  13633 [] 1 self-limited or minor problem [] Minimal to none [] No treatment recommended or patient to monitor. Reassurance.  15-29  10-19   59299  70975 Low  [] 2 or more self limited or minor problems  [] 1 stable chronic illness  [] 1 acute, uncomplicated illness or injury Limited (2)  [] Prior external notes from each unique source  [] Review result of each unique test  [] Order each unique test  OR [] Independent historian Low  []  OTC medications   []  Discussed/Decision for minor skin surgery (no risk factors) 30-44  20-29   83431  86438 Moderate  []  1 or more chronic unstable illness (not at goal or progression or exacerbation) or SE of treatment  []  2 or more stable chronic illnesses  []  1 acute illness with systemic symptoms  []  1 acute complicated injury  [x]  1 undiagnosed new problem with uncertain prognosis Moderate (1/3 below)  []  3 or more data items        *Now includes independent historian  []  Independent interpretation of a test  []  Discuss management/test with another provider Moderate  [x]  Prescription drug mgmt  []  Discussed/Decision for Minor surgery with risk factors  []  Mgmt limited by social determinates 45-59  30-39   23417  04900 High  []  1 or more chronic illness with severe exacerbation, progression or SE of treatment  []  1 acute or chronic illness/injury that poses a threat to life or bodily function Extensive (2/3 below)  []  3 or more data items        *Now includes independent historian.  []  Independent interpretation of a test  []  Discuss management/test with another provider High  []  Major surgery with risk discussed  []  Drug therapy requiring intensive monitoring for toxicity  []  Hospitalization  []  Decision for DNR 60-74  40-54

## 2022-09-15 NOTE — PATIENT INSTRUCTIONS

## 2022-09-16 LAB
ANA PATTERN 1: NORMAL
ANA SER QL IF: POSITIVE
ANA TITR SER IF: NORMAL {TITER}

## 2022-09-17 LAB — BACTERIA SPEC AEROBE CULT: NORMAL

## 2022-09-19 ENCOUNTER — TELEPHONE (OUTPATIENT)
Dept: OPHTHALMOLOGY | Facility: CLINIC | Age: 72
End: 2022-09-19
Payer: COMMERCIAL

## 2022-09-19 NOTE — TELEPHONE ENCOUNTER
----- Message from Archana Singh sent at 9/19/2022  1:10 PM CDT -----  Regarding: Questions  Pt and her sister Sachi Andrea mrn: [phone] are trying to order their glasses and need the PD.      Katelyn @ [phone]

## 2022-09-19 NOTE — TELEPHONE ENCOUNTER
Clinic spoke to pt RE: PD measurement. Informed pt that PD measurement is usually not taken in clinic and encouraged pt to stop by an optical store to have PD measurement taken. Educated pt on importance of having bifocal/PAL glasses ordered in person due to need to have proper glasses segment height measurement. Pt noted understanding.

## 2022-09-20 LAB
ANTI SM ANTIBODY: 0.13 RATIO (ref 0–0.99)
ANTI SM/RNP ANTIBODY: 0.37 RATIO (ref 0–0.99)
ANTI-SM INTERPRETATION: NEGATIVE
ANTI-SM/RNP INTERPRETATION: NEGATIVE
ANTI-SSA ANTIBODY: 0.13 RATIO (ref 0–0.99)
ANTI-SSA ANTIBODY: 0.13 RATIO (ref 0–0.99)
ANTI-SSA INTERPRETATION: NEGATIVE
ANTI-SSA INTERPRETATION: NEGATIVE
ANTI-SSB ANTIBODY: 0.08 RATIO (ref 0–0.99)
ANTI-SSB ANTIBODY: 0.08 RATIO (ref 0–0.99)
ANTI-SSB INTERPRETATION: NEGATIVE
ANTI-SSB INTERPRETATION: NEGATIVE
DSDNA AB SER-ACNC: NORMAL [IU]/ML

## 2022-09-28 LAB
COMMENT: NORMAL
FINAL PATHOLOGIC DIAGNOSIS: NORMAL
GROSS: NORMAL
Lab: NORMAL
MICROSCOPIC EXAM: NORMAL

## 2022-09-29 ENCOUNTER — CLINICAL SUPPORT (OUTPATIENT)
Dept: DERMATOLOGY | Facility: CLINIC | Age: 72
End: 2022-09-29
Payer: COMMERCIAL

## 2022-09-29 DIAGNOSIS — Z48.02 ENCOUNTER FOR REMOVAL OF SUTURES: Primary | ICD-10-CM

## 2022-09-29 PROCEDURE — 99024 PR POST-OP FOLLOW-UP VISIT: ICD-10-PCS | Mod: S$GLB,,, | Performed by: STUDENT IN AN ORGANIZED HEALTH CARE EDUCATION/TRAINING PROGRAM

## 2022-09-29 PROCEDURE — 99024 POSTOP FOLLOW-UP VISIT: CPT | Mod: S$GLB,,, | Performed by: STUDENT IN AN ORGANIZED HEALTH CARE EDUCATION/TRAINING PROGRAM

## 2022-09-29 NOTE — PROGRESS NOTES
Patient presents for suture removal.  The wound is well healed without signs of infection.  The sutures are removed.  Wound care and activity instructions given.  Return prn.

## 2022-10-01 ENCOUNTER — DOCUMENTATION ONLY (OUTPATIENT)
Dept: DERMATOLOGY | Facility: CLINIC | Age: 72
End: 2022-10-01
Payer: COMMERCIAL

## 2022-10-01 DIAGNOSIS — R76.8 POSITIVE ANA (ANTINUCLEAR ANTIBODY): Primary | ICD-10-CM

## 2022-10-01 NOTE — PROGRESS NOTES
Patient's lab workup showed +BOBBI 1:160. 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 Ddx includes ACD, or photoallergic possibly? Referred to rheum for + BOBBI. Will continue to follow in clinic to re-evaluate rash.    Patient can continue all topicals

## 2022-10-06 ENCOUNTER — OFFICE VISIT (OUTPATIENT)
Dept: PRIMARY CARE CLINIC | Facility: CLINIC | Age: 72
End: 2022-10-06
Payer: COMMERCIAL

## 2022-10-06 VITALS
DIASTOLIC BLOOD PRESSURE: 70 MMHG | HEIGHT: 65 IN | BODY MASS INDEX: 48.82 KG/M2 | OXYGEN SATURATION: 99 % | HEART RATE: 89 BPM | WEIGHT: 293 LBS | SYSTOLIC BLOOD PRESSURE: 132 MMHG

## 2022-10-06 DIAGNOSIS — Z00.00 ROUTINE ADULT HEALTH MAINTENANCE: Primary | ICD-10-CM

## 2022-10-06 PROCEDURE — 3075F SYST BP GE 130 - 139MM HG: CPT | Mod: CPTII,S$GLB,, | Performed by: STUDENT IN AN ORGANIZED HEALTH CARE EDUCATION/TRAINING PROGRAM

## 2022-10-06 PROCEDURE — 99999 PR PBB SHADOW E&M-EST. PATIENT-LVL V: CPT | Mod: PBBFAC,,, | Performed by: STUDENT IN AN ORGANIZED HEALTH CARE EDUCATION/TRAINING PROGRAM

## 2022-10-06 PROCEDURE — 3288F FALL RISK ASSESSMENT DOCD: CPT | Mod: CPTII,S$GLB,, | Performed by: STUDENT IN AN ORGANIZED HEALTH CARE EDUCATION/TRAINING PROGRAM

## 2022-10-06 PROCEDURE — 1159F PR MEDICATION LIST DOCUMENTED IN MEDICAL RECORD: ICD-10-PCS | Mod: CPTII,S$GLB,, | Performed by: STUDENT IN AN ORGANIZED HEALTH CARE EDUCATION/TRAINING PROGRAM

## 2022-10-06 PROCEDURE — 3044F PR MOST RECENT HEMOGLOBIN A1C LEVEL <7.0%: ICD-10-PCS | Mod: CPTII,S$GLB,, | Performed by: STUDENT IN AN ORGANIZED HEALTH CARE EDUCATION/TRAINING PROGRAM

## 2022-10-06 PROCEDURE — 3044F HG A1C LEVEL LT 7.0%: CPT | Mod: CPTII,S$GLB,, | Performed by: STUDENT IN AN ORGANIZED HEALTH CARE EDUCATION/TRAINING PROGRAM

## 2022-10-06 PROCEDURE — 3078F DIAST BP <80 MM HG: CPT | Mod: CPTII,S$GLB,, | Performed by: STUDENT IN AN ORGANIZED HEALTH CARE EDUCATION/TRAINING PROGRAM

## 2022-10-06 PROCEDURE — 99999 PR PBB SHADOW E&M-EST. PATIENT-LVL V: ICD-10-PCS | Mod: PBBFAC,,, | Performed by: STUDENT IN AN ORGANIZED HEALTH CARE EDUCATION/TRAINING PROGRAM

## 2022-10-06 PROCEDURE — 1101F PR PT FALLS ASSESS DOC 0-1 FALLS W/OUT INJ PAST YR: ICD-10-PCS | Mod: CPTII,S$GLB,, | Performed by: STUDENT IN AN ORGANIZED HEALTH CARE EDUCATION/TRAINING PROGRAM

## 2022-10-06 PROCEDURE — 3061F PR NEG MICROALBUMINURIA RESULT DOCUMENTED/REVIEW: ICD-10-PCS | Mod: CPTII,S$GLB,, | Performed by: STUDENT IN AN ORGANIZED HEALTH CARE EDUCATION/TRAINING PROGRAM

## 2022-10-06 PROCEDURE — 3061F NEG MICROALBUMINURIA REV: CPT | Mod: CPTII,S$GLB,, | Performed by: STUDENT IN AN ORGANIZED HEALTH CARE EDUCATION/TRAINING PROGRAM

## 2022-10-06 PROCEDURE — 4010F PR ACE/ARB THEARPY RXD/TAKEN: ICD-10-PCS | Mod: CPTII,S$GLB,, | Performed by: STUDENT IN AN ORGANIZED HEALTH CARE EDUCATION/TRAINING PROGRAM

## 2022-10-06 PROCEDURE — 1125F AMNT PAIN NOTED PAIN PRSNT: CPT | Mod: CPTII,S$GLB,, | Performed by: STUDENT IN AN ORGANIZED HEALTH CARE EDUCATION/TRAINING PROGRAM

## 2022-10-06 PROCEDURE — 1125F PR PAIN SEVERITY QUANTIFIED, PAIN PRESENT: ICD-10-PCS | Mod: CPTII,S$GLB,, | Performed by: STUDENT IN AN ORGANIZED HEALTH CARE EDUCATION/TRAINING PROGRAM

## 2022-10-06 PROCEDURE — 1160F RVW MEDS BY RX/DR IN RCRD: CPT | Mod: CPTII,S$GLB,, | Performed by: STUDENT IN AN ORGANIZED HEALTH CARE EDUCATION/TRAINING PROGRAM

## 2022-10-06 PROCEDURE — 1160F PR REVIEW ALL MEDS BY PRESCRIBER/CLIN PHARMACIST DOCUMENTED: ICD-10-PCS | Mod: CPTII,S$GLB,, | Performed by: STUDENT IN AN ORGANIZED HEALTH CARE EDUCATION/TRAINING PROGRAM

## 2022-10-06 PROCEDURE — 3288F PR FALLS RISK ASSESSMENT DOCUMENTED: ICD-10-PCS | Mod: CPTII,S$GLB,, | Performed by: STUDENT IN AN ORGANIZED HEALTH CARE EDUCATION/TRAINING PROGRAM

## 2022-10-06 PROCEDURE — 3066F PR DOCUMENTATION OF TREATMENT FOR NEPHROPATHY: ICD-10-PCS | Mod: CPTII,S$GLB,, | Performed by: STUDENT IN AN ORGANIZED HEALTH CARE EDUCATION/TRAINING PROGRAM

## 2022-10-06 PROCEDURE — 3008F BODY MASS INDEX DOCD: CPT | Mod: CPTII,S$GLB,, | Performed by: STUDENT IN AN ORGANIZED HEALTH CARE EDUCATION/TRAINING PROGRAM

## 2022-10-06 PROCEDURE — 99212 OFFICE O/P EST SF 10 MIN: CPT | Mod: S$GLB,,, | Performed by: STUDENT IN AN ORGANIZED HEALTH CARE EDUCATION/TRAINING PROGRAM

## 2022-10-06 PROCEDURE — 1159F MED LIST DOCD IN RCRD: CPT | Mod: CPTII,S$GLB,, | Performed by: STUDENT IN AN ORGANIZED HEALTH CARE EDUCATION/TRAINING PROGRAM

## 2022-10-06 PROCEDURE — 1101F PT FALLS ASSESS-DOCD LE1/YR: CPT | Mod: CPTII,S$GLB,, | Performed by: STUDENT IN AN ORGANIZED HEALTH CARE EDUCATION/TRAINING PROGRAM

## 2022-10-06 PROCEDURE — 99212 PR OFFICE/OUTPT VISIT, EST, LEVL II, 10-19 MIN: ICD-10-PCS | Mod: S$GLB,,, | Performed by: STUDENT IN AN ORGANIZED HEALTH CARE EDUCATION/TRAINING PROGRAM

## 2022-10-06 PROCEDURE — 3078F PR MOST RECENT DIASTOLIC BLOOD PRESSURE < 80 MM HG: ICD-10-PCS | Mod: CPTII,S$GLB,, | Performed by: STUDENT IN AN ORGANIZED HEALTH CARE EDUCATION/TRAINING PROGRAM

## 2022-10-06 PROCEDURE — 4010F ACE/ARB THERAPY RXD/TAKEN: CPT | Mod: CPTII,S$GLB,, | Performed by: STUDENT IN AN ORGANIZED HEALTH CARE EDUCATION/TRAINING PROGRAM

## 2022-10-06 PROCEDURE — 3075F PR MOST RECENT SYSTOLIC BLOOD PRESS GE 130-139MM HG: ICD-10-PCS | Mod: CPTII,S$GLB,, | Performed by: STUDENT IN AN ORGANIZED HEALTH CARE EDUCATION/TRAINING PROGRAM

## 2022-10-06 PROCEDURE — 3008F PR BODY MASS INDEX (BMI) DOCUMENTED: ICD-10-PCS | Mod: CPTII,S$GLB,, | Performed by: STUDENT IN AN ORGANIZED HEALTH CARE EDUCATION/TRAINING PROGRAM

## 2022-10-06 PROCEDURE — 3066F NEPHROPATHY DOC TX: CPT | Mod: CPTII,S$GLB,, | Performed by: STUDENT IN AN ORGANIZED HEALTH CARE EDUCATION/TRAINING PROGRAM

## 2022-10-06 NOTE — PROGRESS NOTES
Primary Care  Return/Acute Office Visit - In Person  10/6/2022  Mike Ndhlovu      [facility]    Patient is a 72 y.o.   Katelyn Huynh  has a past medical history of Arthritis, Cataract, Glaucoma, History of iritis, Hypertension, Meningioma, and Uveitis.    Patient presents with   Chief Complaint   Patient presents with    Establish Care       No acute complaints today       Social History     Social History Narrative    Not on file     Katelyn Huynh family history includes Cataracts in her maternal aunt, mother, and sister; Diabetes in her father, maternal aunt, mother, and sister; Glaucoma in her brother, maternal aunt, maternal uncle, and mother; Hypertension in her father and mother; No Known Problems in her maternal grandfather, maternal grandmother, paternal aunt, paternal grandfather, paternal grandmother, and paternal uncle; Stroke in her sister.    Active Medications:  Review of patient's allergies indicates:   Allergen Reactions    Cortisone Hives and Swelling     Swelling of the tongue     Current Outpatient Medications   Medication Instructions    alcohol swabs (ALCOHOL WIPES) PadM Monitor X2 daily as directed. Per insurance coverage.    ASCORBATE CALCIUM (VITAMIN C ORAL) Oral, Daily    blood sugar diagnostic Strp Test blood sugar twice daily. Per insurance coverage.    blood-glucose meter kit Monitor X2 daily as directed. Per insurance coverage.    cetirizine (ZYRTEC) 10 mg, Oral, Daily    clobetasol 0.05% (TEMOVATE) 0.05 % Oint Topical (Top), 2 times daily, For use on body. STEROID. Use for up to 2 weeks then decrease to 3 times weekly    diclofenac sodium (VOLTAREN) 2 g, Topical (Top), Daily    diphth,pertus,acell,,tetanus (BOOSTRIX TDAP) 2.5-8-5 Lf-mcg-Lf/0.5mL Syrg injection Intramuscular    diphth,pertus,acell,,tetanus (BOOSTRIX TDAP) 2.5-8-5 Lf-mcg-Lf/0.5mL Syrg injection Intramuscular    flu vac 2022 [id number], 60 mcg (15 mcg x 4)/0.5 mL Syrg 0.5 mLs, Intramuscular,  "Once    hydroCHLOROthiazide (HYDRODIURIL) 50 mg, Oral, Daily    lancets 28 gauge Misc Test blood sugar twice daily    losartan (COZAAR) 100 mg, Oral, Daily    miconazole nitrate 2% (MICOTIN) 2 % Oint Topical (Top), 2 times daily    mupirocin (BACTROBAN) 2 % ointment Topical (Top), 2 times daily    OZEMPIC 0.25 mg, Subcutaneous, Every 7 days    polyethylene glycol (GOLYTELY) 236-22.74-6.74 -5.86 gram suspension 4,000 mLs, Oral, Once    sars-cov-2, covid-19, (MODERNA COVID-19) 50 mcg/0.25 ml injection (BOOSTER) Intramuscular    tacrolimus (PROTOPIC) 0.1 % ointment Topical (Top), 2 times daily, For use on face and body. Not a steroid       Review of Systems   Cardiovascular:  Positive for orthopnea.   Musculoskeletal:  Positive for joint pain.   All other systems reviewed and are negative.    Vitals:    10/06/22 1035   BP: 132/70   BP Location: Right arm   Pulse: 89   Temp: Comment: beverage   SpO2: 99%   Weight: (!) 138.7 kg (305 lb 12.5 oz)   Height: 5' 5" (1.651 m)       Physical Exam  Vitals reviewed.   Constitutional:       General: She is not in acute distress.  Cardiovascular:      Rate and Rhythm: Normal rate and regular rhythm.   Pulmonary:      Effort: Pulmonary effort is normal.      Breath sounds: Normal breath sounds.   Musculoskeletal:      Right lower leg: No edema.      Left lower leg: No edema.        Assessment and Plan     Essential HTN   Well controlled   Continue HCTZ 50 mg daily and losartan 100 mg daily     Obesity   Not using ozempic, will consider starting   Has lost almost 20 lbs this year by cutting out soft drinks from diet and walking around the house     Screening   Colon Cancer Screening: Completed in June 2022. Next due in 10 years. No fam hx   Mammo: Completed in July BI-RADS 1. Next Due 2023  Dexa: Next Due 2024     Vaccinations   Influenza and COVID booster vaccinations scheduled     Diffuse rash   Improving   Followed by derm   Undergoing autoimmune workup       Upcoming Scheduled " Appointments and Follow Up:    No future appointments.    Follow Up [facility] (with who? when?): Follow up if symptoms worsen or fail to improve.      Extended Emergency Contact Information  Primary Emergency Contact: Venkatesh Huynh  Address: [address] [place] [address] [facility]  Home Phone: [phone]  Mobile Phone: [phone]  Relation: Spouse  Preferred language: English  Secondary Emergency Contact: Darrick Martines   [facility]  Mobile Phone: [phone]  Relation: Natanael Almanzar MD   Attending Physician  Primary Care  10/6/2022 - 11:36 AM    I spent a total of 15 minutes on the day of the visit.This includes face to face time and non-face to face time preparing to see the patient (eg, review of tests), obtaining and/or reviewing separately obtained history, documenting clinical information in the electronic or other health record, independently interpreting results and communicating results to the patient/family/caregiver, or care coordinator.

## 2022-10-12 ENCOUNTER — PATIENT MESSAGE (OUTPATIENT)
Dept: DERMATOLOGY | Facility: CLINIC | Age: 72
End: 2022-10-12
Payer: COMMERCIAL

## 2022-10-17 ENCOUNTER — OFFICE VISIT (OUTPATIENT)
Dept: DERMATOLOGY | Facility: CLINIC | Age: 72
End: 2022-10-17
Payer: COMMERCIAL

## 2022-10-17 DIAGNOSIS — R21 RASH AND NONSPECIFIC SKIN ERUPTION: ICD-10-CM

## 2022-10-17 PROCEDURE — 3044F PR MOST RECENT HEMOGLOBIN A1C LEVEL <7.0%: ICD-10-PCS | Mod: CPTII,S$GLB,, | Performed by: STUDENT IN AN ORGANIZED HEALTH CARE EDUCATION/TRAINING PROGRAM

## 2022-10-17 PROCEDURE — 1101F PT FALLS ASSESS-DOCD LE1/YR: CPT | Mod: CPTII,S$GLB,, | Performed by: STUDENT IN AN ORGANIZED HEALTH CARE EDUCATION/TRAINING PROGRAM

## 2022-10-17 PROCEDURE — 99213 OFFICE O/P EST LOW 20 MIN: CPT | Mod: S$GLB,,, | Performed by: STUDENT IN AN ORGANIZED HEALTH CARE EDUCATION/TRAINING PROGRAM

## 2022-10-17 PROCEDURE — 1101F PR PT FALLS ASSESS DOC 0-1 FALLS W/OUT INJ PAST YR: ICD-10-PCS | Mod: CPTII,S$GLB,, | Performed by: STUDENT IN AN ORGANIZED HEALTH CARE EDUCATION/TRAINING PROGRAM

## 2022-10-17 PROCEDURE — 3061F PR NEG MICROALBUMINURIA RESULT DOCUMENTED/REVIEW: ICD-10-PCS | Mod: CPTII,S$GLB,, | Performed by: STUDENT IN AN ORGANIZED HEALTH CARE EDUCATION/TRAINING PROGRAM

## 2022-10-17 PROCEDURE — 1160F PR REVIEW ALL MEDS BY PRESCRIBER/CLIN PHARMACIST DOCUMENTED: ICD-10-PCS | Mod: CPTII,S$GLB,, | Performed by: STUDENT IN AN ORGANIZED HEALTH CARE EDUCATION/TRAINING PROGRAM

## 2022-10-17 PROCEDURE — 3066F NEPHROPATHY DOC TX: CPT | Mod: CPTII,S$GLB,, | Performed by: STUDENT IN AN ORGANIZED HEALTH CARE EDUCATION/TRAINING PROGRAM

## 2022-10-17 PROCEDURE — 1125F PR PAIN SEVERITY QUANTIFIED, PAIN PRESENT: ICD-10-PCS | Mod: CPTII,S$GLB,, | Performed by: STUDENT IN AN ORGANIZED HEALTH CARE EDUCATION/TRAINING PROGRAM

## 2022-10-17 PROCEDURE — 3066F PR DOCUMENTATION OF TREATMENT FOR NEPHROPATHY: ICD-10-PCS | Mod: CPTII,S$GLB,, | Performed by: STUDENT IN AN ORGANIZED HEALTH CARE EDUCATION/TRAINING PROGRAM

## 2022-10-17 PROCEDURE — 3288F FALL RISK ASSESSMENT DOCD: CPT | Mod: CPTII,S$GLB,, | Performed by: STUDENT IN AN ORGANIZED HEALTH CARE EDUCATION/TRAINING PROGRAM

## 2022-10-17 PROCEDURE — 99999 PR PBB SHADOW E&M-EST. PATIENT-LVL III: ICD-10-PCS | Mod: PBBFAC,,, | Performed by: STUDENT IN AN ORGANIZED HEALTH CARE EDUCATION/TRAINING PROGRAM

## 2022-10-17 PROCEDURE — 3288F PR FALLS RISK ASSESSMENT DOCUMENTED: ICD-10-PCS | Mod: CPTII,S$GLB,, | Performed by: STUDENT IN AN ORGANIZED HEALTH CARE EDUCATION/TRAINING PROGRAM

## 2022-10-17 PROCEDURE — 1125F AMNT PAIN NOTED PAIN PRSNT: CPT | Mod: CPTII,S$GLB,, | Performed by: STUDENT IN AN ORGANIZED HEALTH CARE EDUCATION/TRAINING PROGRAM

## 2022-10-17 PROCEDURE — 99213 PR OFFICE/OUTPT VISIT, EST, LEVL III, 20-29 MIN: ICD-10-PCS | Mod: S$GLB,,, | Performed by: STUDENT IN AN ORGANIZED HEALTH CARE EDUCATION/TRAINING PROGRAM

## 2022-10-17 PROCEDURE — 1159F MED LIST DOCD IN RCRD: CPT | Mod: CPTII,S$GLB,, | Performed by: STUDENT IN AN ORGANIZED HEALTH CARE EDUCATION/TRAINING PROGRAM

## 2022-10-17 PROCEDURE — 99999 PR PBB SHADOW E&M-EST. PATIENT-LVL III: CPT | Mod: PBBFAC,,, | Performed by: STUDENT IN AN ORGANIZED HEALTH CARE EDUCATION/TRAINING PROGRAM

## 2022-10-17 PROCEDURE — 4010F ACE/ARB THERAPY RXD/TAKEN: CPT | Mod: CPTII,S$GLB,, | Performed by: STUDENT IN AN ORGANIZED HEALTH CARE EDUCATION/TRAINING PROGRAM

## 2022-10-17 PROCEDURE — 4010F PR ACE/ARB THEARPY RXD/TAKEN: ICD-10-PCS | Mod: CPTII,S$GLB,, | Performed by: STUDENT IN AN ORGANIZED HEALTH CARE EDUCATION/TRAINING PROGRAM

## 2022-10-17 PROCEDURE — 1159F PR MEDICATION LIST DOCUMENTED IN MEDICAL RECORD: ICD-10-PCS | Mod: CPTII,S$GLB,, | Performed by: STUDENT IN AN ORGANIZED HEALTH CARE EDUCATION/TRAINING PROGRAM

## 2022-10-17 PROCEDURE — 3044F HG A1C LEVEL LT 7.0%: CPT | Mod: CPTII,S$GLB,, | Performed by: STUDENT IN AN ORGANIZED HEALTH CARE EDUCATION/TRAINING PROGRAM

## 2022-10-17 PROCEDURE — 3061F NEG MICROALBUMINURIA REV: CPT | Mod: CPTII,S$GLB,, | Performed by: STUDENT IN AN ORGANIZED HEALTH CARE EDUCATION/TRAINING PROGRAM

## 2022-10-17 PROCEDURE — 1160F RVW MEDS BY RX/DR IN RCRD: CPT | Mod: CPTII,S$GLB,, | Performed by: STUDENT IN AN ORGANIZED HEALTH CARE EDUCATION/TRAINING PROGRAM

## 2022-10-17 NOTE — PROGRESS NOTES
Subjective:       Patient ID:  Katelyn Huynh is a 72 y.o. 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 Pt has been using tacrolimus 0.1% & clobetasol 0.05% - pt finished with doxy.        Follow-up - Follow-up  Symptom course: improving  Affected locations: right upper leg and right foot  Signs / symptoms: itching (since Friday)    Problem List Items Addressed This Visit          Derm    Rash and nonspecific skin eruption    Overview     -First seen 8/2022 with rash with photodistributed component on malar face and dorsal hands as well as asteatotic rash on lower leg and nummular eczematous plaques on thighs. Ddx was lupus vs ACD / photocontact +/- stasis / asteatotic / nummular derm on legs. BOBBI was 1:160. Biopsy showed sponge, not CTD. Given topical treatment and referred to rheum  - f/u 10/17 was doing much better on topical therapy with few nummular plaques on hands             Review of Systems   Skin:  Positive for itching and rash.      Objective:    Physical Exam   Constitutional: She appears well-developed and well-nourished. No distress.   Neurological: She is alert and oriented to person, place, and time. She is not disoriented.   Psychiatric: She has a normal mood and affect.   Skin:   Areas Examined (abnormalities noted in diagram):   Head / Face Inspection Performed  Neck Inspection Performed  RUE Inspected  LUE Inspection Performed  RLE Inspected  LLE Inspection Performed            Diagram Legend     Erythematous scaling macule/papule c/w actinic keratosis       Vascular papule c/w angioma      Pigmented verrucoid papule/plaque c/w seborrheic keratosis      Yellow umbilicated papule c/w sebaceous hyperplasia      Irregularly shaped tan macule c/w lentigo     1-2 mm  smooth white papules consistent with Milia      Movable subcutaneous cyst with punctum c/w epidermal inclusion cyst      Subcutaneous movable cyst c/w pilar cyst      Firm pink to brown papule c/w dermatofibroma      Pedunculated fleshy papule(s) c/w skin tag(s)      Evenly pigmented macule c/w junctional nevus     Mildly variegated pigmented, slightly irregular-bordered macule c/w mildly atypical nevus      Flesh colored to evenly pigmented papule c/w intradermal nevus       Pink pearly papule/plaque c/w basal cell carcinoma      Erythematous hyperkeratotic cursted plaque c/w SCC      Surgical scar with no sign of skin cancer recurrence      Open and closed comedones      Inflammatory papules and pustules      Verrucoid papule consistent consistent with wart     Erythematous eczematous patches and plaques     Dystrophic onycholytic nail with subungual debris c/w onychomycosis     Umbilicated papule    Erythematous-base heme-crusted tan verrucoid plaque consistent with inflamed seborrheic keratosis     Erythematous Silvery Scaling Plaque c/w Psoriasis     See annotation      Assessment / Plan:        Rash and nonspecific skin eruption  - Given biopsy, favor type of dermatitis but CTD such as lupus is still on ddx. Lower legs most c/w asteatotic or venous stasis dermatitis with associated nummular derm. Face and dorsal hands were photodistributed raising possibility of SLE, photoallergic, photocontact. Patient is on HCTZ  - Overall doing much better today with topical therapy  - c/w tacro ointment to face, clobetasol PRN to body  - stop HCTZ. Dose was raised in April and it can cause photosensitive rashes  - mineral sunscreen for photoprotection   - rheum appointment is next week for further w/u of SLE given low + BOBBI         Follow up in about 2 months (around 12/17/2022).        LOS NUMBER AND COMPLEXITY OF PROBLEMS    COMPLEXITY OF DATA RISK TOTAL TIME (m)   73847  96303 [] 1 self-limited or minor problem []  Minimal to none [] No treatment recommended or patient to monitor. Reassurance.  15-29  10-19   63040  36370 Low  [] 2 or more self limited or minor problems  [x] 1 stable chronic illness  [] 1 acute, uncomplicated illness or injury Limited (2)  [] Prior external notes from each unique source  [] Review result of each unique test  [] Order each unique test  OR [] Independent historian Low  []  OTC medications   []  Discussed/Decision for minor skin surgery (no risk factors) 30-44  20-29   32427  65757 Moderate  []  1 or more chronic unstable illness (not at goal or progression or exacerbation) or SE of treatment  []  2 or more stable chronic illnesses  []  1 acute illness with systemic symptoms  []  1 acute complicated injury  []  1 undiagnosed new problem with uncertain prognosis Moderate (1/3 below)  []  3 or more data items        *Now includes independent historian  []  Independent interpretation of a test  []  Discuss management/test with another provider Moderate  [x]  Prescription drug mgmt  []  Discussed/Decision for Minor surgery with risk factors  []  Mgmt limited by social determinates 45-59  30-39   30816  31267 High  []  1 or more chronic illness with severe exacerbation, progression or SE of treatment  []  1 acute or chronic illness/injury that poses a threat to life or bodily function Extensive (2/3 below)  []  3 or more data items        *Now includes independent historian.  []  Independent interpretation of a test  []  Discuss management/test with another provider High  []  Major surgery with risk discussed  []  Drug therapy requiring intensive monitoring for toxicity  []  Hospitalization  []  Decision for DNR 60-74  40-54

## 2022-10-18 ENCOUNTER — PATIENT MESSAGE (OUTPATIENT)
Dept: BEHAVIORAL HEALTH | Facility: CLINIC | Age: 72
End: 2022-10-18
Payer: COMMERCIAL

## 2022-10-25 ENCOUNTER — LAB VISIT (OUTPATIENT)
Dept: LAB | Facility: HOSPITAL | Age: 72
End: 2022-10-25
Payer: COMMERCIAL

## 2022-10-25 ENCOUNTER — OFFICE VISIT (OUTPATIENT)
Dept: RHEUMATOLOGY | Facility: CLINIC | Age: 72
End: 2022-10-25
Payer: COMMERCIAL

## 2022-10-25 VITALS
WEIGHT: 293 LBS | HEIGHT: 65 IN | SYSTOLIC BLOOD PRESSURE: 132 MMHG | DIASTOLIC BLOOD PRESSURE: 76 MMHG | HEART RATE: 86 BPM | BODY MASS INDEX: 48.82 KG/M2

## 2022-10-25 DIAGNOSIS — R76.8 POSITIVE ANA (ANTINUCLEAR ANTIBODY): ICD-10-CM

## 2022-10-25 LAB
ALBUMIN SERPL BCP-MCNC: 3.4 G/DL (ref 3.5–5.2)
ALP SERPL-CCNC: 100 U/L (ref 55–135)
ALT SERPL W/O P-5'-P-CCNC: 9 U/L (ref 10–44)
ANION GAP SERPL CALC-SCNC: 8 MMOL/L (ref 8–16)
AST SERPL-CCNC: 12 U/L (ref 10–40)
BASOPHILS # BLD AUTO: 0.05 K/UL (ref 0–0.2)
BASOPHILS NFR BLD: 0.7 % (ref 0–1.9)
BILIRUB SERPL-MCNC: 0.8 MG/DL (ref 0.1–1)
BUN SERPL-MCNC: 11 MG/DL (ref 8–23)
C3 SERPL-MCNC: 166 MG/DL (ref 50–180)
C4 SERPL-MCNC: 40 MG/DL (ref 11–44)
CALCIUM SERPL-MCNC: 9.5 MG/DL (ref 8.7–10.5)
CCP AB SER IA-ACNC: 0.7 U/ML
CHLORIDE SERPL-SCNC: 97 MMOL/L (ref 95–110)
CO2 SERPL-SCNC: 31 MMOL/L (ref 23–29)
CREAT SERPL-MCNC: 0.8 MG/DL (ref 0.5–1.4)
CRP SERPL-MCNC: 8.5 MG/L (ref 0–8.2)
DAT IGG-SP REAG RBC-IMP: NORMAL
DIFFERENTIAL METHOD: NORMAL
EOSINOPHIL # BLD AUTO: 0.3 K/UL (ref 0–0.5)
EOSINOPHIL NFR BLD: 3.5 % (ref 0–8)
ERYTHROCYTE [DISTWIDTH] IN BLOOD BY AUTOMATED COUNT: 13.6 % (ref 11.5–14.5)
ERYTHROCYTE [SEDIMENTATION RATE] IN BLOOD BY PHOTOMETRIC METHOD: 99 MM/HR (ref 0–36)
EST. GFR  (NO RACE VARIABLE): >60 ML/MIN/1.73 M^2
GLUCOSE SERPL-MCNC: 99 MG/DL (ref 70–110)
HCT VFR BLD AUTO: 43.1 % (ref 37–48.5)
HGB BLD-MCNC: 14.1 G/DL (ref 12–16)
IMM GRANULOCYTES # BLD AUTO: 0.02 K/UL (ref 0–0.04)
IMM GRANULOCYTES NFR BLD AUTO: 0.3 % (ref 0–0.5)
LYMPHOCYTES # BLD AUTO: 1.5 K/UL (ref 1–4.8)
LYMPHOCYTES NFR BLD: 19.8 % (ref 18–48)
MCH RBC QN AUTO: 30.2 PG (ref 27–31)
MCHC RBC AUTO-ENTMCNC: 32.7 G/DL (ref 32–36)
MCV RBC AUTO: 92 FL (ref 82–98)
MONOCYTES # BLD AUTO: 0.5 K/UL (ref 0.3–1)
MONOCYTES NFR BLD: 6.5 % (ref 4–15)
NEUTROPHILS # BLD AUTO: 5.1 K/UL (ref 1.8–7.7)
NEUTROPHILS NFR BLD: 69.2 % (ref 38–73)
NRBC BLD-RTO: 0 /100 WBC
PLATELET # BLD AUTO: 253 K/UL (ref 150–450)
PMV BLD AUTO: 10.4 FL (ref 9.2–12.9)
POTASSIUM SERPL-SCNC: 3.2 MMOL/L (ref 3.5–5.1)
PROT SERPL-MCNC: 8.3 G/DL (ref 6–8.4)
RBC # BLD AUTO: 4.67 M/UL (ref 4–5.4)
SODIUM SERPL-SCNC: 136 MMOL/L (ref 136–145)
WBC # BLD AUTO: 7.39 K/UL (ref 3.9–12.7)

## 2022-10-25 PROCEDURE — 86146 BETA-2 GLYCOPROTEIN ANTIBODY: CPT | Mod: 59 | Performed by: INTERNAL MEDICINE

## 2022-10-25 PROCEDURE — 3078F DIAST BP <80 MM HG: CPT | Mod: CPTII,S$GLB,, | Performed by: INTERNAL MEDICINE

## 2022-10-25 PROCEDURE — 1160F PR REVIEW ALL MEDS BY PRESCRIBER/CLIN PHARMACIST DOCUMENTED: ICD-10-PCS | Mod: CPTII,S$GLB,, | Performed by: INTERNAL MEDICINE

## 2022-10-25 PROCEDURE — 3066F NEPHROPATHY DOC TX: CPT | Mod: CPTII,S$GLB,, | Performed by: INTERNAL MEDICINE

## 2022-10-25 PROCEDURE — 86147 CARDIOLIPIN ANTIBODY EA IG: CPT | Performed by: INTERNAL MEDICINE

## 2022-10-25 PROCEDURE — 1160F RVW MEDS BY RX/DR IN RCRD: CPT | Mod: CPTII,S$GLB,, | Performed by: INTERNAL MEDICINE

## 2022-10-25 PROCEDURE — 99204 OFFICE O/P NEW MOD 45 MIN: CPT | Mod: S$GLB,,, | Performed by: INTERNAL MEDICINE

## 2022-10-25 PROCEDURE — 3078F PR MOST RECENT DIASTOLIC BLOOD PRESSURE < 80 MM HG: ICD-10-PCS | Mod: CPTII,S$GLB,, | Performed by: INTERNAL MEDICINE

## 2022-10-25 PROCEDURE — 86140 C-REACTIVE PROTEIN: CPT | Performed by: INTERNAL MEDICINE

## 2022-10-25 PROCEDURE — 3061F PR NEG MICROALBUMINURIA RESULT DOCUMENTED/REVIEW: ICD-10-PCS | Mod: CPTII,S$GLB,, | Performed by: INTERNAL MEDICINE

## 2022-10-25 PROCEDURE — 3066F PR DOCUMENTATION OF TREATMENT FOR NEPHROPATHY: ICD-10-PCS | Mod: CPTII,S$GLB,, | Performed by: INTERNAL MEDICINE

## 2022-10-25 PROCEDURE — 86160 COMPLEMENT ANTIGEN: CPT | Mod: 59 | Performed by: INTERNAL MEDICINE

## 2022-10-25 PROCEDURE — 36415 COLL VENOUS BLD VENIPUNCTURE: CPT | Performed by: INTERNAL MEDICINE

## 2022-10-25 PROCEDURE — 99204 PR OFFICE/OUTPT VISIT, NEW, LEVL IV, 45-59 MIN: ICD-10-PCS | Mod: S$GLB,,, | Performed by: INTERNAL MEDICINE

## 2022-10-25 PROCEDURE — 4010F PR ACE/ARB THEARPY RXD/TAKEN: ICD-10-PCS | Mod: CPTII,S$GLB,, | Performed by: INTERNAL MEDICINE

## 2022-10-25 PROCEDURE — 80053 COMPREHEN METABOLIC PANEL: CPT | Performed by: INTERNAL MEDICINE

## 2022-10-25 PROCEDURE — 3044F PR MOST RECENT HEMOGLOBIN A1C LEVEL <7.0%: ICD-10-PCS | Mod: CPTII,S$GLB,, | Performed by: INTERNAL MEDICINE

## 2022-10-25 PROCEDURE — 86200 CCP ANTIBODY: CPT | Performed by: INTERNAL MEDICINE

## 2022-10-25 PROCEDURE — 85613 RUSSELL VIPER VENOM DILUTED: CPT | Performed by: INTERNAL MEDICINE

## 2022-10-25 PROCEDURE — 3008F BODY MASS INDEX DOCD: CPT | Mod: CPTII,S$GLB,, | Performed by: INTERNAL MEDICINE

## 2022-10-25 PROCEDURE — 4010F ACE/ARB THERAPY RXD/TAKEN: CPT | Mod: CPTII,S$GLB,, | Performed by: INTERNAL MEDICINE

## 2022-10-25 PROCEDURE — 3061F NEG MICROALBUMINURIA REV: CPT | Mod: CPTII,S$GLB,, | Performed by: INTERNAL MEDICINE

## 2022-10-25 PROCEDURE — 1159F PR MEDICATION LIST DOCUMENTED IN MEDICAL RECORD: ICD-10-PCS | Mod: CPTII,S$GLB,, | Performed by: INTERNAL MEDICINE

## 2022-10-25 PROCEDURE — 86162 COMPLEMENT TOTAL (CH50): CPT | Performed by: INTERNAL MEDICINE

## 2022-10-25 PROCEDURE — 86880 COOMBS TEST DIRECT: CPT | Performed by: INTERNAL MEDICINE

## 2022-10-25 PROCEDURE — 99999 PR PBB SHADOW E&M-EST. PATIENT-LVL V: ICD-10-PCS | Mod: PBBFAC,,, | Performed by: INTERNAL MEDICINE

## 2022-10-25 PROCEDURE — 1159F MED LIST DOCD IN RCRD: CPT | Mod: CPTII,S$GLB,, | Performed by: INTERNAL MEDICINE

## 2022-10-25 PROCEDURE — 1125F PR PAIN SEVERITY QUANTIFIED, PAIN PRESENT: ICD-10-PCS | Mod: CPTII,S$GLB,, | Performed by: INTERNAL MEDICINE

## 2022-10-25 PROCEDURE — 3044F HG A1C LEVEL LT 7.0%: CPT | Mod: CPTII,S$GLB,, | Performed by: INTERNAL MEDICINE

## 2022-10-25 PROCEDURE — 3075F SYST BP GE 130 - 139MM HG: CPT | Mod: CPTII,S$GLB,, | Performed by: INTERNAL MEDICINE

## 2022-10-25 PROCEDURE — 1125F AMNT PAIN NOTED PAIN PRSNT: CPT | Mod: CPTII,S$GLB,, | Performed by: INTERNAL MEDICINE

## 2022-10-25 PROCEDURE — 85652 RBC SED RATE AUTOMATED: CPT | Performed by: INTERNAL MEDICINE

## 2022-10-25 PROCEDURE — 86160 COMPLEMENT ANTIGEN: CPT | Performed by: INTERNAL MEDICINE

## 2022-10-25 PROCEDURE — 3075F PR MOST RECENT SYSTOLIC BLOOD PRESS GE 130-139MM HG: ICD-10-PCS | Mod: CPTII,S$GLB,, | Performed by: INTERNAL MEDICINE

## 2022-10-25 PROCEDURE — 99999 PR PBB SHADOW E&M-EST. PATIENT-LVL V: CPT | Mod: PBBFAC,,, | Performed by: INTERNAL MEDICINE

## 2022-10-25 PROCEDURE — 3008F PR BODY MASS INDEX (BMI) DOCUMENTED: ICD-10-PCS | Mod: CPTII,S$GLB,, | Performed by: INTERNAL MEDICINE

## 2022-10-25 PROCEDURE — 85025 COMPLETE CBC W/AUTO DIFF WBC: CPT | Performed by: INTERNAL MEDICINE

## 2022-10-25 ASSESSMENT — ROUTINE ASSESSMENT OF PATIENT INDEX DATA (RAPID3)
MDHAQ FUNCTION SCORE: 0.8
PATIENT GLOBAL ASSESSMENT SCORE: 6.5
AM STIFFNESS SCORE: 1, YES
TOTAL RAPID3 SCORE: 5.05
PAIN SCORE: 6
FATIGUE SCORE: 5
PSYCHOLOGICAL DISTRESS SCORE: 2.2

## 2022-10-25 NOTE — PROGRESS NOTES
Subjective:       Patient ID: Katelyn Huynh is a 72 y.o. female.    Chief Complaint: rash, + BOBBI    Initial Presentation  72 year old female with hypertension, hx of meningioma s/p resection with continued f/u, iritis, prediabetes, OA, bilateral TKA    Presents for evaluation. Seen by derm for 8/2022 with rash on malar face and dorsal hands. Was given topical tacrolimus therapy for rash whch improved. Thought to be due to HCTZ. W/U shows + BOBBI 1:160 homogenous, negative panel. 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 Had a rash on her leg and face and hand. Was given clobetasol, tacrolimus and doxycycline . Rash thought to be venous stasis. Facial rash was itchy, red and burning. Facial rash started September- august. Redness on cheeks. On hand was more of dry skin rash . Leg rash bilaterally was oozing and cracking    No longer has rash on the face and only uses eucerin or vasaline 2-3 weeks. Finished antibiotics on the 29th.       Rheumatology ROS  (-) Fevers,chills (-) weight loss (-) Fatigue (-) morning stiffness  (-) Headaches (+)  vision changes or loss of vision - iritis history (-) hx of red eyes, (-)  photophobia, (-) dry eyes (-) dry mouth (-) Rash, (-) photosensitivity, (-) alopecia, (-) mucosal ulcers, (-) Raynaud's  phenomenon, (-) SQ nodules, (-) sense of skin tightening in hands, face or  torso, (-)  Hx pleurisy, (-) sharp chest pains that increase with deep breath, (-) lung fibrosis, (-) hemoptysis, (-) DVT or PE (-) Chest pains, (-) Hx pericarditis (-) Abdominal pain, (-) nausea, (-) vomiting, (-) diarrhea, (-) constipation, (-) melena,  (-) bloody diarrhea/UC/Crohns(-) dysphagia (-) GERD/Reflux (-) Hematuria, proteinuria, (-) renal failure (-) Focal weakness,(-) trouble combing hair or (-) getting out of chairs  (-) History of Low WBC, low platelets, anemia (-)No pregnancy  "losses/pre term deliveries/pregnancy complications (-) genital ulcers    History  Social: Alcohol use occasionally, smoking in 22 but has quit 40-50 years ago. Used to work as a   Medical:  Family:  Surgical: meningioma resection, bilateral TKA  Medications:  Immunizations:      Imaging/Procedures    Review of Systems   Constitutional:  Negative for fatigue, fever and unexpected weight change.   HENT:  Negative for mouth sores and nosebleeds.    Eyes:  Negative for pain and redness.   Respiratory:  Negative for cough and shortness of breath.    Cardiovascular:  Negative for chest pain and leg swelling.   Gastrointestinal:  Negative for abdominal pain, blood in stool, diarrhea, nausea and vomiting.   Genitourinary:  Negative for decreased urine volume, genital sores and hematuria.   Musculoskeletal:  Negative for arthralgias, joint swelling and myalgias.   Skin:  Positive for rash. Negative for color change.   Neurological:  Negative for weakness and headaches.   Hematological:  Does not bruise/bleed easily.   Psychiatric/Behavioral: Negative.         Objective:   /76   Pulse 86   Ht 5' 4.5" (1.638 m)   Wt (!) 137.9 kg (304 lb)   LMP  (LMP Unknown)   BMI 51.38 kg/m²      Physical Exam   Constitutional: She is oriented to person, place, and time. She appears well-developed and well-nourished. No distress.   HENT:   Head: Normocephalic and atraumatic.   Eyes: Conjunctivae are normal. No scleral icterus.   Cardiovascular: Normal rate and normal heart sounds.   Pulmonary/Chest: Effort normal and breath sounds normal.   Abdominal: Soft. Bowel sounds are normal. There is no abdominal tenderness.   Musculoskeletal:         General: No tenderness or deformity. Normal range of motion.   Lymphadenopathy:     She has no cervical adenopathy.   Neurological: She is alert and oriented to person, place, and time.   Skin: Skin is warm and dry. No rash noted.   Vitals reviewed.     No data to display   "   Assessment:       1. Positive BOBBI (antinuclear antibody)            Plan:       72 year old female with hypertension, hx of meningioma s/p resection with continued f/u, iritis, prediabetes, OA, bilateral TKA    Presents for evaluation. Seen by derm for 8/2022 with rash on malar face and dorsal hands. Was given topical tacrolimus therapy for rash whch improved. Thought to be due to HCTZ. W/U shows + BOBBI 1:160 homogenous, negative panel. 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    Currently patient has venous stasis changes on her legs and slight erythema on her cheeks. Has no symptoms concerning for lupus or other MCTD. Continues to use cream provided by dermatologist    Plan  -complete work up with CCP, ESR, CRP, APLS labs, C3 C4 CH50, CBC, CMP, YAJAIRA  -continue follow up with Dermatology  -likely these rashes are not related to MCTD but will follow up with patient if there are concerning labs    This patient was examined with Dr. Fields. Plan discussed with the patient. Return to clinic as needed.

## 2022-10-26 ENCOUNTER — PATIENT MESSAGE (OUTPATIENT)
Dept: RHEUMATOLOGY | Facility: CLINIC | Age: 72
End: 2022-10-26
Payer: COMMERCIAL

## 2022-10-26 ENCOUNTER — TELEPHONE (OUTPATIENT)
Dept: RHEUMATOLOGY | Facility: CLINIC | Age: 72
End: 2022-10-26
Payer: COMMERCIAL

## 2022-10-26 NOTE — TELEPHONE ENCOUNTER
CRP improved.  Sed rate still elevated.  Fellow to review labs with patient and have any concerns including asymptomatic bacteruria followed with primary.

## 2022-10-27 LAB
B2 GLYCOPROT1 IGA SER QL: <9 SAU
B2 GLYCOPROT1 IGG SER QL: <9 SGU
B2 GLYCOPROT1 IGM SER QL: <9 SMU
CARDIOLIPIN IGG SER IA-ACNC: <9.4 GPL (ref 0–14.99)
CARDIOLIPIN IGM SER IA-ACNC: <9.4 MPL (ref 0–12.49)

## 2022-10-28 LAB — CH50 SERPL-ACNC: 78 U/ML (ref 42–95)

## 2022-10-30 LAB
APTT IMM NP PPP: NORMAL SEC (ref 32–48)
APTT P HEP NEUT PPP: NORMAL SEC (ref 32–48)
CONFIRM APTT STACLOT: NORMAL
DRVVT SCREEN TO CONFIRM RATIO: NORMAL RATIO
LA 3 SCREEN W REFLEX-IMP: NORMAL
LA NT DPL PPP QL: NORMAL
MIXING DRVVT: NORMAL SEC (ref 33–44)
PROTHROMBIN TIME: 13.6 SEC (ref 12–15.5)
REPTILASE TIME: NORMAL SEC
SCREEN APTT: 36 SEC (ref 32–48)
SCREEN DRVVT: 36 SEC (ref 33–44)
THROMBIN TIME: NORMAL SEC (ref 14.7–19.5)

## 2022-11-08 NOTE — PROGRESS NOTES
I have reviewed the notes, assessments, and/or procedures performed this visit, and I concur with the documentation.  Patient with positive BOBBI and no evidence of a rheumatologic condition at this time.  Will evaluate as described in the fellow's note.

## 2022-11-18 ENCOUNTER — PATIENT MESSAGE (OUTPATIENT)
Dept: RHEUMATOLOGY | Facility: CLINIC | Age: 72
End: 2022-11-18
Payer: COMMERCIAL

## 2022-11-21 ENCOUNTER — TELEPHONE (OUTPATIENT)
Dept: BARIATRICS | Facility: CLINIC | Age: 72
End: 2022-11-21
Payer: COMMERCIAL

## 2022-11-21 ENCOUNTER — IMMUNIZATION (OUTPATIENT)
Dept: PHARMACY | Facility: CLINIC | Age: 72
End: 2022-11-21
Payer: COMMERCIAL

## 2022-11-21 DIAGNOSIS — Z23 NEED FOR VACCINATION: Primary | ICD-10-CM

## 2022-11-28 ENCOUNTER — TELEPHONE (OUTPATIENT)
Dept: PRIMARY CARE CLINIC | Facility: CLINIC | Age: 72
End: 2022-11-28
Payer: COMMERCIAL

## 2022-11-28 NOTE — TELEPHONE ENCOUNTER
----- Message from Poly Byrd sent at 11/28/2022  1:30 PM CST -----  Contact: pt [phone]  1MEDICALADVICE     Patient is calling for Medical Advice regarding: possible UTI     How long has patient had these symptoms: 2 weeks    Pharmacy name and phone#:   [facility] #[id number] - [address] [address]  Phone: [phone] Fax: [phone]        Would like response via [facility]:  portal     Comments: patient had urinalysis through Dr Disla's office

## 2022-11-28 NOTE — TELEPHONE ENCOUNTER
----- Message from Poly Byrd sent at 11/28/2022  1:30 PM CST -----  Contact: pt [phone]  1MEDICALADVICE     Patient is calling for Medical Advice regarding: possible UTI     How long has patient had these symptoms: 2 weeks    Pharmacy name and phone#:   [facility] #[id number] - [address] [address]  Phone: [phone] Fax: [phone]        Would like response via [facility]:  portal     Comments: patient had urinalysis through Dr Disla's office

## 2022-12-07 ENCOUNTER — TELEPHONE (OUTPATIENT)
Dept: PRIMARY CARE CLINIC | Facility: CLINIC | Age: 72
End: 2022-12-07

## 2022-12-07 NOTE — TELEPHONE ENCOUNTER
----- Message from Treasure Hurst sent at 12/7/2022  9:07 AM CST -----  Contact: Pt Mobile [phone]  Patient would like a call back in regards to her saying that she left you a message on last week about her possibly having a UTI, and she would like for you to respond to the message please.     Patient said that she would like to get treatment for her symptoms.     Comment: Patient would like for you to call her or leave a message on her portal.

## 2022-12-07 NOTE — TELEPHONE ENCOUNTER
Appt scheduled for tomorrow with NP Dwayne. Pt reporting dysuria & abnormal UA results from October.

## 2022-12-08 ENCOUNTER — OFFICE VISIT (OUTPATIENT)
Dept: PRIMARY CARE CLINIC | Facility: CLINIC | Age: 72
End: 2022-12-08
Payer: COMMERCIAL

## 2022-12-08 VITALS
DIASTOLIC BLOOD PRESSURE: 88 MMHG | HEIGHT: 65 IN | HEART RATE: 83 BPM | SYSTOLIC BLOOD PRESSURE: 124 MMHG | WEIGHT: 293 LBS | OXYGEN SATURATION: 98 % | BODY MASS INDEX: 48.82 KG/M2

## 2022-12-08 DIAGNOSIS — N76.0 ACUTE VAGINITIS: ICD-10-CM

## 2022-12-08 DIAGNOSIS — N39.0 URINARY TRACT INFECTION WITHOUT HEMATURIA, SITE UNSPECIFIED: Primary | ICD-10-CM

## 2022-12-08 PROCEDURE — 99213 PR OFFICE/OUTPT VISIT, EST, LEVL III, 20-29 MIN: ICD-10-PCS | Mod: S$PBB,,, | Performed by: NURSE PRACTITIONER

## 2022-12-08 PROCEDURE — 87088 URINE BACTERIA CULTURE: CPT | Performed by: NURSE PRACTITIONER

## 2022-12-08 PROCEDURE — 3061F NEG MICROALBUMINURIA REV: CPT | Mod: ,,, | Performed by: NURSE PRACTITIONER

## 2022-12-08 PROCEDURE — 87077 CULTURE AEROBIC IDENTIFY: CPT | Performed by: NURSE PRACTITIONER

## 2022-12-08 PROCEDURE — 87186 SC STD MICRODIL/AGAR DIL: CPT | Performed by: NURSE PRACTITIONER

## 2022-12-08 PROCEDURE — 3061F PR NEG MICROALBUMINURIA RESULT DOCUMENTED/REVIEW: ICD-10-PCS | Mod: ,,, | Performed by: NURSE PRACTITIONER

## 2022-12-08 PROCEDURE — 3066F NEPHROPATHY DOC TX: CPT | Mod: ,,, | Performed by: NURSE PRACTITIONER

## 2022-12-08 PROCEDURE — 4010F PR ACE/ARB THEARPY RXD/TAKEN: ICD-10-PCS | Mod: ,,, | Performed by: NURSE PRACTITIONER

## 2022-12-08 PROCEDURE — 99213 OFFICE O/P EST LOW 20 MIN: CPT | Mod: S$PBB,,, | Performed by: NURSE PRACTITIONER

## 2022-12-08 PROCEDURE — 4010F ACE/ARB THERAPY RXD/TAKEN: CPT | Mod: ,,, | Performed by: NURSE PRACTITIONER

## 2022-12-08 PROCEDURE — 81001 URINALYSIS AUTO W/SCOPE: CPT | Performed by: NURSE PRACTITIONER

## 2022-12-08 PROCEDURE — 87086 URINE CULTURE/COLONY COUNT: CPT | Performed by: NURSE PRACTITIONER

## 2022-12-08 PROCEDURE — 3066F PR DOCUMENTATION OF TREATMENT FOR NEPHROPATHY: ICD-10-PCS | Mod: ,,, | Performed by: NURSE PRACTITIONER

## 2022-12-08 PROCEDURE — 99999 PR PBB SHADOW E&M-EST. PATIENT-LVL IV: CPT | Mod: PBBFAC,,, | Performed by: NURSE PRACTITIONER

## 2022-12-08 PROCEDURE — 99999 PR PBB SHADOW E&M-EST. PATIENT-LVL IV: ICD-10-PCS | Mod: PBBFAC,,, | Performed by: NURSE PRACTITIONER

## 2022-12-08 RX ORDER — NITROFURANTOIN 25; 75 MG/1; MG/1
100 CAPSULE ORAL 2 TIMES DAILY
Qty: 10 CAPSULE | Refills: 0 | Status: SHIPPED | OUTPATIENT
Start: 2022-12-08 | End: 2023-04-26 | Stop reason: ALTCHOICE

## 2022-12-08 RX ORDER — FLUCONAZOLE 150 MG/1
150 TABLET ORAL DAILY
Qty: 1 TABLET | Refills: 0 | Status: SHIPPED | OUTPATIENT
Start: 2022-12-08 | End: 2022-12-09

## 2022-12-08 NOTE — PROGRESS NOTES
[facility] Note    Chief Complaint      Chief Complaint   Patient presents with    Urinary Tract Infection     Recurring since november       History of Present Illness      Katelyn Huynh is a 72 y.o. female who presents today for   Chief Complaint   Patient presents with    Urinary Tract Infection     Recurring since november         HPI   Patient reports dark colored urine. She drinks approximately 4 to 5 16 oz gama daily. She denies any burning on urination. She denies any SOB, chest pain, N/V, unintentional weight loss, loss of appetite, fatigue, diarrhea, constipation. She is active daily and remains independent with ADL's.      Review of Systems   Constitutional: Negative.    HENT: Negative.     Eyes: Negative.    Respiratory: Negative.     Cardiovascular: Negative.    Gastrointestinal: Negative.    Genitourinary: Negative.         + dark colored urine   Musculoskeletal: Negative.    Skin: Negative.    Neurological: Negative.    Endo/Heme/Allergies: Negative.    Psychiatric/Behavioral: Negative.        Family History:  family history includes Cataracts in her maternal aunt, mother, and sister; Diabetes in her father, maternal aunt, mother, and sister; Glaucoma in her brother, maternal aunt, maternal grandfather, maternal uncle, and mother; Heart disease in her father and mother; Hypertension in her father and mother; No Known Problems in her maternal grandmother, paternal aunt, paternal grandfather, paternal grandmother, and paternal uncle; Stroke in her sister.   Family history was reviewed with patient.     Medications:  Outpatient Encounter Medications as of 12/8/2022   Medication Sig Note Dispense Refill    alcohol swabs (ALCOHOL WIPES) PadM Monitor X2 daily as directed. Per insurance coverage.  200 each 3    ASCORBATE CALCIUM (VITAMIN C ORAL) Take by mouth once daily.  1/24/2018: Hold 1 wk prior to surgery      blood sugar diagnostic Strp Test blood sugar twice daily. Per  insurance coverage.  200 each 3    blood-glucose meter kit Monitor X2 daily as directed. Per insurance coverage.  1 each 0    cetirizine (ZYRTEC) 10 MG tablet Take 1 tablet (10 mg total) by mouth once daily.  30 tablet 0    clobetasol 0.05% (TEMOVATE) 0.05 % Oint Apply topically 2 (two) times daily. For use on body. STEROID. Use for up to 2 weeks then decrease to 3 times weekly  60 g 1    hydroCHLOROthiazide (HYDRODIURIL) 50 MG tablet Take 1 tablet (50 mg total) by mouth once daily.  90 tablet 3    lancets 28 gauge Misc Test blood sugar twice daily  200 each 3    losartan (COZAAR) 100 MG tablet Take 1 tablet (100 mg total) by mouth once daily.  90 tablet 3    semaglutide (OZEMPIC) 0.25 mg or 0.5 mg(2 mg/1.5 mL) pen injector Inject 0.25 mg into the skin every 7 days.  1 pen 5    tacrolimus (PROTOPIC) 0.1 % ointment Apply topically 2 (two) times daily. For use on face and body. Not a steroid  100 g 2    diclofenac sodium (VOLTAREN) 1 % Gel Apply 2 g topically once daily. (Patient not taking: Reported on 12/8/2022)  150 g 0    diphth,pertus,acell,,tetanus (BOOSTRIX TDAP) 2.5-8-5 Lf-mcg-Lf/0.5mL Syrg injection Inject into the muscle. (Patient not taking: Reported on 12/8/2022)  0.5 mL 0    diphth,pertus,acell,,tetanus (BOOSTRIX TDAP) 2.5-8-5 Lf-mcg-Lf/0.5mL Syrg injection Inject into the muscle. (Patient not taking: Reported on 12/8/2022)  0.5 mL 0    fluconazole (DIFLUCAN) 150 MG Tab Take 1 tablet (150 mg total) by mouth once daily. for 1 day  1 tablet 0    miconazole nitrate 2% (MICOTIN) 2 % Oint Apply topically 2 (two) times daily. (Patient not taking: Reported on 12/8/2022)   0    mupirocin (BACTROBAN) 2 % ointment Apply topically 2 (two) times daily. (Patient not taking: Reported on 12/8/2022)  15 g 0    nitrofurantoin, macrocrystal-monohydrate, (MACROBID) 100 MG capsule Take 1 capsule (100 mg total) by mouth 2 (two) times daily.  10 capsule 0    polyethylene glycol (GOLYTELY) 236-22.74-6.74 -5.86 gram suspension  "Take 4,000 mLs by mouth once.       sars-cov-2, covid-19 omicron, (MODERNA COVID-19) 50 mcg/0.5 mL injection Inject into the muscle. (Patient not taking: Reported on 12/8/2022)  0.5 mL 0    sars-cov-2, covid-19, (MODERNA COVID-19) 50 mcg/0.25 ml injection (BOOSTER) Inject into the muscle. (Patient not taking: Reported on 12/8/2022)  0.25 mL 0     No facility-administered encounter medications on file as of 12/8/2022.       Allergies:  Review of patient's allergies indicates:   Allergen Reactions    Cortisone Hives and Swelling     Swelling of the tongue       Health Maintenance:  Health Maintenance   Topic Date Due    Mammogram  07/25/2023    Lipid Panel  03/23/2027    DEXA Scan  04/18/2032    TETANUS VACCINE  05/23/2032    Hepatitis C Screening  Completed     Health Maintenance Topics with due status: Not Due       Topic Last Completion Date    Hemoglobin A1c (Prediabetes) 03/23/2022    Lipid Panel 03/23/2022    DEXA Scan 04/18/2022    TETANUS VACCINE 05/23/2022    Colorectal Cancer Screening 06/06/2022    Mammogram 07/25/2022       Physical Exam      Vital Signs  Pulse: 83  SpO2: 98 %  BP: 124/88  BP Location: Right arm  Patient Position: Sitting  Pain Score:   5  Pain Loc: Knee  Height and Weight  Height: 5' 4.5" (163.8 cm)  Weight: (!) 138.6 kg (305 lb 8.9 oz)  BSA (Calculated - sq m): 2.51 sq meters  BMI (Calculated): 51.7  Weight in (lb) to have BMI = 25: 147.6]    Physical Exam  Vitals reviewed.   Constitutional:       Appearance: Normal appearance. She is obese.   HENT:      Head: Normocephalic and atraumatic.   Eyes:      Extraocular Movements: Extraocular movements intact.      Conjunctiva/sclera: Conjunctivae normal.      Pupils: Pupils are equal, round, and reactive to light.   Cardiovascular:      Rate and Rhythm: Normal rate and regular rhythm.      Pulses: Normal pulses.      Heart sounds: Normal heart sounds.   Pulmonary:      Effort: Pulmonary effort is normal.      Breath sounds: Normal breath " sounds.   Abdominal:      General: Abdomen is flat. Bowel sounds are normal.      Palpations: Abdomen is soft.   Musculoskeletal:      Cervical back: Normal range of motion and neck supple.      Comments: + ambulates with assistance of a walker   Skin:     General: Skin is warm and dry.   Neurological:      General: No focal deficit present.      Mental Status: She is alert and oriented to person, place, and time. Mental status is at baseline.   Psychiatric:         Mood and Affect: Mood normal.         Behavior: Behavior normal.         Thought Content: Thought content normal.         Judgment: Judgment normal.          Assessment/Plan     Katelyn Huynh is a 72 y.o.female with:    Urinary tract infection without hematuria, site unspecified  -     Urinalysis, Reflex to Urine Culture Urine, Clean Catch  -     nitrofurantoin, macrocrystal-monohydrate, (MACROBID) 100 MG capsule; Take 1 capsule (100 mg total) by mouth 2 (two) times daily.  Dispense: 10 capsule; Refill: 0    Acute vaginitis  -     fluconazole (DIFLUCAN) 150 MG Tab; Take 1 tablet (150 mg total) by mouth once daily. for 1 day  Dispense: 1 tablet; Refill: 0      As above, continue current medications and maintain follow up with specialists.  Return to clinic as needed.    I spent 19 minutes on the day of this encounter for preparing, evaluating, examining, treating, and discussing plan of care with this patient.  Greater than 50% of this time was spent face to face with patient.  All questions were answered to patient's satisfaction.           Nadja Rice, NP-C  [facility]

## 2022-12-09 ENCOUNTER — PATIENT MESSAGE (OUTPATIENT)
Dept: RHEUMATOLOGY | Facility: CLINIC | Age: 72
End: 2022-12-09

## 2022-12-09 LAB
BACTERIA #/AREA URNS AUTO: ABNORMAL /HPF
BILIRUB UR QL STRIP: NEGATIVE
CAOX CRY UR QL COMP ASSIST: ABNORMAL
CLARITY UR REFRACT.AUTO: ABNORMAL
COLOR UR AUTO: YELLOW
GLUCOSE UR QL STRIP: NEGATIVE
HGB UR QL STRIP: ABNORMAL
KETONES UR QL STRIP: NEGATIVE
LEUKOCYTE ESTERASE UR QL STRIP: ABNORMAL
MICROSCOPIC COMMENT: ABNORMAL
NITRITE UR QL STRIP: POSITIVE
PH UR STRIP: 6 [PH] (ref 5–8)
PROT UR QL STRIP: NEGATIVE
RBC #/AREA URNS AUTO: 14 /HPF (ref 0–4)
SP GR UR STRIP: 1.02 (ref 1–1.03)
SQUAMOUS #/AREA URNS AUTO: 17 /HPF
URN SPEC COLLECT METH UR: ABNORMAL
WBC #/AREA URNS AUTO: >100 /HPF (ref 0–5)
WBC CLUMPS UR QL AUTO: ABNORMAL

## 2022-12-12 LAB — BACTERIA UR CULT: ABNORMAL

## 2023-01-03 NOTE — PT/OT/SLP PROGRESS
Physical Therapy Treatment    Patient Name:  Katelyn Huynh   MRN:  [id number]    Recommendations:     Discharge Recommendations:  home with home health   Discharge Equipment Recommendations: walker, rolling, bedside commode   Barriers to discharge: None    Assessment:     Katelyn Huynh is a 67 y.o. female admitted with a medical diagnosis of Primary osteoarthritis of left knee.  She presents with the following impairments/functional limitations:  weakness, impaired endurance, impaired self care skills, gait instability, impaired balance, decreased lower extremity function, decreased ROM, pain, decreased safety awareness, impaired joint extensibility.    -Pt progressing well with therapy POC. Pt tolerated session well with no complications. Pt with no LOB during ambulation and t/f's using Rw for support. Pt demonstrated good quad activation during supine exercises with minimal increase in pain. Pt with no instability during WBing on surgical LE. Pt will cont to benefit from skilled therapy services and is appropriate for d/c with HHPT after PM therapy session. Pt's DME needs are a rolling walker and beside commode prior to d/c home.    Rehab Prognosis:  Good; patient would benefit from acute skilled PT services to address these deficits and reach maximum level of function.      Recent Surgery: Procedure(s) (LRB):  REPLACEMENT-KNEE-TOTAL (Left) 1 Day Post-Op    Plan:     During this hospitalization, patient to be seen BID to address the above listed problems via gait training, therapeutic activities, neuromuscular re-education, therapeutic exercises  · Plan of Care Expires:  03/10/18   Plan of Care Reviewed with: patient    Subjective     Communicated with nsg prior to session.  Patient found supine upon PT entry to room, agreeable to treatment.      Chief Complaint: impaired mobility  Patient comments/goals: return home to [facility]  Pain/Comfort:  · Pain Rating 1: 3/10  · Location - Side 1:  Left  · Location - Orientation 1: anterior  · Location 1: knee  · Pain Rating Post-Intervention 1: 5/10    Patients cultural, spiritual, Yarsanism conflicts given the current situation: none stated    Objective:     Patient found with: cryotherapy, perineural catheter, FCD     General Precautions: Standard, fall   Orthopedic Precautions:LLE weight bearing as tolerated   Braces: N/A     Functional Mobility:  · Bed Mobility:     · Scooting: stand by assistance  · Supine to Sit: stand by assistance  · Sit to Supine: stand by assistance  · Transfers:     · Sit to Stand:  minimum assistance with rolling walker  · Toilet Transfer: minimum assistance with  rolling walker  using  Stand Pivot  · Gait: 100' x1 trial, 75' x1 trial using RW with no LOB  · Balance: Pt with good standing/sitting balance.      AM-PAC 6 CLICK MOBILITY  Turning over in bed (including adjusting bedclothes, sheets and blankets)?: 4  Sitting down on and standing up from a chair with arms (e.g., wheelchair, bedside commode, etc.): 3  Moving from lying on back to sitting on the side of the bed?: 4  Moving to and from a bed to a chair (including a wheelchair)?: 3  Need to walk in hospital room?: 4  Climbing 3-5 steps with a railing?: 3  Total Score: 21       Therapeutic Activities and Exercises:   -Pt performed TKR protocol exercises x15-20 reps of:  A. AP  B. GS  C. QS  D. SAQ-AAROM  E. Heel slides-AAROM  F. Hip abd/add-AAROM  G. SLR-AAROM  H. LAQ-AAROM    -Pt educated on:  A. PT POC and role of PT  B. Importance of OOB activity to improve functional outcomes after surgery  C. S/s associated with TKR procedure  D. Importance of re-gaining ROM early in acute phase  E. DME mgmt and gait/transfer sequencing  F. Performing HEP to reduce risk of developing blood clots  G. Car t/f      Patient left up in chair with all lines intact, call button in reach and nsg notified..    GOALS:    Physical Therapy Goals        Problem: Physical Therapy Goal    Goal  Priority Disciplines Outcome Goal Variances Interventions   Physical Therapy Goal     PT/OT, PT Ongoing (interventions implemented as appropriate)     Description:  Goals to be met by: 18     Patient will increase functional independence with mobility by performin. Supine to sit with Supervision  2. Sit to supine with Supervision  3. Sit to stand transfer with Stand-by Assistance  4. Gait  x 150 feet with Stand-by Assistance using Rolling Walker.   5. Lower extremity exercise program x 30 reps per handout, with independence                      Time Tracking:     PT Received On: 02/10/18  PT Start Time: 731     PT Stop Time: 40  PT Total Time (min): 69 min     Billable Minutes: Gait Training 23, Therapeutic Activity 23 and Therapeutic Exercise 23    Treatment Type: Treatment  PT/PTA: PT           Ian Rodriguez, PT  02/10/2018   Location Indication Override (Is Already Calculated Based On Selected Body Location): Area H

## 2023-01-30 NOTE — ASSESSMENT & PLAN NOTE
Ms. Katelyn Huynh is a 67 year old woman with HTN and arthritis who is s/p left TKA on 2/9 with surgical wound dehiscence.   Per patient she was initially discharged home and subsequently fell, fracturing her left wrist. She denies falling forward onto the left knee.  She subsequently was admitted to SNF.  Wound had had mild serous drainage since surgery.   Sutures removed on 2/21 and wound opened. She underwent I&D, poly exchange and quad tendon repair yesterday 2/24.  Currently on empiric vancomycin and ceftriaxone.  Operative cultures are pending. Gram stain negative.   Afebrile, no leukocytosis, hemodynamically stable.  CRP 23.2, ESR 52     -  Continue empiric IV vancomycin and ceftriaxone for now pending culture results.  Increase vancomycin to 1500 q 12 (done)   -  Will follow cultures and adjust abx accordingly.    -  Will follow up tomorrow with final recommendations.     Patient seen by, and plan discussed with, ID staff  Discussed with Primary Team  
Home meds  
Katelyn Huynh is a 66 yo female s/p left TKA I&D and quad tendon repair    - WBAT in hinged knee brace locked in extension  - vanc, ceftriaxone   - ID consult  - Follow cultures  - drain: 135cc; DC today  - labs: ESR, CRP, PC pending  - Dispo: likely SNF again when ready  
Katelyn Huynh is a 66 yo female s/p left TKA I&D and quad tendon repair    - WBAT in hinged knee brace locked in extension  - vanc, ceftriaxone   - ID consult  - Follow cultures; NGTD  - drain: removed  - labs: reviewed  - Dispo: SNF pending insurance approval  
Katelyn Huynh is a 66 yo female s/p left TKA with wound dehiscence    - To OR today for I&D and possible poly exchange of left knee  - Will continue ancef postop  - NPO   
Katelyn Huynh is a 68 yo female s/p left TKA I&D and quad tendon repair    - WBAT in hinged knee brace locked in extension  - vanc, ceftriaxone   - ID consult  - Follow cultures  - Dispo: likely SNF again when ready  
Katelyn Huynh is a 68 yo female s/p left TKA I&D and quad tendon repair    - WBAT in hinged knee brace locked in extension  - vanc, ceftriaxone   - ID consult  - Follow cultures; NGTD  - drain: removed  - labs: reviewed  - Dispo: SNF pending insurance approval  
Katelyn Huynh is a 68 yo female s/p left TKA with wound dehiscence    - Admit to orthopedics  - To OR tomorrow for I&D and possible poly exchange of left knee  - Ancef ordered q6  - NPO midnight    Procedure Note: left knee irrigation  Patient was explained risks, benefits, and alternatives to treatment and verbalized consent to proceed. Time out was performed and patient name, , site, and procedure were confirmed. Wound was thoroughly irrigated with 2 L of normal saline. Wound was covered with betadine soaked gauze. Soft dressings were applied of ABD, cast padding, and ACE bandage.  
Ms. Katelyn Huynh is a 67 year old woman with HTN and arthritis who is s/p left TKA on 2/9 with surgical wound dehiscence.   Per patient she was initially discharged home and subsequently fell, fracturing her left wrist. She denies falling forward onto the left knee.  She subsequently was admitted to SNF.  Wound had had mild serous drainage since surgery.   Sutures removed on 2/21 and wound opened. She underwent I&D, poly exchange and quad tendon repair on 2/24.  Currently on empiric vancomycin and ceftriaxone.  Operative cultures are NGTD; Gram stain negative.   Afebrile, no leukocytosis, hemodynamically stable.    - CRP up to 106 today from 23 two days ago (post-op?)  - ESR 61  - procalcitonin normal  - afebrile, no leukocytosis, clinically stable    Plan:  1. Continue empiric IV vancomycin 1500 mg IV q 12 hours and ceftriaxone 2 gram IV q 24 hours; vanc trough goal: 15-20  2. Recommend 2 weeks of therapy with f/u in ID clinic in 2 weeks to assess if therapy to be extended (estimated end date: 3/10/18)  3. Would monitor weekly CBC, CMP, ESR, CRP and vanc trough while on therapy   4. F/u in ID clinic around 3/10 for end of antibiotic therapy  5. Will sign off    
Removed sutures  Placed in short arm cast  
Splint in place  
No lymphadedenopathy

## 2023-03-15 ENCOUNTER — PATIENT MESSAGE (OUTPATIENT)
Dept: PRIMARY CARE CLINIC | Facility: CLINIC | Age: 73
End: 2023-03-15
Payer: MEDICARE

## 2023-03-15 DIAGNOSIS — I10 ESSENTIAL HYPERTENSION: ICD-10-CM

## 2023-03-15 DIAGNOSIS — R73.03 PREDIABETES: ICD-10-CM

## 2023-03-15 RX ORDER — HYDROCHLOROTHIAZIDE 50 MG/1
50 TABLET ORAL DAILY
Qty: 90 TABLET | Refills: 3 | Status: SHIPPED | OUTPATIENT
Start: 2023-03-15 | End: 2024-02-19

## 2023-03-15 RX ORDER — SEMAGLUTIDE 1.34 MG/ML
0.25 INJECTION, SOLUTION SUBCUTANEOUS
Qty: 1 EACH | Refills: 5 | OUTPATIENT
Start: 2023-03-15 | End: 2024-03-14

## 2023-03-15 RX ORDER — LOSARTAN POTASSIUM 100 MG/1
100 TABLET ORAL DAILY
Qty: 90 TABLET | Refills: 3 | Status: SHIPPED | OUTPATIENT
Start: 2023-03-15 | End: 2024-04-01

## 2023-03-16 RX ORDER — SEMAGLUTIDE 1.34 MG/ML
0.25 INJECTION, SOLUTION SUBCUTANEOUS
Qty: 0.75 ML | Refills: 2 | Status: SHIPPED | OUTPATIENT
Start: 2023-03-16 | End: 2023-04-26 | Stop reason: SDUPTHER

## 2023-04-26 ENCOUNTER — LAB VISIT (OUTPATIENT)
Dept: LAB | Facility: HOSPITAL | Age: 73
End: 2023-04-26
Attending: STUDENT IN AN ORGANIZED HEALTH CARE EDUCATION/TRAINING PROGRAM
Payer: MEDICARE

## 2023-04-26 ENCOUNTER — OFFICE VISIT (OUTPATIENT)
Dept: PRIMARY CARE CLINIC | Facility: CLINIC | Age: 73
End: 2023-04-26
Payer: MEDICARE

## 2023-04-26 VITALS
OXYGEN SATURATION: 99 % | WEIGHT: 293 LBS | SYSTOLIC BLOOD PRESSURE: 124 MMHG | HEART RATE: 87 BPM | DIASTOLIC BLOOD PRESSURE: 84 MMHG | HEIGHT: 65 IN | BODY MASS INDEX: 48.82 KG/M2 | TEMPERATURE: 98 F

## 2023-04-26 DIAGNOSIS — E66.01 CLASS 3 SEVERE OBESITY DUE TO EXCESS CALORIES WITH SERIOUS COMORBIDITY AND BODY MASS INDEX (BMI) OF 50.0 TO 59.9 IN ADULT: ICD-10-CM

## 2023-04-26 DIAGNOSIS — I10 ESSENTIAL HYPERTENSION: ICD-10-CM

## 2023-04-26 DIAGNOSIS — M17.12 PRIMARY OSTEOARTHRITIS OF LEFT KNEE: ICD-10-CM

## 2023-04-26 DIAGNOSIS — M25.562 CHRONIC PAIN OF LEFT KNEE: ICD-10-CM

## 2023-04-26 DIAGNOSIS — G89.29 CHRONIC PAIN OF LEFT KNEE: ICD-10-CM

## 2023-04-26 DIAGNOSIS — R73.03 PREDIABETES: ICD-10-CM

## 2023-04-26 DIAGNOSIS — R26.89 IMPAIRED GAIT AND MOBILITY: ICD-10-CM

## 2023-04-26 DIAGNOSIS — I10 ESSENTIAL HYPERTENSION: Primary | ICD-10-CM

## 2023-04-26 LAB
ANION GAP SERPL CALC-SCNC: 8 MMOL/L (ref 8–16)
BUN SERPL-MCNC: 12 MG/DL (ref 8–23)
CALCIUM SERPL-MCNC: 9.6 MG/DL (ref 8.7–10.5)
CHLORIDE SERPL-SCNC: 102 MMOL/L (ref 95–110)
CO2 SERPL-SCNC: 30 MMOL/L (ref 23–29)
CREAT SERPL-MCNC: 0.9 MG/DL (ref 0.5–1.4)
EST. GFR  (NO RACE VARIABLE): >60 ML/MIN/1.73 M^2
GLUCOSE SERPL-MCNC: 97 MG/DL (ref 70–110)
POTASSIUM SERPL-SCNC: 4.1 MMOL/L (ref 3.5–5.1)
SODIUM SERPL-SCNC: 140 MMOL/L (ref 136–145)

## 2023-04-26 PROCEDURE — 1101F PT FALLS ASSESS-DOCD LE1/YR: CPT | Mod: HCNC,CPTII,S$GLB, | Performed by: STUDENT IN AN ORGANIZED HEALTH CARE EDUCATION/TRAINING PROGRAM

## 2023-04-26 PROCEDURE — 3079F DIAST BP 80-89 MM HG: CPT | Mod: HCNC,CPTII,S$GLB, | Performed by: STUDENT IN AN ORGANIZED HEALTH CARE EDUCATION/TRAINING PROGRAM

## 2023-04-26 PROCEDURE — 99397 PR PREVENTIVE VISIT,EST,65 & OVER: ICD-10-PCS | Mod: HCNC,S$GLB,, | Performed by: STUDENT IN AN ORGANIZED HEALTH CARE EDUCATION/TRAINING PROGRAM

## 2023-04-26 PROCEDURE — 3288F FALL RISK ASSESSMENT DOCD: CPT | Mod: HCNC,CPTII,S$GLB, | Performed by: STUDENT IN AN ORGANIZED HEALTH CARE EDUCATION/TRAINING PROGRAM

## 2023-04-26 PROCEDURE — 99999 PR PBB SHADOW E&M-EST. PATIENT-LVL V: ICD-10-PCS | Mod: PBBFAC,HCNC,, | Performed by: STUDENT IN AN ORGANIZED HEALTH CARE EDUCATION/TRAINING PROGRAM

## 2023-04-26 PROCEDURE — 3008F PR BODY MASS INDEX (BMI) DOCUMENTED: ICD-10-PCS | Mod: HCNC,CPTII,S$GLB, | Performed by: STUDENT IN AN ORGANIZED HEALTH CARE EDUCATION/TRAINING PROGRAM

## 2023-04-26 PROCEDURE — 99999 PR PBB SHADOW E&M-EST. PATIENT-LVL V: CPT | Mod: PBBFAC,HCNC,, | Performed by: STUDENT IN AN ORGANIZED HEALTH CARE EDUCATION/TRAINING PROGRAM

## 2023-04-26 PROCEDURE — 99397 PER PM REEVAL EST PAT 65+ YR: CPT | Mod: HCNC,S$GLB,, | Performed by: STUDENT IN AN ORGANIZED HEALTH CARE EDUCATION/TRAINING PROGRAM

## 2023-04-26 PROCEDURE — 4010F PR ACE/ARB THEARPY RXD/TAKEN: ICD-10-PCS | Mod: HCNC,CPTII,S$GLB, | Performed by: STUDENT IN AN ORGANIZED HEALTH CARE EDUCATION/TRAINING PROGRAM

## 2023-04-26 PROCEDURE — 1160F PR REVIEW ALL MEDS BY PRESCRIBER/CLIN PHARMACIST DOCUMENTED: ICD-10-PCS | Mod: HCNC,CPTII,S$GLB, | Performed by: STUDENT IN AN ORGANIZED HEALTH CARE EDUCATION/TRAINING PROGRAM

## 2023-04-26 PROCEDURE — 3079F PR MOST RECENT DIASTOLIC BLOOD PRESSURE 80-89 MM HG: ICD-10-PCS | Mod: HCNC,CPTII,S$GLB, | Performed by: STUDENT IN AN ORGANIZED HEALTH CARE EDUCATION/TRAINING PROGRAM

## 2023-04-26 PROCEDURE — 3074F PR MOST RECENT SYSTOLIC BLOOD PRESSURE < 130 MM HG: ICD-10-PCS | Mod: HCNC,CPTII,S$GLB, | Performed by: STUDENT IN AN ORGANIZED HEALTH CARE EDUCATION/TRAINING PROGRAM

## 2023-04-26 PROCEDURE — 3008F BODY MASS INDEX DOCD: CPT | Mod: HCNC,CPTII,S$GLB, | Performed by: STUDENT IN AN ORGANIZED HEALTH CARE EDUCATION/TRAINING PROGRAM

## 2023-04-26 PROCEDURE — 1159F PR MEDICATION LIST DOCUMENTED IN MEDICAL RECORD: ICD-10-PCS | Mod: HCNC,CPTII,S$GLB, | Performed by: STUDENT IN AN ORGANIZED HEALTH CARE EDUCATION/TRAINING PROGRAM

## 2023-04-26 PROCEDURE — 3288F PR FALLS RISK ASSESSMENT DOCUMENTED: ICD-10-PCS | Mod: HCNC,CPTII,S$GLB, | Performed by: STUDENT IN AN ORGANIZED HEALTH CARE EDUCATION/TRAINING PROGRAM

## 2023-04-26 PROCEDURE — 80048 BASIC METABOLIC PNL TOTAL CA: CPT | Mod: HCNC | Performed by: STUDENT IN AN ORGANIZED HEALTH CARE EDUCATION/TRAINING PROGRAM

## 2023-04-26 PROCEDURE — 1160F RVW MEDS BY RX/DR IN RCRD: CPT | Mod: HCNC,CPTII,S$GLB, | Performed by: STUDENT IN AN ORGANIZED HEALTH CARE EDUCATION/TRAINING PROGRAM

## 2023-04-26 PROCEDURE — 36415 COLL VENOUS BLD VENIPUNCTURE: CPT | Mod: HCNC,PN | Performed by: STUDENT IN AN ORGANIZED HEALTH CARE EDUCATION/TRAINING PROGRAM

## 2023-04-26 PROCEDURE — 1125F PR PAIN SEVERITY QUANTIFIED, PAIN PRESENT: ICD-10-PCS | Mod: HCNC,CPTII,S$GLB, | Performed by: STUDENT IN AN ORGANIZED HEALTH CARE EDUCATION/TRAINING PROGRAM

## 2023-04-26 PROCEDURE — 1125F AMNT PAIN NOTED PAIN PRSNT: CPT | Mod: HCNC,CPTII,S$GLB, | Performed by: STUDENT IN AN ORGANIZED HEALTH CARE EDUCATION/TRAINING PROGRAM

## 2023-04-26 PROCEDURE — 1159F MED LIST DOCD IN RCRD: CPT | Mod: HCNC,CPTII,S$GLB, | Performed by: STUDENT IN AN ORGANIZED HEALTH CARE EDUCATION/TRAINING PROGRAM

## 2023-04-26 PROCEDURE — 4010F ACE/ARB THERAPY RXD/TAKEN: CPT | Mod: HCNC,CPTII,S$GLB, | Performed by: STUDENT IN AN ORGANIZED HEALTH CARE EDUCATION/TRAINING PROGRAM

## 2023-04-26 PROCEDURE — 1101F PR PT FALLS ASSESS DOC 0-1 FALLS W/OUT INJ PAST YR: ICD-10-PCS | Mod: HCNC,CPTII,S$GLB, | Performed by: STUDENT IN AN ORGANIZED HEALTH CARE EDUCATION/TRAINING PROGRAM

## 2023-04-26 PROCEDURE — 3074F SYST BP LT 130 MM HG: CPT | Mod: HCNC,CPTII,S$GLB, | Performed by: STUDENT IN AN ORGANIZED HEALTH CARE EDUCATION/TRAINING PROGRAM

## 2023-04-26 PROCEDURE — 99215 OFFICE O/P EST HI 40 MIN: CPT | Mod: PBBFAC,PN | Performed by: STUDENT IN AN ORGANIZED HEALTH CARE EDUCATION/TRAINING PROGRAM

## 2023-04-26 RX ORDER — LANCETS
EACH MISCELLANEOUS
Qty: 200 EACH | Refills: 3 | Status: SHIPPED | OUTPATIENT
Start: 2023-04-26

## 2023-04-26 NOTE — PROGRESS NOTES
Primary Care  Return/Acute Office Visit - In Person  4/26/2023  Mike Ndhlovu      [facility]    Patient is a 72 y.o.   Katleyn Huynh  has a past medical history of Arthritis, Cataract, Glaucoma, History of iritis, Hypertension, Meningioma, and Uveitis.    Patient presents with   Chief Complaint   Patient presents with    Annual Exam     Patient has had difficulty with raising left leg, reports history of quadriceps tear     Social History     Social History Narrative    Not on file     Katelyn Huynh family history includes Cataracts in her maternal aunt, mother, and sister; Diabetes in her father, maternal aunt, mother, and sister; Glaucoma in her brother, maternal aunt, maternal grandfather, maternal uncle, and mother; Heart disease in her father and mother; Hypertension in her father and mother; No Known Problems in her maternal grandmother, paternal aunt, paternal grandfather, paternal grandmother, and paternal uncle; Stroke in her sister.    Active Medications:  Review of patient's allergies indicates:   Allergen Reactions    Cortisone Hives and Swelling     Swelling of the tongue     Current Outpatient Medications   Medication Instructions    alcohol swabs (ALCOHOL WIPES) PadM Monitor X2 daily as directed. Per insurance coverage.    ASCORBATE CALCIUM (VITAMIN C ORAL) Oral, Daily    blood sugar diagnostic Strp Test blood sugar twice daily. Per insurance coverage.    blood-glucose meter kit Monitor X2 daily as directed. Per insurance coverage.    cetirizine (ZYRTEC) 10 mg, Oral, Daily    clobetasol 0.05% (TEMOVATE) 0.05 % Oint Topical (Top), 2 times daily, For use on body. STEROID. Use for up to 2 weeks then decrease to 3 times weekly    diclofenac sodium (VOLTAREN) 2 g, Topical (Top), Daily    diphth,pertus,acell,,tetanus (BOOSTRIX TDAP) 2.5-8-5 Lf-mcg-Lf/0.5mL Syrg injection Intramuscular    diphth,pertus,acell,,tetanus (BOOSTRIX TDAP) 2.5-8-5 Lf-mcg-Lf/0.5mL Syrg injection  "Intramuscular    hydroCHLOROthiazide (HYDRODIURIL) 50 mg, Oral, Daily    lancets 28 gauge Misc Test blood sugar twice daily    losartan (COZAAR) 100 mg, Oral, Daily    miconazole nitrate 2% (MICOTIN) 2 % Oint Topical (Top), 2 times daily    mupirocin (BACTROBAN) 2 % ointment Topical (Top), 2 times daily    OZEMPIC 0.25 mg, Subcutaneous, Every 7 days    polyethylene glycol (GOLYTELY) 236-22.74-6.74 -5.86 gram suspension 4,000 mLs, Oral, Once    tacrolimus (PROTOPIC) 0.1 % ointment Topical (Top), 2 times daily, For use on face and body. Not a steroid       Review of Systems   Musculoskeletal:  Positive for joint pain.   All other systems reviewed and are negative.    Vitals:    04/26/23 0957   BP: 124/84   BP Location: Right arm   Pulse: 87   Temp: 98.1 °F (36.7 °C)   SpO2: 99%   Weight: 134.3 kg (296 lb 1.2 oz)   Height: 5' 4.5" (1.638 m)       Physical Exam  Vitals reviewed.   Constitutional:       General: She is not in acute distress.  Cardiovascular:      Rate and Rhythm: Normal rate and regular rhythm.      Pulses: Normal pulses.      Heart sounds: Normal heart sounds.   Pulmonary:      Effort: Pulmonary effort is normal.      Breath sounds: Normal breath sounds.   Abdominal:      General: Abdomen is flat. Bowel sounds are normal.      Palpations: Abdomen is soft.   Musculoskeletal:      Left knee: Tenderness present.      Right lower leg: No edema.      Left lower leg: No edema.   Neurological:      General: No focal deficit present.      Mental Status: She is oriented to person, place, and time.        Assessment and Plan     Essential HTN   Well controlled   Continue HCTZ 50 mg daily and losartan 100 mg daily  F/u BMP      Class 3 obesity   20 lb weight loss over the past one year   Patient currently on Ozempic 0.25 mg weekly   F/u BMP      Prediabetes   Well controlled with diet and Ozempic     Chronic b/l knee pain   Referral placed to orthopedics at patient request           Orders Placed This " Visit  Orders Placed This Encounter   Procedures    Basic Metabolic Panel     Standing Status:   Future     Number of Occurrences:   1     Standing Expiration Date:   6/24/2024    Ambulatory referral/consult to Orthopedics     Standing Status:   Future     Standing Expiration Date:   5/26/2024     Referral Priority:   Routine     Referral Type:   Consultation     Requested Specialty:   Orthopedic Surgery     Number of Visits Requested:   1           Upcoming Scheduled Appointments and Follow Up:    Future Appointments   Date Time Provider Department Center   4/26/2023 11:15 AM [facility]   10/25/2023  9:30 AM Mike Almanzar MD [facility]       Follow Up [facility] (with who? when?): Follow up in about 6 months (around 10/26/2023).      Extended Emergency Contact Information  Primary Emergency Contact: LinoVenkatesh  Address: [address] [place] [id number] [facility]  Home Phone: [phone]  Mobile Phone: [phone]  Relation: Spouse  Preferred language: English  Secondary Emergency Contact: Darrick Martines   [facility]  Mobile Phone: [phone]  Relation: Natanael Almanzar MD   Attending Physician  Primary Care  4/26/2023 - 10:14 AM    I spent a total of 15 minutes on the day of the visit.This includes face to face time and non-face to face time preparing to see the patient (eg, review of tests), obtaining and/or reviewing separately obtained history, documenting clinical information in the electronic or other health record, independently interpreting results and communicating results to the patient/family/caregiver, or care coordinator.

## 2023-05-01 ENCOUNTER — TELEPHONE (OUTPATIENT)
Dept: SPORTS MEDICINE | Facility: CLINIC | Age: 73
End: 2023-05-01
Payer: MEDICARE

## 2023-05-01 DIAGNOSIS — R52 PAIN: Primary | ICD-10-CM

## 2023-05-01 NOTE — TELEPHONE ENCOUNTER
Spoke to the patient in regards to her appointment on 5/4 with Ann Londono PA-C. While on the call the patient informed me she was coming in for bilateral knee pain. She reports bilateral knee replacements. The patient was uncertain of the actual dates of the replacements. She reports left knee replacement approximately 2015. She stated she will find the records with actual dates. I informed the patient that [facility] doesn't see second opinion surgeries. I also informed the patient I will speak with [facility] regarding her appointment and call her back to schedule her with the correct provider.

## 2023-05-18 ENCOUNTER — TELEPHONE (OUTPATIENT)
Dept: PRIMARY CARE CLINIC | Facility: CLINIC | Age: 73
End: 2023-05-18
Payer: MEDICARE

## 2023-05-18 NOTE — TELEPHONE ENCOUNTER
Per pt, according to home urine kit, an abn reading showed possible uti infection. Is an appt needed to assess??

## 2023-05-18 NOTE — TELEPHONE ENCOUNTER
"----- Message from Izabela Larios sent at 5/18/2023  3:22 PM CDT -----  "Type:  Patient Call Back    Who Called:Pt    What is the reqeust in detail:Pt requesting call back she took a test and it shows she has an UTI and would like to know if doctor can prescribe some medication. Please advise    Can the clinic reply by [facility]?no    Best Call Back Number:[phone]      Additional Information:            "

## 2023-05-19 RX ORDER — NITROFURANTOIN 25; 75 MG/1; MG/1
100 CAPSULE ORAL 2 TIMES DAILY
Qty: 10 CAPSULE | Refills: 0 | Status: SHIPPED | OUTPATIENT
Start: 2023-05-19 | End: 2023-05-24

## 2023-06-02 DIAGNOSIS — R73.03 PREDIABETES: ICD-10-CM

## 2023-06-13 ENCOUNTER — OFFICE VISIT (OUTPATIENT)
Dept: ORTHOPEDICS | Facility: CLINIC | Age: 73
End: 2023-06-13
Payer: MEDICARE

## 2023-06-13 ENCOUNTER — TELEPHONE (OUTPATIENT)
Dept: ORTHOPEDICS | Facility: CLINIC | Age: 73
End: 2023-06-13
Payer: MEDICARE

## 2023-06-13 ENCOUNTER — HOSPITAL ENCOUNTER (OUTPATIENT)
Dept: RADIOLOGY | Facility: HOSPITAL | Age: 73
Discharge: HOME | End: 2023-06-13
Attending: PHYSICIAN ASSISTANT
Payer: MEDICARE

## 2023-06-13 VITALS — BODY MASS INDEX: 48.82 KG/M2 | WEIGHT: 293 LBS | HEIGHT: 65 IN

## 2023-06-13 DIAGNOSIS — Z96.653 S/P TOTAL KNEE REPLACEMENT, BILATERAL: Primary | ICD-10-CM

## 2023-06-13 DIAGNOSIS — R52 PAIN: ICD-10-CM

## 2023-06-13 PROCEDURE — 1125F AMNT PAIN NOTED PAIN PRSNT: CPT | Mod: CPTII,S$GLB,, | Performed by: ORTHOPAEDIC SURGERY

## 2023-06-13 PROCEDURE — 99999 PR PBB SHADOW E&M-EST. PATIENT-LVL IV: ICD-10-PCS | Mod: PBBFAC,,, | Performed by: ORTHOPAEDIC SURGERY

## 2023-06-13 PROCEDURE — 3288F FALL RISK ASSESSMENT DOCD: CPT | Mod: CPTII,S$GLB,, | Performed by: ORTHOPAEDIC SURGERY

## 2023-06-13 PROCEDURE — 1160F RVW MEDS BY RX/DR IN RCRD: CPT | Mod: CPTII,S$GLB,, | Performed by: ORTHOPAEDIC SURGERY

## 2023-06-13 PROCEDURE — 4010F PR ACE/ARB THEARPY RXD/TAKEN: ICD-10-PCS | Mod: CPTII,S$GLB,, | Performed by: ORTHOPAEDIC SURGERY

## 2023-06-13 PROCEDURE — 1125F PR PAIN SEVERITY QUANTIFIED, PAIN PRESENT: ICD-10-PCS | Mod: CPTII,S$GLB,, | Performed by: ORTHOPAEDIC SURGERY

## 2023-06-13 PROCEDURE — 1101F PR PT FALLS ASSESS DOC 0-1 FALLS W/OUT INJ PAST YR: ICD-10-PCS | Mod: CPTII,S$GLB,, | Performed by: ORTHOPAEDIC SURGERY

## 2023-06-13 PROCEDURE — 99214 OFFICE O/P EST MOD 30 MIN: CPT | Mod: S$GLB,,, | Performed by: ORTHOPAEDIC SURGERY

## 2023-06-13 PROCEDURE — 1159F PR MEDICATION LIST DOCUMENTED IN MEDICAL RECORD: ICD-10-PCS | Mod: CPTII,S$GLB,, | Performed by: ORTHOPAEDIC SURGERY

## 2023-06-13 PROCEDURE — 99999 PR PBB SHADOW E&M-EST. PATIENT-LVL IV: CPT | Mod: PBBFAC,,, | Performed by: ORTHOPAEDIC SURGERY

## 2023-06-13 PROCEDURE — 1159F MED LIST DOCD IN RCRD: CPT | Mod: CPTII,S$GLB,, | Performed by: ORTHOPAEDIC SURGERY

## 2023-06-13 PROCEDURE — 4010F ACE/ARB THERAPY RXD/TAKEN: CPT | Mod: CPTII,S$GLB,, | Performed by: ORTHOPAEDIC SURGERY

## 2023-06-13 PROCEDURE — 3008F BODY MASS INDEX DOCD: CPT | Mod: CPTII,S$GLB,, | Performed by: ORTHOPAEDIC SURGERY

## 2023-06-13 PROCEDURE — 3288F PR FALLS RISK ASSESSMENT DOCUMENTED: ICD-10-PCS | Mod: CPTII,S$GLB,, | Performed by: ORTHOPAEDIC SURGERY

## 2023-06-13 PROCEDURE — 99214 PR OFFICE/OUTPT VISIT, EST, LEVL IV, 30-39 MIN: ICD-10-PCS | Mod: S$GLB,,, | Performed by: ORTHOPAEDIC SURGERY

## 2023-06-13 PROCEDURE — 1101F PT FALLS ASSESS-DOCD LE1/YR: CPT | Mod: CPTII,S$GLB,, | Performed by: ORTHOPAEDIC SURGERY

## 2023-06-13 PROCEDURE — 73562 X-RAY EXAM OF KNEE 3: CPT | Mod: TC,50

## 2023-06-13 PROCEDURE — 73562 XR KNEE ORTHO BILAT: ICD-10-PCS | Mod: 26,50,, | Performed by: RADIOLOGY

## 2023-06-13 PROCEDURE — 1160F PR REVIEW ALL MEDS BY PRESCRIBER/CLIN PHARMACIST DOCUMENTED: ICD-10-PCS | Mod: CPTII,S$GLB,, | Performed by: ORTHOPAEDIC SURGERY

## 2023-06-13 PROCEDURE — 3008F PR BODY MASS INDEX (BMI) DOCUMENTED: ICD-10-PCS | Mod: CPTII,S$GLB,, | Performed by: ORTHOPAEDIC SURGERY

## 2023-06-13 PROCEDURE — 73562 X-RAY EXAM OF KNEE 3: CPT | Mod: 26,50,, | Performed by: RADIOLOGY

## 2023-06-13 NOTE — TELEPHONE ENCOUNTER
Called and spoke to pt regarding lab results. Pt was pleasant and verbalized understandings.   ----- Message from Sage Springer MD sent at 6/13/2023  1:59 PM CDT -----  You may let her know that infection seems unlikely on her lab work, and I do not think we need to get fluid out of her knee for testing at this time.  ----- Message -----  From: [facility]  Sent: 6/13/2023   1:02 PM CDT  To: Sage Springer MD

## 2023-06-13 NOTE — TELEPHONE ENCOUNTER
Called and spoke to pt regarding lab results.   ----- Message from Seda Monroe sent at 6/13/2023  2:56 PM CDT -----  Regarding: Results/ missed call  Contact: [phone]  Calling back regarding a missed call for pts results. Please call back to discuss

## 2023-06-13 NOTE — PROGRESS NOTES
Subjective:       Patient ID: Katelyn Huynh is a 72 y.o. female.    Chief Complaint:   Pain of the Left Knee and Pain of the Right Knee    Katelyn Huynh is a 72 y.o. female with history of bilateral knee replacements (right knee replacement in 2015 and left knee replacement in 2018 by Dr. Kemal Salinas) presenting for evaluation of bilateral knee pain.  Of note, the patient sustained a left quadriceps tendon rupture in 2019 and surgical intervention was not pursued due to the patient's preference in addition to increased risk with her weight.  She presents today for evaluation of her bilateral knees.  She states that over the past few months she has had new onset knee pain, right worse than left.  She denies any trauma or injury that led to this.  She has been taking ibuprofen, anti-inflammatory gel, ice, and heat with some relief in her pain.  She now mobilizes with her walker due to buckling of her left knee.  She denies recent falls.    Past Medical History:   Diagnosis Date    Arthritis     Cataract     Glaucoma     History of iritis     OD    Hypertension     Meningioma     Uveitis      Past Surgical History:   Procedure Laterality Date    Brain tumor surgery      had a brain tumor removed    CATARACT EXTRACTION      OU    CATARACT EXTRACTION BILATERAL W/ ANTERIOR VITRECTOMY      CHOLECYSTECTOMY      COLONOSCOPY N/A 6/6/2022    Procedure: COLONOSCOPY;  Surgeon: Efren Kiran MD;  Location: [facility] ([address] [place] [address]);  Service: Endoscopy;  Laterality: N/A;  BMI-56  Wt: 329#       vaccinated-GT    CRANIOTOMY      JOINT REPLACEMENT      KNEE SURGERY  2-24-15    right TKR    KNEE SURGERY Left 02/09/2018    TKR    KNEE SURGERY Left 02/24/2018    REVISION    WRIST FRACTURE SURGERY      YAG Right 1/20/2016    Dr. Kaiser     Family History   Problem Relation Age of Onset    Glaucoma Mother     Hypertension Mother     Diabetes Mother     Cataracts Mother     Heart disease Mother      Hypertension Father     Diabetes Father     Heart disease Father     Diabetes Sister     Cataracts Sister     Stroke Sister         minor stroke    Glaucoma Brother     No Known Problems Maternal Grandmother     Glaucoma Maternal Grandfather     No Known Problems Paternal Grandmother     No Known Problems Paternal Grandfather     Glaucoma Maternal Aunt     Diabetes Maternal Aunt     Cataracts Maternal Aunt     Glaucoma Maternal Uncle     No Known Problems Paternal Aunt     No Known Problems Paternal Uncle     Amblyopia Neg Hx     Blindness Neg Hx     Macular degeneration Neg Hx     Retinal detachment Neg Hx     Strabismus Neg Hx      Social History     Socioeconomic History    Marital status:    Tobacco Use    Smoking status: Former     Packs/day: 0.10     Years: 3.00     Pack years: 0.30     Types: Cigarettes     Quit date: 1973     Years since quittin.1    Smokeless tobacco: Never   Substance and Sexual Activity    Alcohol use: Not Currently     Alcohol/week: 2.0 standard drinks     Types: 2 Glasses of wine per week     Comment: social- glass on wine on occasions    Drug use: No       Current Outpatient Medications   Medication Sig Dispense Refill    ASCORBATE CALCIUM (VITAMIN C ORAL) Take by mouth once daily.       cetirizine (ZYRTEC) 10 MG tablet Take 1 tablet (10 mg total) by mouth once daily. 30 tablet 0    clobetasol 0.05% (TEMOVATE) 0.05 % Oint Apply topically 2 (two) times daily. For use on body. STEROID. Use for up to 2 weeks then decrease to 3 times weekly 60 g 1    diclofenac sodium (VOLTAREN) 1 % Gel Apply 2 g topically once daily. 150 g 0    hydroCHLOROthiazide (HYDRODIURIL) 50 MG tablet Take 1 tablet (50 mg total) by mouth once daily. 90 tablet 3    losartan (COZAAR) 100 MG tablet Take 1 tablet (100 mg total) by mouth once daily. 90 tablet 3    semaglutide (OZEMPIC) 0.25 mg or 0.5 mg (2 mg/3 mL) pen injector Inject 0.25 mg into the skin every 7 days. 4.5 mL 3    tacrolimus (PROTOPIC)  "0.1 % ointment Apply topically 2 (two) times daily. For use on face and body. Not a steroid 100 g 2    alcohol swabs (ALCOHOL WIPES) PadM Monitor X2 daily as directed. Per insurance coverage. 200 each 3    blood sugar diagnostic Strp Test blood sugar twice daily. Per insurance coverage. 200 each 3    blood-glucose meter (ACCU-CHEK GUIDE ME GLUCOSE MTR) Misc Use to test blood sugar 1 each 0    blood-glucose meter kit Monitor X2 daily as directed. Per insurance coverage. 1 each 0    diphth,pertus,acell,,tetanus (BOOSTRIX TDAP) 2.5-8-5 Lf-mcg-Lf/0.5mL Syrg injection Inject into the muscle. 0.5 mL 0    diphth,pertus,acell,,tetanus (BOOSTRIX TDAP) 2.5-8-5 Lf-mcg-Lf/0.5mL Syrg injection Inject into the muscle. 0.5 mL 0    lancets 28 gauge Misc Test blood sugar twice daily 200 each 3    miconazole nitrate 2% (MICOTIN) 2 % Oint Apply topically 2 (two) times daily. (Patient not taking: Reported on 6/13/2023)  0    mupirocin (BACTROBAN) 2 % ointment Apply topically 2 (two) times daily. (Patient not taking: Reported on 6/13/2023) 15 g 0    polyethylene glycol (GOLYTELY) 236-22.74-6.74 -5.86 gram suspension Take 4,000 mLs by mouth once.       No current facility-administered medications for this visit.     Review of patient's allergies indicates:   Allergen Reactions    Cortisone Hives and Swelling     Swelling of the tongue         Objective:      Vitals:    06/13/23 1102   Weight: 133 kg (293 lb 3.4 oz)   Height: 5' 4.5" (1.638 m)     Physical Exam  Right knee:  There is no significant deformity.  Active range of motion is 0-115 degrees.  No crepitus with active extension.  Patellar mobility is not limited.  Mild tenderness over the patellar tendon and medial/lateral joint lines.  No instability to varus/valgus/Lachman's stressing.  No pain in the groin with flexion and internal rotation of the hip. Skin intact.  Scar well healed. Distal neurovascular intact. Flip test negative.    Left knee:  There is no significant " deformity.  Passive range of motion is 0-115 degrees.  60 degree extensor lag.  Little active quad function.   Mild tenderness over the patellar tendon and medial/lateral joint lines.  No instability to varus/valgus/Lachman's stressing.  No pain in the groin with flexion and internal rotation of the hip. Skin intact.  Scar well healed. Distal neurovascular intact. Flip test negative.    Imaging Review:   X-ray bilateral knee is with bilateral total knee arthroplasties in place.  Chronic appearing patella baja on the left with associated calcific tendinitis.  Overall components appear to be in adequate position with no evidence of complication outside of the patella baja on the left.  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    Plan:   Although the patient has a reason for left-sided total knee replacement pain due to her chronic quadriceps tendon rupture, given her new pain over the past few months I have ordered labs for infection workup.  These will be completed today and the results will be reviewed.  Due to the patient's chronic condition and comorbid obesity, I do not believe that surgical interventions are warranted in her case.  She feels the same and prefer not to pursue any surgical intervention at this time.  I have replaced a referral to pain management for further assistance and management of the patient's pain from a nonoperative perspective.  I will discuss the patient's lab results with her once they are completed.    The patient's pathophysiology was explained in detail with reference to x-rays, models, other visual aids as appropriate.  Treatment options were discussed in detail.  Questions were invited and answered to the patient's satisfaction.    Sage Springer MD  Orthopaedic Surgery

## 2023-06-13 NOTE — TELEPHONE ENCOUNTER
[facility] with name and call back number  ----- Message from Sage Springer MD sent at 6/13/2023  1:59 PM CDT -----  You may let her know that infection seems unlikely on her lab work, and I do not think we need to get fluid out of her knee for testing at this time.  ----- Message -----  From: Juan, [facility]  Sent: 6/13/2023   1:02 PM CDT  To: Sage Springer MD

## 2023-06-21 ENCOUNTER — TELEPHONE (OUTPATIENT)
Dept: PRIMARY CARE CLINIC | Facility: CLINIC | Age: 73
End: 2023-06-21
Payer: MEDICARE

## 2023-06-21 NOTE — TELEPHONE ENCOUNTER
----- Message from Lluvia Petersen sent at 6/21/2023  1:01 PM CDT -----  Contact: [phone]  Requesting an RX refill or new RX.  Is this a refill or new RX:   RX name and strength lancets 28 gauge Misc      Is this a 30 day or 90 day RX:   Pharmacy name and phone #    [facility] [id number] [place] [address]  Phone: [phone] Fax: [phone]          The doctors have asked that we provide their patients with the following 2 reminders -- prescription refills can take up to 72 hours, and a friendly reminder that in the future you can use your [facility] account to request refills:yes

## 2023-06-22 ENCOUNTER — PATIENT OUTREACH (OUTPATIENT)
Dept: ADMINISTRATIVE | Facility: HOSPITAL | Age: 73
End: 2023-06-22
Payer: MEDICARE

## 2023-07-21 ENCOUNTER — TELEPHONE (OUTPATIENT)
Dept: PRIMARY CARE CLINIC | Facility: CLINIC | Age: 73
End: 2023-07-21
Payer: MEDICARE

## 2023-07-21 ENCOUNTER — OFFICE VISIT (OUTPATIENT)
Dept: PRIMARY CARE CLINIC | Facility: CLINIC | Age: 73
End: 2023-07-21
Payer: MEDICARE

## 2023-07-21 VITALS
SYSTOLIC BLOOD PRESSURE: 130 MMHG | BODY MASS INDEX: 48.55 KG/M2 | TEMPERATURE: 98 F | DIASTOLIC BLOOD PRESSURE: 80 MMHG | OXYGEN SATURATION: 98 % | WEIGHT: 291.44 LBS | HEART RATE: 93 BPM | HEIGHT: 65 IN

## 2023-07-21 DIAGNOSIS — Z12.31 SCREENING MAMMOGRAM FOR BREAST CANCER: Primary | ICD-10-CM

## 2023-07-21 DIAGNOSIS — Z02.4 ENCOUNTER FOR EXAMINATION FOR DRIVING LICENSE: ICD-10-CM

## 2023-07-21 PROCEDURE — 3079F PR MOST RECENT DIASTOLIC BLOOD PRESSURE 80-89 MM HG: ICD-10-PCS | Mod: HCNC,CPTII,S$GLB, | Performed by: STUDENT IN AN ORGANIZED HEALTH CARE EDUCATION/TRAINING PROGRAM

## 2023-07-21 PROCEDURE — 1159F PR MEDICATION LIST DOCUMENTED IN MEDICAL RECORD: ICD-10-PCS | Mod: HCNC,CPTII,S$GLB, | Performed by: STUDENT IN AN ORGANIZED HEALTH CARE EDUCATION/TRAINING PROGRAM

## 2023-07-21 PROCEDURE — 1160F RVW MEDS BY RX/DR IN RCRD: CPT | Mod: HCNC,CPTII,S$GLB, | Performed by: STUDENT IN AN ORGANIZED HEALTH CARE EDUCATION/TRAINING PROGRAM

## 2023-07-21 PROCEDURE — 3075F PR MOST RECENT SYSTOLIC BLOOD PRESS GE 130-139MM HG: ICD-10-PCS | Mod: HCNC,CPTII,S$GLB, | Performed by: STUDENT IN AN ORGANIZED HEALTH CARE EDUCATION/TRAINING PROGRAM

## 2023-07-21 PROCEDURE — 4010F ACE/ARB THERAPY RXD/TAKEN: CPT | Mod: HCNC,CPTII,S$GLB, | Performed by: STUDENT IN AN ORGANIZED HEALTH CARE EDUCATION/TRAINING PROGRAM

## 2023-07-21 PROCEDURE — 1160F PR REVIEW ALL MEDS BY PRESCRIBER/CLIN PHARMACIST DOCUMENTED: ICD-10-PCS | Mod: HCNC,CPTII,S$GLB, | Performed by: STUDENT IN AN ORGANIZED HEALTH CARE EDUCATION/TRAINING PROGRAM

## 2023-07-21 PROCEDURE — 99213 PR OFFICE/OUTPT VISIT, EST, LEVL III, 20-29 MIN: ICD-10-PCS | Mod: HCNC,S$GLB,, | Performed by: STUDENT IN AN ORGANIZED HEALTH CARE EDUCATION/TRAINING PROGRAM

## 2023-07-21 PROCEDURE — 3288F PR FALLS RISK ASSESSMENT DOCUMENTED: ICD-10-PCS | Mod: HCNC,CPTII,S$GLB, | Performed by: STUDENT IN AN ORGANIZED HEALTH CARE EDUCATION/TRAINING PROGRAM

## 2023-07-21 PROCEDURE — 3008F BODY MASS INDEX DOCD: CPT | Mod: HCNC,CPTII,S$GLB, | Performed by: STUDENT IN AN ORGANIZED HEALTH CARE EDUCATION/TRAINING PROGRAM

## 2023-07-21 PROCEDURE — 3288F FALL RISK ASSESSMENT DOCD: CPT | Mod: HCNC,CPTII,S$GLB, | Performed by: STUDENT IN AN ORGANIZED HEALTH CARE EDUCATION/TRAINING PROGRAM

## 2023-07-21 PROCEDURE — 4010F PR ACE/ARB THEARPY RXD/TAKEN: ICD-10-PCS | Mod: HCNC,CPTII,S$GLB, | Performed by: STUDENT IN AN ORGANIZED HEALTH CARE EDUCATION/TRAINING PROGRAM

## 2023-07-21 PROCEDURE — 1101F PR PT FALLS ASSESS DOC 0-1 FALLS W/OUT INJ PAST YR: ICD-10-PCS | Mod: HCNC,CPTII,S$GLB, | Performed by: STUDENT IN AN ORGANIZED HEALTH CARE EDUCATION/TRAINING PROGRAM

## 2023-07-21 PROCEDURE — 3079F DIAST BP 80-89 MM HG: CPT | Mod: HCNC,CPTII,S$GLB, | Performed by: STUDENT IN AN ORGANIZED HEALTH CARE EDUCATION/TRAINING PROGRAM

## 2023-07-21 PROCEDURE — 3008F PR BODY MASS INDEX (BMI) DOCUMENTED: ICD-10-PCS | Mod: HCNC,CPTII,S$GLB, | Performed by: STUDENT IN AN ORGANIZED HEALTH CARE EDUCATION/TRAINING PROGRAM

## 2023-07-21 PROCEDURE — 1125F AMNT PAIN NOTED PAIN PRSNT: CPT | Mod: HCNC,CPTII,S$GLB, | Performed by: STUDENT IN AN ORGANIZED HEALTH CARE EDUCATION/TRAINING PROGRAM

## 2023-07-21 PROCEDURE — 99999 PR PBB SHADOW E&M-EST. PATIENT-LVL IV: CPT | Mod: PBBFAC,HCNC,, | Performed by: STUDENT IN AN ORGANIZED HEALTH CARE EDUCATION/TRAINING PROGRAM

## 2023-07-21 PROCEDURE — 3075F SYST BP GE 130 - 139MM HG: CPT | Mod: HCNC,CPTII,S$GLB, | Performed by: STUDENT IN AN ORGANIZED HEALTH CARE EDUCATION/TRAINING PROGRAM

## 2023-07-21 PROCEDURE — 1101F PT FALLS ASSESS-DOCD LE1/YR: CPT | Mod: HCNC,CPTII,S$GLB, | Performed by: STUDENT IN AN ORGANIZED HEALTH CARE EDUCATION/TRAINING PROGRAM

## 2023-07-21 PROCEDURE — 1125F PR PAIN SEVERITY QUANTIFIED, PAIN PRESENT: ICD-10-PCS | Mod: HCNC,CPTII,S$GLB, | Performed by: STUDENT IN AN ORGANIZED HEALTH CARE EDUCATION/TRAINING PROGRAM

## 2023-07-21 PROCEDURE — 1159F MED LIST DOCD IN RCRD: CPT | Mod: HCNC,CPTII,S$GLB, | Performed by: STUDENT IN AN ORGANIZED HEALTH CARE EDUCATION/TRAINING PROGRAM

## 2023-07-21 PROCEDURE — 99999 PR PBB SHADOW E&M-EST. PATIENT-LVL IV: ICD-10-PCS | Mod: PBBFAC,HCNC,, | Performed by: STUDENT IN AN ORGANIZED HEALTH CARE EDUCATION/TRAINING PROGRAM

## 2023-07-21 PROCEDURE — 99213 OFFICE O/P EST LOW 20 MIN: CPT | Mod: HCNC,S$GLB,, | Performed by: STUDENT IN AN ORGANIZED HEALTH CARE EDUCATION/TRAINING PROGRAM

## 2023-07-21 NOTE — TELEPHONE ENCOUNTER
Attempted to contact pt for in clinic or Virtual appt for DMV forms, per Dr. Almanzar. No vm set up.

## 2023-07-21 NOTE — PROGRESS NOTES
Primary Care  Return/Acute Office Visit - In Person  7/21/2023  Mike Ndhlovu      [facility]    Patient is a 73 y.o.   Katelyn Huynh  has a past medical history of Arthritis, Cataract, Glaucoma, History of iritis, Hypertension, Meningioma, and Uveitis.    Patient presents with   Chief Complaint   Patient presents with    DMV FORMS     Patient presenting for DMV form completion     She states that upon attempting to renew her license she was informed that she need a physician to review first since she uses a walker    Social History     Social History Narrative    Not on file     Katelyn Huynh family history includes Cataracts in her maternal aunt, mother, and sister; Diabetes in her father, maternal aunt, mother, and sister; Glaucoma in her brother, maternal aunt, maternal grandfather, maternal uncle, and mother; Heart disease in her father and mother; Hypertension in her father and mother; No Known Problems in her maternal grandmother, paternal aunt, paternal grandfather, paternal grandmother, and paternal uncle; Stroke in her sister.    Active Medications:  Review of patient's allergies indicates:   Allergen Reactions    Cortisone Hives and Swelling     Swelling of the tongue     Current Outpatient Medications   Medication Instructions    alcohol swabs (ALCOHOL WIPES) PadM Monitor X2 daily as directed. Per insurance coverage.    ASCORBATE CALCIUM (VITAMIN C ORAL) Oral, Daily    blood sugar diagnostic Strp Test blood sugar twice daily. Per insurance coverage.    blood-glucose meter (ACCU-CHEK GUIDE ME GLUCOSE MTR) Misc Use to test blood sugar    blood-glucose meter kit Monitor X2 daily as directed. Per insurance coverage.    cetirizine (ZYRTEC) 10 mg, Oral, Daily    clobetasol 0.05% (TEMOVATE) 0.05 % Oint Topical (Top), 2 times daily, For use on body. STEROID. Use for up to 2 weeks then decrease to 3 times weekly    diclofenac sodium (VOLTAREN) 2 g, Topical (Top), Daily     "diphth,pertus,acell,,tetanus (BOOSTRIX TDAP) 2.5-8-5 Lf-mcg-Lf/0.5mL Syrg injection Intramuscular    diphth,pertus,acell,,tetanus (BOOSTRIX TDAP) 2.5-8-5 Lf-mcg-Lf/0.5mL Syrg injection Intramuscular    hydroCHLOROthiazide (HYDRODIURIL) 50 mg, Oral, Daily    lancets (ACCU-CHEK SOFTCLIX LANCETS) Misc Test blood sugar twice daily    losartan (COZAAR) 100 mg, Oral, Daily    miconazole nitrate 2% (MICOTIN) 2 % Oint Topical (Top), 2 times daily    mupirocin (BACTROBAN) 2 % ointment Topical (Top), 2 times daily    polyethylene glycol (GOLYTELY) 236-22.74-6.74 -5.86 gram suspension 4,000 mLs, Oral, Once    semaglutide (OZEMPIC) 0.25 mg, Subcutaneous, Every 7 days    tacrolimus (PROTOPIC) 0.1 % ointment Topical (Top), 2 times daily, For use on face and body. Not a steroid       Review of Systems   All other systems reviewed and are negative.    Vitals:    07/21/23 1330   BP: 130/80   BP Location: Left arm   Pulse: 93   Temp: 98.2 °F (36.8 °C)   SpO2: 98%   Weight: 132.2 kg (291 lb 7.2 oz)   Height: 5' 4.5" (1.638 m)       Physical Exam  Vitals reviewed.   Constitutional:       General: She is not in acute distress.       Assessment and Plan     Driving assessment   DMV paperwork completed   Patient reports regular f/u with opthalmology   She reports that knee pain does not effect driving ability     Screening breast cancer   Mammogram     Orders Placed This Visit  Orders Placed This Encounter   Procedures    Mammo Digital Screening Bilat     Standing Status:   Future     Standing Expiration Date:   7/21/2025     Order Specific Question:   May the Radiologist modify the order per protocol to meet the clinical needs of the patient?     Answer:   Yes     Order Specific Question:   Release to patient     Answer:   Immediate           Upcoming Scheduled Appointments and Follow Up:    Future Appointments   Date Time Provider Department Center   7/24/2023  2:00 PM Sanjay Kaiser MD [facility]   10/25/2023  9:30 " ASHER Almanzar MD [facility]       Follow Up [facility] (with who? when?): No follow-ups on file.      Extended Emergency Contact Information  Primary Emergency Contact: Venkatesh Huynh  Address: [address] [place] [address] [facility]  Home Phone: [phone]  Mobile Phone: [phone]  Relation: Spouse  Preferred language: English  Secondary Emergency Contact: Darrick Martines   [facility]  Mobile Phone: [phone]  Relation: Natanael Almanzar MD   Attending Physician  Primary Care  7/21/2023 - 1:32 PM    9 minutes   This includes face to face time and non-face to face time preparing to see the patient (eg, review of tests), obtaining and/or reviewing separately obtained history, documenting clinical information in the electronic or other health record, independently interpreting results and communicating results to the patient/family/caregiver, or care coordinator.

## 2023-07-24 ENCOUNTER — OFFICE VISIT (OUTPATIENT)
Dept: OPHTHALMOLOGY | Facility: CLINIC | Age: 73
End: 2023-07-24
Payer: MEDICARE

## 2023-07-24 DIAGNOSIS — I10 ESSENTIAL HYPERTENSION: ICD-10-CM

## 2023-07-24 DIAGNOSIS — H04.123 DRY EYE SYNDROME, BILATERAL: Primary | ICD-10-CM

## 2023-07-24 DIAGNOSIS — Z96.1 PSEUDOPHAKIA: ICD-10-CM

## 2023-07-24 DIAGNOSIS — H52.7 REFRACTIVE ERROR: ICD-10-CM

## 2023-07-24 DIAGNOSIS — H43.812 VITREOUS DETACHMENT OF LEFT EYE: ICD-10-CM

## 2023-07-24 PROCEDURE — 92014 PR EYE EXAM, EST PATIENT,COMPREHESV: ICD-10-PCS | Mod: HCNC,S$GLB,, | Performed by: OPHTHALMOLOGY

## 2023-07-24 PROCEDURE — 3288F FALL RISK ASSESSMENT DOCD: CPT | Mod: HCNC,CPTII,S$GLB, | Performed by: OPHTHALMOLOGY

## 2023-07-24 PROCEDURE — 1101F PT FALLS ASSESS-DOCD LE1/YR: CPT | Mod: HCNC,CPTII,S$GLB, | Performed by: OPHTHALMOLOGY

## 2023-07-24 PROCEDURE — 3288F PR FALLS RISK ASSESSMENT DOCUMENTED: ICD-10-PCS | Mod: HCNC,CPTII,S$GLB, | Performed by: OPHTHALMOLOGY

## 2023-07-24 PROCEDURE — 4010F PR ACE/ARB THEARPY RXD/TAKEN: ICD-10-PCS | Mod: HCNC,CPTII,S$GLB, | Performed by: OPHTHALMOLOGY

## 2023-07-24 PROCEDURE — 99999 PR PBB SHADOW E&M-EST. PATIENT-LVL III: ICD-10-PCS | Mod: PBBFAC,HCNC,, | Performed by: OPHTHALMOLOGY

## 2023-07-24 PROCEDURE — 92014 COMPRE OPH EXAM EST PT 1/>: CPT | Mod: HCNC,S$GLB,, | Performed by: OPHTHALMOLOGY

## 2023-07-24 PROCEDURE — 1126F PR PAIN SEVERITY QUANTIFIED, NO PAIN PRESENT: ICD-10-PCS | Mod: HCNC,CPTII,S$GLB, | Performed by: OPHTHALMOLOGY

## 2023-07-24 PROCEDURE — 1159F MED LIST DOCD IN RCRD: CPT | Mod: HCNC,CPTII,S$GLB, | Performed by: OPHTHALMOLOGY

## 2023-07-24 PROCEDURE — 1159F PR MEDICATION LIST DOCUMENTED IN MEDICAL RECORD: ICD-10-PCS | Mod: HCNC,CPTII,S$GLB, | Performed by: OPHTHALMOLOGY

## 2023-07-24 PROCEDURE — 1160F PR REVIEW ALL MEDS BY PRESCRIBER/CLIN PHARMACIST DOCUMENTED: ICD-10-PCS | Mod: HCNC,CPTII,S$GLB, | Performed by: OPHTHALMOLOGY

## 2023-07-24 PROCEDURE — 99999 PR PBB SHADOW E&M-EST. PATIENT-LVL III: CPT | Mod: PBBFAC,HCNC,, | Performed by: OPHTHALMOLOGY

## 2023-07-24 PROCEDURE — 1101F PR PT FALLS ASSESS DOC 0-1 FALLS W/OUT INJ PAST YR: ICD-10-PCS | Mod: HCNC,CPTII,S$GLB, | Performed by: OPHTHALMOLOGY

## 2023-07-24 PROCEDURE — 1126F AMNT PAIN NOTED NONE PRSNT: CPT | Mod: HCNC,CPTII,S$GLB, | Performed by: OPHTHALMOLOGY

## 2023-07-24 PROCEDURE — 1160F RVW MEDS BY RX/DR IN RCRD: CPT | Mod: HCNC,CPTII,S$GLB, | Performed by: OPHTHALMOLOGY

## 2023-07-24 PROCEDURE — 4010F ACE/ARB THERAPY RXD/TAKEN: CPT | Mod: HCNC,CPTII,S$GLB, | Performed by: OPHTHALMOLOGY

## 2023-07-24 NOTE — PROGRESS NOTES
Subjective:       Patient ID: Katelyn Huynh is a 73 y.o. female.    Chief Complaint: Concerns About Ocular Health    HPI    Dls: 3/23/22 Dr. Kaiser    72 y/o female presents today for annual ck.   Pt states no changes in vision. Pt wears pal's.     No tearing  No itching  No burning  No pain  No ha's  + ou floaters  No flashes    Ocular Hx:  Pseudophakia OU   S/p Yag cap OS     Eye meds  None    Last edited by Eleanor Avery MA on 7/24/2023  1:44 PM.             Assessment:       1. Dry eye syndrome, bilateral    2. Vitreous detachment of left eye    3. Essential hypertension    4. Refractive error    5. Pseudophakia - Both Eyes        Plan:       KIRILL OU-Doing well.  PVD OS-Stable.  HTN-No retinopathy OU.  RE-Pt does not want MRx.      AT's.  Control HTN.  RTC 1 yr.

## 2023-07-27 ENCOUNTER — TELEPHONE (OUTPATIENT)
Dept: PRIMARY CARE CLINIC | Facility: CLINIC | Age: 73
End: 2023-07-27
Payer: MEDICARE

## 2023-07-27 NOTE — TELEPHONE ENCOUNTER
----- Message from Lluvia Petersen sent at 7/27/2023  9:44 AM CDT -----  Contact: patient @ [phone]  Good Morning  Patient need to know if she can pass by to get date she started using walking     Please call and advise

## 2023-08-31 ENCOUNTER — PATIENT MESSAGE (OUTPATIENT)
Dept: PRIMARY CARE CLINIC | Facility: CLINIC | Age: 73
End: 2023-08-31
Payer: MEDICARE

## 2023-09-20 ENCOUNTER — HOSPITAL ENCOUNTER (OUTPATIENT)
Dept: RADIOLOGY | Facility: HOSPITAL | Age: 73
Discharge: HOME | End: 2023-09-20
Attending: STUDENT IN AN ORGANIZED HEALTH CARE EDUCATION/TRAINING PROGRAM
Payer: MEDICARE

## 2023-09-20 DIAGNOSIS — Z12.31 SCREENING MAMMOGRAM FOR BREAST CANCER: ICD-10-CM

## 2023-09-20 PROCEDURE — 77067 MAMMO DIGITAL SCREENING BILAT WITH TOMO: ICD-10-PCS | Mod: 26,HCNC,, | Performed by: INTERNAL MEDICINE

## 2023-09-20 PROCEDURE — 77067 SCR MAMMO BI INCL CAD: CPT | Mod: 26,HCNC,, | Performed by: INTERNAL MEDICINE

## 2023-09-20 PROCEDURE — 77063 MAMMO DIGITAL SCREENING BILAT WITH TOMO: ICD-10-PCS | Mod: 26,HCNC,, | Performed by: INTERNAL MEDICINE

## 2023-09-20 PROCEDURE — 77063 BREAST TOMOSYNTHESIS BI: CPT | Mod: 26,HCNC,, | Performed by: INTERNAL MEDICINE

## 2023-09-20 PROCEDURE — 77067 SCR MAMMO BI INCL CAD: CPT | Mod: TC,HCNC

## 2023-10-25 ENCOUNTER — OFFICE VISIT (OUTPATIENT)
Dept: PRIMARY CARE CLINIC | Facility: CLINIC | Age: 73
End: 2023-10-25
Payer: MEDICARE

## 2023-10-25 ENCOUNTER — TELEPHONE (OUTPATIENT)
Dept: OTOLARYNGOLOGY | Facility: CLINIC | Age: 73
End: 2023-10-25
Payer: MEDICARE

## 2023-10-25 ENCOUNTER — LAB VISIT (OUTPATIENT)
Dept: LAB | Facility: HOSPITAL | Age: 73
End: 2023-10-25
Attending: STUDENT IN AN ORGANIZED HEALTH CARE EDUCATION/TRAINING PROGRAM
Payer: MEDICARE

## 2023-10-25 VITALS
TEMPERATURE: 98 F | HEART RATE: 71 BPM | DIASTOLIC BLOOD PRESSURE: 80 MMHG | SYSTOLIC BLOOD PRESSURE: 126 MMHG | OXYGEN SATURATION: 98 % | BODY MASS INDEX: 47.75 KG/M2 | HEIGHT: 65 IN | WEIGHT: 286.63 LBS

## 2023-10-25 DIAGNOSIS — H61.21 IMPACTED CERUMEN OF RIGHT EAR: ICD-10-CM

## 2023-10-25 DIAGNOSIS — I10 ESSENTIAL HYPERTENSION: ICD-10-CM

## 2023-10-25 DIAGNOSIS — R73.03 PREDIABETES: ICD-10-CM

## 2023-10-25 DIAGNOSIS — R73.03 PREDIABETES: Primary | ICD-10-CM

## 2023-10-25 LAB
ALBUMIN SERPL BCP-MCNC: 3.4 G/DL (ref 3.5–5.2)
ALP SERPL-CCNC: 88 U/L (ref 55–135)
ALT SERPL W/O P-5'-P-CCNC: 11 U/L (ref 10–44)
ANION GAP SERPL CALC-SCNC: 6 MMOL/L (ref 8–16)
AST SERPL-CCNC: 17 U/L (ref 10–40)
BILIRUB SERPL-MCNC: 0.7 MG/DL (ref 0.1–1)
BUN SERPL-MCNC: 13 MG/DL (ref 8–23)
CALCIUM SERPL-MCNC: 9.5 MG/DL (ref 8.7–10.5)
CHLORIDE SERPL-SCNC: 103 MMOL/L (ref 95–110)
CO2 SERPL-SCNC: 30 MMOL/L (ref 23–29)
CREAT SERPL-MCNC: 0.9 MG/DL (ref 0.5–1.4)
EST. GFR  (NO RACE VARIABLE): >60 ML/MIN/1.73 M^2
ESTIMATED AVG GLUCOSE: 111 MG/DL (ref 68–131)
GLUCOSE SERPL-MCNC: 98 MG/DL (ref 70–110)
HBA1C MFR BLD: 5.5 % (ref 4–5.6)
POTASSIUM SERPL-SCNC: 4.9 MMOL/L (ref 3.5–5.1)
PROT SERPL-MCNC: 8.1 G/DL (ref 6–8.4)
SODIUM SERPL-SCNC: 139 MMOL/L (ref 136–145)

## 2023-10-25 PROCEDURE — 1159F PR MEDICATION LIST DOCUMENTED IN MEDICAL RECORD: ICD-10-PCS | Mod: CPTII,S$GLB,, | Performed by: STUDENT IN AN ORGANIZED HEALTH CARE EDUCATION/TRAINING PROGRAM

## 2023-10-25 PROCEDURE — 3288F FALL RISK ASSESSMENT DOCD: CPT | Mod: CPTII,S$GLB,, | Performed by: STUDENT IN AN ORGANIZED HEALTH CARE EDUCATION/TRAINING PROGRAM

## 2023-10-25 PROCEDURE — 1101F PT FALLS ASSESS-DOCD LE1/YR: CPT | Mod: CPTII,S$GLB,, | Performed by: STUDENT IN AN ORGANIZED HEALTH CARE EDUCATION/TRAINING PROGRAM

## 2023-10-25 PROCEDURE — 3008F PR BODY MASS INDEX (BMI) DOCUMENTED: ICD-10-PCS | Mod: CPTII,S$GLB,, | Performed by: STUDENT IN AN ORGANIZED HEALTH CARE EDUCATION/TRAINING PROGRAM

## 2023-10-25 PROCEDURE — 99999 PR PBB SHADOW E&M-EST. PATIENT-LVL V: ICD-10-PCS | Mod: PBBFAC,,, | Performed by: STUDENT IN AN ORGANIZED HEALTH CARE EDUCATION/TRAINING PROGRAM

## 2023-10-25 PROCEDURE — 3079F DIAST BP 80-89 MM HG: CPT | Mod: CPTII,S$GLB,, | Performed by: STUDENT IN AN ORGANIZED HEALTH CARE EDUCATION/TRAINING PROGRAM

## 2023-10-25 PROCEDURE — 99214 OFFICE O/P EST MOD 30 MIN: CPT | Mod: S$GLB,,, | Performed by: STUDENT IN AN ORGANIZED HEALTH CARE EDUCATION/TRAINING PROGRAM

## 2023-10-25 PROCEDURE — 4010F PR ACE/ARB THEARPY RXD/TAKEN: ICD-10-PCS | Mod: CPTII,S$GLB,, | Performed by: STUDENT IN AN ORGANIZED HEALTH CARE EDUCATION/TRAINING PROGRAM

## 2023-10-25 PROCEDURE — 1159F MED LIST DOCD IN RCRD: CPT | Mod: CPTII,S$GLB,, | Performed by: STUDENT IN AN ORGANIZED HEALTH CARE EDUCATION/TRAINING PROGRAM

## 2023-10-25 PROCEDURE — 3288F PR FALLS RISK ASSESSMENT DOCUMENTED: ICD-10-PCS | Mod: CPTII,S$GLB,, | Performed by: STUDENT IN AN ORGANIZED HEALTH CARE EDUCATION/TRAINING PROGRAM

## 2023-10-25 PROCEDURE — 36415 COLL VENOUS BLD VENIPUNCTURE: CPT | Mod: PN | Performed by: STUDENT IN AN ORGANIZED HEALTH CARE EDUCATION/TRAINING PROGRAM

## 2023-10-25 PROCEDURE — 80053 COMPREHEN METABOLIC PANEL: CPT | Performed by: STUDENT IN AN ORGANIZED HEALTH CARE EDUCATION/TRAINING PROGRAM

## 2023-10-25 PROCEDURE — 3079F PR MOST RECENT DIASTOLIC BLOOD PRESSURE 80-89 MM HG: ICD-10-PCS | Mod: CPTII,S$GLB,, | Performed by: STUDENT IN AN ORGANIZED HEALTH CARE EDUCATION/TRAINING PROGRAM

## 2023-10-25 PROCEDURE — 4010F ACE/ARB THERAPY RXD/TAKEN: CPT | Mod: CPTII,S$GLB,, | Performed by: STUDENT IN AN ORGANIZED HEALTH CARE EDUCATION/TRAINING PROGRAM

## 2023-10-25 PROCEDURE — 1125F AMNT PAIN NOTED PAIN PRSNT: CPT | Mod: CPTII,S$GLB,, | Performed by: STUDENT IN AN ORGANIZED HEALTH CARE EDUCATION/TRAINING PROGRAM

## 2023-10-25 PROCEDURE — 3074F PR MOST RECENT SYSTOLIC BLOOD PRESSURE < 130 MM HG: ICD-10-PCS | Mod: CPTII,S$GLB,, | Performed by: STUDENT IN AN ORGANIZED HEALTH CARE EDUCATION/TRAINING PROGRAM

## 2023-10-25 PROCEDURE — 3074F SYST BP LT 130 MM HG: CPT | Mod: CPTII,S$GLB,, | Performed by: STUDENT IN AN ORGANIZED HEALTH CARE EDUCATION/TRAINING PROGRAM

## 2023-10-25 PROCEDURE — 83036 HEMOGLOBIN GLYCOSYLATED A1C: CPT | Performed by: STUDENT IN AN ORGANIZED HEALTH CARE EDUCATION/TRAINING PROGRAM

## 2023-10-25 PROCEDURE — 99214 PR OFFICE/OUTPT VISIT, EST, LEVL IV, 30-39 MIN: ICD-10-PCS | Mod: S$GLB,,, | Performed by: STUDENT IN AN ORGANIZED HEALTH CARE EDUCATION/TRAINING PROGRAM

## 2023-10-25 PROCEDURE — 1125F PR PAIN SEVERITY QUANTIFIED, PAIN PRESENT: ICD-10-PCS | Mod: CPTII,S$GLB,, | Performed by: STUDENT IN AN ORGANIZED HEALTH CARE EDUCATION/TRAINING PROGRAM

## 2023-10-25 PROCEDURE — 99999 PR PBB SHADOW E&M-EST. PATIENT-LVL V: CPT | Mod: PBBFAC,,, | Performed by: STUDENT IN AN ORGANIZED HEALTH CARE EDUCATION/TRAINING PROGRAM

## 2023-10-25 PROCEDURE — 3008F BODY MASS INDEX DOCD: CPT | Mod: CPTII,S$GLB,, | Performed by: STUDENT IN AN ORGANIZED HEALTH CARE EDUCATION/TRAINING PROGRAM

## 2023-10-25 PROCEDURE — 1101F PR PT FALLS ASSESS DOC 0-1 FALLS W/OUT INJ PAST YR: ICD-10-PCS | Mod: CPTII,S$GLB,, | Performed by: STUDENT IN AN ORGANIZED HEALTH CARE EDUCATION/TRAINING PROGRAM

## 2023-10-25 NOTE — PROGRESS NOTES
Primary Care  Return/Acute Office Visit - In Person  10/25/2023  Mike Ndhlovu      HPI    Patient is a 73 y.o.   Katelyn Huynh  has a past medical history of Arthritis, Cataract, Glaucoma, History of iritis, Hypertension, Meningioma, and Uveitis.    Patient presents with   Chief Complaint   Patient presents with    Diabetes    Cough     Dry cough     Patient presenting for f/u            Social History     Social History Narrative    Not on file     Katelyn Huynh family history includes Cataracts in her maternal aunt, mother, and sister; Diabetes in her father, maternal aunt, mother, and sister; Glaucoma in her brother, maternal aunt, maternal grandfather, maternal uncle, and mother; Heart disease in her father and mother; Hypertension in her father and mother; No Known Problems in her maternal grandmother, paternal aunt, paternal grandfather, paternal grandmother, paternal uncle, and another family member; Stroke in her sister.    Active Medications:  Review of patient's allergies indicates:   Allergen Reactions    Cortisone Hives and Swelling     Swelling of the tongue     Current Outpatient Medications   Medication Instructions    alcohol swabs (ALCOHOL WIPES) PadM Monitor X2 daily as directed. Per insurance coverage.    ASCORBATE CALCIUM (VITAMIN C ORAL) Oral, Daily    blood sugar diagnostic Strp Test blood sugar twice daily. Per insurance coverage.    blood-glucose meter (ACCU-CHEK GUIDE ME GLUCOSE MTR) Misc Use to test blood sugar    blood-glucose meter kit Monitor X2 daily as directed. Per insurance coverage.    cetirizine (ZYRTEC) 10 mg, Oral, Daily    clobetasol 0.05% (TEMOVATE) 0.05 % Oint Topical (Top), 2 times daily, For use on body. STEROID. Use for up to 2 weeks then decrease to 3 times weekly    diclofenac sodium (VOLTAREN) 2 g, Topical (Top), Daily    diphth,pertus,acell,,tetanus (BOOSTRIX TDAP) 2.5-8-5 Lf-mcg-Lf/0.5mL Syrg injection Intramuscular     "diphth,pertus,acell,,tetanus (BOOSTRIX TDAP) 2.5-8-5 Lf-mcg-Lf/0.5mL Syrg injection Intramuscular    hydroCHLOROthiazide (HYDRODIURIL) 50 mg, Oral, Daily    lancets (ACCU-CHEK SOFTCLIX LANCETS) Misc Test blood sugar twice daily    losartan (COZAAR) 100 mg, Oral, Daily    miconazole nitrate 2% (MICOTIN) 2 % Oint Topical (Top), 2 times daily    mupirocin (BACTROBAN) 2 % ointment Topical (Top), 2 times daily    polyethylene glycol (GOLYTELY) 236-22.74-6.74 -5.86 gram suspension 4,000 mLs, Oral, Once    semaglutide (OZEMPIC) 0.25 mg, Subcutaneous, Every 7 days    tacrolimus (PROTOPIC) 0.1 % ointment Topical (Top), 2 times daily, For use on face and body. Not a steroid       Review of Systems   All other systems reviewed and are negative.      Vitals:    10/25/23 0932   BP: 126/80   BP Location: Right arm   Pulse: 71   Temp: 98 °F (36.7 °C)   SpO2: 98%   Weight: 130 kg (286 lb 9.6 oz)   Height: 5' 4.5" (1.638 m)       Physical Exam  Vitals reviewed.   Constitutional:       General: She is not in acute distress.  HENT:      Right Ear: There is impacted cerumen.      Left Ear: Tympanic membrane normal.   Cardiovascular:      Rate and Rhythm: Normal rate and regular rhythm.      Pulses: Normal pulses.      Heart sounds: Normal heart sounds.   Pulmonary:      Effort: Pulmonary effort is normal. No respiratory distress.      Breath sounds: Normal breath sounds. No wheezing or rales.   Abdominal:      General: Abdomen is flat. Bowel sounds are normal.      Palpations: Abdomen is soft.   Musculoskeletal:      Right lower leg: No edema.      Left lower leg: No edema.   Neurological:      General: No focal deficit present.      Mental Status: She is oriented to person, place, and time.          Assessment and Plan     Essential HTN   Well controlled   Continue HCTZ 50 mg daily and losartan 100 mg daily  F/u CMP       Class 3 obesity   Ozempic 0.25 mg weekly     Prediabetes   Well controlled with diet and Ozempic      Impacted " cerumen   Referral placed to ENT at patient request      Orders Placed This Visit  No orders of the defined types were placed in this encounter.          Upcoming Scheduled Appointments and Follow Up:    Future Appointments   Date Time Provider Department Center   11/28/2023 12:00 PM Izabella Niet[facility]       Follow Up [facility] (with who? when?): No follow-ups on file.      Extended Emergency Contact Information  Primary Emergency Contact: Venkatesh Huynh  Address: [address] [place] [address] [facility]  Home Phone: [phone]  Mobile Phone: [phone]  Relation: Spouse  Preferred language: English  Secondary Emergency Contact: ConradDarrick fox  Address: [address] [place] [address] [place]  Mobile Phone: [phone]  Relation: Natanael Almanzar MD   Internal medicine  10/25/2023 - 9:42 AM    I spent a total of 25 minutes on the day of the visit.This includes face to face time and non-face to face time preparing to see the patient (eg, review of tests), obtaining and/or reviewing separately obtained history, documenting clinical information in the electronic or other health record, independently interpreting results and communicating results to the patient/family/caregiver, or care coordinator.

## 2023-10-25 NOTE — TELEPHONE ENCOUNTER
----- Message from Ivy Hsieh sent at 10/25/2023 10:07 AM CDT -----  Dr.Mwengwe Almanzar has placed a referral for the patient to see ENT. Please assist with scheduling.      Physical Therapy  Facility/Department: [facility] C5 - MED SURG/ORTHO  Daily Treatment Note  NAME: Vangie Nelson  : 1941  MRN: [id number]    Date of Service: 2021    Discharge Recommendations:  24 hour supervision or assist, Home with Home health PT   PT Equipment Recommendations  Equipment Needed: No    Assessment   Body structures, Functions, Activity limitations: Decreased functional mobility ; Decreased balance; Decreased strength; Decreased endurance  Assessment: Pt fuctioning below baseline primarily d/t pain c/o in R ft and also due to deconditioning. Pt unable to ambulate and required cga to spt bed to chair. O2 remained stable when on 4L. Pt positioned in chair and educated on LE exercises to improve endurance. Will continue to assess: previous recommendation for home with 24 hr A and HHPT  Treatment Diagnosis: decreased mobility  Prognosis: Good  Decision Making: Medium Complexity  PT Education: Goals; General Safety; Gait Training; Disease Specific Education; PT Role; Plan of Care; Functional Mobility Training; Energy Conservation  Patient Education: Pt on transfer safety and pt expressed understanding  Barriers to Learning: none  REQUIRES PT FOLLOW UP: Yes  Activity Tolerance  Activity Tolerance: Patient limited by pain  Activity Tolerance: 110/57  HR 96  O2 95% on 4L spo2     Patient Diagnosis(es): The encounter diagnosis was Acute on chronic respiratory failure with hypoxia ([address] has a past medical history of Arthritis, Asthma, Bronchitis chronic, Chronic respiratory failure ([address] Depression, Diabetes mellitus ([address] Emphysema of lung ([address] Glaucoma, Hypertension, Morbid obesity ([address] Oxygen dependent, Pneumonia of left upper lobe due to Pseudomonas species ([address] Rash, Shingles, and Sleep apnea. has a past surgical history that includes hernia repair; Cholecystectomy; Hysterectomy; Colon surgery; Lap Band (10/5/10); Cataract removal with implant (3/9/2011);  Cataract removal with implant (3/23/2011); Colonoscopy (11/18/13); bronchoscopy (N/A, 11/30/2021); and bronchoscopy (11/30/2021). Restrictions  Restrictions/Precautions  Restrictions/Precautions: Fall Risk, General Precautions  Position Activity Restriction  Other position/activity restrictions: Up with assist, 4L O2  Subjective   General  Chart Reviewed: Yes  Response To Previous Treatment: Patient with no complaints from previous session. Family / Caregiver Present: Yes ()  Referring Practitioner: JUANY Avina CNP  Subjective  Subjective: Pt resting in bed. Denies pain except when standing on her feet - leads to R foot pain of 8.5 out of 1-10 scale. RN and hospitalist made aware. General Comment  Comments: cleared by nursing. Pain Screening  Patient Currently in Pain: Yes  Pain Assessment  Pain Assessment: 0-10  Pain Level:  (8.5)  Pain Location: Foot  Pain Orientation: Right  Functional Pain Assessment: Prevents or interferes some active activities and ADLs  Non-Pharmaceutical Pain Intervention(s): Repositioned; Ambulation/Increased Activity (pt unable to walk)  Vital Signs  Patient Currently in Pain: Yes       Orientation  Orientation  Overall Orientation Status: Within Functional Limits  Cognition      Objective   Bed mobility  Supine to Sit: Contact guard assistance (HOB elev)  Transfers  Sit to Stand: Contact guard assistance  Stand to sit: Contact guard assistance  Comment: reminded pt to lock breaks on 4ww  Ambulation  Ambulation?: No (Pt c/o too much pain R foot w/ WB)  Ambulation 1  Other Apparatus:  (4 L)     Balance  Posture: Fair  Sitting - Static: Good  Sitting - Dynamic: Good  Standing - Static: Good; -  Standing - Dynamic: Fair; +  Exercises  Heelslides: 15 B  Hip Flexion: 15 B  Hip Abduction: 15 B  Knee Long Arc Quad: 15 B  Ankle Pumps: 20 B         Comment: Pt stood in walker 3 times: 30\", 15\", 10\" - pt verbalized limited by low pain tolerance R ft.   Dr. Jolynn Lake in room to assess pt and stated he would order an xray of pt's foot. AM-PAC Score  AM-PAC Inpatient Mobility Raw Score : 15 (12/02/21 1248)  AM-PAC Inpatient T-Scale Score : 39.45 (12/02/21 1248)  Mobility Inpatient CMS 0-100% Score: 57.7 (12/02/21 1248)  Mobility Inpatient CMS G-Code Modifier : CK (12/02/21 1248)          Goals  Short term goals  Time Frame for Short term goals: 12/2; Goals extended to 12/09 d/t prolonged hospital stay  Short term goal 1: Pt will complete B LE exercises to improve endurance and strength for mobility by 11/30  -12/02 on-going  Short term goal 2: Pt will ambulate x 60' with rollator with supervision   -11/30 sba 4ww 48'  -12/02 pt unable to amb today d/t c/o pain in R ft. Patient Goals   Patient goals : to get stronger, to walk more -12/02 on-going    Plan    Plan  Times per week: 3-5x/week  Current Treatment Recommendations: Strengthening, Balance Training, Functional Mobility Training, Transfer Training, Gait Training, Endurance Training, Positioning, Equipment Evaluation, Education, & procurement, Patient/Caregiver Education & Training, Safety Education & Training, Home Exercise Program  Safety Devices  Type of devices:  All fall risk precautions in place, Call light within reach, Chair alarm in place, Gait belt, Patient at risk for falls, Nurse notified, Left in chair  Restraints  Initially in place: No     Therapy Time   Individual Concurrent Group Co-treatment   Time In 1057         Time Out 1135         Minutes 38         Timed Code Treatment Minutes: [facility] extended by Danielito Barrientos, PT, DPT

## 2023-11-28 ENCOUNTER — OFFICE VISIT (OUTPATIENT)
Dept: HOME HEALTH SERVICES | Facility: CLINIC | Age: 73
End: 2023-11-28
Payer: MEDICARE

## 2023-11-28 VITALS
HEART RATE: 90 BPM | DIASTOLIC BLOOD PRESSURE: 63 MMHG | SYSTOLIC BLOOD PRESSURE: 126 MMHG | BODY MASS INDEX: 46.82 KG/M2 | HEIGHT: 65 IN | WEIGHT: 281 LBS

## 2023-11-28 DIAGNOSIS — H04.123 DRY EYE SYNDROME, BILATERAL: ICD-10-CM

## 2023-11-28 DIAGNOSIS — E66.01 CLASS 3 SEVERE OBESITY DUE TO EXCESS CALORIES WITH SERIOUS COMORBIDITY AND BODY MASS INDEX (BMI) OF 45.0 TO 49.9 IN ADULT: ICD-10-CM

## 2023-11-28 DIAGNOSIS — Z00.00 ENCOUNTER FOR PREVENTIVE HEALTH EXAMINATION: Primary | ICD-10-CM

## 2023-11-28 DIAGNOSIS — Z99.89 DEPENDENCE ON OTHER ENABLING MACHINES AND DEVICES: ICD-10-CM

## 2023-11-28 DIAGNOSIS — I10 ESSENTIAL HYPERTENSION: ICD-10-CM

## 2023-11-28 PROCEDURE — 1125F PR PAIN SEVERITY QUANTIFIED, PAIN PRESENT: ICD-10-PCS | Mod: CPTII,S$GLB,, | Performed by: NURSE PRACTITIONER

## 2023-11-28 PROCEDURE — 3288F PR FALLS RISK ASSESSMENT DOCUMENTED: ICD-10-PCS | Mod: CPTII,S$GLB,, | Performed by: NURSE PRACTITIONER

## 2023-11-28 PROCEDURE — G0439 PPPS, SUBSEQ VISIT: HCPCS | Mod: S$GLB,,, | Performed by: NURSE PRACTITIONER

## 2023-11-28 PROCEDURE — 1160F PR REVIEW ALL MEDS BY PRESCRIBER/CLIN PHARMACIST DOCUMENTED: ICD-10-PCS | Mod: CPTII,S$GLB,, | Performed by: NURSE PRACTITIONER

## 2023-11-28 PROCEDURE — 1101F PT FALLS ASSESS-DOCD LE1/YR: CPT | Mod: CPTII,S$GLB,, | Performed by: NURSE PRACTITIONER

## 2023-11-28 PROCEDURE — 3288F FALL RISK ASSESSMENT DOCD: CPT | Mod: CPTII,S$GLB,, | Performed by: NURSE PRACTITIONER

## 2023-11-28 PROCEDURE — 3078F PR MOST RECENT DIASTOLIC BLOOD PRESSURE < 80 MM HG: ICD-10-PCS | Mod: CPTII,S$GLB,, | Performed by: NURSE PRACTITIONER

## 2023-11-28 PROCEDURE — G0439 PR MEDICARE ANNUAL WELLNESS SUBSEQUENT VISIT: ICD-10-PCS | Mod: S$GLB,,, | Performed by: NURSE PRACTITIONER

## 2023-11-28 PROCEDURE — 3074F SYST BP LT 130 MM HG: CPT | Mod: CPTII,S$GLB,, | Performed by: NURSE PRACTITIONER

## 2023-11-28 PROCEDURE — 3074F PR MOST RECENT SYSTOLIC BLOOD PRESSURE < 130 MM HG: ICD-10-PCS | Mod: CPTII,S$GLB,, | Performed by: NURSE PRACTITIONER

## 2023-11-28 PROCEDURE — 1125F AMNT PAIN NOTED PAIN PRSNT: CPT | Mod: CPTII,S$GLB,, | Performed by: NURSE PRACTITIONER

## 2023-11-28 PROCEDURE — 3078F DIAST BP <80 MM HG: CPT | Mod: CPTII,S$GLB,, | Performed by: NURSE PRACTITIONER

## 2023-11-28 PROCEDURE — 1170F PR FUNCTIONAL STATUS ASSESSED: ICD-10-PCS | Mod: CPTII,S$GLB,, | Performed by: NURSE PRACTITIONER

## 2023-11-28 PROCEDURE — 1160F RVW MEDS BY RX/DR IN RCRD: CPT | Mod: CPTII,S$GLB,, | Performed by: NURSE PRACTITIONER

## 2023-11-28 PROCEDURE — 3044F HG A1C LEVEL LT 7.0%: CPT | Mod: CPTII,S$GLB,, | Performed by: NURSE PRACTITIONER

## 2023-11-28 PROCEDURE — 4010F ACE/ARB THERAPY RXD/TAKEN: CPT | Mod: CPTII,S$GLB,, | Performed by: NURSE PRACTITIONER

## 2023-11-28 PROCEDURE — 4010F PR ACE/ARB THEARPY RXD/TAKEN: ICD-10-PCS | Mod: CPTII,S$GLB,, | Performed by: NURSE PRACTITIONER

## 2023-11-28 PROCEDURE — 1170F FXNL STATUS ASSESSED: CPT | Mod: CPTII,S$GLB,, | Performed by: NURSE PRACTITIONER

## 2023-11-28 PROCEDURE — 3044F PR MOST RECENT HEMOGLOBIN A1C LEVEL <7.0%: ICD-10-PCS | Mod: CPTII,S$GLB,, | Performed by: NURSE PRACTITIONER

## 2023-11-28 PROCEDURE — 1159F MED LIST DOCD IN RCRD: CPT | Mod: CPTII,S$GLB,, | Performed by: NURSE PRACTITIONER

## 2023-11-28 PROCEDURE — 1101F PR PT FALLS ASSESS DOC 0-1 FALLS W/OUT INJ PAST YR: ICD-10-PCS | Mod: CPTII,S$GLB,, | Performed by: NURSE PRACTITIONER

## 2023-11-28 PROCEDURE — 1159F PR MEDICATION LIST DOCUMENTED IN MEDICAL RECORD: ICD-10-PCS | Mod: CPTII,S$GLB,, | Performed by: NURSE PRACTITIONER

## 2023-11-29 NOTE — PATIENT INSTRUCTIONS
Counseling and Referral of Other Preventative  (Italic type indicates deductible and co-insurance are waived)    Patient Name: Katelyn Huynh  Today's Date: 11/28/2023    Health Maintenance       Date Due Completion Date    RSV Vaccine (Age 60+ and Pregnant patients) (1 - 1-dose 60+ series) Never done ---    Mammogram 09/20/2024 9/20/2023    Hemoglobin A1c (Prediabetes) 10/25/2024 10/25/2023    Lipid Panel 03/23/2027 3/23/2022    DEXA Scan 04/18/2032 4/18/2022    TETANUS VACCINE 05/23/2032 5/23/2022    Colorectal Cancer Screening 06/06/2032 6/6/2022        No orders of the defined types were placed in this encounter.    The following information is provided to all patients.  This information is to help you find resources for any of the problems found today that may be affecting your health:                Living healthy guide: www.[facility].[place].[facility]      Understanding Diabetes: www.diabetes.org      Eating healthy: www.cdc.gov/healthyweight      CDC home safety checklist: www.cdc.gov/steadi/patient.html      Agency on Aging: www.goea.[place].[facility]      Alcoholics anonymous (AA): www.aa.org      Physical Activity: www.caesar.nih.gov/uk6acvi      Tobacco use: www.quitwithusla.org

## 2023-11-29 NOTE — PROGRESS NOTES
"  Katelyn Huynh presented for a  Medicare AWV and comprehensive Health Risk Assessment today. The following components were reviewed and updated:    Medical history  Family History  Social history  Allergies and Current Medications  Health Risk Assessment  Health Maintenance  Care Team         ** See Completed Assessments for Annual Wellness Visit within the encounter summary.**         The following assessments were completed:  Living Situation  CAGE  Depression Screening  Timed Get Up and Go  Whisper Test  Cognitive Function Screening    Nutrition Screening  ADL Screening  PAQ Screening        Vitals:    11/28/23 1027   BP: 126/63   Pulse: 90   Weight: 127.5 kg (281 lb)   Height: 5' 5" (1.651 m)     Body mass index is 46.76 kg/m².  Physical Exam  Constitutional:       Appearance: She is obese.   HENT:      Head: Normocephalic and atraumatic.      Nose: Nose normal.      Mouth/Throat:      Mouth: Mucous membranes are moist.   Eyes:      Extraocular Movements: Extraocular movements intact.   Cardiovascular:      Rate and Rhythm: Normal rate and regular rhythm.      Heart sounds: Normal heart sounds.   Pulmonary:      Effort: Pulmonary effort is normal. No respiratory distress.      Breath sounds: Normal breath sounds.   Abdominal:      General: Bowel sounds are normal. There is no distension.      Palpations: Abdomen is soft.   Musculoskeletal:         General: No swelling. Normal range of motion.      Cervical back: Normal range of motion.   Skin:     General: Skin is warm and dry.   Neurological:      General: No focal deficit present.      Mental Status: She is alert and oriented to person, place, and time.      Gait: Gait abnormal (walker present).   Psychiatric:         Mood and Affect: Mood normal.         Behavior: Behavior normal.               Diagnoses and health risks identified today and associated recommendations/orders:    1. Encounter for preventive health examination  Assessments completed. " Preventive measures and health maintenance reviewed with patient.  Review for opioid screening: Patient does not use any opioids.  Review for substance use disorder: Patient does not use any substances.    2. Class 3 severe obesity due to excess calories with serious comorbidity and body mass index (BMI) of 45.0 to 49.9 in adult  Stable, patient on Ozempic. Followed by PCP.    3. Dry eye syndrome, bilateral  Stable, followed by Optometry.    4. Essential hypertension  Stable, patient on Hydrochlorothiazide and Losartan. Followed by PCP.    5. Dependence on other enabling machines and devices  Stable, patient has walker for mobility. Followed by PCP.      Provided Katelyn with a 5-10 year written screening schedule and personal prevention plan. Recommendations were developed using the USPSTF age appropriate recommendations. Education, counseling, and referrals were provided as needed. After Visit Summary printed and given to patient which includes a list of additional screenings\tests needed.    Follow up in about 1 year (around 11/28/2024) for your next annual wellness visit.    Izabella Nieto, NP  I offered to discuss advanced care planning, including how to pick a person who would make decisions for you if you were unable to make them for yourself, called a health care power of , and what kind of decisions you might make such as use of life sustaining treatments such as ventilators and tube feeding when faced with a life limiting illness recorded on a living will that they will need to know. (How you want to be cared for as you near the end of your natural life)     X Patient is interested in learning more about how to make advanced directives.  I provided them paperwork and offered to discuss this with them.

## 2023-12-04 ENCOUNTER — PATIENT MESSAGE (OUTPATIENT)
Dept: INTERNAL MEDICINE | Facility: CLINIC | Age: 73
End: 2023-12-04
Payer: MEDICARE

## 2024-01-03 ENCOUNTER — TELEPHONE (OUTPATIENT)
Dept: PRIMARY CARE CLINIC | Facility: CLINIC | Age: 74
End: 2024-01-03
Payer: MEDICARE

## 2024-01-03 NOTE — TELEPHONE ENCOUNTER
----- Message from Tracy Tabares sent at 1/3/2024 12:58 PM CST -----  Contact: [phone]  Patient is calling because she did a self test for UTI and was positive and is asking for a prescription to treat it, please send it to her preferred pharmacy [id number] -    Thank you

## 2024-01-03 NOTE — PROGRESS NOTES
[facility] Note    Chief Complaint      Chief Complaint   Patient presents with    Urinary Tract Infection       History of Present Illness      Katelyn Huynh is a 73 y.o. female who presents today via virtual visit for   Chief Complaint   Patient presents with    Urinary Tract Infection         Patient reports discomfort upon voiding.  I treated her last month for a UTI in which she found medication helped resolve issue but now she feels the same symptoms again.  She denies any shortness a breath or chest pain.  Otherwise she is feeling well today.    Dysuria   This is a new problem. The current episode started in the past 7 days. The problem occurs every urination. The problem has been unchanged. The quality of the pain is described as aching. The pain is at a severity of 3/10. The pain is mild. There has been no fever. She is Not sexually active. There is No history of pyelonephritis. Associated symptoms include frequency and urgency. Pertinent negatives include no behavior changes, discharge, hematuria, hesitancy, nausea, possible pregnancy, sweats, vomiting, weight loss, rash or withholding. Her past medical history is significant for hypertension. There is no history of catheterization, genitourinary reflux, kidney stones, a single kidney, STD or a urological procedure.        Review of Systems   Constitutional:  Negative for weight loss.   Gastrointestinal:  Negative for nausea and vomiting.   Genitourinary:  Positive for dysuria, frequency and urgency. Negative for hematuria and hesitancy.   Skin:  Negative for rash.        Family History:  family history includes Cataracts in her maternal aunt, mother, and sister; Diabetes in her father, maternal aunt, mother, and sister; Glaucoma in her brother, maternal aunt, maternal grandfather, maternal uncle, and mother; Heart disease in her father and mother; Hypertension in her father and mother; No Known Problems in her maternal  grandmother, paternal aunt, paternal grandfather, paternal grandmother, paternal uncle, and another family member; Stroke in her sister.   Family history was reviewed with patient.     Medications:  Outpatient Encounter Medications as of 1/4/2024   Medication Sig Note Dispense Refill    alcohol swabs (ALCOHOL WIPES) PadM Monitor X2 daily as directed. Per insurance coverage.  200 each 3    ASCORBATE CALCIUM (VITAMIN C ORAL) Take by mouth once daily.  1/24/2018: Hold 1 wk prior to surgery      blood sugar diagnostic Strp Test blood sugar twice daily. Per insurance coverage.  200 each 3    blood-glucose meter (ACCU-CHEK GUIDE ME GLUCOSE MTR) Misc Use to test blood sugar  1 each 0    blood-glucose meter kit Monitor X2 daily as directed. Per insurance coverage.  1 each 0    cetirizine (ZYRTEC) 10 MG tablet Take 1 tablet (10 mg total) by mouth once daily. 11/28/2023: As needed 30 tablet 0    clobetasol 0.05% (TEMOVATE) 0.05 % Oint Apply topically 2 (two) times daily. For use on body. STEROID. Use for up to 2 weeks then decrease to 3 times weekly  60 g 1    diphth,pertus,acell,,tetanus (BOOSTRIX TDAP) 2.5-8-5 Lf-mcg-Lf/0.5mL Syrg injection Inject into the muscle.  0.5 mL 0    diphth,pertus,acell,,tetanus (BOOSTRIX TDAP) 2.5-8-5 Lf-mcg-Lf/0.5mL Syrg injection Inject into the muscle.  0.5 mL 0    hydroCHLOROthiazide (HYDRODIURIL) 50 MG tablet Take 1 tablet (50 mg total) by mouth once daily.  90 tablet 3    lancets (ACCU-CHEK SOFTCLIX LANCETS) Misc Test blood sugar twice daily  200 each 3    losartan (COZAAR) 100 MG tablet Take 1 tablet (100 mg total) by mouth once daily.  90 tablet 3    nitrofurantoin, macrocrystal-monohydrate, (MACROBID) 100 MG capsule Take 1 capsule (100 mg total) by mouth 2 (two) times daily.  10 capsule 0    polyethylene glycol (GOLYTELY) 236-22.74-6.74 -5.86 gram suspension Take 4,000 mLs by mouth once.       semaglutide (OZEMPIC) 0.25 mg or 0.5 mg (2 mg/3 mL) pen injector Inject 0.25 mg into the skin  every 7 days.  4.5 mL 3    [DISCONTINUED] diclofenac sodium (VOLTAREN) 1 % Gel Apply 2 g topically once daily. (Patient not taking: Reported on 11/28/2023)  150 g 0    [DISCONTINUED] miconazole nitrate 2% (MICOTIN) 2 % Oint Apply topically 2 (two) times daily. (Patient not taking: Reported on 11/28/2023)   0    [DISCONTINUED] mupirocin (BACTROBAN) 2 % ointment Apply topically 2 (two) times daily. (Patient not taking: Reported on 11/28/2023)  15 g 0    [DISCONTINUED] tacrolimus (PROTOPIC) 0.1 % ointment Apply topically 2 (two) times daily. For use on face and body. Not a steroid (Patient not taking: Reported on 11/28/2023)  100 g 2     No facility-administered encounter medications on file as of 1/4/2024.       Allergies:  Review of patient's allergies indicates:   Allergen Reactions    Cortisone Hives and Swelling     Swelling of the tongue       Health Maintenance:  Health Maintenance   Topic Date Due    Mammogram  09/20/2024    Lipid Panel  03/23/2027    DEXA Scan  04/18/2032    TETANUS VACCINE  05/23/2032    Colorectal Cancer Screening  06/06/2032    Hepatitis C Screening  Completed    Shingles Vaccine  Completed     Health Maintenance Topics with due status: Not Due       Topic Last Completion Date    Lipid Panel 03/23/2022    DEXA Scan 04/18/2022    TETANUS VACCINE 05/23/2022    Colorectal Cancer Screening 06/06/2022    Mammogram 09/20/2023    Hemoglobin A1c (Prediabetes) 10/25/2023       Physical Exam       ]        Assessment/Plan     Katelyn Huynh is a 73 y.o.female with:    Dysuria  -     Urinalysis, Reflex to Urine Culture Urine, Clean Catch  -     nitrofurantoin, macrocrystal-monohydrate, (MACROBID) 100 MG capsule; Take 1 capsule (100 mg total) by mouth 2 (two) times daily.  Dispense: 10 capsule; Refill: 0  -     Ambulatory referral/consult to Urology; Future; Expected date: 01/11/2024        As above, continue current medications and maintain follow up with specialists.  Return to clinic  as needed.    Greater than 50% of this time was spent face to face via virtual visit with patient.  All questions were answered to patient's satisfaction.    The patient location is: home  The chief complaint leading to consultation is: dysuria    Visit type: audiovisual    Face to Face time with patient:   6 minutes of total time spent on the encounter, which includes face to face time and non-face to face time preparing to see the patient (eg, review of tests), Obtaining and/or reviewing separately obtained history, Documenting clinical information in the electronic or other health record, Independently interpreting results (not separately reported) and communicating results to the patient/family/caregiver, or Care coordination (not separately reported).         Each patient to whom he or she provides medical services by telemedicine is:  (1) informed of the relationship between the physician and patient and the respective role of any other health care provider with respect to management of the patient; and (2) notified that he or she may decline to receive medical services by telemedicine and may withdraw from such care at any time.       Nadja Rice, NP-C  [facility]

## 2024-01-04 ENCOUNTER — OFFICE VISIT (OUTPATIENT)
Dept: PRIMARY CARE CLINIC | Facility: CLINIC | Age: 74
End: 2024-01-04
Payer: MEDICARE

## 2024-01-04 DIAGNOSIS — R30.0 DYSURIA: Primary | ICD-10-CM

## 2024-01-04 PROCEDURE — 99213 OFFICE O/P EST LOW 20 MIN: CPT | Mod: 95,,, | Performed by: NURSE PRACTITIONER

## 2024-01-04 PROCEDURE — 1159F MED LIST DOCD IN RCRD: CPT | Mod: CPTII,95,, | Performed by: NURSE PRACTITIONER

## 2024-01-04 PROCEDURE — 1160F RVW MEDS BY RX/DR IN RCRD: CPT | Mod: CPTII,95,, | Performed by: NURSE PRACTITIONER

## 2024-01-04 RX ORDER — NITROFURANTOIN 25; 75 MG/1; MG/1
100 CAPSULE ORAL 2 TIMES DAILY
Qty: 10 CAPSULE | Refills: 0 | Status: SHIPPED | OUTPATIENT
Start: 2024-01-04

## 2024-01-05 ENCOUNTER — LAB VISIT (OUTPATIENT)
Dept: LAB | Facility: HOSPITAL | Age: 74
End: 2024-01-05
Attending: NURSE PRACTITIONER
Payer: MEDICARE

## 2024-01-05 DIAGNOSIS — R30.0 DYSURIA: ICD-10-CM

## 2024-01-05 DIAGNOSIS — N39.0 URINARY TRACT INFECTION WITHOUT HEMATURIA, SITE UNSPECIFIED: Primary | ICD-10-CM

## 2024-01-05 LAB
BACTERIA #/AREA URNS AUTO: ABNORMAL /HPF
BILIRUB UR QL STRIP: NEGATIVE
CLARITY UR REFRACT.AUTO: ABNORMAL
COLOR UR AUTO: YELLOW
GLUCOSE UR QL STRIP: NEGATIVE
HGB UR QL STRIP: ABNORMAL
HYALINE CASTS UR QL AUTO: 0 /LPF
KETONES UR QL STRIP: NEGATIVE
LEUKOCYTE ESTERASE UR QL STRIP: ABNORMAL
MICROSCOPIC COMMENT: ABNORMAL
NITRITE UR QL STRIP: NEGATIVE
PH UR STRIP: 6 [PH] (ref 5–8)
PROT UR QL STRIP: ABNORMAL
RBC #/AREA URNS AUTO: 5 /HPF (ref 0–4)
SP GR UR STRIP: 1.01 (ref 1–1.03)
SQUAMOUS #/AREA URNS AUTO: 5 /HPF
URN SPEC COLLECT METH UR: ABNORMAL
WBC #/AREA URNS AUTO: >100 /HPF (ref 0–5)
WBC CLUMPS UR QL AUTO: ABNORMAL

## 2024-01-05 PROCEDURE — 81001 URINALYSIS AUTO W/SCOPE: CPT | Performed by: NURSE PRACTITIONER

## 2024-01-05 PROCEDURE — 87086 URINE CULTURE/COLONY COUNT: CPT | Performed by: NURSE PRACTITIONER

## 2024-01-05 PROCEDURE — 87077 CULTURE AEROBIC IDENTIFY: CPT | Performed by: NURSE PRACTITIONER

## 2024-01-05 PROCEDURE — 87186 SC STD MICRODIL/AGAR DIL: CPT | Performed by: NURSE PRACTITIONER

## 2024-01-05 PROCEDURE — 87088 URINE BACTERIA CULTURE: CPT | Performed by: NURSE PRACTITIONER

## 2024-01-08 LAB — BACTERIA UR CULT: ABNORMAL

## 2024-01-08 RX ORDER — AMOXICILLIN AND CLAVULANATE POTASSIUM 500; 125 MG/1; MG/1
1 TABLET, FILM COATED ORAL EVERY 12 HOURS
Qty: 14 TABLET | Refills: 0 | Status: SHIPPED | OUTPATIENT
Start: 2024-01-08 | End: 2024-01-15

## 2024-01-09 ENCOUNTER — TELEPHONE (OUTPATIENT)
Dept: PRIMARY CARE CLINIC | Facility: CLINIC | Age: 74
End: 2024-01-09
Payer: MEDICARE

## 2024-01-09 NOTE — TELEPHONE ENCOUNTER
Explain to patient that she should start taking her second antibiotic after her first antibiotic is done on tomorrow. Pt agreed to start on Thursday.

## 2024-01-09 NOTE — TELEPHONE ENCOUNTER
----- Message from Poly Byrd sent at 1/8/2024  2:14 PM CST -----  Contact: [phone]  Patient is returning a phone call.  Who left a message for the patient: Dwayne  Does patient know what this is regarding:  RX  Would you like a call back, or a response through your [facility] portal?:   portal  Comments: should patient discontinue use of the  nitrofurantoin, macrocrystal-monohydrate, (MACROBID) 100 MG capsule

## 2024-01-30 ENCOUNTER — OFFICE VISIT (OUTPATIENT)
Dept: OTOLARYNGOLOGY | Facility: CLINIC | Age: 74
End: 2024-01-30
Payer: MEDICARE

## 2024-01-30 VITALS — DIASTOLIC BLOOD PRESSURE: 69 MMHG | HEART RATE: 93 BPM | OXYGEN SATURATION: 99 % | SYSTOLIC BLOOD PRESSURE: 137 MMHG

## 2024-01-30 DIAGNOSIS — H61.23 BILATERAL IMPACTED CERUMEN: Primary | ICD-10-CM

## 2024-01-30 DIAGNOSIS — H93.293 ABNORMAL AUDITORY PERCEPTION OF BOTH EARS: ICD-10-CM

## 2024-01-30 DIAGNOSIS — J34.2 NASAL SEPTAL DEVIATION: ICD-10-CM

## 2024-01-30 DIAGNOSIS — H61.21 IMPACTED CERUMEN OF RIGHT EAR: ICD-10-CM

## 2024-01-30 PROCEDURE — 1160F RVW MEDS BY RX/DR IN RCRD: CPT | Mod: CPTII,S$GLB,, | Performed by: SPECIALIST

## 2024-01-30 PROCEDURE — 3288F FALL RISK ASSESSMENT DOCD: CPT | Mod: CPTII,S$GLB,, | Performed by: SPECIALIST

## 2024-01-30 PROCEDURE — 99204 OFFICE O/P NEW MOD 45 MIN: CPT | Mod: 25,S$GLB,, | Performed by: SPECIALIST

## 2024-01-30 PROCEDURE — 3075F SYST BP GE 130 - 139MM HG: CPT | Mod: CPTII,S$GLB,, | Performed by: SPECIALIST

## 2024-01-30 PROCEDURE — 3078F DIAST BP <80 MM HG: CPT | Mod: CPTII,S$GLB,, | Performed by: SPECIALIST

## 2024-01-30 PROCEDURE — 99999 PR PBB SHADOW E&M-EST. PATIENT-LVL IV: CPT | Mod: PBBFAC,,, | Performed by: SPECIALIST

## 2024-01-30 PROCEDURE — 1125F AMNT PAIN NOTED PAIN PRSNT: CPT | Mod: CPTII,S$GLB,, | Performed by: SPECIALIST

## 2024-01-30 PROCEDURE — 69210 REMOVE IMPACTED EAR WAX UNI: CPT | Mod: S$GLB,,, | Performed by: SPECIALIST

## 2024-01-30 PROCEDURE — 1159F MED LIST DOCD IN RCRD: CPT | Mod: CPTII,S$GLB,, | Performed by: SPECIALIST

## 2024-01-30 PROCEDURE — 1101F PT FALLS ASSESS-DOCD LE1/YR: CPT | Mod: CPTII,S$GLB,, | Performed by: SPECIALIST

## 2024-01-30 NOTE — PROGRESS NOTES
Subjective:       Patient ID: Katelyn Huynh is a 73 y.o. female.    Chief Complaint: No chief complaint on file.      The patient is referred by Dr.Ndhlovu Mckeon for evaluation of blockage in her ear.  Having decreased hearing but no significant pain.  She has not having tinnitus or otorrhea.  She has no significant recreational occupational noise exposure history.  She does have a sister who has hearing loss and has had use of hearing aids recommended.          Review of Systems     Constitutional: Negative for appetite change, chills, fatigue, fever and unexpected weight loss.      HENT: Negative for ear discharge, ear infection, ear pain, facial swelling, hearing loss, mouth sores, nosebleeds, postnasal drip, ringing in the ears, runny nose, sinus infection, sinus pressure, sore throat, stuffy nose, tonsil infection, dental problems, trouble swallowing and voice change.      Eyes:  Positive for change in eyesight. Negative for eye drainage, eye itching and photophobia. Ocular hypertension    Respiratory:  Positive for shortness of breath. Negative for cough, sleep apnea, snoring and wheezing.      Cardiovascular:  Positive for foot swelling, irregular heartbeat and swollen veins. Negative for chest pain.     Gastrointestinal:  Negative for abdominal pain, acid reflux, constipation, diarrhea, heartburn and vomiting.     Genitourinary: Negative for difficulty urinating, sexual problems and frequent urination.     Musc: Positive for aching joints, aching muscles, back pain and neck pain.     Skin: Positive for rash.     Allergy: Negative for food allergies and seasonal allergies.     Endocrine: Negative for cold intolerance and heat intolerance.  Morbid obesity  Type 2 diabetes mellitus    Neurological: Negative for dizziness, headaches, light-headedness, seizures and tremors.      Hematologic: Negative for bruises/bleeds easily and swollen glands.      Psychiatric: Negative for decreased  concentration, depression, nervous/anxious and sleep disturbance.                Objective:      Physical Exam  Vitals and nursing note reviewed.   Constitutional:       General: She is awake.      Appearance: Normal appearance. She is well-developed and well-groomed. She is morbidly obese.   HENT:      Head: Normocephalic.      Jaw: There is normal jaw occlusion.      Salivary Glands: Right salivary gland is not diffusely enlarged or tender. Left salivary gland is not diffusely enlarged or tender.      Right Ear: Ear canal and external ear normal. Decreased hearing noted. There is impacted cerumen. Tympanic membrane is retracted.      Left Ear: Ear canal and external ear normal. Decreased hearing noted. There is impacted cerumen. Tympanic membrane is retracted.      Nose: Mucosal edema (cyanotic, boggy inferior turbinates bilaterally) and rhinorrhea (clear mucus bilaterally) present. No nasal deformity or septal deviation (midline septum). Rhinorrhea is clear.      Right Turbinates: Enlarged and pale.      Left Turbinates: Enlarged and pale.      Mouth/Throat:      Lips: No lesions.      Mouth: No oral lesions.      Dentition: Abnormal dentition. No gum lesions.      Tongue: No lesions.      Palate: No mass and lesions.      Pharynx: Oropharynx is clear. Uvula midline.   Eyes:      General: Lids are normal.         Right eye: No discharge.         Left eye: No discharge.      Conjunctiva/sclera:      Right eye: Right conjunctiva is injected. No exudate.     Left eye: Left conjunctiva is injected. No exudate.     Pupils: Pupils are equal, round, and reactive to light.   Neck:      Thyroid: No thyroid mass or thyromegaly.      Trachea: Trachea normal. No tracheal deviation.   Cardiovascular:      Rate and Rhythm: Normal rate and regular rhythm.      Pulses: Normal pulses.      Heart sounds: Normal heart sounds.   Pulmonary:      Effort: Pulmonary effort is normal.      Breath sounds: Normal breath sounds. No stridor.  No decreased breath sounds, wheezing, rhonchi or rales.   Abdominal:      General: Bowel sounds are normal.      Palpations: Abdomen is soft.      Tenderness: There is no abdominal tenderness.   Musculoskeletal:         General: Normal range of motion.      Cervical back: Normal range of motion. No muscular tenderness.   Lymphadenopathy:      Head:      Right side of head: No submental, submandibular, preauricular, posterior auricular or occipital adenopathy.      Left side of head: No submental, submandibular, preauricular, posterior auricular or occipital adenopathy.      Cervical: No cervical adenopathy.   Skin:     General: Skin is warm and dry.      Findings: No petechiae or rash.      Nails: There is no clubbing.   Neurological:      Mental Status: She is alert and oriented to person, place, and time.      Cranial Nerves: No cranial nerve deficit.      Sensory: No sensory deficit.      Gait: Gait normal.   Psychiatric:         Speech: Speech normal.         Behavior: Behavior normal. Behavior is cooperative.         Thought Content: Thought content normal.         Judgment: Judgment normal.         Procedure-cerumen impactions removed bilaterally using wire loop on the left and wire loop, 8 French suction and irrigation on the right.  The patient tolerated procedure well was discharged post procedure.    Assessment:       1. Bilateral impacted cerumen    2. Impacted cerumen of right ear    3. Abnormal auditory perception of both ears    4. Nasal septal deviation        Plan:       I will schedule patient for a comprehensive auditory evaluation.  I will telephone her a report of the audiogram unless in-person explanation is required.  I will recheck her  in 1 year, or sooner on an as-needed basis.    ADDENDUM:  Results of audiogram of 03/20/2024 below:    I personally reviewed the above audiogram.  Pertinent findings:  Mild-to-moderate flat sensorineural hearing loss with good auditory discrimination and normal  impedance audiometry findings.  My recommendation will be that the patient pursue bilateral amplification/hearing aids.  She is medically cleared to do so.  The patient has been notified.  She needs to take a copy of her audiogram to the hearing aid dealer of choice for her insurance company to obtain hearing aids.            DISCLAIMER: This note was prepared with [employer] voice recognition transcription software. Garbled syntax, mangled pronouns, and other bizarre constructions may be attributed to that software system. While efforts were made to correct any mistakes made by this voice recognition program, some errors and/or omissions may remain in the note that were missed when the note was originally created.

## 2024-01-31 NOTE — PROCEDURES
"Ear Cerumen Removal    Date/Time: 1/30/2024 1:00 PM    Performed by: YOAN Alcantar MD  Authorized by: YOAN Alcantar MD    Time out: Immediately prior to procedure a "time out" was called to verify the correct patient, procedure, equipment, support staff and site/side marked as required.    Consent Done?:  Yes (Verbal)    Local anesthetic:  None  Location details:  Both ears  Procedure type comment:  Left ear-wire loop; right ear-wire loop, 8 Beninese suction and irrigation  Cerumen  Removal Results:  Cerumen completely removed  Patient tolerance:  Patient tolerated the procedure well with no immediate complications    " Fair

## 2024-02-19 DIAGNOSIS — Z86.011 PERSONAL HISTORY OF MENINGIOMA OF THE BRAIN: Primary | ICD-10-CM

## 2024-02-19 DIAGNOSIS — I10 ESSENTIAL HYPERTENSION: ICD-10-CM

## 2024-02-19 RX ORDER — HYDROCHLOROTHIAZIDE 50 MG/1
50 TABLET ORAL DAILY
Qty: 90 TABLET | Refills: 3 | Status: SHIPPED | OUTPATIENT
Start: 2024-02-19

## 2024-02-19 NOTE — TELEPHONE ENCOUNTER
Care Due:                  Date            Visit Type   Department     Provider  --------------------------------------------------------------------------------                                EP -                              PRIMARY      [facility] PRIMARY  Last Visit: 10-      CARE ([facility])   CARE           gilbert Almanzar                              EP -                              PRIMARY      [facility] PRIMARY  Next Visit: 04-      CARE ([facility])   CARE           [facility]  Nd[place]ovu                                                            Last  Test          Frequency    Reason                     Performed    Due Date  --------------------------------------------------------------------------------    HBA1C.......  6 months...  semaglutide..............  10-   04-    [facility] Embedded Care Due Messages. Reference number: [id number].   2/19/2024 11:18:14 AM CST

## 2024-02-19 NOTE — TELEPHONE ENCOUNTER
Refill Routing Note   Medication(s) are not appropriate for processing by [facility] for the following reason(s):        Clarification of medication (Rx) details    [facility] action(s):  Defer     Requires labs : Yes      Medication Therapy Plan: PHARMACY REQUESTING WITH INAPPROPRIATE AND MISSING INFORMATION; WILL PEND WITH THE CORRECT INFORMATION      Appointments  past 12m or future 3m with PCP    Date Provider   Last Visit   10/25/2023 Mike Almanzar MD   Next Visit   4/29/2024 Mike Almanzar MD   ED visits in past 90 days: 0        Note composed:3:24 PM 02/19/2024

## 2024-03-15 ENCOUNTER — OFFICE VISIT (OUTPATIENT)
Dept: UROLOGY | Facility: CLINIC | Age: 74
End: 2024-03-15
Payer: MEDICARE

## 2024-03-15 VITALS
WEIGHT: 284.19 LBS | BODY MASS INDEX: 47.35 KG/M2 | HEIGHT: 65 IN | DIASTOLIC BLOOD PRESSURE: 71 MMHG | SYSTOLIC BLOOD PRESSURE: 139 MMHG | HEART RATE: 81 BPM

## 2024-03-15 DIAGNOSIS — N39.0 RECURRENT UTI: ICD-10-CM

## 2024-03-15 DIAGNOSIS — Z86.011 PERSONAL HISTORY OF MENINGIOMA OF THE BRAIN: Primary | ICD-10-CM

## 2024-03-15 DIAGNOSIS — A49.8 PROTEUS INFECTION: Primary | ICD-10-CM

## 2024-03-15 DIAGNOSIS — N95.2 VAGINAL ATROPHY: ICD-10-CM

## 2024-03-15 PROCEDURE — 1125F AMNT PAIN NOTED PAIN PRSNT: CPT | Mod: CPTII,S$GLB,, | Performed by: UROLOGY

## 2024-03-15 PROCEDURE — 99999 PR PBB SHADOW E&M-EST. PATIENT-LVL IV: CPT | Mod: PBBFAC,,, | Performed by: UROLOGY

## 2024-03-15 PROCEDURE — 3008F BODY MASS INDEX DOCD: CPT | Mod: CPTII,S$GLB,, | Performed by: UROLOGY

## 2024-03-15 PROCEDURE — 1101F PT FALLS ASSESS-DOCD LE1/YR: CPT | Mod: CPTII,S$GLB,, | Performed by: UROLOGY

## 2024-03-15 PROCEDURE — 3078F DIAST BP <80 MM HG: CPT | Mod: CPTII,S$GLB,, | Performed by: UROLOGY

## 2024-03-15 PROCEDURE — 3075F SYST BP GE 130 - 139MM HG: CPT | Mod: CPTII,S$GLB,, | Performed by: UROLOGY

## 2024-03-15 PROCEDURE — 3288F FALL RISK ASSESSMENT DOCD: CPT | Mod: CPTII,S$GLB,, | Performed by: UROLOGY

## 2024-03-15 PROCEDURE — 99204 OFFICE O/P NEW MOD 45 MIN: CPT | Mod: S$GLB,,, | Performed by: UROLOGY

## 2024-03-15 PROCEDURE — 1159F MED LIST DOCD IN RCRD: CPT | Mod: CPTII,S$GLB,, | Performed by: UROLOGY

## 2024-03-15 RX ORDER — ESTRADIOL 0.1 MG/G
1 CREAM VAGINAL
Qty: 45 G | Refills: 3 | Status: SHIPPED | OUTPATIENT
Start: 2024-03-15

## 2024-03-15 RX ORDER — DOXYCYCLINE HYCLATE 100 MG
100 TABLET ORAL ONCE
Status: CANCELLED | OUTPATIENT
Start: 2024-03-15 | End: 2024-03-15

## 2024-03-15 RX ORDER — LIDOCAINE HYDROCHLORIDE 20 MG/ML
JELLY TOPICAL ONCE
Status: CANCELLED | OUTPATIENT
Start: 2024-03-15 | End: 2024-03-15

## 2024-03-15 NOTE — PROGRESS NOTES
CHIEF COMPLAINT:    Mrs. Huynh is a 73 y.o. female presenting as a self referred patient for recurrent UTI's.     PRESENTING ILLNESS:    Katelyn Huynh is a 73 y.o. female who presents with a history of recurrent UTI.  She had two within several months.  She also has frequency and urgency, nocturia x 2-3    Onset of symptoms:  a few months ago    Culture Proven (yes/no):  one had proteus mirabilis    Symptoms:  urine is discolored brown, dysuria,     Risk factors:     Bowel movements:  has a bm 3-4 times a week (normal for her)   Menopausal status:    Using Vaginal Estrogen:  has never been prescribed    Reason why not using Vaginal Estrogen:   Sexually active:  is sexually active   fluid intake:  3-4 16.9 oz bottles of water a day   Pad use:  no pad use, does have urge incontinence    Preventative measures:   Probiotics:  eats yogurt    ,  uterus in place, no pain with intercourse, 3-4 times a week.  Soft.    She is pre diabetic, on Ozempic, last HgA1c was 5.5%    REVIEW OF SYSTEMS:    Review of Systems   Constitutional: Negative.    HENT: Negative.     Eyes: Negative.    Respiratory: Negative.     Cardiovascular: Negative.    Gastrointestinal: Negative.    Genitourinary:  Positive for dysuria.   Musculoskeletal:  Positive for joint pain.   Skin: Negative.    Neurological: Negative.    Endo/Heme/Allergies: Negative.    Psychiatric/Behavioral: Negative.         PATIENT HISTORY:    Past Medical History:   Diagnosis Date    Arthritis     Cataract     Glaucoma     History of iritis     OD    Hypertension     Meningioma     Uveitis        Past Surgical History:   Procedure Laterality Date    Brain tumor surgery      had a brain tumor removed    CATARACT EXTRACTION      OU    CATARACT EXTRACTION BILATERAL W/ ANTERIOR VITRECTOMY      CHOLECYSTECTOMY      COLONOSCOPY N/A 2022    Procedure: COLONOSCOPY;  Surgeon: Efren Kiran MD;  Location: [facility] ([address] [place] [address]);  Service: Endoscopy;   Laterality: N/A;  BMI-56  Wt: 329#       vaccinated-GT    CRANIOTOMY      JOINT REPLACEMENT      KNEE SURGERY  2-24-15    right TKR    KNEE SURGERY Left 2018    TKR    KNEE SURGERY Left 2018    REVISION    WRIST FRACTURE SURGERY      YAG Right 2016    Dr. Kaiser       Family History   Problem Relation Age of Onset    Glaucoma Mother     Hypertension Mother     Diabetes Mother     Cataracts Mother     Heart disease Mother     Hypertension Father     Diabetes Father     Heart disease Father     Diabetes Sister     Cataracts Sister     Stroke Sister         minor stroke    Glaucoma Brother     Glaucoma Maternal Aunt     Diabetes Maternal Aunt     Cataracts Maternal Aunt     Glaucoma Maternal Uncle     Glaucoma Maternal Grandfather      Social History     Socioeconomic History    Marital status:    Tobacco Use    Smoking status: Former     Current packs/day: 0.00     Average packs/day: 0.1 packs/day for 3.0 years (0.3 ttl pk-yrs)     Types: Cigarettes     Start date: 1970     Quit date: 1973     Years since quittin.9    Smokeless tobacco: Never   Substance and Sexual Activity    Alcohol use: Yes     Alcohol/week: 2.0 standard drinks of alcohol     Types: 2 Glasses of wine per week     Comment: occasionally    Drug use: No    Sexual activity: Not Currently     Partners: Male     Social Determinants of Health     Financial Resource Strain: Low Risk  (2024)    Overall Financial Resource Strain (CARDIA)     Difficulty of Paying Living Expenses: Not very hard   Food Insecurity: No Food Insecurity (2024)    Hunger Vital Sign     Worried About Running Out of Food in the Last Year: Never true     Ran Out of Food in the Last Year: Never true   Transportation Needs: No Transportation Needs (2024)    PRAPARE - Transportation     Lack of Transportation (Medical): No     Lack of Transportation (Non-Medical): No   Physical Activity: Unknown (2024)    Exercise Vital Sign      Days of Exercise per Week: Patient declined     Minutes of Exercise per Session: 0 min   Recent Concern: Physical Activity - Insufficiently Active (11/28/2023)    Exercise Vital Sign     Days of Exercise per Week: 3 days     Minutes of Exercise per Session: 10 min   Stress: No Stress Concern Present (1/4/2024)    [employer] [place] [employer] - Occupational Stress Questionnaire     Feeling of Stress : Only a little   Social Connections: Moderately Integrated (1/4/2024)    Social Connection and Isolation Panel [NHANES]     Frequency of Communication with Friends and Family: More than three times a week     Frequency of Social Gatherings with Friends and Family: Three times a week     Attends Christianity Services: Never     Active Member of Clubs or Organizations: Yes     Attends Club or Organization Meetings: More than 4 times per year     Marital Status:    Housing Stability: Low Risk  (1/4/2024)    Housing Stability Vital Sign     Unable to Pay for Housing in the Last Year: No     Number of Places Lived in the Last Year: 1     Unstable Housing in the Last Year: No       Allergies:  Cortisone    Medications:  Outpatient Encounter Medications as of 3/15/2024   Medication Sig Dispense Refill    alcohol swabs (ALCOHOL WIPES) PadM Monitor X2 daily as directed. Per insurance coverage. 200 each 3    ASCORBATE CALCIUM (VITAMIN C ORAL) Take by mouth once daily.       blood sugar diagnostic Strp Test blood sugar twice daily. Per insurance coverage. 200 each 3    blood-glucose meter (ACCU-CHEK GUIDE ME GLUCOSE MTR) Misc Use to test blood sugar 1 each 0    blood-glucose meter kit Monitor X2 daily as directed. Per insurance coverage. 1 each 0    cetirizine (ZYRTEC) 10 MG tablet Take 1 tablet (10 mg total) by mouth once daily. 30 tablet 0    clobetasol 0.05% (TEMOVATE) 0.05 % Oint Apply topically 2 (two) times daily. For use on body. STEROID. Use for up to 2 weeks then decrease to 3 times weekly 60 g 1     diphth,pertus,acell,,tetanus (BOOSTRIX TDAP) 2.5-8-5 Lf-mcg-Lf/0.5mL Syrg injection Inject into the muscle. 0.5 mL 0    diphth,pertus,acell,,tetanus (BOOSTRIX TDAP) 2.5-8-5 Lf-mcg-Lf/0.5mL Syrg injection Inject into the muscle. 0.5 mL 0    hydroCHLOROthiazide (HYDRODIURIL) 50 MG tablet Take 1 tablet (50 mg total) by mouth once daily. 90 tablet 3    lancets (ACCU-CHEK SOFTCLIX LANCETS) Misc Test blood sugar twice daily 200 each 3    losartan (COZAAR) 100 MG tablet Take 1 tablet (100 mg total) by mouth once daily. 90 tablet 3    nitrofurantoin, macrocrystal-monohydrate, (MACROBID) 100 MG capsule Take 1 capsule (100 mg total) by mouth 2 (two) times daily. 10 capsule 0    polyethylene glycol (GOLYTELY) 236-22.74-6.74 -5.86 gram suspension Take 4,000 mLs by mouth once.      RSVPreF3 antigen-AS01E, PF, (AREXVY) 120 mcg/0.5 mL SusR vaccine Inject into the muscle. 1 each 0    semaglutide (OZEMPIC) 0.25 mg or 0.5 mg (2 mg/3 mL) pen injector Inject 0.25 mg into the skin every 7 days. 4.5 mL 3    [DISCONTINUED] hydroCHLOROthiazide (HYDRODIURIL) 50 MG tablet Take 1 tablet (50 mg total) by mouth once daily. 90 tablet 3     No facility-administered encounter medications on file as of 3/15/2024.         PHYSICAL EXAMINATION:    The patient generally appears in good health, is appropriately interactive, and is in no apparent distress.    Skin: No lesions.    Mental: Cooperative with normal affect.    Neuro: Grossly intact.    HEENT: Normal. No evidence of lymphadenopathy.    Chest:  normal inspiratory effort.    Abdomen: Soft, non-tender. No masses or organomegaly. Bladder is not palpable. No evidence of flank discomfort. No evidence of inguinal hernia.    Extremities: No clubbing, cyanosis, or edema    NOTE: exam was carried out with a nurse chaperone  Normal external female genitalia  Grade II vaginal atrophy  Urethral meatus is normal  Urethra and bladder are nontender to bimanual exam  Has a stage II cystocele  Well supported  posteriorly   Uterus and cervix are normal, there is uterine prolapse  No adnexal masses    Aa +0.5, Ba +0.5, C -5  gH 4, pB 2.5, TVL 8  Ap -3, Bp -3, D -6    LABS:    Lab Results   Component Value Date    BUN 13 10/25/2023    CREATININE 0.9 10/25/2023       UA 1.015, pH 5, 50 blood, otherwise, negative.  On microscopy, there were no RBC's    IMPRESSION:    Recurrent UTI  Vaginal atrophy    PLAN:    1.  Had a UTI with proteus mirabilis.  Renal ultrasound ordered to look for stones.   2.  Cystoscopy  3.  Estrace vaginal cream 3 times a week before bedtime    I spent 45 minutes with the patient of which more than half was spent in direct consultation with the patient in regards to our treatment and plan.

## 2024-03-20 ENCOUNTER — CLINICAL SUPPORT (OUTPATIENT)
Dept: AUDIOLOGY | Facility: CLINIC | Age: 74
End: 2024-03-20
Payer: MEDICARE

## 2024-03-20 DIAGNOSIS — H90.3 SENSORINEURAL HEARING LOSS, BILATERAL: Primary | ICD-10-CM

## 2024-03-20 PROCEDURE — 92557 COMPREHENSIVE HEARING TEST: CPT | Mod: S$GLB,,, | Performed by: AUDIOLOGIST

## 2024-03-20 PROCEDURE — 92567 TYMPANOMETRY: CPT | Mod: S$GLB,,, | Performed by: AUDIOLOGIST

## 2024-03-20 NOTE — PROGRESS NOTES
Audiologic Evaluation 3/20/2024:       Katelyn Huynh, a 73 y.o. female, was seen today in the clinic for an audiologic evaluation for hearing loss. Ms. Huynh reported occasional difficulty hearing. She denied tinnitus, otalgia, and dizziness.     Tympanometry revealed Type A tympanogram in the right ear and Type A tympanogram in the left ear. Audiogram results revealed mild to moderate sensorineural hearing loss in the right ear and mild to moderate sensorineural hearing loss in the left ear.  Speech reception thresholds were noted at 35 dB in the right ear and 35 dB in the left ear.  Speech discrimination scores were 88% in the right ear and 92% in the left ear.    Recommendations:  Otologic evaluation  Hearing aid consultation  Annual audiogram  Hearing protection when in noise

## 2024-03-21 ENCOUNTER — HOSPITAL ENCOUNTER (OUTPATIENT)
Dept: RADIOLOGY | Facility: HOSPITAL | Age: 74
Discharge: HOME | End: 2024-03-21
Attending: RADIOLOGY
Payer: MEDICARE

## 2024-03-21 ENCOUNTER — OFFICE VISIT (OUTPATIENT)
Dept: RADIATION ONCOLOGY | Facility: CLINIC | Age: 74
End: 2024-03-21
Payer: MEDICARE

## 2024-03-21 VITALS
WEIGHT: 289 LBS | HEIGHT: 65 IN | DIASTOLIC BLOOD PRESSURE: 69 MMHG | SYSTOLIC BLOOD PRESSURE: 143 MMHG | BODY MASS INDEX: 48.15 KG/M2 | HEART RATE: 91 BPM | OXYGEN SATURATION: 98 %

## 2024-03-21 DIAGNOSIS — Z86.011 PERSONAL HISTORY OF MENINGIOMA OF THE BRAIN: ICD-10-CM

## 2024-03-21 DIAGNOSIS — Z86.018 HISTORY OF MENINGIOMA: Primary | Chronic | ICD-10-CM

## 2024-03-21 PROCEDURE — 1125F AMNT PAIN NOTED PAIN PRSNT: CPT | Mod: CPTII,S$GLB,, | Performed by: RADIOLOGY

## 2024-03-21 PROCEDURE — 1159F MED LIST DOCD IN RCRD: CPT | Mod: CPTII,S$GLB,, | Performed by: RADIOLOGY

## 2024-03-21 PROCEDURE — 99999 PR PBB SHADOW E&M-EST. PATIENT-LVL III: CPT | Mod: PBBFAC,,, | Performed by: RADIOLOGY

## 2024-03-21 PROCEDURE — 70553 MRI BRAIN STEM W/O & W/DYE: CPT | Mod: 26,,, | Performed by: RADIOLOGY

## 2024-03-21 PROCEDURE — 3077F SYST BP >= 140 MM HG: CPT | Mod: CPTII,S$GLB,, | Performed by: RADIOLOGY

## 2024-03-21 PROCEDURE — 3078F DIAST BP <80 MM HG: CPT | Mod: CPTII,S$GLB,, | Performed by: RADIOLOGY

## 2024-03-21 PROCEDURE — 25500020 PHARM REV CODE 255: Performed by: RADIOLOGY

## 2024-03-21 PROCEDURE — 1101F PT FALLS ASSESS-DOCD LE1/YR: CPT | Mod: CPTII,S$GLB,, | Performed by: RADIOLOGY

## 2024-03-21 PROCEDURE — 3008F BODY MASS INDEX DOCD: CPT | Mod: CPTII,S$GLB,, | Performed by: RADIOLOGY

## 2024-03-21 PROCEDURE — A9585 GADOBUTROL INJECTION: HCPCS | Performed by: RADIOLOGY

## 2024-03-21 PROCEDURE — 70553 MRI BRAIN STEM W/O & W/DYE: CPT | Mod: TC

## 2024-03-21 PROCEDURE — 99213 OFFICE O/P EST LOW 20 MIN: CPT | Mod: S$GLB,,, | Performed by: RADIOLOGY

## 2024-03-21 PROCEDURE — 3288F FALL RISK ASSESSMENT DOCD: CPT | Mod: CPTII,S$GLB,, | Performed by: RADIOLOGY

## 2024-03-21 RX ORDER — GADOBUTROL 604.72 MG/ML
10 INJECTION INTRAVENOUS
Status: COMPLETED | OUTPATIENT
Start: 2024-03-21 | End: 2024-03-21

## 2024-03-21 RX ADMIN — GADOBUTROL 10 ML: 604.72 INJECTION INTRAVENOUS at 09:03

## 2024-03-21 NOTE — PROGRESS NOTES
"03/21/2024    Radiation Oncology Follow-Up Visit    Prior Radiation History: 50.4 Gy to planum sphenoid meningioma in late 2009    Assessment   This is a 73 y.o. 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 Zimmerman. She returns today for routine follow-up.     MRI Brain today 3/21/24 demonstrates stability of the treated meningioma. She is now 14 years out from treatment; I offered to continued with q2 year surveillance or discontinue routine imaging in the absence of new symptoms. She wished to continue surveillance. I advised that she call our office sooner if she starts to develop headaches that don't resolve, pressure behind the eyes, visual changes, or any "stroke-like" symptoms.         Plan   1) MRI Brain and f/u in 2 years.        Chief Complaint   Patient presents with    Follow-up       HPI: Doing well since last visit. Recently diagnosed with hearing loss and may need hearing aids. Stable anosmia. Denies visual field deficit. No HA, N/V, focal weakness, or speech change.         Past Medical History:   Diagnosis Date    Arthritis     Cataract     Glaucoma     History of iritis     OD    Hypertension     Meningioma     Uveitis        Past Surgical History:   Procedure Laterality Date    Brain tumor surgery      had a brain tumor removed    CATARACT EXTRACTION      OU    CATARACT EXTRACTION BILATERAL W/ ANTERIOR VITRECTOMY      CHOLECYSTECTOMY      COLONOSCOPY N/A 6/6/2022    Procedure: COLONOSCOPY;  Surgeon: Efren Kiran MD;  Location: [address]);  Service: Endoscopy;  Laterality: N/A;  BMI-56  Wt: 329#       vaccinated-GT    CRANIOTOMY      JOINT REPLACEMENT      KNEE SURGERY  2-24-15    right TKR    KNEE SURGERY Left 02/09/2018    TKR    KNEE SURGERY Left 02/24/2018    REVISION    WRIST FRACTURE SURGERY      YAG Right 1/20/2016    Dr. Kaiser       Social History     Tobacco Use    Smoking " status: Former     Current packs/day: 0.00     Average packs/day: 0.1 packs/day for 3.0 years (0.3 ttl pk-yrs)     Types: Cigarettes     Start date: 1970     Quit date: 1973     Years since quittin.9    Smokeless tobacco: Never   Substance Use Topics    Alcohol use: Yes     Alcohol/week: 2.0 standard drinks of alcohol     Types: 2 Glasses of wine per week     Comment: occasionally    Drug use: No       Cancer-related family history is not on file.    Current Outpatient Medications on File Prior to Visit   Medication Sig Dispense Refill    alcohol swabs (ALCOHOL WIPES) PadM Monitor X2 daily as directed. Per insurance coverage. 200 each 3    blood sugar diagnostic Strp Test blood sugar twice daily. Per insurance coverage. 200 each 3    blood-glucose meter (ACCU-CHEK GUIDE ME GLUCOSE MTR) Misc Use to test blood sugar 1 each 0    blood-glucose meter kit Monitor X2 daily as directed. Per insurance coverage. 1 each 0    clobetasol 0.05% (TEMOVATE) 0.05 % Oint Apply topically 2 (two) times daily. For use on body. STEROID. Use for up to 2 weeks then decrease to 3 times weekly 60 g 1    diphth,pertus,acell,,tetanus (BOOSTRIX TDAP) 2.5-8-5 Lf-mcg-Lf/0.5mL Syrg injection Inject into the muscle. 0.5 mL 0    diphth,pertus,acell,,tetanus (BOOSTRIX TDAP) 2.5-8-5 Lf-mcg-Lf/0.5mL Syrg injection Inject into the muscle. 0.5 mL 0    estradioL (ESTRACE) 0.01 % (0.1 mg/gram) vaginal cream Place 1 g vaginally 3 (three) times a week. Place by fingertip application before bedtime three times a week (Monday, Wednesday, Friday) 45 g 3    hydroCHLOROthiazide (HYDRODIURIL) 50 MG tablet Take 1 tablet (50 mg total) by mouth once daily. 90 tablet 3    lancets (ACCU-CHEK SOFTCLIX LANCETS) Misc Test blood sugar twice daily 200 each 3    losartan (COZAAR) 100 MG tablet Take 1 tablet (100 mg total) by mouth once daily. 90 tablet 3    RSVPreF3 antigen-AS01E, PF, (AREXVY) 120 mcg/0.5 mL SusR vaccine Inject into the muscle. 1 each 0     "semaglutide (OZEMPIC) 0.25 mg or 0.5 mg (2 mg/3 mL) pen injector Inject 0.25 mg into the skin every 7 days. 4.5 mL 3    ASCORBATE CALCIUM (VITAMIN C ORAL) Take by mouth once daily.       cetirizine (ZYRTEC) 10 MG tablet Take 1 tablet (10 mg total) by mouth once daily. 30 tablet 0     Current Facility-Administered Medications on File Prior to Visit   Medication Dose Route Frequency Provider Last Rate Last Admin    [COMPLETED] gadobutroL (GADAVIST) injection 10 mL  10 mL Intravenous ONCE PRN Shola Larios MD   10 mL at 03/21/24 0939       Review of patient's allergies indicates:   Allergen Reactions    Cortisone Hives and Swelling     Swelling of the tongue       Vital Signs: BP (!) 143/69   Pulse 91   Ht 5' 5" (1.651 m)   Wt 131.1 kg (289 lb 0.4 oz)   LMP  (LMP Unknown)   SpO2 98%   BMI 48.10 kg/m²     ECOG Performance Status: 2 - Ambulates, capable of self care only    Physical Exam  Constitutional:       Appearance: Normal appearance.   HENT:      Head: Normocephalic and atraumatic.   Eyes:      General: No scleral icterus.     Extraocular Movements: Extraocular movements intact.   Pulmonary:      Effort: No accessory muscle usage or respiratory distress.   Musculoskeletal:      Cervical back: Normal range of motion.   Neurological:      Mental Status: She is alert and oriented to person, place, and time.      Comments: In clinic-supplied wheelchair. Normally ambulates with walker at home   Psychiatric:         Mood and Affect: Mood and affect normal.         Judgment: Judgment normal.          Labs:    Imaging: I have personally reviewed the patient's available images and reports and summarized pertinent findings above in HPI.     Pathology: N/A      "

## 2024-03-23 ENCOUNTER — HOSPITAL ENCOUNTER (OUTPATIENT)
Dept: RADIOLOGY | Facility: HOSPITAL | Age: 74
Discharge: HOME | End: 2024-03-23
Attending: UROLOGY
Payer: MEDICARE

## 2024-03-23 DIAGNOSIS — A49.8 PROTEUS INFECTION: ICD-10-CM

## 2024-03-23 PROCEDURE — 76775 US EXAM ABDO BACK WALL LIM: CPT | Mod: 26,,, | Performed by: RADIOLOGY

## 2024-03-23 PROCEDURE — 76775 US EXAM ABDO BACK WALL LIM: CPT | Mod: TC

## 2024-03-28 ENCOUNTER — TELEPHONE (OUTPATIENT)
Dept: UROLOGY | Facility: CLINIC | Age: 74
End: 2024-03-28
Payer: MEDICARE

## 2024-03-28 DIAGNOSIS — I10 ESSENTIAL HYPERTENSION: ICD-10-CM

## 2024-03-29 NOTE — TELEPHONE ENCOUNTER
No care due was identified.  [facility] Embedded Care Due Messages. Reference number: [id number].   3/28/2024 11:56:59 PM CDT

## 2024-03-30 NOTE — TELEPHONE ENCOUNTER
Refill Routing Note   Refill Routing Note   Medication(s) are not appropriate for processing by [facility] for the following reason(s):        Required vitals abnormal    [facility] action(s):  Defer             Appointments  past 12m or future 3m with PCP    Date Provider   Last Visit   10/25/2023 Mike Almanzar MD   Next Visit   4/29/2024 Mike Almanzar MD   ED visits in past 90 days: 0        Note composed:4:39 AM 03/30/2024

## 2024-04-01 RX ORDER — LOSARTAN POTASSIUM 100 MG/1
100 TABLET ORAL
Qty: 90 TABLET | Refills: 1 | Status: SHIPPED | OUTPATIENT
Start: 2024-04-01

## 2024-04-08 ENCOUNTER — PROCEDURE VISIT (OUTPATIENT)
Dept: UROLOGY | Facility: CLINIC | Age: 74
End: 2024-04-08
Payer: MEDICARE

## 2024-04-08 VITALS
TEMPERATURE: 97 F | BODY MASS INDEX: 48.12 KG/M2 | SYSTOLIC BLOOD PRESSURE: 129 MMHG | HEART RATE: 90 BPM | HEIGHT: 65 IN | WEIGHT: 288.81 LBS | DIASTOLIC BLOOD PRESSURE: 79 MMHG

## 2024-04-08 DIAGNOSIS — N39.0 RECURRENT UTI: ICD-10-CM

## 2024-04-08 PROCEDURE — 52000 CYSTOURETHROSCOPY: CPT | Mod: S$GLB,,, | Performed by: UROLOGY

## 2024-04-08 RX ORDER — DOXYCYCLINE HYCLATE 100 MG
100 TABLET ORAL ONCE
Status: COMPLETED | OUTPATIENT
Start: 2024-04-08 | End: 2024-04-08

## 2024-04-08 RX ORDER — LIDOCAINE HYDROCHLORIDE 20 MG/ML
JELLY TOPICAL ONCE
Status: COMPLETED | OUTPATIENT
Start: 2024-04-08 | End: 2024-04-08

## 2024-04-08 RX ADMIN — LIDOCAINE HYDROCHLORIDE: 20 JELLY TOPICAL at 01:04

## 2024-04-08 RX ADMIN — Medication 100 MG: at 01:04

## 2024-04-08 NOTE — PROCEDURES
Procedures    CYSTOSCOPY REPORT    Pre Procedure Diagnosis:  recurrent UTI    Post Procedure Diagnosis:  cystocele just behind the bladder neck, involves the intertrigonal ridge.     Anesthesia: 10 cc 2% lidocaine jelly applied per urethra.    14 FR Flexible Olympus cystoscope used.    FINDINGS:  Dome, anterior, posterior, lateral walls and bladder base free of urothelial abnormalities. Right and left ureteral orifices in the normal postion and configuration, both effluxed clear urine.  Bladder neck and urethra were normal.    Specimen:  none    The patient was taken to the cystoscopy suite and placed in dorsal lithotomy position.  The genitalia was prepped and draped  in the usual sterile fashion.  Time out was performed.  Two percent lidocaine jelly was inserted in the urethra.  After sufficent time had passed to allow good local anesthesia, the cystoscope was inserted in the urethra and passed into the bladder visualizing the urethra along its entire course.  The dome, anterior, posterior and lateral walls were examined systematically.  The ureteral orifices were in their usual position and configuration.  The cystoscope was turned upon itself 180 degrees to visualize the bladder neck.  The cystoscope was then brought to the level of the bladder neck, the water was turned on and the urethra was visualized.  The cystoscope was removed and the patient was instructed to urinate prior to leaving the office.     Post procedure medication:  doxycycline 100 mg x 1    ADDITIONAL NOTES:  3/23/2024 renal ultrasound left simple renal cyst, otherwise, normal renal ultrasound.       ASSESSMENT/PLAN:  73 year old woman status post flexible cystoscopy.  1. Push fluids for 24 hours.  2. May see blood in the urine, this should gradually improve over the next 2-3 days.  3. The patient was instructed to return to the office or go to the emergency should fever, chills, cloudy urine, or inability to urinate develop.  4. Follow up as  needed. Discussed increasing fluids (1500 ml water daily) using Estrogen cream and keeping bowels soft.

## 2024-04-08 NOTE — PATIENT INSTRUCTIONS
What to Expect After a Cystoscopy  For the next 24-48 hours, you may feel a mild burning when you urinate. This burning is normal and expected. Drink plenty of water to dilute the urine to help relieve the burning sensation. You may also see a small amount of blood in your urine after the procedure.    Unless you are already taking antibiotics, you may be given an antibiotic after the test to prevent infection.    Signs and Symptoms to Report  Call the [facility] at [phone] if you develop any of the following:  Fever of 101 degrees or higher  Chills or persistent bleeding  Inability to urinate

## 2024-05-06 ENCOUNTER — PATIENT OUTREACH (OUTPATIENT)
Dept: ADMINISTRATIVE | Facility: HOSPITAL | Age: 74
End: 2024-05-06
Payer: MEDICARE

## 2024-05-06 NOTE — PROGRESS NOTES
Patient due for the following    COVID-19 Vaccine (7 - 2023-24 season)      Immunizations: reviewed and updated  Care Everywhere: triggered   Care Teams: up to date  Outreach: completed

## 2024-05-29 ENCOUNTER — OFFICE VISIT (OUTPATIENT)
Dept: PRIMARY CARE CLINIC | Facility: CLINIC | Age: 74
End: 2024-05-29
Payer: MEDICARE

## 2024-05-29 ENCOUNTER — LAB VISIT (OUTPATIENT)
Dept: LAB | Facility: HOSPITAL | Age: 74
End: 2024-05-29
Attending: STUDENT IN AN ORGANIZED HEALTH CARE EDUCATION/TRAINING PROGRAM
Payer: MEDICARE

## 2024-05-29 VITALS
BODY MASS INDEX: 46.65 KG/M2 | SYSTOLIC BLOOD PRESSURE: 110 MMHG | HEIGHT: 65 IN | WEIGHT: 280 LBS | OXYGEN SATURATION: 99 % | HEART RATE: 97 BPM | TEMPERATURE: 98 F | DIASTOLIC BLOOD PRESSURE: 68 MMHG

## 2024-05-29 DIAGNOSIS — E66.01 CLASS 3 SEVERE OBESITY DUE TO EXCESS CALORIES WITH SERIOUS COMORBIDITY AND BODY MASS INDEX (BMI) OF 45.0 TO 49.9 IN ADULT: ICD-10-CM

## 2024-05-29 DIAGNOSIS — I10 ESSENTIAL HYPERTENSION: ICD-10-CM

## 2024-05-29 DIAGNOSIS — R73.03 PREDIABETES: ICD-10-CM

## 2024-05-29 DIAGNOSIS — Z00.00 ENCOUNTER FOR PREVENTATIVE ADULT HEALTH CARE EXAMINATION: Primary | ICD-10-CM

## 2024-05-29 LAB
ANION GAP SERPL CALC-SCNC: 11 MMOL/L (ref 8–16)
BUN SERPL-MCNC: 15 MG/DL (ref 8–23)
CALCIUM SERPL-MCNC: 9.6 MG/DL (ref 8.7–10.5)
CHLORIDE SERPL-SCNC: 100 MMOL/L (ref 95–110)
CO2 SERPL-SCNC: 29 MMOL/L (ref 23–29)
CREAT SERPL-MCNC: 0.8 MG/DL (ref 0.5–1.4)
EST. GFR  (NO RACE VARIABLE): >60 ML/MIN/1.73 M^2
GLUCOSE SERPL-MCNC: 96 MG/DL (ref 70–110)
POTASSIUM SERPL-SCNC: 3.7 MMOL/L (ref 3.5–5.1)
SODIUM SERPL-SCNC: 140 MMOL/L (ref 136–145)

## 2024-05-29 PROCEDURE — 3074F SYST BP LT 130 MM HG: CPT | Mod: CPTII,S$GLB,, | Performed by: STUDENT IN AN ORGANIZED HEALTH CARE EDUCATION/TRAINING PROGRAM

## 2024-05-29 PROCEDURE — 80048 BASIC METABOLIC PNL TOTAL CA: CPT | Performed by: STUDENT IN AN ORGANIZED HEALTH CARE EDUCATION/TRAINING PROGRAM

## 2024-05-29 PROCEDURE — 1125F AMNT PAIN NOTED PAIN PRSNT: CPT | Mod: CPTII,S$GLB,, | Performed by: STUDENT IN AN ORGANIZED HEALTH CARE EDUCATION/TRAINING PROGRAM

## 2024-05-29 PROCEDURE — 1159F MED LIST DOCD IN RCRD: CPT | Mod: CPTII,S$GLB,, | Performed by: STUDENT IN AN ORGANIZED HEALTH CARE EDUCATION/TRAINING PROGRAM

## 2024-05-29 PROCEDURE — 99999 PR PBB SHADOW E&M-EST. PATIENT-LVL IV: CPT | Mod: PBBFAC,,, | Performed by: STUDENT IN AN ORGANIZED HEALTH CARE EDUCATION/TRAINING PROGRAM

## 2024-05-29 PROCEDURE — 1101F PT FALLS ASSESS-DOCD LE1/YR: CPT | Mod: CPTII,S$GLB,, | Performed by: STUDENT IN AN ORGANIZED HEALTH CARE EDUCATION/TRAINING PROGRAM

## 2024-05-29 PROCEDURE — 3008F BODY MASS INDEX DOCD: CPT | Mod: CPTII,S$GLB,, | Performed by: STUDENT IN AN ORGANIZED HEALTH CARE EDUCATION/TRAINING PROGRAM

## 2024-05-29 PROCEDURE — 1160F RVW MEDS BY RX/DR IN RCRD: CPT | Mod: CPTII,S$GLB,, | Performed by: STUDENT IN AN ORGANIZED HEALTH CARE EDUCATION/TRAINING PROGRAM

## 2024-05-29 PROCEDURE — 4010F ACE/ARB THERAPY RXD/TAKEN: CPT | Mod: CPTII,S$GLB,, | Performed by: STUDENT IN AN ORGANIZED HEALTH CARE EDUCATION/TRAINING PROGRAM

## 2024-05-29 PROCEDURE — 3288F FALL RISK ASSESSMENT DOCD: CPT | Mod: CPTII,S$GLB,, | Performed by: STUDENT IN AN ORGANIZED HEALTH CARE EDUCATION/TRAINING PROGRAM

## 2024-05-29 PROCEDURE — 36415 COLL VENOUS BLD VENIPUNCTURE: CPT | Mod: PN | Performed by: STUDENT IN AN ORGANIZED HEALTH CARE EDUCATION/TRAINING PROGRAM

## 2024-05-29 PROCEDURE — 99397 PER PM REEVAL EST PAT 65+ YR: CPT | Mod: S$GLB,,, | Performed by: STUDENT IN AN ORGANIZED HEALTH CARE EDUCATION/TRAINING PROGRAM

## 2024-05-29 PROCEDURE — 3078F DIAST BP <80 MM HG: CPT | Mod: CPTII,S$GLB,, | Performed by: STUDENT IN AN ORGANIZED HEALTH CARE EDUCATION/TRAINING PROGRAM

## 2024-05-29 NOTE — PROGRESS NOTES
Primary Care  Annual Office Visit - In Person  5/29/2024          Patient is a 73 y.o.   Katelyn Huynh  has a past medical history of Arthritis, Cataract, Glaucoma, History of iritis, Hypertension, Meningioma, and Uveitis.    Patient has no acute complaints today             Active Medications  Current Outpatient Medications   Medication Instructions    alcohol swabs (ALCOHOL WIPES) PadM Monitor X2 daily as directed. Per insurance coverage.    ASCORBATE CALCIUM (VITAMIN C ORAL) Oral, Daily    blood sugar diagnostic Strp Test blood sugar twice daily. Per insurance coverage.    blood-glucose meter (ACCU-CHEK GUIDE ME GLUCOSE MTR) Misc Use to test blood sugar    blood-glucose meter kit Monitor X2 daily as directed. Per insurance coverage.    cetirizine (ZYRTEC) 10 mg, Oral, Daily    clobetasol 0.05% (TEMOVATE) 0.05 % Oint Topical (Top), 2 times daily, For use on body. STEROID. Use for up to 2 weeks then decrease to 3 times weekly    estradioL (ESTRACE) 1 g, Vaginal, Three times weekly, Place by fingertip application before bedtime three times a week (Monday, Wednesday, Friday)    hydroCHLOROthiazide (HYDRODIURIL) 50 mg, Oral, Daily    lancets (ACCU-CHEK SOFTCLIX LANCETS) Misc Test blood sugar twice daily    losartan (COZAAR) 100 mg, Oral    RSVPreF3 antigen-AS01E, PF, (AREXVY) 120 mcg/0.5 mL SusR vaccine Intramuscular    semaglutide (OZEMPIC) 0.25 mg, Subcutaneous, Every 7 days       Annual Review of Preventative Care    Health Maintenance Due   Topic Date Due    COVID-19 Vaccine (7 - 2023-24 season) 02/25/2024     Diabetes Screening:    Lab Results   Component Value Date    HGBA1C 5.5 10/25/2023    HGBA1C 5.9 (H) 03/23/2022    HGBA1C 5.7 (H) 01/25/2018     BP Readings from Last 3 Encounters:   05/29/24 110/68   04/08/24 129/79   03/21/24 (!) 143/69     Wt Readings from Last 3 Encounters:   05/29/24 1420 127 kg (279 lb 15.8 oz)   04/08/24 1306 131 kg (288 lb 12.8 oz)   03/21/24 1036 131.1 kg (289 lb 0.4  oz)     Colon Cancer Screening: Next Due 2032  Dexa: Next Due 2032      Physical Exam  Vitals reviewed.   Constitutional:       General: She is not in acute distress.  Eyes:      Pupils: Pupils are equal, round, and reactive to light.   Cardiovascular:      Rate and Rhythm: Normal rate and regular rhythm.      Pulses: Normal pulses.      Heart sounds: Normal heart sounds.   Pulmonary:      Effort: Pulmonary effort is normal.      Breath sounds: Normal breath sounds.   Abdominal:      General: Abdomen is flat. Bowel sounds are normal.      Palpations: Abdomen is soft.   Musculoskeletal:      Right lower leg: No edema.      Left lower leg: No edema.                 Assessment and Plan     1. Encounter for preventative adult health care examination    2. Class 3 severe obesity due to excess calories with serious comorbidity and body mass index (BMI) of 45.0 to 49.9 in adult  Comments:  > 10 lb weight loss over the past year    3. Prediabetes  Comments:  Continue Ozempic    4. Essential hypertension  Comments:  Continue losartan and HCTZ  Orders:  -     BASIC METABOLIC PANEL; Future; Expected date: 05/29/2024                                     Upcoming Scheduled Appointments and Follow Up:    Future Appointments   Date Time Provider Department Center   5/29/2024  3:00 PM [facility] LAB [place]       Follow Up [facility] Care (with who? when?): Follow up in about 6 months (around 11/29/2024).        Extended Emergency Contact Information  Primary Emergency Contact: Venkatesh Huynh  Address: [address] [place], [place] [address] [place] [address] of [facility]  Home Phone: [phone]  Mobile Phone: [phone]  Relation: Spouse  Preferred language: English  Secondary Emergency Contact: Darrick Martines  Address: [address] [place], [place] [id number] [place] [address]  Mobile Phone: [phone]  Relation: Natanael Almanzar MD   Internal Medicine  5/29/2024 - 2:29 PM    I  spent a total of 30 minutes on the day of the visit.This includes face to face time and non-face to face time preparing to see the patient (eg, review of tests), obtaining and/or reviewing separately obtained history, documenting clinical information in the electronic or other health record, independently interpreting results and communicating results to the patient/family/caregiver, or care coordinator.    While patients have the right to access their medical record, it is essential to recognize that progress notes primarily serve as a means of communication among healthcare professionals.

## 2024-08-12 ENCOUNTER — OFFICE VISIT (OUTPATIENT)
Dept: OPHTHALMOLOGY | Facility: CLINIC | Age: 74
End: 2024-08-12
Payer: MEDICARE

## 2024-08-12 DIAGNOSIS — I10 ESSENTIAL HYPERTENSION: ICD-10-CM

## 2024-08-12 DIAGNOSIS — Z96.1 PSEUDOPHAKIA: ICD-10-CM

## 2024-08-12 DIAGNOSIS — H43.812 VITREOUS DETACHMENT OF LEFT EYE: ICD-10-CM

## 2024-08-12 DIAGNOSIS — H04.123 DRY EYE SYNDROME, BILATERAL: Primary | ICD-10-CM

## 2024-08-12 DIAGNOSIS — H52.7 REFRACTIVE ERROR: ICD-10-CM

## 2024-08-12 PROCEDURE — 1159F MED LIST DOCD IN RCRD: CPT | Mod: HCNC,CPTII,S$GLB, | Performed by: OPHTHALMOLOGY

## 2024-08-12 PROCEDURE — 1101F PT FALLS ASSESS-DOCD LE1/YR: CPT | Mod: HCNC,CPTII,S$GLB, | Performed by: OPHTHALMOLOGY

## 2024-08-12 PROCEDURE — 92014 COMPRE OPH EXAM EST PT 1/>: CPT | Mod: HCNC,S$GLB,, | Performed by: OPHTHALMOLOGY

## 2024-08-12 PROCEDURE — 99999 PR PBB SHADOW E&M-EST. PATIENT-LVL III: CPT | Mod: PBBFAC,HCNC,, | Performed by: OPHTHALMOLOGY

## 2024-08-12 PROCEDURE — 1126F AMNT PAIN NOTED NONE PRSNT: CPT | Mod: HCNC,CPTII,S$GLB, | Performed by: OPHTHALMOLOGY

## 2024-08-12 PROCEDURE — 3288F FALL RISK ASSESSMENT DOCD: CPT | Mod: HCNC,CPTII,S$GLB, | Performed by: OPHTHALMOLOGY

## 2024-08-12 PROCEDURE — 4010F ACE/ARB THERAPY RXD/TAKEN: CPT | Mod: HCNC,CPTII,S$GLB, | Performed by: OPHTHALMOLOGY

## 2024-08-12 NOTE — PROGRESS NOTES
Subjective:       Patient ID: Katelyn Huynh is a 74 y.o. female.    Chief Complaint: Concerns About Ocular Health (1 yr chk)    HPI     Concerns About Ocular Health     Additional comments: 1 yr chk           Comments    Patient states that vision seems about the same as last visit in both   eyes.    No gtts    1.Dry eye syndrome, bilateral   2.Vitreous detachment of left eye   3.Essential hypertension   4.Refractive error   5.Pseudophakia - Both Eyes              Last edited by Blaine Gonzalez on 8/12/2024  1:18 PM.             Assessment:       1. Dry eye syndrome, bilateral    2. Vitreous detachment of left eye    3. Essential hypertension    4. Refractive error    5. Pseudophakia - Both Eyes        Plan:       KIRILL OU-Doing well.  PVD OS-Stable.  HTN-No retinopathy OU.  RE-Pt wants copy of WRx.      AT's.  Control HTN.  Give WRx.  RTC 1 yr.

## 2024-08-16 ENCOUNTER — PATIENT MESSAGE (OUTPATIENT)
Dept: PRIMARY CARE CLINIC | Facility: CLINIC | Age: 74
End: 2024-08-16
Payer: MEDICARE

## 2024-08-16 DIAGNOSIS — R73.03 PREDIABETES: ICD-10-CM

## 2024-08-16 RX ORDER — LANCETS
EACH MISCELLANEOUS
Qty: 200 EACH | Refills: 3 | Status: SHIPPED | OUTPATIENT
Start: 2024-08-16

## 2024-08-16 RX ORDER — SEMAGLUTIDE 1.34 MG/ML
0.25 INJECTION, SOLUTION SUBCUTANEOUS
Qty: 2.25 ML | Refills: 1 | Status: SHIPPED | OUTPATIENT
Start: 2024-08-16 | End: 2025-02-12

## 2024-08-16 RX ORDER — SEMAGLUTIDE 1.34 MG/ML
0.25 INJECTION, SOLUTION SUBCUTANEOUS
COMMUNITY
End: 2024-08-16 | Stop reason: SDUPTHER

## 2024-08-16 NOTE — TELEPHONE ENCOUNTER
----- Message from Jessica Angel sent at 8/16/2024 12:38 PM CDT -----  Contact: [phone]  Requesting an RX refill or new RX.    Is this a refill or new RX: refiil     RX name and strength Ozempic     Is this a 30 day or 90 day RX: 30    Pharmacy name and phone #         [facility] - [place], [place] - [address]  [address]  [facility] [address]  Phone: [phone] Fax: [phone]

## 2024-08-16 NOTE — TELEPHONE ENCOUNTER
No care due was identified.  [facility] Embedded Care Due Messages. Reference number: [id number].   8/16/2024 12:26:03 PM CDT

## 2024-08-20 ENCOUNTER — PATIENT MESSAGE (OUTPATIENT)
Dept: PRIMARY CARE CLINIC | Facility: CLINIC | Age: 74
End: 2024-08-20
Payer: MEDICARE

## 2024-09-21 ENCOUNTER — HOSPITAL ENCOUNTER (OUTPATIENT)
Dept: RADIOLOGY | Facility: HOSPITAL | Age: 74
Discharge: HOME | End: 2024-09-21
Attending: STUDENT IN AN ORGANIZED HEALTH CARE EDUCATION/TRAINING PROGRAM
Payer: MEDICARE

## 2024-09-21 DIAGNOSIS — Z12.31 ENCOUNTER FOR SCREENING MAMMOGRAM FOR BREAST CANCER: ICD-10-CM

## 2024-09-21 PROCEDURE — 77067 SCR MAMMO BI INCL CAD: CPT | Mod: 26,HCNC,, | Performed by: RADIOLOGY

## 2024-09-21 PROCEDURE — 77067 SCR MAMMO BI INCL CAD: CPT | Mod: TC,HCNC

## 2024-09-21 PROCEDURE — 77063 BREAST TOMOSYNTHESIS BI: CPT | Mod: 26,HCNC,, | Performed by: RADIOLOGY

## 2024-09-21 PROCEDURE — 77063 BREAST TOMOSYNTHESIS BI: CPT | Mod: TC,HCNC

## 2024-11-08 DIAGNOSIS — I10 ESSENTIAL HYPERTENSION: ICD-10-CM

## 2024-11-08 RX ORDER — HYDROCHLOROTHIAZIDE 50 MG/1
50 TABLET ORAL
Qty: 90 TABLET | Refills: 1 | Status: SHIPPED | OUTPATIENT
Start: 2024-11-08

## 2024-11-08 NOTE — TELEPHONE ENCOUNTER
Provider Staff:  Action required for this patient    Requires labs      Please see care gap opportunities below in Care Due Message.    Thanks!  [facility]     Appointments      Date Provider   Last Visit   5/29/2024 Mike Almanzar MD   Next Visit   12/17/2024 Mike Almanzar MD     Refill Decision Note   Katelyn Huynh  is requesting a refill authorization.  Brief Assessment and Rationale for Refill:  Approve     Medication Therapy Plan:  FOVS      Comments:     Note composed:10:23 AM 11/08/2024

## 2024-11-08 NOTE — TELEPHONE ENCOUNTER
Care Due:                  Date            Visit Type   Department     Provider  --------------------------------------------------------------------------------                                MYCHART                              ANNUAL                              CHECKUP/PHY  [facility] PRIMARY  Last Visit: 05-      S            CARE           Mwengwe  Ndganesh                              MYCHART                              ANNUAL                              CHECKUP/PHY  [facility] PR[facility]  Next Visit: 12-      S            CARE           [facility]                                                            Last  Test          Frequency    Reason                     Performed    Due Date  --------------------------------------------------------------------------------    HBA1C.......  6 months...  semaglutide..............  10-   04-    [facility] Embedded Care Due Messages. Reference number: [id number].   11/08/2024 8:52:21 AM CST

## 2024-11-25 ENCOUNTER — LAB VISIT (OUTPATIENT)
Dept: LAB | Facility: HOSPITAL | Age: 74
End: 2024-11-25
Attending: STUDENT IN AN ORGANIZED HEALTH CARE EDUCATION/TRAINING PROGRAM
Payer: MEDICARE

## 2024-11-25 ENCOUNTER — OFFICE VISIT (OUTPATIENT)
Dept: PRIMARY CARE CLINIC | Facility: CLINIC | Age: 74
End: 2024-11-25
Payer: MEDICARE

## 2024-11-25 VITALS
DIASTOLIC BLOOD PRESSURE: 72 MMHG | TEMPERATURE: 98 F | SYSTOLIC BLOOD PRESSURE: 114 MMHG | HEIGHT: 65 IN | HEART RATE: 88 BPM | OXYGEN SATURATION: 100 % | WEIGHT: 277.75 LBS | BODY MASS INDEX: 46.28 KG/M2

## 2024-11-25 DIAGNOSIS — I10 ESSENTIAL HYPERTENSION: ICD-10-CM

## 2024-11-25 DIAGNOSIS — R73.03 PREDIABETES: ICD-10-CM

## 2024-11-25 DIAGNOSIS — Z71.3 DIETARY COUNSELING: ICD-10-CM

## 2024-11-25 DIAGNOSIS — I10 ESSENTIAL HYPERTENSION: Primary | ICD-10-CM

## 2024-11-25 DIAGNOSIS — E66.01 CLASS 3 SEVERE OBESITY DUE TO EXCESS CALORIES WITH SERIOUS COMORBIDITY AND BODY MASS INDEX (BMI) OF 45.0 TO 49.9 IN ADULT: ICD-10-CM

## 2024-11-25 DIAGNOSIS — Z99.89 DEPENDENCE ON OTHER ENABLING MACHINES AND DEVICES: ICD-10-CM

## 2024-11-25 DIAGNOSIS — E66.813 CLASS 3 SEVERE OBESITY DUE TO EXCESS CALORIES WITH SERIOUS COMORBIDITY AND BODY MASS INDEX (BMI) OF 45.0 TO 49.9 IN ADULT: ICD-10-CM

## 2024-11-25 LAB
ALBUMIN SERPL BCP-MCNC: 3.4 G/DL (ref 3.5–5.2)
ALP SERPL-CCNC: 89 U/L (ref 40–150)
ALT SERPL W/O P-5'-P-CCNC: 9 U/L (ref 10–44)
ANION GAP SERPL CALC-SCNC: 12 MMOL/L (ref 8–16)
AST SERPL-CCNC: 15 U/L (ref 10–40)
BILIRUB SERPL-MCNC: 0.7 MG/DL (ref 0.1–1)
BUN SERPL-MCNC: 14 MG/DL (ref 8–23)
CALCIUM SERPL-MCNC: 9.5 MG/DL (ref 8.7–10.5)
CHLORIDE SERPL-SCNC: 101 MMOL/L (ref 95–110)
CHOLEST SERPL-MCNC: 155 MG/DL (ref 120–199)
CHOLEST/HDLC SERPL: 2.2 {RATIO} (ref 2–5)
CO2 SERPL-SCNC: 27 MMOL/L (ref 23–29)
CREAT SERPL-MCNC: 0.9 MG/DL (ref 0.5–1.4)
EST. GFR  (NO RACE VARIABLE): >60 ML/MIN/1.73 M^2
ESTIMATED AVG GLUCOSE: 108 MG/DL (ref 68–131)
GLUCOSE SERPL-MCNC: 89 MG/DL (ref 70–110)
HBA1C MFR BLD: 5.4 % (ref 4–5.6)
HDLC SERPL-MCNC: 69 MG/DL (ref 40–75)
HDLC SERPL: 44.5 % (ref 20–50)
LDLC SERPL CALC-MCNC: 65.4 MG/DL (ref 63–159)
NONHDLC SERPL-MCNC: 86 MG/DL
POTASSIUM SERPL-SCNC: 3.9 MMOL/L (ref 3.5–5.1)
PROT SERPL-MCNC: 8 G/DL (ref 6–8.4)
SODIUM SERPL-SCNC: 140 MMOL/L (ref 136–145)
TRIGL SERPL-MCNC: 103 MG/DL (ref 30–150)

## 2024-11-25 PROCEDURE — 1159F MED LIST DOCD IN RCRD: CPT | Mod: HCNC,CPTII,S$GLB, | Performed by: STUDENT IN AN ORGANIZED HEALTH CARE EDUCATION/TRAINING PROGRAM

## 2024-11-25 PROCEDURE — 3008F BODY MASS INDEX DOCD: CPT | Mod: HCNC,CPTII,S$GLB, | Performed by: STUDENT IN AN ORGANIZED HEALTH CARE EDUCATION/TRAINING PROGRAM

## 2024-11-25 PROCEDURE — 3078F DIAST BP <80 MM HG: CPT | Mod: HCNC,CPTII,S$GLB, | Performed by: STUDENT IN AN ORGANIZED HEALTH CARE EDUCATION/TRAINING PROGRAM

## 2024-11-25 PROCEDURE — 80053 COMPREHEN METABOLIC PANEL: CPT | Mod: HCNC | Performed by: STUDENT IN AN ORGANIZED HEALTH CARE EDUCATION/TRAINING PROGRAM

## 2024-11-25 PROCEDURE — G2211 COMPLEX E/M VISIT ADD ON: HCPCS | Mod: HCNC,S$GLB,, | Performed by: STUDENT IN AN ORGANIZED HEALTH CARE EDUCATION/TRAINING PROGRAM

## 2024-11-25 PROCEDURE — 3074F SYST BP LT 130 MM HG: CPT | Mod: HCNC,CPTII,S$GLB, | Performed by: STUDENT IN AN ORGANIZED HEALTH CARE EDUCATION/TRAINING PROGRAM

## 2024-11-25 PROCEDURE — 1126F AMNT PAIN NOTED NONE PRSNT: CPT | Mod: HCNC,CPTII,S$GLB, | Performed by: STUDENT IN AN ORGANIZED HEALTH CARE EDUCATION/TRAINING PROGRAM

## 2024-11-25 PROCEDURE — 99214 OFFICE O/P EST MOD 30 MIN: CPT | Mod: HCNC,S$GLB,, | Performed by: STUDENT IN AN ORGANIZED HEALTH CARE EDUCATION/TRAINING PROGRAM

## 2024-11-25 PROCEDURE — 99999 PR PBB SHADOW E&M-EST. PATIENT-LVL III: CPT | Mod: PBBFAC,HCNC,, | Performed by: STUDENT IN AN ORGANIZED HEALTH CARE EDUCATION/TRAINING PROGRAM

## 2024-11-25 PROCEDURE — 80061 LIPID PANEL: CPT | Mod: HCNC | Performed by: STUDENT IN AN ORGANIZED HEALTH CARE EDUCATION/TRAINING PROGRAM

## 2024-11-25 PROCEDURE — 1101F PT FALLS ASSESS-DOCD LE1/YR: CPT | Mod: HCNC,CPTII,S$GLB, | Performed by: STUDENT IN AN ORGANIZED HEALTH CARE EDUCATION/TRAINING PROGRAM

## 2024-11-25 PROCEDURE — 83036 HEMOGLOBIN GLYCOSYLATED A1C: CPT | Mod: HCNC | Performed by: STUDENT IN AN ORGANIZED HEALTH CARE EDUCATION/TRAINING PROGRAM

## 2024-11-25 PROCEDURE — 1160F RVW MEDS BY RX/DR IN RCRD: CPT | Mod: HCNC,CPTII,S$GLB, | Performed by: STUDENT IN AN ORGANIZED HEALTH CARE EDUCATION/TRAINING PROGRAM

## 2024-11-25 PROCEDURE — 4010F ACE/ARB THERAPY RXD/TAKEN: CPT | Mod: HCNC,CPTII,S$GLB, | Performed by: STUDENT IN AN ORGANIZED HEALTH CARE EDUCATION/TRAINING PROGRAM

## 2024-11-25 PROCEDURE — 3288F FALL RISK ASSESSMENT DOCD: CPT | Mod: HCNC,CPTII,S$GLB, | Performed by: STUDENT IN AN ORGANIZED HEALTH CARE EDUCATION/TRAINING PROGRAM

## 2024-11-25 RX ORDER — LOSARTAN POTASSIUM 100 MG/1
100 TABLET ORAL DAILY
Qty: 90 TABLET | Refills: 3 | Status: SHIPPED | OUTPATIENT
Start: 2024-11-25

## 2024-11-25 RX ORDER — SEMAGLUTIDE 1.34 MG/ML
0.25 INJECTION, SOLUTION SUBCUTANEOUS
Qty: 2.25 ML | Refills: 3 | Status: SHIPPED | OUTPATIENT
Start: 2024-11-25

## 2024-11-25 RX ORDER — HYDROCHLOROTHIAZIDE 50 MG/1
50 TABLET ORAL DAILY
Qty: 90 TABLET | Refills: 3 | Status: SHIPPED | OUTPATIENT
Start: 2024-11-25

## 2024-11-25 NOTE — PROGRESS NOTES
Primary Care  Office Visit - In Person  11/25/2024      HPI    Patient is a 74 y.o.   Katelyn Huynh  has a past medical history of Arthritis, Cataract, Glaucoma, History of iritis, Hypertension, Meningioma, and Uveitis.    History of Present Illness    CHIEF COMPLAINT:  Patient presents today for follow-up.    DENTAL:  She received new dentures on Thursday and reports difficulty adjusting, particularly with eating and chewing.     MEDICATIONS:  She expresses concern about potential discontinuation of Ozempic coverage by her insurance, noting she may need prior authorization to continue. She denies experiencing abdominal pain or significant GI issues from Ozempic.    SLEEP:  She reports ongoing sleep issues, experiencing difficulty falling asleep until early morning hours (between 12 AM and 2 AM). She typically awakens around 4:15 AM when her 's alarm goes off, sometimes falling back asleep around 8 AM until 10 AM. Melatonin has been ineffective in addressing her sleep problems. She denies engaging in significant daytime activities.    DERMATOLOGY:  She reports itching and possible recurrence of eczema, particularly on certain spots on her legs. She has been using Zyrtec for symptom relief and old ointments from previous treatments for itch relief. She plans to see a dermatologist for evaluation and management.    MOBILITY:  She primarily uses a walker for support when outside the home and alternates between a cane and walker inside. She experiences ongoing knee pain that varies in intensity, often correlating with weather changes. She has difficulty standing up from chairs without armrests and limitations in raising one leg. She declined knee surgery due to concerns about a challenging rehabilitation process. One leg is particularly problematic due to ruptured tendons. She expresses frustration with her mobility limitations.          Active Medications:  Current Outpatient Medications  "  Medication Instructions    alcohol swabs (ALCOHOL WIPES) PadM Monitor X2 daily as directed. Per insurance coverage.    ASCORBATE CALCIUM (VITAMIN C ORAL) Daily    blood sugar diagnostic (ACCU-CHEK GUIDE TEST STRIPS) Strp Test blood sugar twice daily. Per insurance coverage.    blood-glucose meter (ACCU-CHEK GUIDE ME GLUCOSE MTR) Misc Use to test blood sugar    blood-glucose meter kit Monitor X2 daily as directed. Per insurance coverage.    cetirizine (ZYRTEC) 10 mg, Oral, Daily    clobetasol 0.05% (TEMOVATE) 0.05 % Oint Topical (Top), 2 times daily, For use on body. STEROID. Use for up to 2 weeks then decrease to 3 times weekly    estradioL (ESTRACE) 1 g, Vaginal, Three times weekly, Place by fingertip application before bedtime three times a week (Monday, Wednesday, Friday)    hydroCHLOROthiazide (HYDRODIURIL) 50 mg, Oral, Daily    lancets (ACCU-CHEK SOFTCLIX LANCETS) Misc Test blood sugar twice daily    losartan (COZAAR) 100 mg, Oral, Daily    OZEMPIC 0.25 mg, Subcutaneous, Every 7 days    RSVPreF3 antigen-AS01E, PF, (AREXVY) 120 mcg/0.5 mL SusR vaccine Intramuscular       Vitals:    11/25/24 0819   BP: 114/72   BP Location: Right arm   Patient Position: Sitting   Pulse: 88   Temp: 98 °F (36.7 °C)   SpO2: 100%   Weight: 126 kg (277 lb 12.5 oz)   Height: 5' 5" (1.651 m)       Physical Exam  Vitals reviewed.   Constitutional:       General: She is not in acute distress.  Cardiovascular:      Rate and Rhythm: Normal rate and regular rhythm.      Pulses: Normal pulses.      Heart sounds: Normal heart sounds.   Pulmonary:      Effort: Pulmonary effort is normal.      Breath sounds: Normal breath sounds.   Abdominal:      General: Abdomen is flat. Bowel sounds are normal.      Palpations: Abdomen is soft.   Musculoskeletal:      Right lower leg: No edema.      Left lower leg: No edema.   Neurological:      General: No focal deficit present.      Mental Status: She is oriented to person, place, and time.      "       Assessment & Plan      Assessed patient's adaptation to new dental prosthesis  Evaluated sleep disturbances  Considered orthopedic or pain management referral for chronic knee pain, but patient declined  Reviewed patient's limited mobility and use of assistive devices (walker, cane)  Assessed Ozempic usage and potential discontinuation due to insurance coverage issues    INSOMNIA:  Educated on the importance of increased daytime activity for better sleep.  Explained the normal decrease in sleep requirements with aging.  Patient to reduce screen time before bedtime.  Recommend increasing daytime activity levels.  Patient can consider walking outside or using the elliptical/pedal bike at home.  Patient to avoid spending time in the bedroom during the day.  Continued melatonin for sleep aid.  Discontinued Zyrtec as a sleep aid.    HYPERTENSION:  Continue losartan and HCTZ  -F/u CMP and lipid panel     DIETARY COUNSELING:  Advised on the risks of using compounded weight loss medications due to lack of regulatory oversight.    PREDIABETES   CLASS 3 OBESITY:   As above   F/u HbA1C     DIFFICULTY IN WALKING / DEPENDENCE ON ENABLING DEVICES:  Patient to continue using the walker when necessary to prevent falls.        Orders Placed This Encounter    Lipid Panel    Hemoglobin A1C    Comprehensive Metabolic Panel    hydroCHLOROthiazide (HYDRODIURIL) 50 MG tablet    losartan (COZAAR) 100 MG tablet    semaglutide (OZEMPIC) 0.25 mg or 0.5 mg(2 mg/1.5 mL) pen injector         Previous labs, records, and notes reviewed and considered for their impact on clinical decision making today.                Upcoming Scheduled Appointments and Follow Up:    Future Appointments   Date Time Provider Department Center   11/25/2024 10:30 AM LAB, [place] [facility]       Follow Up [facility] Care (with who? when?): Follow up in about 4 months (around 3/25/2025).      Extended Emergency Contact Information  Primary  Emergency Contact: Venkatesh Huynh  Address: [address] [place] [address] [place] of [facility]  Home Phone: [phone]  Mobile Phone: [phone]  Relation: Spouse  Preferred language: English  Secondary Emergency Contact: Darrick Martines  Address: [address] [place] [address] [place]  Mobile Phone: [phone]  Relation: Natanael Almanzar MD   Internal Medicine  11/25/2024 - 9:06 AM    I spent a total of 30 minutes on the day of the visit.This includes face to face time and non-face to face time preparing to see the patient (eg, review of tests), obtaining and/or reviewing separately obtained history, documenting clinical information in the electronic or other health record, independently interpreting results and communicating results to the patient/family/caregiver, or care coordinator.    This note was generated with the assistance of ambient listening technology. Verbal consent was obtained by the patient and accompanying visitor(s) for the recording of patient appointment to facilitate this note. I attest to having reviewed and edited the generated note for accuracy, though some syntax or spelling errors may persist. Please contact the author of this note for any clarification.      While patients have the right to access their medical record, it is essential to recognize that progress notes primarily serve as a means of communication among healthcare professionals.

## 2025-01-16 ENCOUNTER — TELEPHONE (OUTPATIENT)
Dept: PRIMARY CARE CLINIC | Facility: CLINIC | Age: 75
End: 2025-01-16
Payer: MEDICARE

## 2025-01-16 ENCOUNTER — E-VISIT (OUTPATIENT)
Dept: PRIMARY CARE CLINIC | Facility: CLINIC | Age: 75
End: 2025-01-16
Payer: MEDICARE

## 2025-01-16 ENCOUNTER — PATIENT MESSAGE (OUTPATIENT)
Dept: PRIMARY CARE CLINIC | Facility: CLINIC | Age: 75
End: 2025-01-16

## 2025-01-16 DIAGNOSIS — R30.0 DYSURIA: Primary | ICD-10-CM

## 2025-01-16 PROCEDURE — 99421 OL DIG E/M SVC 5-10 MIN: CPT | Mod: ,,, | Performed by: STUDENT IN AN ORGANIZED HEALTH CARE EDUCATION/TRAINING PROGRAM

## 2025-01-16 NOTE — TELEPHONE ENCOUNTER
----- Message from Lynn sent at 1/16/2025 11:12 AM CST -----  Contact: [phone]  .[email or web address]     Patient is calling for Medical Advice regarding:Patient would like a call back about a UTI.     Comments: Please call    Please advise patient replies from provider may take up to 48 hours.

## 2025-01-17 RX ORDER — NITROFURANTOIN 25; 75 MG/1; MG/1
100 CAPSULE ORAL 2 TIMES DAILY
Qty: 10 CAPSULE | Refills: 0 | Status: SHIPPED | OUTPATIENT
Start: 2025-01-17 | End: 2025-01-22

## 2025-01-17 NOTE — PROGRESS NOTES
Patient ID: Katelyn Huynh is a 74 y.o. female.    Chief Complaint: General Illness (Entered automatically based on patient selection in [facility].)    The patient initiated a request through [employer] on 1/16/2025 for evaluation and management with a chief complaint of General Illness (Entered automatically based on patient selection in [facility].)     I evaluated the questionnaire submission on 1/17/25.    [facility]-Common Problems    1/17/2025  1:49 PM CST - Filed by Patient   What do you need help with? Urinary Symptoms   Do you agree to participate in an E-Visit? Yes   If you have any of the following symptoms, please present to your local emergency room or call 911:  I acknowledge   What is the main issue you would like addressed today? Urinary tract infection   What symptoms do you currently have? Pain while passing urine   When did your symptoms first appear? 1/12/2025   List what you have done or taken to help your symptoms. Drink cranberry juice and plenty of water   Please indicate whether you have had the following symptoms during the past 24 hours     Urgent urination (a sudden and uncontrollable urge to urinate) Moderate   Frequent urination of small amounts of urine (going to the toilet very often) Moderate   Burning pain when urinating None   Incomplete bladder emptying (still feel like you need to urinate again after urination) Moderate   Pain not associated with urination in the lower abdomen below the belly button) None   What does your urine look like? Clear   Blood seen in the urine None   Flank pain (pain in one or both sides of the lower back) Mild   Abnormal Vaginal Discharge (abnormal amount, color and/or odor) None   Discharge from the urethra (urinary opening) without urination None   High body temperature/fever? None-<99.5   Please rate how much discomfort you have experience because of the symptoms in the past 24 hours: Mild   Please indicate how the symptoms have  interfered with your every day activities/work in the past 24 hours: Mild   Please indicate how these symptoms have interfered with your social activities (visiting people, meeting with friends, etc.) in the past 24 hours? Mild   Are you a diabetic? No   Please indicate whether you have the following at the time of completion of this questionnaire: None of the above   Provide any additional information you feel is important.    Please attach any relevant images or files (if you have performed a home test for UTI, please submit a photo of results)    Are you able to take your vital signs? Yes   Systolic Blood Pressure:    Diastolic Blood Pressure:    Weight:    Height:    Pulse:    Temperature: 97.2   Respiration rate:    Pulse Oxygen:          Encounter Diagnosis   Name Primary?    Dysuria Yes        Orders Placed This Encounter   Procedures    Urinalysis, Reflex to Urine Culture Urine, Clean Catch     Order Specific Question:   Preferred Collection Type     Answer:   Urine, Clean Catch     Order Specific Question:   Specimen Source     Answer:   Urine      Medications Ordered This Encounter   Medications    nitrofurantoin, macrocrystal-monohydrate, (MACROBID) 100 MG capsule     Sig: Take 1 capsule (100 mg total) by mouth 2 (two) times daily. for 5 days     Dispense:  10 capsule     Refill:  0        No follow-ups on file.      E-Visit Time Trackin minutes

## 2025-02-22 DIAGNOSIS — Z00.00 ENCOUNTER FOR MEDICARE ANNUAL WELLNESS EXAM: ICD-10-CM

## 2025-04-16 DIAGNOSIS — N95.2 VAGINAL ATROPHY: ICD-10-CM

## 2025-04-21 RX ORDER — ESTRADIOL 0.1 MG/G
CREAM VAGINAL
Qty: 42.5 G | Refills: 0 | Status: SHIPPED | OUTPATIENT
Start: 2025-04-21

## 2025-05-30 ENCOUNTER — OFFICE VISIT (OUTPATIENT)
Dept: OTOLARYNGOLOGY | Facility: CLINIC | Age: 75
End: 2025-05-30
Payer: MEDICARE

## 2025-05-30 VITALS
SYSTOLIC BLOOD PRESSURE: 109 MMHG | DIASTOLIC BLOOD PRESSURE: 52 MMHG | WEIGHT: 282.31 LBS | OXYGEN SATURATION: 97 % | BODY MASS INDEX: 46.98 KG/M2 | HEART RATE: 92 BPM

## 2025-05-30 DIAGNOSIS — H90.3 SENSORINEURAL HEARING LOSS (SNHL) OF BOTH EARS: ICD-10-CM

## 2025-05-30 DIAGNOSIS — J34.2 NASAL SEPTAL DEVIATION: ICD-10-CM

## 2025-05-30 DIAGNOSIS — H61.23 BILATERAL IMPACTED CERUMEN: Primary | ICD-10-CM

## 2025-05-30 PROCEDURE — 99999 PR PBB SHADOW E&M-EST. PATIENT-LVL III: CPT | Mod: PBBFAC,HCNC,, | Performed by: SPECIALIST

## 2025-05-30 NOTE — PROGRESS NOTES
Subjective:       Patient ID: Katelyn Huynh is a 74 y.o. female.    Chief Complaint: Cerumen Impaction      The patient is coming in for follow-up visit.  It has been 17 months since I last saw her.  There are multiple issues to discuss:   1. Sensorineural hearing loss:  The patient does not feel that she has had significant worsening in her hearing.  Audiogram performed in March of last year did show a flat mild-to-moderate sensorineural hearing loss that was symmetrical.  Bilateral hearing aids were recommended but the patient did not pursue them.  2. Allergic rhinitis:  Nasal secretions are clear.  The patient is having some nasal stuffiness and postnasal drip She is taking cetirizine.        Review of Systems     Constitutional: Negative for appetite change, chills, fatigue, fever and unexpected weight loss.      HENT: Positive for hearing loss.  Negative for ear discharge, ear infection, ear pain (blockage of the ears with wax), facial swelling, mouth sores, nosebleeds, postnasal drip, ringing in the ears, runny nose, sinus infection, sinus pressure, sore throat, stuffy nose, tonsil infection, dental problems, trouble swallowing and voice change.      Eyes:  Positive for change in eyesight. Negative for eye drainage, eye itching and photophobia. Ocular hypertension    Respiratory:  Positive for shortness of breath. Negative for cough, sleep apnea, snoring and wheezing.      Cardiovascular:  Positive for foot swelling, irregular heartbeat and swollen veins. Negative for chest pain.     Gastrointestinal:  Negative for abdominal pain, acid reflux, constipation, diarrhea, heartburn and vomiting.     Genitourinary: Negative for difficulty urinating, sexual problems and frequent urination.     Musc: Positive for aching joints, aching muscles, back pain and neck pain.     Skin: Positive for rash.     Allergy: Negative for food allergies and seasonal allergies.     Endocrine: Negative for cold  intolerance and heat intolerance.  Morbid obesity  Type 2 diabetes mellitus    Neurological: Negative for dizziness, headaches, light-headedness, seizures and tremors.      Hematologic: Negative for bruises/bleeds easily and swollen glands.      Psychiatric: Negative for decreased concentration, depression, nervous/anxious and sleep disturbance.              Objective:      Physical Exam  Vitals and nursing note reviewed.   Constitutional:       General: She is awake.      Appearance: Normal appearance. She is well-developed and well-groomed. She is morbidly obese.   HENT:      Head: Normocephalic.      Jaw: There is normal jaw occlusion.      Salivary Glands: Right salivary gland is not diffusely enlarged or tender. Left salivary gland is not diffusely enlarged or tender.      Right Ear: Ear canal and external ear normal. Decreased hearing noted. There is impacted cerumen. Tympanic membrane is retracted.      Left Ear: Ear canal and external ear normal. Decreased hearing noted. There is impacted cerumen. Tympanic membrane is retracted.      Nose: Mucosal edema (cyanotic, boggy inferior turbinates bilaterally) and rhinorrhea (clear mucus bilaterally) present. No nasal deformity or septal deviation (midline septum). Rhinorrhea is clear.      Right Turbinates: Enlarged and pale.      Left Turbinates: Enlarged and pale.      Mouth/Throat:      Lips: No lesions.      Mouth: No oral lesions.      Dentition: Abnormal dentition. No gum lesions.      Tongue: No lesions.      Palate: No mass and lesions.      Pharynx: Oropharynx is clear. Uvula midline.   Eyes:      General: Lids are normal.         Right eye: No discharge.         Left eye: No discharge.      Conjunctiva/sclera:      Right eye: Right conjunctiva is injected. No exudate.     Left eye: Left conjunctiva is injected. No exudate.     Pupils: Pupils are equal, round, and reactive to light.   Neck:      Thyroid: No thyroid mass or thyromegaly.      Trachea:  Trachea normal. No tracheal deviation.   Cardiovascular:      Rate and Rhythm: Normal rate and regular rhythm.      Pulses: Normal pulses.      Heart sounds: Normal heart sounds.   Pulmonary:      Effort: Pulmonary effort is normal.      Breath sounds: Normal breath sounds. No stridor. No decreased breath sounds, wheezing, rhonchi or rales.   Abdominal:      General: Bowel sounds are normal.      Palpations: Abdomen is soft.      Tenderness: There is no abdominal tenderness.   Musculoskeletal:         General: Normal range of motion.      Cervical back: Normal range of motion. No muscular tenderness.   Lymphadenopathy:      Head:      Right side of head: No submental, submandibular, preauricular, posterior auricular or occipital adenopathy.      Left side of head: No submental, submandibular, preauricular, posterior auricular or occipital adenopathy.      Cervical: No cervical adenopathy.   Skin:     General: Skin is warm and dry.      Findings: No petechiae or rash.      Nails: There is no clubbing.   Neurological:      Mental Status: She is alert and oriented to person, place, and time.      Cranial Nerves: No cranial nerve deficit.      Sensory: No sensory deficit.      Gait: Gait normal.   Psychiatric:         Speech: Speech normal.         Behavior: Behavior normal. Behavior is cooperative.         Thought Content: Thought content normal.         Judgment: Judgment normal.       Procedure- cerumen impactions removed bilaterally using wire loop an 8 [place] suction.  The patient tolerated procedure well was discharged post procedure    Review of most recent audiogram:        Assessment:       1. Bilateral impacted cerumen    2. Nasal septal deviation    3. Sensorineural hearing loss (SNHL) of both ears          Plan:       I will schedule patient for a comprehensive auditory evaluation.  She has sufficient hearing loss that she would benefit from bilateral amplification.  I am giving her a copy of the audiogram  with written medical clearance for bilateral hearing aids on the bottom of it.  I will recheck her in 1 year, or sooner on an as-needed basis              DISCLAIMER: This note was prepared with [employer] voice recognition transcription software. Garbled syntax, mangled pronouns, and other bizarre constructions may be attributed to that software system. While efforts were made to correct any mistakes made by this voice recognition program, some errors and/or omissions may remain in the note that were missed when the note was originally created.

## 2025-05-30 NOTE — PROCEDURES
"Ear Cerumen Removal    Date/Time: 5/30/2025 2:40 PM    Performed by: YOAN Alcantar MD  Authorized by: YOAN Alcantar MD    Time out: Immediately prior to procedure a "time out" was called to verify the correct patient, procedure, equipment, support staff and site/side marked as required.    Consent Done?:  Yes (Verbal)    Local anesthetic:  None  Location details:  Both ears  Procedure type: curette    Procedure type comment:  Wire loop and 8 Fr suction  Cerumen  Removal Results:  Cerumen completely removed  Patient tolerance:  Patient tolerated the procedure well with no immediate complications    "

## 2025-06-12 ENCOUNTER — OFFICE VISIT (OUTPATIENT)
Dept: PRIMARY CARE CLINIC | Facility: CLINIC | Age: 75
End: 2025-06-12
Payer: MEDICARE

## 2025-06-12 VITALS
BODY MASS INDEX: 46.28 KG/M2 | HEART RATE: 91 BPM | HEIGHT: 65 IN | SYSTOLIC BLOOD PRESSURE: 140 MMHG | OXYGEN SATURATION: 98 % | WEIGHT: 277.75 LBS | DIASTOLIC BLOOD PRESSURE: 80 MMHG

## 2025-06-12 DIAGNOSIS — D32.9 MENINGIOMA: ICD-10-CM

## 2025-06-12 DIAGNOSIS — R73.03 PREDIABETES: ICD-10-CM

## 2025-06-12 DIAGNOSIS — E66.01 CLASS 3 SEVERE OBESITY DUE TO EXCESS CALORIES WITH SERIOUS COMORBIDITY AND BODY MASS INDEX (BMI) OF 45.0 TO 49.9 IN ADULT: ICD-10-CM

## 2025-06-12 DIAGNOSIS — Z00.00 ENCOUNTER FOR PREVENTATIVE ADULT HEALTH CARE EXAMINATION: Primary | ICD-10-CM

## 2025-06-12 DIAGNOSIS — I10 ESSENTIAL HYPERTENSION: ICD-10-CM

## 2025-06-12 DIAGNOSIS — E66.813 CLASS 3 SEVERE OBESITY DUE TO EXCESS CALORIES WITH SERIOUS COMORBIDITY AND BODY MASS INDEX (BMI) OF 45.0 TO 49.9 IN ADULT: ICD-10-CM

## 2025-06-12 DIAGNOSIS — G47.00 INSOMNIA, UNSPECIFIED TYPE: ICD-10-CM

## 2025-06-12 DIAGNOSIS — Z91.89 AT RISK FOR OBSTRUCTIVE SLEEP APNEA: ICD-10-CM

## 2025-06-12 PROCEDURE — 99999 PR PBB SHADOW E&M-EST. PATIENT-LVL IV: CPT | Mod: PBBFAC,HCNC,, | Performed by: STUDENT IN AN ORGANIZED HEALTH CARE EDUCATION/TRAINING PROGRAM

## 2025-06-12 NOTE — PROGRESS NOTES
Primary Care  Annual Office Visit - In Person  6/12/2025          Patient is a 74 y.o.   Katelyn Huynh  has a past medical history of Arthritis, Cataract, Glaucoma, History of iritis, Hypertension, Meningioma, and Uveitis.    History of Present Illness    CHIEF COMPLAINT:  Patient presents today for follow up of insomnia    SLEEP:  She reports chronic insomnia and utilizes television for background noise while sleeping, a longstanding habit.      Active Medications  Current Outpatient Medications   Medication Instructions    alcohol swabs (ALCOHOL WIPES) PadM Monitor X2 daily as directed. Per insurance coverage.    ASCORBATE CALCIUM (VITAMIN C ORAL) Daily    blood sugar diagnostic (ACCU-CHEK GUIDE TEST STRIPS) Strp Test blood sugar twice daily. Per insurance coverage.    blood-glucose meter (ACCU-CHEK GUIDE ME GLUCOSE MTR) Misc Use to test blood sugar    blood-glucose meter kit Monitor X2 daily as directed. Per insurance coverage.    cetirizine (ZYRTEC) 10 mg, Oral, Daily    clobetasol 0.05% (TEMOVATE) 0.05 % Oint Topical (Top), 2 times daily, For use on body. STEROID. Use for up to 2 weeks then decrease to 3 times weekly    estradioL (ESTRACE) 0.01 % (0.1 mg/gram) vaginal cream INSERT 1 GRAM VAGINALLY BY FINGERTIP 3 TIMES PER WEEK BEFORE BEDTIME( MONDAY, WEDNESDAY, FRIDAY)    hydroCHLOROthiazide (HYDRODIURIL) 50 mg, Oral, Daily    lancets (ACCU-CHEK SOFTCLIX LANCETS) Misc Test blood sugar twice daily    losartan (COZAAR) 100 mg, Oral, Daily    RSVPreF3 antigen-AS01E, PF, (AREXVY) 120 mcg/0.5 mL SusR vaccine Intramuscular       Annual Review of Preventative Care    There are no preventive care reminders to display for this patient.  Diabetes Screening:    Lab Results   Component Value Date    HGBA1C 5.4 11/25/2024    HGBA1C 5.5 10/25/2023    HGBA1C 5.9 (H) 03/23/2022     BP Readings from Last 3 Encounters:   06/12/25 (!) 140/80   05/30/25 (!) 109/52   11/25/24 114/72     Wt Readings from Last 3  Encounters:   06/12/25 1312 126 kg (277 lb 12.5 oz)   05/30/25 1407 128 kg (282 lb 4.8 oz)   11/25/24 0819 126 kg (277 lb 12.5 oz)            Physical Exam  Vitals reviewed.   Constitutional:       General: She is not in acute distress.  Eyes:      Pupils: Pupils are equal, round, and reactive to light.   Cardiovascular:      Rate and Rhythm: Normal rate and regular rhythm.      Pulses: Normal pulses.      Heart sounds: Normal heart sounds.   Pulmonary:      Effort: Pulmonary effort is normal.      Breath sounds: Normal breath sounds.   Abdominal:      General: Abdomen is flat. Bowel sounds are normal.      Palpations: Abdomen is soft.   Musculoskeletal:      Right lower leg: No edema.      Left lower leg: No edema.               Assessment & Plan      INSOMNIA:  Previously counseled on poor sleep hygiene.   Recommend reducing screen time and light exposure before bedtime.  Suggested switching from television to calming sounds for sleep, and trying coloring books instead of phone apps for relaxation.  Referred the patient to sleep medicine to r/o EMY     HYPERTENSION:  Continue losartan and HCTZ. She did not take antihypertensives until appointment. Monitor.   -F/u BMP     OBESITY, CLASS 3:  Continue indoor walking and use of elliptical for exercise.  Noted the patient's limited mobility due to using a walker and primarily walking inside the house.  Discussed increasing daytime activity levels and considering purchasing a treadmill for home use.    PREDIABETES   As above     HX MENINGIOMA:   S/p resection 2012  Stable. Last MRI 3/2024              Orders Placed This Encounter    Ambulatory referral/consult to Sleep Disorders                             Upcoming Scheduled Appointments and Follow Up:    Future Appointments   Date Time Provider Department Center   6/17/2025 11:00 AM Amanda Colin AU.D [facility]   9/23/2025  8:30 AM Nadja Rice NP [facility]       Follow Up  [facility] (with who? when?): No follow-ups on file.        Extended Emergency Contact Information  Primary Emergency Contact: Venkatesh Huynh  Address: [address] [place] [address] [facility]  Home Phone: [phone]  Mobile Phone: [phone]  Relation: Spouse  Preferred language: English  Secondary Emergency Contact: Darrick Martines  Address: [address] [place] [address] [place]  Mobile Phone: [phone]  Relation: Natanael Almanzar MD   Internal Medicine  6/12/2025 - 1:56 PM    I spent a total of 30 minutes on the day of the visit.This includes face to face time and non-face to face time preparing to see the patient (eg, review of tests), obtaining and/or reviewing separately obtained history, documenting clinical information in the electronic or other health record, independently interpreting results and communicating results to the patient/family/caregiver, or care coordinator.    This note was generated with the assistance of ambient listening technology. Verbal consent was obtained by the patient and accompanying visitor(s) for the recording of patient appointment to facilitate this note. I attest to having reviewed and edited the generated note for accuracy, though some syntax or spelling errors may persist. Please contact the author of this note for any clarification.      While patients have the right to access their medical record, it is essential to recognize that progress notes primarily serve as a means of communication among healthcare professionals.

## 2025-06-25 ENCOUNTER — PATIENT MESSAGE (OUTPATIENT)
Dept: ADMINISTRATIVE | Facility: HOSPITAL | Age: 75
End: 2025-06-25
Payer: MEDICARE

## 2025-06-27 ENCOUNTER — PATIENT OUTREACH (OUTPATIENT)
Dept: ADMINISTRATIVE | Facility: HOSPITAL | Age: 75
End: 2025-06-27
Payer: MEDICARE

## 2025-06-27 DIAGNOSIS — I10 ESSENTIAL (PRIMARY) HYPERTENSION: Primary | ICD-10-CM

## 2025-07-02 ENCOUNTER — TELEPHONE (OUTPATIENT)
Dept: OPHTHALMOLOGY | Facility: CLINIC | Age: 75
End: 2025-07-02
Payer: MEDICARE

## 2025-07-15 NOTE — PROGRESS NOTES
Ms. Katelyn Huynh was seen in the clinic today for an audiological evaluation. She has a documented history of bilateral sensorineural hearing loss.  She reported that she does not feel as though her hearing has changed since her previous audiogram ().    Audiological testing revealed hearing in the mild to moderate sensorineural range for the right ear and revealed hearing in the mild to moderate sensorineural range for the left ear.  A speech reception threshold was obtained at 30 dBHL for the right ear and at 30 dBHL for the left ear.  Speech discrimination scores were 96%% for the right ear and 100%% for the left ear.      Tympanometry testing revealed a Type A tympanogram for the right ear and revealed a Type A tympanogram for the left ear.      Recommendations:  Otologic evaluation, as needed  Annual audiological evaluation  Hearing protection in noise  Hearing aid consultation

## 2025-07-16 ENCOUNTER — CLINICAL SUPPORT (OUTPATIENT)
Dept: OTOLARYNGOLOGY | Facility: CLINIC | Age: 75
End: 2025-07-16
Payer: MEDICARE

## 2025-07-16 DIAGNOSIS — H90.3 SENSORINEURAL HEARING LOSS OF BOTH EARS: Primary | ICD-10-CM

## 2025-07-16 PROCEDURE — 92557 COMPREHENSIVE HEARING TEST: CPT | Mod: HCNC,S$GLB,, | Performed by: AUDIOLOGIST

## 2025-07-16 PROCEDURE — 92567 TYMPANOMETRY: CPT | Mod: HCNC,S$GLB,, | Performed by: AUDIOLOGIST

## 2025-07-21 ENCOUNTER — TELEPHONE (OUTPATIENT)
Dept: OTOLARYNGOLOGY | Facility: CLINIC | Age: 75
End: 2025-07-21
Payer: MEDICARE

## (undated) DEVICE — ELECTRODE REM PLYHSV RETURN 9

## (undated) DEVICE — MASK FLYTE HOOD PEEL AWAY

## (undated) DEVICE — PULSAVAC ZIMMER

## (undated) DEVICE — TOURNIQUET SB QC DP 34X4IN

## (undated) DEVICE — SUT VICRYL BR 1 GEN 27 CT-1

## (undated) DEVICE — BANDAGE ESMARK 6X12

## (undated) DEVICE — BOWL CEMENT

## (undated) DEVICE — DRESSING AQUACEL FOAM 5 X 5

## (undated) DEVICE — SUT 0 VICRYL / CT-1

## (undated) DEVICE — Device

## (undated) DEVICE — KIT DRESSING PREVENA PLUS

## (undated) DEVICE — NDL 18GA X1 1/2 REG BEVEL

## (undated) DEVICE — TOURNIQUET SB QC SP 44X4IN

## (undated) DEVICE — SEE MEDLINE ITEM 154981

## (undated) DEVICE — HOOD T-5 TEAR AWAY STERILE

## (undated) DEVICE — KIT EVACUATOR 3-SPRING 1/8 DRN

## (undated) DEVICE — SUT VICRYL 3-0 27 SH

## (undated) DEVICE — KIT IRR SUCTION HND PIECE

## (undated) DEVICE — SEE MEDLINE ITEM 152487

## (undated) DEVICE — SUT FIBERTAPE 1.3MM TAILS

## (undated) DEVICE — SYR 30CC LUER LOCK

## (undated) DEVICE — KIT PREVENA PLUS

## (undated) DEVICE — SEE MEDLINE ITEM 146298

## (undated) DEVICE — TIBIAL POST STAB SZ 4 11X3 POL
Type: IMPLANTABLE DEVICE | Site: KNEE | Status: NON-FUNCTIONAL
Removed: 2018-02-24

## (undated) DEVICE — INSERT TRIATHLON SZ4 11MM
Type: IMPLANTABLE DEVICE | Site: TIBIA | Status: NON-FUNCTIONAL
Removed: 2018-02-09

## (undated) DEVICE — CANISTER INFOV.A.C WOUND 500ML

## (undated) DEVICE — LAVAGE WOUND BACTISURE 1L BAG

## (undated) DEVICE — BLADE SAGITTAL 18 X 1.27 X 90M

## (undated) DEVICE — PUMP COLD THERAPY

## (undated) DEVICE — DRESSING AQUACEL AG ADV 3.5X12

## (undated) DEVICE — SEE MEDLINE ITEM 157117

## (undated) DEVICE — RETRIEVER SUTURE HEWSON DISP

## (undated) DEVICE — DRESSING PREVENA CUSTOMIZABLE

## (undated) DEVICE — DRESSING TEGADERM IV 3.5 X 4.5

## (undated) DEVICE — KIT TOTAL KNEE TKOFG

## (undated) DEVICE — BRACE KNEE T SCOPE PREMIER

## (undated) DEVICE — PAD COLD THERAPY KNEE WRAP ON

## (undated) DEVICE — TAPE SURG DURAPORE 2 X10YD

## (undated) DEVICE — SOL IRR NACL .9% 3000ML

## (undated) DEVICE — SUT FIBERWIRE #5